# Patient Record
Sex: FEMALE | Race: WHITE | NOT HISPANIC OR LATINO | URBAN - METROPOLITAN AREA
[De-identification: names, ages, dates, MRNs, and addresses within clinical notes are randomized per-mention and may not be internally consistent; named-entity substitution may affect disease eponyms.]

---

## 2015-07-30 RX ORDER — HYDROXYCHLOROQUINE SULFATE 200 MG
2 TABLET ORAL
Qty: 0 | Refills: 0 | COMMUNITY
Start: 2015-07-30

## 2017-01-30 ENCOUNTER — EMERGENCY (EMERGENCY)
Facility: HOSPITAL | Age: 47
LOS: 1 days | Discharge: PSYCHIATRIC OR SUBSTANCE USE FACILITY | End: 2017-01-30
Attending: FAMILY MEDICINE | Admitting: FAMILY MEDICINE
Payer: MEDICAID

## 2017-01-30 VITALS
HEART RATE: 92 BPM | DIASTOLIC BLOOD PRESSURE: 80 MMHG | RESPIRATION RATE: 19 BRPM | OXYGEN SATURATION: 98 % | TEMPERATURE: 99 F | SYSTOLIC BLOOD PRESSURE: 122 MMHG

## 2017-01-30 DIAGNOSIS — F25.0 SCHIZOAFFECTIVE DISORDER, BIPOLAR TYPE: ICD-10-CM

## 2017-01-30 DIAGNOSIS — L93.0 DISCOID LUPUS ERYTHEMATOSUS: ICD-10-CM

## 2017-01-30 DIAGNOSIS — R69 ILLNESS, UNSPECIFIED: ICD-10-CM

## 2017-01-30 DIAGNOSIS — F10.21 ALCOHOL DEPENDENCE, IN REMISSION: ICD-10-CM

## 2017-01-30 LAB
ALBUMIN SERPL ELPH-MCNC: 3.5 G/DL — SIGNIFICANT CHANGE UP (ref 3.3–5)
ALP SERPL-CCNC: 40 U/L — SIGNIFICANT CHANGE UP (ref 40–120)
ALT FLD-CCNC: 20 U/L — SIGNIFICANT CHANGE UP (ref 12–78)
AMPHET UR-MCNC: NEGATIVE — SIGNIFICANT CHANGE UP
ANION GAP SERPL CALC-SCNC: 8 MMOL/L — SIGNIFICANT CHANGE UP (ref 5–17)
APAP SERPL-MCNC: 11 UG/ML — SIGNIFICANT CHANGE UP (ref 10–30)
APPEARANCE UR: CLEAR — SIGNIFICANT CHANGE UP
AST SERPL-CCNC: 14 U/L — LOW (ref 15–37)
BARBITURATES UR SCN-MCNC: NEGATIVE — SIGNIFICANT CHANGE UP
BASOPHILS # BLD AUTO: 0.1 K/UL — SIGNIFICANT CHANGE UP (ref 0–0.2)
BASOPHILS NFR BLD AUTO: 1.1 % — SIGNIFICANT CHANGE UP (ref 0–2)
BENZODIAZ UR-MCNC: NEGATIVE — SIGNIFICANT CHANGE UP
BILIRUB SERPL-MCNC: 0.2 MG/DL — SIGNIFICANT CHANGE UP (ref 0.2–1.2)
BILIRUB UR-MCNC: NEGATIVE — SIGNIFICANT CHANGE UP
BUN SERPL-MCNC: 7 MG/DL — SIGNIFICANT CHANGE UP (ref 7–23)
CALCIUM SERPL-MCNC: 8.9 MG/DL — SIGNIFICANT CHANGE UP (ref 8.5–10.1)
CHLORIDE SERPL-SCNC: 109 MMOL/L — HIGH (ref 96–108)
CO2 SERPL-SCNC: 25 MMOL/L — SIGNIFICANT CHANGE UP (ref 22–31)
COCAINE METAB.OTHER UR-MCNC: NEGATIVE — SIGNIFICANT CHANGE UP
COLOR SPEC: YELLOW — SIGNIFICANT CHANGE UP
CREAT SERPL-MCNC: 0.74 MG/DL — SIGNIFICANT CHANGE UP (ref 0.5–1.3)
DIFF PNL FLD: NEGATIVE — SIGNIFICANT CHANGE UP
EOSINOPHIL # BLD AUTO: 0 K/UL — SIGNIFICANT CHANGE UP (ref 0–0.5)
EOSINOPHIL NFR BLD AUTO: 0.9 % — SIGNIFICANT CHANGE UP (ref 0–6)
ETHANOL SERPL-MCNC: <10 MG/DL — SIGNIFICANT CHANGE UP (ref 0–10)
GLUCOSE SERPL-MCNC: 72 MG/DL — SIGNIFICANT CHANGE UP (ref 70–99)
GLUCOSE UR QL: NEGATIVE MG/DL — SIGNIFICANT CHANGE UP
HCG UR QL: NEGATIVE — SIGNIFICANT CHANGE UP
HCT VFR BLD CALC: 38 % — SIGNIFICANT CHANGE UP (ref 34.5–45)
HGB BLD-MCNC: 12.2 G/DL — SIGNIFICANT CHANGE UP (ref 11.5–15.5)
KETONES UR-MCNC: NEGATIVE — SIGNIFICANT CHANGE UP
LEUKOCYTE ESTERASE UR-ACNC: NEGATIVE — SIGNIFICANT CHANGE UP
LYMPHOCYTES # BLD AUTO: 2 K/UL — SIGNIFICANT CHANGE UP (ref 1–3.3)
LYMPHOCYTES # BLD AUTO: 41.2 % — SIGNIFICANT CHANGE UP (ref 13–44)
MCHC RBC-ENTMCNC: 28.3 PG — SIGNIFICANT CHANGE UP (ref 27–34)
MCHC RBC-ENTMCNC: 31.9 GM/DL — LOW (ref 32–36)
MCV RBC AUTO: 88.7 FL — SIGNIFICANT CHANGE UP (ref 80–100)
METHADONE UR-MCNC: NEGATIVE — SIGNIFICANT CHANGE UP
MONOCYTES # BLD AUTO: 0.2 K/UL — SIGNIFICANT CHANGE UP (ref 0–0.9)
MONOCYTES NFR BLD AUTO: 4.4 % — SIGNIFICANT CHANGE UP (ref 2–14)
NEUTROPHILS # BLD AUTO: 2.6 K/UL — SIGNIFICANT CHANGE UP (ref 1.8–7.4)
NEUTROPHILS NFR BLD AUTO: 52.3 % — SIGNIFICANT CHANGE UP (ref 43–77)
NITRITE UR-MCNC: NEGATIVE — SIGNIFICANT CHANGE UP
OPIATES UR-MCNC: NEGATIVE — SIGNIFICANT CHANGE UP
PCP SPEC-MCNC: SIGNIFICANT CHANGE UP
PCP UR-MCNC: NEGATIVE — SIGNIFICANT CHANGE UP
PH UR: 6 — SIGNIFICANT CHANGE UP (ref 4.8–8)
PLATELET # BLD AUTO: 257 K/UL — SIGNIFICANT CHANGE UP (ref 150–400)
POTASSIUM SERPL-MCNC: 3.8 MMOL/L — SIGNIFICANT CHANGE UP (ref 3.5–5.3)
POTASSIUM SERPL-SCNC: 3.8 MMOL/L — SIGNIFICANT CHANGE UP (ref 3.5–5.3)
PROT SERPL-MCNC: 6.6 GM/DL — SIGNIFICANT CHANGE UP (ref 6–8.3)
PROT UR-MCNC: NEGATIVE MG/DL — SIGNIFICANT CHANGE UP
RBC # BLD: 4.29 M/UL — SIGNIFICANT CHANGE UP (ref 3.8–5.2)
RBC # FLD: 13 % — SIGNIFICANT CHANGE UP (ref 10.3–14.5)
SALICYLATES SERPL-MCNC: <1.7 MG/DL — LOW (ref 2.8–20)
SODIUM SERPL-SCNC: 142 MMOL/L — SIGNIFICANT CHANGE UP (ref 135–145)
SP GR SPEC: 1 — LOW (ref 1.01–1.02)
THC UR QL: NEGATIVE — SIGNIFICANT CHANGE UP
TSH SERPL-MCNC: 2.2 UIU/ML — SIGNIFICANT CHANGE UP (ref 0.36–3.74)
UROBILINOGEN FLD QL: NEGATIVE MG/DL — SIGNIFICANT CHANGE UP
VALPROATE SERPL-MCNC: 24 UG/ML — LOW (ref 50–100)
WBC # BLD: 4.9 K/UL — SIGNIFICANT CHANGE UP (ref 3.8–10.5)
WBC # FLD AUTO: 4.9 K/UL — SIGNIFICANT CHANGE UP (ref 3.8–10.5)

## 2017-01-30 PROCEDURE — 99285 EMERGENCY DEPT VISIT HI MDM: CPT

## 2017-01-30 RX ORDER — ACETAMINOPHEN 500 MG
1000 TABLET ORAL ONCE
Qty: 0 | Refills: 0 | Status: COMPLETED | OUTPATIENT
Start: 2017-01-30 | End: 2017-01-30

## 2017-01-30 RX ADMIN — Medication 1000 MILLIGRAM(S): at 19:03

## 2017-01-30 NOTE — ED ADULT NURSE REASSESSMENT NOTE - NS ED NURSE REASSESS COMMENT FT1
Pt is a 46 y/o A&O x 4 female presents to ED for SI/HI (see triage note for details). Pt cooperative. Psych checklist completed per protocol. CO initiaited for safety. Urine specimen obtained and sent to lab. Will continue to monitor.
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
## 2017-01-30 NOTE — ED PROVIDER NOTE - MEDICAL DECISION MAKING DETAILS
46 y/o F w/ hx of lupus, schizophrenia presents with increased pain and stress recently. Will order labs, tylenol, and psych consult. Plan re-eval.

## 2017-01-30 NOTE — ED BEHAVIORAL HEALTH ASSESSMENT NOTE - CURRENT MEDICATION
Iván Solis NPP-[place]   Depakote 750 mg PO HS  Luvox 150 mg po daily ;Klonopin 0.5 mg po HS; Rexulti 4 mg po daily(for past week)  Metformin 1500 mg po daily     Levothyroxine 75 mcg po daily       Plaquinal 400 mg po daily  Celcept 2000 mg po daily

## 2017-01-30 NOTE — ED BEHAVIORAL HEALTH ASSESSMENT NOTE - HPI (INCLUDE ILLNESS QUALITY, SEVERITY, DURATION, TIMING, CONTEXT, MODIFYING FACTORS, ASSOCIATED SIGNS AND SYMPTOMS)
47 y.o. DWF known to [facility] and [facility] from previous treatment. Pt. is presently in control, easily tearful and describes both physical c/o pain and  c/o auditory hallucinations, chronic pain, impaired sleep/hypersomnia. States has had recent medication changes and "they are not working ". Pt. reports 6 mos. feeling "stable " under care of Dr Chacon with [facility] and "then I changed because of advice and I feel terrible now, the Depakote isn't working" Describes constant pain and no current rheumatologist for Lupus symptoms. Reports compliance with medication. 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 Describes "loves both (type) jobs."  Tearful during interview, maintained good eye contact, articulate and easily engaged. BF stayed with pt. for a period of time and then left. Have not yet reached for collateral info. Attends AA 4x/week, has a "wonderful" sponsor and BF is supportive per pt.   Recommended to come to ED by PCP, MATTHEW Lozano NP.    Pt. states she fears hallucinations will worsen, "and I have to be able to see my kids".   A&Ox4, hx of multiple admissions and several suicide attempts.   Admits to current SI with plan to "cut wrist open". Fears the violence of plan. Admits to paranoid ideations , including IOR.

## 2017-01-30 NOTE — ED PROVIDER NOTE - OBJECTIVE STATEMENT
48 y/o w/hx of herniated disc, DM, Hashimoto's thyroid dx, lupus, schizophrenia, sciatica presents to the ED for psychological issues and overall pain including in the head with onset in the past few days. C/o hearing voices, paranoia, head pain, back pain, finger pain, knee pain, toe pain. States she will hear another tv in another room talking about her. Last SI attempted 6 years ago, currently has thoughts of cutting herself. No visual hallucinations. Pt has seen three different Doctors in the past few weeks for pain. On Depakote ER, 4mg rexulti Butanox. Reports increased pain and stress recently. Denies fever, chills, CP, n/v/d.

## 2017-01-30 NOTE — ED PROVIDER NOTE - MUSCULOSKELETAL, MLM
Spine appears normal, range of motion is not limited. Edema at the PIP joints in bilateral upper extremities. Tender wrists bilaterally.

## 2017-01-30 NOTE — ED PROVIDER NOTE - PROGRESS NOTE DETAILS
Ana at [facility] states that they do not acceopt voluntary patient. Spoke to  on call for psych will with call Dr. Hernandez. She called back and confirmed that it is a new [facility] policy that voluntary admission can not be accepted for transfer. Pt to stay in ER overnight. I Ivná Dockery was the scribe for Dr. Branch. AJM: pt received at signout. seen by psych who has arranged for transfer to inpatient psych. stable for transfer.

## 2017-01-30 NOTE — ED BEHAVIORAL HEALTH NOTE - BEHAVIORAL HEALTH NOTE
Accepted at [facility], precert obtained from [facility]. Voluntary forms signed and faxed to [facility]. Awaiting ambulance at 10:20pm

## 2017-01-30 NOTE — ED ADULT NURSE REASSESSMENT NOTE - NS ED NURSE REASSESS COMMENT FT1
Pt remains assessed as above. Ambulating with steady gait. CO maintained for safety. Awaiting transfer to [facility]. Provided with dinner and water per pt request. Will continue to monitor

## 2017-01-30 NOTE — ED BEHAVIORAL HEALTH ASSESSMENT NOTE - SUICIDE RISK FACTORS
Chronic pain or acute medical issue/Access to means (pills, firearms, etc.)/Unable to engage in safety planning/Agitation/severe anxiety/Mood episode
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
## 2017-01-30 NOTE — ED PROVIDER NOTE - NS ED MD SCRIBE ATTENDING SCRIBE SECTIONS
HISTORY OF PRESENT ILLNESS/PHYSICAL EXAM/VITAL SIGNS( Pullset)/PAST MEDICAL/SURGICAL/SOCIAL HISTORY/REVIEW OF SYSTEMS/DISPOSITION

## 2017-01-30 NOTE — ED BEHAVIORAL HEALTH ASSESSMENT NOTE - SUICIDE PROTECTIVE FACTORS
Supportive social network or family/Engaged in work or school/Positive therapeutic relationships/Responsibility to family and others/Identifies reasons for living

## 2017-01-30 NOTE — ED ADULT NURSE NOTE - NS ED NURSE LEVEL OF CONSCIOUSNESS ORIENTATION
Ideation - suicidal/Oriented - self; Oriented - place; Oriented - time/Ideation - homicidal/Hallucination - audio

## 2017-01-30 NOTE — ED PROVIDER NOTE - PMH
delivery delivered    Other forms of systemic lupus erythematosus, unspecified organ involvement status    Schizo-affective schizophrenia

## 2017-01-30 NOTE — ED BEHAVIORAL HEALTH ASSESSMENT NOTE - DETAILS
see above states has OD'd in past and cut self rash: Lamictal, gained 15 lbs since started Depakote in 12/16 parents/undiagnosed; cousin w/ Bipolar "many" c/o back pain and "all over" muscle pains transfer faxed clinicals

## 2017-01-31 VITALS
OXYGEN SATURATION: 100 % | SYSTOLIC BLOOD PRESSURE: 127 MMHG | DIASTOLIC BLOOD PRESSURE: 81 MMHG | HEART RATE: 65 BPM | RESPIRATION RATE: 20 BRPM

## 2017-01-31 PROCEDURE — 93010 ELECTROCARDIOGRAM REPORT: CPT

## 2017-01-31 RX ORDER — DIVALPROEX SODIUM 500 MG/1
750 TABLET, DELAYED RELEASE ORAL ONCE
Qty: 0 | Refills: 0 | Status: COMPLETED | OUTPATIENT
Start: 2017-01-31 | End: 2017-01-31

## 2017-01-31 RX ORDER — HYDROXYCHLOROQUINE SULFATE 200 MG
400 TABLET ORAL ONCE
Qty: 0 | Refills: 0 | Status: COMPLETED | OUTPATIENT
Start: 2017-01-31 | End: 2017-01-31

## 2017-01-31 RX ORDER — IBUPROFEN 200 MG
600 TABLET ORAL ONCE
Qty: 0 | Refills: 0 | Status: COMPLETED | OUTPATIENT
Start: 2017-01-31 | End: 2017-01-31

## 2017-01-31 RX ORDER — CLONAZEPAM 1 MG
0.5 TABLET ORAL ONCE
Qty: 0 | Refills: 0 | Status: DISCONTINUED | OUTPATIENT
Start: 2017-01-31 | End: 2017-01-31

## 2017-01-31 RX ADMIN — Medication 400 MILLIGRAM(S): at 03:29

## 2017-01-31 RX ADMIN — Medication 600 MILLIGRAM(S): at 03:33

## 2017-01-31 RX ADMIN — DIVALPROEX SODIUM 750 MILLIGRAM(S): 500 TABLET, DELAYED RELEASE ORAL at 03:10

## 2017-01-31 RX ADMIN — Medication 0.5 MILLIGRAM(S): at 03:07

## 2017-01-31 NOTE — ED ADULT NURSE REASSESSMENT NOTE - STATUS
awaiting transfer, assessment change/pt is a/ox3, pt is personable and appropriate, receptive to care.   Pt awaiting bed at other psych facility.

## 2017-01-31 NOTE — ED BEHAVIORAL HEALTH NOTE - BEHAVIORAL HEALTH NOTE
Pt accepted at [facility] on Voluntary status today.  Accepting MD max Mccurdy.  Transportation set up with [facility] for within the hour of 1030 am/  [facility] [place] [id number].  Jaja Nolasco is contact for next [facility] .  SW email sent requesting for transportation [facility].

## 2017-02-11 ENCOUNTER — TRANSCRIPTION ENCOUNTER (OUTPATIENT)
Age: 47
End: 2017-02-11

## 2017-04-16 ENCOUNTER — EMERGENCY (EMERGENCY)
Facility: HOSPITAL | Age: 47
LOS: 0 days | Discharge: HOME | End: 2017-04-16
Attending: EMERGENCY MEDICINE | Admitting: EMERGENCY MEDICINE
Payer: COMMERCIAL

## 2017-04-16 VITALS
DIASTOLIC BLOOD PRESSURE: 64 MMHG | HEIGHT: 63 IN | RESPIRATION RATE: 18 BRPM | OXYGEN SATURATION: 100 % | TEMPERATURE: 99 F | SYSTOLIC BLOOD PRESSURE: 104 MMHG | WEIGHT: 156.09 LBS | HEART RATE: 89 BPM
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
## 2017-04-16 DIAGNOSIS — S49.91XA UNSPECIFIED INJURY OF RIGHT SHOULDER AND UPPER ARM, INITIAL ENCOUNTER: ICD-10-CM

## 2017-04-16 DIAGNOSIS — S40.011A CONTUSION OF RIGHT SHOULDER, INITIAL ENCOUNTER: ICD-10-CM
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
## 2017-04-16 PROCEDURE — 99283 EMERGENCY DEPT VISIT LOW MDM: CPT

## 2017-04-16 NOTE — ED PROVIDER NOTE - NS ED MD SCRIBE ATTENDING SCRIBE SECTIONS
DISPOSITION/HISTORY OF PRESENT ILLNESS/PAST MEDICAL/SURGICAL/SOCIAL HISTORY/REVIEW OF SYSTEMS/PROGRESS NOTE/PHYSICAL EXAM/RESULTS
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
## 2017-04-16 NOTE — ED PROVIDER NOTE - DETAILS:
Dr. Gillespie: I performed the initial face to face bedside interview with this patient regarding history of present illness, review of symptoms and past medical, social and family history.  I completed an independent physical examination.  I was the initial provider who evaluated this patient.  The history, review of symptoms and examination was documented by the scribe in my presence and I attest to the accuracy of the documentation.  I have signed out the follow up of any pending tests (i.e. labs, radiological studies) to the ACP.  I have discussed the patient’s plan of care and disposition with the ACP.  Return to the ER immediately for any worsening symptoms, concerns, chest pain, fevers, shortness of breath, vomiting, abdominal pain, rashes, neck pain, back pain, numbness, paresthesias, pain or any difficulties at all.  Please follow up with your own private physician or our medical clinic at [phone] in the next 2-3 days.  Find a doctor at [phone].
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
## 2017-04-16 NOTE — ED PROVIDER NOTE - MEDICAL DECISION MAKING DETAILS
Pt currently calm, denying any imaging at this time and informed to come back to ED with any worsening of symptoms.

## 2017-06-11 ENCOUNTER — TRANSCRIPTION ENCOUNTER (OUTPATIENT)
Age: 47
End: 2017-06-11

## 2017-06-13 ENCOUNTER — EMERGENCY (EMERGENCY)
Facility: HOSPITAL | Age: 47
LOS: 0 days | Discharge: HOME | End: 2017-06-13
Attending: EMERGENCY MEDICINE | Admitting: EMERGENCY MEDICINE
Payer: MEDICAID

## 2017-06-13 VITALS
SYSTOLIC BLOOD PRESSURE: 121 MMHG | HEART RATE: 68 BPM | RESPIRATION RATE: 18 BRPM | OXYGEN SATURATION: 98 % | DIASTOLIC BLOOD PRESSURE: 78 MMHG | TEMPERATURE: 98 F

## 2017-06-13 VITALS — HEIGHT: 67 IN | WEIGHT: 156.97 LBS

## 2017-06-13 DIAGNOSIS — F43.23 ADJUSTMENT DISORDER WITH MIXED ANXIETY AND DEPRESSED MOOD: ICD-10-CM

## 2017-06-13 DIAGNOSIS — F31.76 BIPOLAR DISORDER, IN FULL REMISSION, MOST RECENT EPISODE DEPRESSED: ICD-10-CM

## 2017-06-13 LAB
ALBUMIN SERPL ELPH-MCNC: 3.5 G/DL — SIGNIFICANT CHANGE UP (ref 3.3–5)
ALP SERPL-CCNC: 50 U/L — SIGNIFICANT CHANGE UP (ref 40–120)
ALT FLD-CCNC: 14 U/L — SIGNIFICANT CHANGE UP (ref 12–78)
AMPHET UR-MCNC: NEGATIVE — SIGNIFICANT CHANGE UP
ANION GAP SERPL CALC-SCNC: 7 MMOL/L — SIGNIFICANT CHANGE UP (ref 5–17)
APAP SERPL-MCNC: 16 UG/ML — SIGNIFICANT CHANGE UP (ref 10–30)
APPEARANCE UR: CLEAR — SIGNIFICANT CHANGE UP
AST SERPL-CCNC: 15 U/L — SIGNIFICANT CHANGE UP (ref 15–37)
BARBITURATES UR SCN-MCNC: NEGATIVE — SIGNIFICANT CHANGE UP
BASOPHILS # BLD AUTO: 0 K/UL — SIGNIFICANT CHANGE UP (ref 0–0.2)
BASOPHILS NFR BLD AUTO: 0.8 % — SIGNIFICANT CHANGE UP (ref 0–2)
BENZODIAZ UR-MCNC: NEGATIVE — SIGNIFICANT CHANGE UP
BILIRUB SERPL-MCNC: 0.2 MG/DL — SIGNIFICANT CHANGE UP (ref 0.2–1.2)
BILIRUB UR-MCNC: NEGATIVE — SIGNIFICANT CHANGE UP
BUN SERPL-MCNC: 14 MG/DL — SIGNIFICANT CHANGE UP (ref 7–23)
CALCIUM SERPL-MCNC: 8.5 MG/DL — SIGNIFICANT CHANGE UP (ref 8.5–10.1)
CHLORIDE SERPL-SCNC: 111 MMOL/L — HIGH (ref 96–108)
CO2 SERPL-SCNC: 22 MMOL/L — SIGNIFICANT CHANGE UP (ref 22–31)
COCAINE METAB.OTHER UR-MCNC: NEGATIVE — SIGNIFICANT CHANGE UP
COLOR SPEC: YELLOW — SIGNIFICANT CHANGE UP
CREAT SERPL-MCNC: 0.86 MG/DL — SIGNIFICANT CHANGE UP (ref 0.5–1.3)
DIFF PNL FLD: NEGATIVE — SIGNIFICANT CHANGE UP
EOSINOPHIL # BLD AUTO: 0.1 K/UL — SIGNIFICANT CHANGE UP (ref 0–0.5)
EOSINOPHIL NFR BLD AUTO: 1.2 % — SIGNIFICANT CHANGE UP (ref 0–6)
ETHANOL SERPL-MCNC: <10 MG/DL — SIGNIFICANT CHANGE UP (ref 0–10)
GLUCOSE SERPL-MCNC: 102 MG/DL — HIGH (ref 70–99)
GLUCOSE UR QL: NEGATIVE MG/DL — SIGNIFICANT CHANGE UP
HCT VFR BLD CALC: 36.9 % — SIGNIFICANT CHANGE UP (ref 34.5–45)
HGB BLD-MCNC: 12.6 G/DL — SIGNIFICANT CHANGE UP (ref 11.5–15.5)
KETONES UR-MCNC: NEGATIVE — SIGNIFICANT CHANGE UP
LEUKOCYTE ESTERASE UR-ACNC: NEGATIVE — SIGNIFICANT CHANGE UP
LITHIUM SERPL-MCNC: 0.3 MMOL/L — LOW (ref 0.6–1.2)
LYMPHOCYTES # BLD AUTO: 1.6 K/UL — SIGNIFICANT CHANGE UP (ref 1–3.3)
LYMPHOCYTES # BLD AUTO: 28.5 % — SIGNIFICANT CHANGE UP (ref 13–44)
MCHC RBC-ENTMCNC: 30.3 PG — SIGNIFICANT CHANGE UP (ref 27–34)
MCHC RBC-ENTMCNC: 34.2 GM/DL — SIGNIFICANT CHANGE UP (ref 32–36)
MCV RBC AUTO: 88.6 FL — SIGNIFICANT CHANGE UP (ref 80–100)
METHADONE UR-MCNC: NEGATIVE — SIGNIFICANT CHANGE UP
MONOCYTES # BLD AUTO: 0.3 K/UL — SIGNIFICANT CHANGE UP (ref 0–0.9)
MONOCYTES NFR BLD AUTO: 5.5 % — SIGNIFICANT CHANGE UP (ref 2–14)
NEUTROPHILS # BLD AUTO: 3.5 K/UL — SIGNIFICANT CHANGE UP (ref 1.8–7.4)
NEUTROPHILS NFR BLD AUTO: 64 % — SIGNIFICANT CHANGE UP (ref 43–77)
NITRITE UR-MCNC: NEGATIVE — SIGNIFICANT CHANGE UP
OPIATES UR-MCNC: NEGATIVE — SIGNIFICANT CHANGE UP
PCP SPEC-MCNC: SIGNIFICANT CHANGE UP
PCP UR-MCNC: NEGATIVE — SIGNIFICANT CHANGE UP
PH UR: 5 — SIGNIFICANT CHANGE UP (ref 5–8)
PLATELET # BLD AUTO: 264 K/UL — SIGNIFICANT CHANGE UP (ref 150–400)
POTASSIUM SERPL-MCNC: 4.7 MMOL/L — SIGNIFICANT CHANGE UP (ref 3.5–5.3)
POTASSIUM SERPL-SCNC: 4.7 MMOL/L — SIGNIFICANT CHANGE UP (ref 3.5–5.3)
PROT SERPL-MCNC: 6.5 GM/DL — SIGNIFICANT CHANGE UP (ref 6–8.3)
PROT UR-MCNC: NEGATIVE MG/DL — SIGNIFICANT CHANGE UP
RBC # BLD: 4.16 M/UL — SIGNIFICANT CHANGE UP (ref 3.8–5.2)
RBC # FLD: 13.3 % — SIGNIFICANT CHANGE UP (ref 10.3–14.5)
SALICYLATES SERPL-MCNC: <1.7 MG/DL — LOW (ref 2.8–20)
SODIUM SERPL-SCNC: 140 MMOL/L — SIGNIFICANT CHANGE UP (ref 135–145)
SP GR SPEC: 1 — LOW (ref 1.01–1.02)
THC UR QL: NEGATIVE — SIGNIFICANT CHANGE UP
TSH SERPL-MCNC: 1.62 UU/ML — SIGNIFICANT CHANGE UP (ref 0.36–3.74)
UROBILINOGEN FLD QL: NEGATIVE MG/DL — SIGNIFICANT CHANGE UP
WBC # BLD: 5.5 K/UL — SIGNIFICANT CHANGE UP (ref 3.8–10.5)
WBC # FLD AUTO: 5.5 K/UL — SIGNIFICANT CHANGE UP (ref 3.8–10.5)

## 2017-06-13 PROCEDURE — 93010 ELECTROCARDIOGRAM REPORT: CPT

## 2017-06-13 PROCEDURE — 99284 EMERGENCY DEPT VISIT MOD MDM: CPT

## 2017-06-13 NOTE — ED PROVIDER NOTE - MEDICAL DECISION MAKING DETAILS
48 yo WF, PMH colitis, hypothyroid, DM, bipolar, p/w feeling emotionally upset, some racing thoughts, suicidal ideation, allegedly threatened to stab herself w/ a fork.  Psych labs, 1:1 observation, Psych eval.

## 2017-06-13 NOTE — ED PROVIDER NOTE - DETAILS:
The scribe's documentation has been prepared under my direction and personally reviewed by me in its entirety. I confirm that the note above accurately reflects all work, treatment, procedures, and medical decision making performed by me (Dr. Olivarez).

## 2017-06-13 NOTE — ED BEHAVIORAL HEALTH ASSESSMENT NOTE - SUMMARY
47 y.o. DWF with long history of Bipolar illness, hospitalizations and medication management, stable since last admission at [facility] and with current providers, bib self due to increasing depression and SI this AM with intrusive thoughts, but no intent, to stab self with a fork.  She later recognized during eval she was upset her guests are still living in her home and wants them to leave and to have her former life back.  She admitted to inviting them in when she learned they were homeless but now it is interfering with her medical and mental health,  triggering symptoms of colitis and what she perceived to be markus.  She became calm when she realized she could address the issue directly rather than avoid the problem and potentially inducing further distress, and psychiatric symptoms.  No acute psychiatric symptoms have been reported.  Pt has follow up appoint with her psych MD tomorrow.  Attempted notification of Dr Chacon and message left for return call.  Pt SO Mika is supportive and does not feel she is a safety risk at this time.  Discussed safety plan to return to ED if she developed SI or feels unsafe.  Pt planning on staying with sister for immediate time.

## 2017-06-13 NOTE — ED BEHAVIORAL HEALTH ASSESSMENT NOTE - SUICIDE PROTECTIVE FACTORS
Supportive social network or family/Identifies reasons for living/Responsibility to family and others/Positive therapeutic relationships

## 2017-06-13 NOTE — ED PROVIDER NOTE - OBJECTIVE STATEMENT
46 y/o female with a h/o recent colitis over the passed 2 weeks, bipolar disorder on lithium 450 mg, OCD, lupus, hashimoto's thyroiditis, DM on metformin, stating she believes she is having a manic episode. Pt says she believes people are talking about her over the passed 3-4 days. +paranoia, SI and sleep disturbance. Denies visual or auditory hallucinations, or HI. No specific suicidal plan. Per boyfriend at bedside, he says pt was stating she was going to stab herself with a fork. Pt is on klonopin, effexor, luvox 100 mg. Dr. Lozano (PCP), Dr. Elaine (GI). Non-smoker, non-drinker, no illicit drug use. LMP June 6th.

## 2017-06-13 NOTE — ED ADULT NURSE NOTE - CHPI ED SYMPTOMS NEG
no weight loss/no change in level of consciousness/no agitation/no weakness/no disorientation/no confusion

## 2017-06-13 NOTE — ED PROVIDER NOTE - ENMT, MLM
Airway patent, Nasal mucosa clear. Mouth with mildly dry mucosa. Throat has no vesicles, no oropharyngeal exudates and uvula is midline. Neck is non-tender and supple.

## 2017-06-13 NOTE — ED BEHAVIORAL HEALTH ASSESSMENT NOTE - CURRENT MEDICATION
Iván Solis NPP-[facility]   [place] 450 CR , (LL pending)  Luvox 100 mg po daily ;Klonopin 0.5 mg po HS; Rexulti 4 mg po daily(for past week)  Metformin 1500 mg po daily     Levothyroxine 75 mcg po daily       Plaquinal 400 mg po daily  Celcept 2000 mg po daily

## 2017-06-13 NOTE — ED BEHAVIORAL HEALTH ASSESSMENT NOTE - HPI (INCLUDE ILLNESS QUALITY, SEVERITY, DURATION, TIMING, CONTEXT, MODIFYING FACTORS, ASSOCIATED SIGNS AND SYMPTOMS)
47 y.o. DWF known to [facility] and [facility] from previous treatment. Pt. is presently in control,  no acute psychiatric symptoms, PPH OCD Bipolar by History, Alcohol abuse in remission x 6 yrs, non caregiver ( has custody of 2 children), multiple psych admissions and one SA in late 20'2 by OD 10 Xanax, most recent psych admission was Jan, 2017 for SI and psychotic sym toms, paranoia, which today she refutes stating her psychiatrist thinks she has OCD with racing thoughts, PMH Fany, NIDD< Hashimoto and Colitis, bib self due to racing thoughts and thoughts of self harm.    Pt reports racing intrusive thoughts and agitation she attributes to a developing manic episode.  She rep[orts today she had thought to stab self with a fork, but did not because she does not want to die and because of her children and effects on them and SO, Mika.  Pt expressed situational stress and escalation of depression and colitis and was seen today in MD office in [facility] for exacerbation of colitis.  She reports she and live in boyfriend allowed Mika 2 friend to move in with their cats 3 months ago temporarily, who are homeless, but are still there and she wants her life and routine back.  She liked to work out in her basement, but their cats are housed there, and Pt wants her privacy again but is afraid to ask them to leave for fear of feeling backlash from guilt.  Pt became tearful when she realized what was causing her stress and felt she did not need to be admitted but needed to take care of this issue.  Circumstance discussed with Mika who agreed on addressing this with their guests and Mika will be supportive and address things with them.  Pt denies acute impairment of mood, SIIP, HIIP, and feels she would feel stable, if they were not there. Pt sleeps from 10pm to 5 am and denies markus, or sleeplessness.  Pt spoke to Her psychiatrist who agreed to see her tomorrow for emergency visit.  Boyfriend Mika denies safety concern of acting on her thoughts and agrees this is an issue which is affecting her medical and mental wellbeing.

## 2017-06-13 NOTE — ED ADULT NURSE NOTE - OBJECTIVE STATEMENT
Pt is a 47y female, A & O x 4, VSS, presents to ed w/ manic sx's, pt states she has had hallucinations stating she should hurt herself, pt denies SA, no plan, + SI, pt calm, appropriate, all belongings removed, placed with security, security called to eddi pt, pt in no apparent distress, bed rails up, friend at bedside, 1:1 at bedside, will continue to monitor,. Pt is a 47y female, A & O x 4, VSS, presents to ed w/ manic sx's, pt states she has had hallucinations stating she should hurt herself, threatened to stab herself w/ a fork, , pt calm, appropriate at this time, all belongings removed, placed with security, security called to eddi pt, pt in no apparent distress, bed rails up, friend at bedside, 1:1 at bedside, will continue to monitor,.

## 2017-06-13 NOTE — ED BEHAVIORAL HEALTH ASSESSMENT NOTE - DESCRIPTION
Pt is in good control, calm, and tearful when she identified her stress in context to house guests who stayed too long.  Pt denies SI/HI/AH/delusions and presents psychiatrically stable. Lupus, DMII, Vasculitis in eye, Hashimoto's ETOH
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
## 2017-06-13 NOTE — ED PROVIDER NOTE - MUSCULOSKELETAL, MLM
CARR x4 with no focal swelling or tenderness. Spine appears normal, range of motion is not limited, no muscle or joint tenderness

## 2017-06-13 NOTE — ED BEHAVIORAL HEALTH ASSESSMENT NOTE - DETAILS
children are in custody of their father states has OD'd in past and cut self rash: Lamictal, gained 15 lbs since started Depakote in 12/16 parents/undiagnosed; cousin w/ Bipolar "many" c/o back pain and "all over" Dr Olivarez

## 2017-06-15 DIAGNOSIS — M32.9 SYSTEMIC LUPUS ERYTHEMATOSUS, UNSPECIFIED: ICD-10-CM

## 2017-06-15 DIAGNOSIS — F43.23 ADJUSTMENT DISORDER WITH MIXED ANXIETY AND DEPRESSED MOOD: ICD-10-CM

## 2017-06-15 DIAGNOSIS — E11.9 TYPE 2 DIABETES MELLITUS WITHOUT COMPLICATIONS: ICD-10-CM

## 2017-06-15 DIAGNOSIS — Z79.84 LONG TERM (CURRENT) USE OF ORAL HYPOGLYCEMIC DRUGS: ICD-10-CM

## 2017-06-15 DIAGNOSIS — F31.76 BIPOLAR DISORDER, IN FULL REMISSION, MOST RECENT EPISODE DEPRESSED: ICD-10-CM

## 2017-06-29 ENCOUNTER — INPATIENT (INPATIENT)
Facility: HOSPITAL | Age: 47
LOS: 5 days | Discharge: HOME | End: 2017-07-05
Attending: PSYCHIATRY & NEUROLOGY | Admitting: PSYCHIATRY & NEUROLOGY
Payer: MEDICAID

## 2017-06-29 VITALS — WEIGHT: 156.97 LBS

## 2017-06-29 DIAGNOSIS — K51.911 ULCERATIVE COLITIS, UNSPECIFIED WITH RECTAL BLEEDING: ICD-10-CM

## 2017-06-29 DIAGNOSIS — E11.9 TYPE 2 DIABETES MELLITUS WITHOUT COMPLICATIONS: ICD-10-CM

## 2017-06-29 DIAGNOSIS — H35.069 RETINAL VASCULITIS, UNSPECIFIED EYE: ICD-10-CM

## 2017-06-29 DIAGNOSIS — F25.0 SCHIZOAFFECTIVE DISORDER, BIPOLAR TYPE: ICD-10-CM

## 2017-06-29 DIAGNOSIS — R69 ILLNESS, UNSPECIFIED: ICD-10-CM

## 2017-06-29 DIAGNOSIS — F10.21 ALCOHOL DEPENDENCE, IN REMISSION: ICD-10-CM

## 2017-06-29 DIAGNOSIS — E06.3 AUTOIMMUNE THYROIDITIS: ICD-10-CM

## 2017-06-29 DIAGNOSIS — F42.9 OBSESSIVE-COMPULSIVE DISORDER, UNSPECIFIED: ICD-10-CM

## 2017-06-29 DIAGNOSIS — F31.30 BIPOLAR DISORDER, CURRENT EPISODE DEPRESSED, MILD OR MODERATE SEVERITY, UNSPECIFIED: ICD-10-CM

## 2017-06-29 DIAGNOSIS — M32.8 OTHER FORMS OF SYSTEMIC LUPUS ERYTHEMATOSUS: ICD-10-CM

## 2017-06-29 LAB
ALBUMIN SERPL ELPH-MCNC: 3.8 G/DL — SIGNIFICANT CHANGE UP (ref 3.3–5)
ALP SERPL-CCNC: 40 U/L — SIGNIFICANT CHANGE UP (ref 40–120)
ALT FLD-CCNC: 19 U/L — SIGNIFICANT CHANGE UP (ref 12–78)
AMPHET UR-MCNC: NEGATIVE — SIGNIFICANT CHANGE UP
ANION GAP SERPL CALC-SCNC: 7 MMOL/L — SIGNIFICANT CHANGE UP (ref 5–17)
APAP SERPL-MCNC: <2 UG/ML — LOW (ref 10–30)
APPEARANCE UR: CLEAR — SIGNIFICANT CHANGE UP
AST SERPL-CCNC: 20 U/L — SIGNIFICANT CHANGE UP (ref 15–37)
BARBITURATES UR SCN-MCNC: NEGATIVE — SIGNIFICANT CHANGE UP
BASOPHILS # BLD AUTO: 0 K/UL — SIGNIFICANT CHANGE UP (ref 0–0.2)
BASOPHILS NFR BLD AUTO: 0.7 % — SIGNIFICANT CHANGE UP (ref 0–2)
BENZODIAZ UR-MCNC: NEGATIVE — SIGNIFICANT CHANGE UP
BILIRUB SERPL-MCNC: 0.2 MG/DL — SIGNIFICANT CHANGE UP (ref 0.2–1.2)
BILIRUB UR-MCNC: NEGATIVE — SIGNIFICANT CHANGE UP
BUN SERPL-MCNC: 11 MG/DL — SIGNIFICANT CHANGE UP (ref 7–23)
CALCIUM SERPL-MCNC: 8.5 MG/DL — SIGNIFICANT CHANGE UP (ref 8.5–10.1)
CHLORIDE SERPL-SCNC: 111 MMOL/L — HIGH (ref 96–108)
CO2 SERPL-SCNC: 22 MMOL/L — SIGNIFICANT CHANGE UP (ref 22–31)
COCAINE METAB.OTHER UR-MCNC: NEGATIVE — SIGNIFICANT CHANGE UP
COLOR SPEC: YELLOW — SIGNIFICANT CHANGE UP
CREAT SERPL-MCNC: 0.96 MG/DL — SIGNIFICANT CHANGE UP (ref 0.5–1.3)
DIFF PNL FLD: NEGATIVE — SIGNIFICANT CHANGE UP
EOSINOPHIL # BLD AUTO: 0 K/UL — SIGNIFICANT CHANGE UP (ref 0–0.5)
EOSINOPHIL NFR BLD AUTO: 0.9 % — SIGNIFICANT CHANGE UP (ref 0–6)
ETHANOL SERPL-MCNC: <10 MG/DL — SIGNIFICANT CHANGE UP (ref 0–10)
GLUCOSE SERPL-MCNC: 122 MG/DL — HIGH (ref 70–99)
GLUCOSE UR QL: NEGATIVE MG/DL — SIGNIFICANT CHANGE UP
HCT VFR BLD CALC: 38.1 % — SIGNIFICANT CHANGE UP (ref 34.5–45)
HGB BLD-MCNC: 12.7 G/DL — SIGNIFICANT CHANGE UP (ref 11.5–15.5)
KETONES UR-MCNC: NEGATIVE — SIGNIFICANT CHANGE UP
LEUKOCYTE ESTERASE UR-ACNC: NEGATIVE — SIGNIFICANT CHANGE UP
LITHIUM SERPL-MCNC: 0.3 MMOL/L — LOW (ref 0.6–1.2)
LYMPHOCYTES # BLD AUTO: 1.4 K/UL — SIGNIFICANT CHANGE UP (ref 1–3.3)
LYMPHOCYTES # BLD AUTO: 24.4 % — SIGNIFICANT CHANGE UP (ref 13–44)
MCHC RBC-ENTMCNC: 29.2 PG — SIGNIFICANT CHANGE UP (ref 27–34)
MCHC RBC-ENTMCNC: 33.4 GM/DL — SIGNIFICANT CHANGE UP (ref 32–36)
MCV RBC AUTO: 87.5 FL — SIGNIFICANT CHANGE UP (ref 80–100)
METHADONE UR-MCNC: NEGATIVE — SIGNIFICANT CHANGE UP
MONOCYTES # BLD AUTO: 0.2 K/UL — SIGNIFICANT CHANGE UP (ref 0–0.9)
MONOCYTES NFR BLD AUTO: 2.7 % — SIGNIFICANT CHANGE UP (ref 2–14)
NEUTROPHILS # BLD AUTO: 4.1 K/UL — SIGNIFICANT CHANGE UP (ref 1.8–7.4)
NEUTROPHILS NFR BLD AUTO: 71.4 % — SIGNIFICANT CHANGE UP (ref 43–77)
NITRITE UR-MCNC: NEGATIVE — SIGNIFICANT CHANGE UP
OPIATES UR-MCNC: NEGATIVE — SIGNIFICANT CHANGE UP
PCP SPEC-MCNC: SIGNIFICANT CHANGE UP
PCP UR-MCNC: NEGATIVE — SIGNIFICANT CHANGE UP
PH UR: 5 — SIGNIFICANT CHANGE UP (ref 5–8)
PLATELET # BLD AUTO: 254 K/UL — SIGNIFICANT CHANGE UP (ref 150–400)
POTASSIUM SERPL-MCNC: 4.1 MMOL/L — SIGNIFICANT CHANGE UP (ref 3.5–5.3)
POTASSIUM SERPL-SCNC: 4.1 MMOL/L — SIGNIFICANT CHANGE UP (ref 3.5–5.3)
PROT SERPL-MCNC: 6.8 GM/DL — SIGNIFICANT CHANGE UP (ref 6–8.3)
PROT UR-MCNC: NEGATIVE MG/DL — SIGNIFICANT CHANGE UP
RBC # BLD: 4.35 M/UL — SIGNIFICANT CHANGE UP (ref 3.8–5.2)
RBC # FLD: 13.3 % — SIGNIFICANT CHANGE UP (ref 10.3–14.5)
SALICYLATES SERPL-MCNC: 2 MG/DL — LOW (ref 2.8–20)
SODIUM SERPL-SCNC: 140 MMOL/L — SIGNIFICANT CHANGE UP (ref 135–145)
SP GR SPEC: 1 — LOW (ref 1.01–1.02)
THC UR QL: NEGATIVE — SIGNIFICANT CHANGE UP
TSH SERPL-MCNC: 3.46 UIU/ML — SIGNIFICANT CHANGE UP (ref 0.36–3.74)
UROBILINOGEN FLD QL: NEGATIVE MG/DL — SIGNIFICANT CHANGE UP
WBC # BLD: 5.7 K/UL — SIGNIFICANT CHANGE UP (ref 3.8–10.5)
WBC # FLD AUTO: 5.7 K/UL — SIGNIFICANT CHANGE UP (ref 3.8–10.5)

## 2017-06-29 PROCEDURE — 99285 EMERGENCY DEPT VISIT HI MDM: CPT

## 2017-06-29 PROCEDURE — 93010 ELECTROCARDIOGRAM REPORT: CPT

## 2017-06-29 RX ORDER — DIVALPROEX SODIUM 500 MG/1
0 TABLET, DELAYED RELEASE ORAL
Qty: 0 | Refills: 0 | COMMUNITY

## 2017-06-29 RX ORDER — HALOPERIDOL DECANOATE 100 MG/ML
5 INJECTION INTRAMUSCULAR ONCE
Qty: 0 | Refills: 0 | Status: DISCONTINUED | OUTPATIENT
Start: 2017-06-29 | End: 2017-07-05

## 2017-06-29 RX ORDER — LITHIUM CARBONATE 300 MG/1
300 TABLET, EXTENDED RELEASE ORAL
Qty: 0 | Refills: 0 | Status: DISCONTINUED | OUTPATIENT
Start: 2017-06-29 | End: 2017-06-30

## 2017-06-29 RX ORDER — DEXTROSE 50 % IN WATER 50 %
12.5 SYRINGE (ML) INTRAVENOUS ONCE
Qty: 0 | Refills: 0 | Status: DISCONTINUED | OUTPATIENT
Start: 2017-06-29 | End: 2017-07-05

## 2017-06-29 RX ORDER — IBUPROFEN 200 MG
600 TABLET ORAL EVERY 8 HOURS
Qty: 0 | Refills: 0 | Status: DISCONTINUED | OUTPATIENT
Start: 2017-06-29 | End: 2017-06-30

## 2017-06-29 RX ORDER — DEXTROSE 50 % IN WATER 50 %
1 SYRINGE (ML) INTRAVENOUS ONCE
Qty: 0 | Refills: 0 | Status: DISCONTINUED | OUTPATIENT
Start: 2017-06-29 | End: 2017-07-05

## 2017-06-29 RX ORDER — MESALAMINE 400 MG
1000 TABLET, DELAYED RELEASE (ENTERIC COATED) ORAL AT BEDTIME
Qty: 0 | Refills: 0 | Status: DISCONTINUED | OUTPATIENT
Start: 2017-06-29 | End: 2017-07-05

## 2017-06-29 RX ORDER — CLONAZEPAM 1 MG
0.5 TABLET ORAL
Qty: 0 | Refills: 0 | COMMUNITY
Start: 2017-06-29

## 2017-06-29 RX ORDER — CLONAZEPAM 1 MG
0 TABLET ORAL
Qty: 0 | Refills: 0 | COMMUNITY

## 2017-06-29 RX ORDER — DIPHENHYDRAMINE HCL 50 MG
50 CAPSULE ORAL ONCE
Qty: 0 | Refills: 0 | Status: DISCONTINUED | OUTPATIENT
Start: 2017-06-29 | End: 2017-07-05

## 2017-06-29 RX ORDER — LITHIUM CARBONATE 300 MG/1
225 TABLET, EXTENDED RELEASE ORAL
Qty: 0 | Refills: 0 | Status: DISCONTINUED | OUTPATIENT
Start: 2017-06-29 | End: 2017-06-29

## 2017-06-29 RX ORDER — METFORMIN HYDROCHLORIDE 850 MG/1
500 TABLET ORAL
Qty: 0 | Refills: 0 | Status: DISCONTINUED | OUTPATIENT
Start: 2017-06-29 | End: 2017-06-30

## 2017-06-29 RX ORDER — VENLAFAXINE HCL 75 MG
75 CAPSULE, EXT RELEASE 24 HR ORAL DAILY
Qty: 0 | Refills: 0 | Status: DISCONTINUED | OUTPATIENT
Start: 2017-06-29 | End: 2017-06-30

## 2017-06-29 RX ORDER — HYDROXYCHLOROQUINE SULFATE 200 MG
400 TABLET ORAL AT BEDTIME
Qty: 0 | Refills: 0 | Status: DISCONTINUED | OUTPATIENT
Start: 2017-06-29 | End: 2017-07-05

## 2017-06-29 RX ORDER — SODIUM CHLORIDE 9 MG/ML
1000 INJECTION, SOLUTION INTRAVENOUS
Qty: 0 | Refills: 0 | Status: DISCONTINUED | OUTPATIENT
Start: 2017-06-29 | End: 2017-07-05

## 2017-06-29 RX ORDER — DEXTROSE 50 % IN WATER 50 %
25 SYRINGE (ML) INTRAVENOUS ONCE
Qty: 0 | Refills: 0 | Status: DISCONTINUED | OUTPATIENT
Start: 2017-06-29 | End: 2017-07-05

## 2017-06-29 RX ORDER — GLUCAGON INJECTION, SOLUTION 0.5 MG/.1ML
1 INJECTION, SOLUTION SUBCUTANEOUS ONCE
Qty: 0 | Refills: 0 | Status: DISCONTINUED | OUTPATIENT
Start: 2017-06-29 | End: 2017-07-05

## 2017-06-29 RX ORDER — MESALAMINE 400 MG
1500 TABLET, DELAYED RELEASE (ENTERIC COATED) ORAL DAILY
Qty: 0 | Refills: 0 | Status: DISCONTINUED | OUTPATIENT
Start: 2017-06-29 | End: 2017-07-05

## 2017-06-29 RX ORDER — MYCOPHENOLATE MOFETIL 250 MG/1
1000 CAPSULE ORAL
Qty: 0 | Refills: 0 | Status: DISCONTINUED | OUTPATIENT
Start: 2017-06-29 | End: 2017-07-05

## 2017-06-29 RX ORDER — FLUVOXAMINE MALEATE 25 MG/1
150 TABLET ORAL AT BEDTIME
Qty: 0 | Refills: 0 | Status: DISCONTINUED | OUTPATIENT
Start: 2017-06-29 | End: 2017-07-05

## 2017-06-29 RX ORDER — LEVOTHYROXINE SODIUM 125 MCG
75 TABLET ORAL DAILY
Qty: 0 | Refills: 0 | Status: DISCONTINUED | OUTPATIENT
Start: 2017-06-29 | End: 2017-07-05

## 2017-06-29 RX ORDER — INSULIN LISPRO 100/ML
VIAL (ML) SUBCUTANEOUS
Qty: 0 | Refills: 0 | Status: DISCONTINUED | OUTPATIENT
Start: 2017-06-29 | End: 2017-07-05

## 2017-06-29 RX ORDER — CLONAZEPAM 1 MG
0.5 TABLET ORAL AT BEDTIME
Qty: 0 | Refills: 0 | Status: DISCONTINUED | OUTPATIENT
Start: 2017-06-29 | End: 2017-07-05

## 2017-06-29 RX ADMIN — Medication 400 MILLIGRAM(S): at 23:55

## 2017-06-29 RX ADMIN — LITHIUM CARBONATE 300 MILLIGRAM(S): 300 TABLET, EXTENDED RELEASE ORAL at 21:07

## 2017-06-29 RX ADMIN — MYCOPHENOLATE MOFETIL 1000 MILLIGRAM(S): 250 CAPSULE ORAL at 23:55

## 2017-06-29 RX ADMIN — Medication 1000 MILLIGRAM(S): at 23:59

## 2017-06-29 RX ADMIN — Medication 0.5 MILLIGRAM(S): at 21:06

## 2017-06-29 RX ADMIN — FLUVOXAMINE MALEATE 150 MILLIGRAM(S): 25 TABLET ORAL at 21:06

## 2017-06-29 NOTE — ED PROVIDER NOTE - OBJECTIVE STATEMENT
48 yo with extensive psych history reporting stating that for the past few days she has been having compulsions about wanting to kill herself.  Has a plan to cut her wrists.  Has reported attempts in the past with cutting and pills.  Symptoms are currently severe and constant.  Denies any ingestions at this time.
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
## 2017-06-29 NOTE — ED BEHAVIORAL HEALTH ASSESSMENT NOTE - SUICIDE PROTECTIVE FACTORS
Responsibility to family and others/Positive therapeutic relationships/Supportive social network or family/Identifies reasons for living/Future oriented

## 2017-06-29 NOTE — H&P ADULT - PROBLEM SELECTOR PLAN 3
-C/w Mesalamine and Canasa supp  -Monitor CBC  -Pt refused GI consult while inpatient, states she recently visited her outpt GI
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
## 2017-06-29 NOTE — H&P ADULT - NSHPSOCIALHISTORY_GEN_ALL_CORE
Lives at home with boyfriend and his son. Has 2 children, they lives with father. Former smoker, quit in 2004. History of alcohol abuse, sober for the past 6 years. Denies illicit drug use.

## 2017-06-29 NOTE — ED BEHAVIORAL HEALTH ASSESSMENT NOTE - PRIMARY DX
Bipolar affective disorder, current episode depressed, current episode severity unspecified Deferred condition on axis II Schizoaffective disorder, bipolar type

## 2017-06-29 NOTE — H&P ADULT - PMH
delivery delivered    Hashimoto's disease    Other forms of systemic lupus erythematosus, unspecified organ involvement status    Retinal vasculitis, unspecified laterality    Schizo-affective schizophrenia    Type 2 diabetes mellitus without complication, unspecified long term insulin use status    Ulcerative colitis with rectal bleeding, unspecified location
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
## 2017-06-29 NOTE — ED BEHAVIORAL HEALTH ASSESSMENT NOTE - DESCRIPTION
upset, tearful initially , but cooperative Lupus, Colitis, DM, Hoshimotos, Fibromyalgia 47 y.o. DWF , mother who lost custody in past, has visitation with a supportive BF. Long Work hx 6 mos in length

## 2017-06-29 NOTE — PATIENT PROFILE BEHAVIORAL HEALTH - COPING STRESSORS, PROFILE
Acute blood loss anemia given Occult blood + and normal Hg in 12/2016  Endorses hx of diverticulitis-No abdominal pain reported  Been taking NSAIDs though EGD normal   Type and scree. Goal Hg>7  No intervention from GI as inpatient given stable HG housing concerns mental health condition/death of a loved one/housing concerns

## 2017-06-29 NOTE — H&P ADULT - PROBLEM SELECTOR PLAN 2
-C/w Metformin 500 mg bid  -Fingerstick with ISS  -Diabetic diet -Hold home PO Metformin   -Fingerstick with ISS  -Diabetic diet

## 2017-06-29 NOTE — ED ADULT NURSE NOTE - OBJECTIVE STATEMENT
Pt presents to the Ed with c/o hurting herself.   Pt stated that she has a plan of cutting her wrist due  to her increased depression.

## 2017-06-29 NOTE — H&P ADULT - ASSESSMENT
48 y/o female with PMHx of NIDDM, Ulcerative colitis, Lupus, Retinal vasculitis, Fibromyalgia, Hashimoto's thyroiditis, Bipolar disorder, Schizoaffective disorder presented to ED with suicidal ideation.

## 2017-06-29 NOTE — ED BEHAVIORAL HEALTH ASSESSMENT NOTE - SUMMARY
47 y.o. DWF, mother of 10 y.o son and 13 y.o. daughter living with SO who is supportive. States had been stable on regime of Wellbutrin, Abilify, LICO3, Luvox and Klonopin ~ 6 mos ago. Dr. Chacon went on extended vacation and covering MD "changed meds. 2 hospitalizations since in Dec, 16 and Feb.'17.    Currently having disturbing intrusive images and impulses to harm self. Tearful, impaired sleep, concentration, memory. +AH, no commands, paranoia and feels people can read her mind.    Pt. requesting voluntary admission to stabilize symptoms and meducation

## 2017-06-29 NOTE — ED BEHAVIORAL HEALTH ASSESSMENT NOTE - CURRENT MEDICATION
OguraLuvox 150 mg po HS, Rexulti 3 mg po AM, Klonopin 0.5 mg po HS, Effexor XR 75 po daily, Lithium  CR 225mg po BID

## 2017-06-29 NOTE — ED ADULT TRIAGE NOTE - CHIEF COMPLAINT QUOTE
Pt presents to ED stating "I am thinking about hurting myself, I don't want to be here any more". Pt reports plan to cut herself. Pt reports hx of suicide attempt by taking pills in 2003

## 2017-06-29 NOTE — H&P ADULT - ATTENDING COMMENTS
48 y/o F PMHx significant for NIDDM, Ulcerative colitis, Lupus erythematosus, Retinal vasculitis, Fibromyalgia, Hashimoto's thyroiditis, Bipolar disorder, Schizoaffective disorder who presents to the ED with suicidal ideation     1)Schizoaffective disorder, bipolar type  ~cont. management per Psychiatry    2)DM2  ~hold Metformin for now  ~FS qAC  ~cont. ISS per protocol    3)Ulcerative colitis   ~cont. home meds  ~Pt refused GI consult. As noted the patient had several BMs today w/ scant blood.    4)Hashimoto's disease  ~cont. Levothyroxine 75mcg po daily    5)Retinal vasculitis  ~cont. Cellcept 500 mg 2 tabs bid.     6)SLE  ~cont. Plaquenil 200 mg 2 tabs qhs

## 2017-06-30 LAB
CHOLEST SERPL-MCNC: 196 MG/DL — SIGNIFICANT CHANGE UP (ref 10–199)
HBA1C BLD-MCNC: 4.9 % — SIGNIFICANT CHANGE UP (ref 4–5.6)
HCT VFR BLD CALC: 39.8 % — SIGNIFICANT CHANGE UP (ref 34.5–45)
HDLC SERPL-MCNC: 83 MG/DL — SIGNIFICANT CHANGE UP (ref 40–125)
HGB BLD-MCNC: 13.4 G/DL — SIGNIFICANT CHANGE UP (ref 11.5–15.5)
LIPID PNL WITH DIRECT LDL SERPL: 74 MG/DL — SIGNIFICANT CHANGE UP
MCHC RBC-ENTMCNC: 30.1 PG — SIGNIFICANT CHANGE UP (ref 27–34)
MCHC RBC-ENTMCNC: 33.8 GM/DL — SIGNIFICANT CHANGE UP (ref 32–36)
MCV RBC AUTO: 89.1 FL — SIGNIFICANT CHANGE UP (ref 80–100)
PLATELET # BLD AUTO: 275 K/UL — SIGNIFICANT CHANGE UP (ref 150–400)
RBC # BLD: 4.47 M/UL — SIGNIFICANT CHANGE UP (ref 3.8–5.2)
RBC # FLD: 13.2 % — SIGNIFICANT CHANGE UP (ref 10.3–14.5)
TOTAL CHOLESTEROL/HDL RATIO MEASUREMENT: 2.4 RATIO — LOW (ref 3.3–7.1)
TRIGL SERPL-MCNC: 195 MG/DL — HIGH (ref 10–149)
WBC # BLD: 5.1 K/UL — SIGNIFICANT CHANGE UP (ref 3.8–10.5)
WBC # FLD AUTO: 5.1 K/UL — SIGNIFICANT CHANGE UP (ref 3.8–10.5)

## 2017-06-30 RX ORDER — ARIPIPRAZOLE 15 MG/1
10 TABLET ORAL DAILY
Qty: 0 | Refills: 0 | Status: DISCONTINUED | OUTPATIENT
Start: 2017-07-01 | End: 2017-07-05

## 2017-06-30 RX ORDER — FLUVOXAMINE MALEATE 25 MG/1
50 TABLET ORAL DAILY
Qty: 0 | Refills: 0 | Status: DISCONTINUED | OUTPATIENT
Start: 2017-07-01 | End: 2017-07-05

## 2017-06-30 RX ORDER — LITHIUM CARBONATE 300 MG/1
450 TABLET, EXTENDED RELEASE ORAL
Qty: 0 | Refills: 0 | Status: DISCONTINUED | OUTPATIENT
Start: 2017-06-30 | End: 2017-06-30

## 2017-06-30 RX ORDER — VENLAFAXINE HCL 75 MG
37.5 CAPSULE, EXT RELEASE 24 HR ORAL DAILY
Qty: 0 | Refills: 0 | Status: DISCONTINUED | OUTPATIENT
Start: 2017-07-01 | End: 2017-07-03

## 2017-06-30 RX ORDER — ACETAMINOPHEN 500 MG
650 TABLET ORAL EVERY 6 HOURS
Qty: 0 | Refills: 0 | Status: DISCONTINUED | OUTPATIENT
Start: 2017-06-30 | End: 2017-07-05

## 2017-06-30 RX ORDER — LITHIUM CARBONATE 300 MG/1
450 TABLET, EXTENDED RELEASE ORAL
Qty: 0 | Refills: 0 | Status: DISCONTINUED | OUTPATIENT
Start: 2017-06-30 | End: 2017-07-05

## 2017-06-30 RX ADMIN — MYCOPHENOLATE MOFETIL 1000 MILLIGRAM(S): 250 CAPSULE ORAL at 21:17

## 2017-06-30 RX ADMIN — Medication 600 MILLIGRAM(S): at 08:49

## 2017-06-30 RX ADMIN — FLUVOXAMINE MALEATE 150 MILLIGRAM(S): 25 TABLET ORAL at 22:34

## 2017-06-30 RX ADMIN — Medication 1500 MILLIGRAM(S): at 11:20

## 2017-06-30 RX ADMIN — Medication 75 MICROGRAM(S): at 08:49

## 2017-06-30 RX ADMIN — Medication 1000 MILLIGRAM(S): at 22:35

## 2017-06-30 RX ADMIN — Medication 0.5 MILLIGRAM(S): at 21:18

## 2017-06-30 RX ADMIN — Medication 600 MILLIGRAM(S): at 09:50

## 2017-06-30 RX ADMIN — LITHIUM CARBONATE 450 MILLIGRAM(S): 300 TABLET, EXTENDED RELEASE ORAL at 21:16

## 2017-06-30 RX ADMIN — LITHIUM CARBONATE 300 MILLIGRAM(S): 300 TABLET, EXTENDED RELEASE ORAL at 11:20

## 2017-06-30 RX ADMIN — MYCOPHENOLATE MOFETIL 1000 MILLIGRAM(S): 250 CAPSULE ORAL at 08:51

## 2017-06-30 RX ADMIN — Medication 75 MILLIGRAM(S): at 08:49

## 2017-06-30 RX ADMIN — Medication 400 MILLIGRAM(S): at 21:15

## 2017-06-30 NOTE — PROGRESS NOTE BEHAVIORAL HEALTH - NSBHFUPADDHPIFT_PSY_A_CORE
Discussed stressors of ANDREAS's son having gun locker in the house and how this affects her.  Also still unable to stand up to people and has been unable to have ANDREAS's friends removed from the home.

## 2017-07-01 ENCOUNTER — OUTPATIENT (OUTPATIENT)
Dept: OUTPATIENT SERVICES | Facility: HOSPITAL | Age: 47
LOS: 1 days | End: 2017-07-01
Payer: MEDICAID

## 2017-07-01 RX ADMIN — LITHIUM CARBONATE 450 MILLIGRAM(S): 300 TABLET, EXTENDED RELEASE ORAL at 21:50

## 2017-07-01 RX ADMIN — MYCOPHENOLATE MOFETIL 1000 MILLIGRAM(S): 250 CAPSULE ORAL at 21:51

## 2017-07-01 RX ADMIN — Medication 650 MILLIGRAM(S): at 05:14

## 2017-07-01 RX ADMIN — Medication 1500 MILLIGRAM(S): at 09:25

## 2017-07-01 RX ADMIN — FLUVOXAMINE MALEATE 50 MILLIGRAM(S): 25 TABLET ORAL at 09:25

## 2017-07-01 RX ADMIN — MYCOPHENOLATE MOFETIL 1000 MILLIGRAM(S): 250 CAPSULE ORAL at 09:25

## 2017-07-01 RX ADMIN — FLUVOXAMINE MALEATE 150 MILLIGRAM(S): 25 TABLET ORAL at 21:52

## 2017-07-01 RX ADMIN — ARIPIPRAZOLE 10 MILLIGRAM(S): 15 TABLET ORAL at 09:24

## 2017-07-01 RX ADMIN — Medication 37.5 MILLIGRAM(S): at 09:25

## 2017-07-01 RX ADMIN — Medication 1000 MILLIGRAM(S): at 21:56

## 2017-07-01 RX ADMIN — Medication 0.5 MILLIGRAM(S): at 21:53

## 2017-07-01 RX ADMIN — Medication 650 MILLIGRAM(S): at 13:04

## 2017-07-01 RX ADMIN — Medication 400 MILLIGRAM(S): at 21:50

## 2017-07-01 RX ADMIN — LITHIUM CARBONATE 450 MILLIGRAM(S): 300 TABLET, EXTENDED RELEASE ORAL at 09:25

## 2017-07-01 RX ADMIN — Medication 75 MICROGRAM(S): at 06:12

## 2017-07-02 RX ADMIN — FLUVOXAMINE MALEATE 150 MILLIGRAM(S): 25 TABLET ORAL at 21:21

## 2017-07-02 RX ADMIN — LITHIUM CARBONATE 450 MILLIGRAM(S): 300 TABLET, EXTENDED RELEASE ORAL at 21:19

## 2017-07-02 RX ADMIN — Medication 37.5 MILLIGRAM(S): at 09:18

## 2017-07-02 RX ADMIN — Medication 75 MICROGRAM(S): at 06:23

## 2017-07-02 RX ADMIN — MYCOPHENOLATE MOFETIL 1000 MILLIGRAM(S): 250 CAPSULE ORAL at 21:20

## 2017-07-02 RX ADMIN — Medication 1000 MILLIGRAM(S): at 21:27

## 2017-07-02 RX ADMIN — FLUVOXAMINE MALEATE 50 MILLIGRAM(S): 25 TABLET ORAL at 09:18

## 2017-07-02 RX ADMIN — Medication 400 MILLIGRAM(S): at 21:18

## 2017-07-02 RX ADMIN — Medication 1500 MILLIGRAM(S): at 09:18

## 2017-07-02 RX ADMIN — Medication 0.5 MILLIGRAM(S): at 21:21

## 2017-07-02 RX ADMIN — LITHIUM CARBONATE 450 MILLIGRAM(S): 300 TABLET, EXTENDED RELEASE ORAL at 09:18

## 2017-07-02 RX ADMIN — ARIPIPRAZOLE 10 MILLIGRAM(S): 15 TABLET ORAL at 09:18

## 2017-07-02 RX ADMIN — Medication 650 MILLIGRAM(S): at 21:23

## 2017-07-02 RX ADMIN — MYCOPHENOLATE MOFETIL 1000 MILLIGRAM(S): 250 CAPSULE ORAL at 09:21

## 2017-07-03 RX ADMIN — LITHIUM CARBONATE 450 MILLIGRAM(S): 300 TABLET, EXTENDED RELEASE ORAL at 09:25

## 2017-07-03 RX ADMIN — MYCOPHENOLATE MOFETIL 1000 MILLIGRAM(S): 250 CAPSULE ORAL at 09:25

## 2017-07-03 RX ADMIN — Medication 75 MICROGRAM(S): at 06:41

## 2017-07-03 RX ADMIN — Medication 400 MILLIGRAM(S): at 21:24

## 2017-07-03 RX ADMIN — MYCOPHENOLATE MOFETIL 1000 MILLIGRAM(S): 250 CAPSULE ORAL at 21:25

## 2017-07-03 RX ADMIN — FLUVOXAMINE MALEATE 150 MILLIGRAM(S): 25 TABLET ORAL at 21:26

## 2017-07-03 RX ADMIN — Medication 37.5 MILLIGRAM(S): at 09:25

## 2017-07-03 RX ADMIN — LITHIUM CARBONATE 450 MILLIGRAM(S): 300 TABLET, EXTENDED RELEASE ORAL at 21:24

## 2017-07-03 RX ADMIN — Medication 1000 MILLIGRAM(S): at 21:28

## 2017-07-03 RX ADMIN — Medication 1500 MILLIGRAM(S): at 09:25

## 2017-07-03 RX ADMIN — FLUVOXAMINE MALEATE 50 MILLIGRAM(S): 25 TABLET ORAL at 09:25

## 2017-07-03 RX ADMIN — Medication 0.5 MILLIGRAM(S): at 21:25

## 2017-07-03 RX ADMIN — ARIPIPRAZOLE 10 MILLIGRAM(S): 15 TABLET ORAL at 09:25

## 2017-07-04 VITALS
TEMPERATURE: 98 F | DIASTOLIC BLOOD PRESSURE: 83 MMHG | RESPIRATION RATE: 16 BRPM | SYSTOLIC BLOOD PRESSURE: 118 MMHG | OXYGEN SATURATION: 99 % | HEART RATE: 66 BPM

## 2017-07-04 RX ADMIN — MYCOPHENOLATE MOFETIL 1000 MILLIGRAM(S): 250 CAPSULE ORAL at 21:02

## 2017-07-04 RX ADMIN — MYCOPHENOLATE MOFETIL 1000 MILLIGRAM(S): 250 CAPSULE ORAL at 09:31

## 2017-07-04 RX ADMIN — ARIPIPRAZOLE 10 MILLIGRAM(S): 15 TABLET ORAL at 09:31

## 2017-07-04 RX ADMIN — Medication 1500 MILLIGRAM(S): at 09:31

## 2017-07-04 RX ADMIN — Medication 0.5 MILLIGRAM(S): at 21:01

## 2017-07-04 RX ADMIN — FLUVOXAMINE MALEATE 50 MILLIGRAM(S): 25 TABLET ORAL at 09:31

## 2017-07-04 RX ADMIN — Medication 400 MILLIGRAM(S): at 21:02

## 2017-07-04 RX ADMIN — Medication 1000 MILLIGRAM(S): at 21:03

## 2017-07-04 RX ADMIN — Medication 75 MICROGRAM(S): at 06:25

## 2017-07-04 RX ADMIN — LITHIUM CARBONATE 450 MILLIGRAM(S): 300 TABLET, EXTENDED RELEASE ORAL at 09:31

## 2017-07-04 RX ADMIN — FLUVOXAMINE MALEATE 150 MILLIGRAM(S): 25 TABLET ORAL at 21:02

## 2017-07-04 RX ADMIN — LITHIUM CARBONATE 450 MILLIGRAM(S): 300 TABLET, EXTENDED RELEASE ORAL at 21:02

## 2017-07-04 NOTE — PROGRESS NOTE BEHAVIORAL HEALTH - NSBHCHARTREVIEWVS_PSY_A_CORE FT
Vital Signs Last 24 Hrs  T(C): 37.2 (03 Jul 2017 07:23), Max: 37.2 (03 Jul 2017 07:23)  T(F): 99 (03 Jul 2017 07:23), Max: 99 (03 Jul 2017 07:23)  HR: 63 (03 Jul 2017 07:23) (63 - 63)  BP: 128/77 (03 Jul 2017 07:23) (128/77 - 128/77)  BP(mean): --  RR: 16 (03 Jul 2017 07:23) (16 - 16)  SpO2: 100% (03 Jul 2017 07:23) (100% - 100%)
Vital Signs Last 24 Hrs  T(C): 36.8 (01 Jul 2017 08:22), Max: 36.8 (01 Jul 2017 08:22)  T(F): 98.3 (01 Jul 2017 08:22), Max: 98.3 (01 Jul 2017 08:22)  HR: 79 (01 Jul 2017 08:22) (79 - 79)  BP: 122/82 (01 Jul 2017 08:22) (122/82 - 122/82)  BP(mean): --  RR: 12 (01 Jul 2017 08:22) (12 - 12)  SpO2: 100% (01 Jul 2017 08:22) (100% - 100%)
Vital Signs Last 24 Hrs  T(C): 37.2 (03 Jul 2017 07:23), Max: 37.2 (03 Jul 2017 07:23)  T(F): 99 (03 Jul 2017 07:23), Max: 99 (03 Jul 2017 07:23)  HR: 63 (03 Jul 2017 07:23) (63 - 63)  BP: 128/77 (03 Jul 2017 07:23) (128/77 - 128/77)  BP(mean): --  RR: 16 (03 Jul 2017 07:23) (16 - 16)  SpO2: 100% (03 Jul 2017 07:23) (100% - 100%)
Vital Signs Last 24 Hrs  T(C): 36.6 (02 Jul 2017 08:22), Max: 36.6 (02 Jul 2017 08:22)  T(F): 97.8 (02 Jul 2017 08:22), Max: 97.8 (02 Jul 2017 08:22)  HR: 96 (02 Jul 2017 08:22) (96 - 96)  BP: 122/83 (02 Jul 2017 08:22) (122/83 - 122/83)  BP(mean): --  RR: 14 (02 Jul 2017 08:22) (14 - 14)  SpO2: 100% (02 Jul 2017 08:22) (100% - 100%)
Vital Signs Last 24 Hrs  T(C): 36.7 (30 Jun 2017 09:26), Max: 37.1 (29 Jun 2017 22:25)  T(F): 98.1 (30 Jun 2017 09:26), Max: 98.7 (29 Jun 2017 22:25)  HR: 70 (30 Jun 2017 09:26) (70 - 93)  BP: 119/83 (30 Jun 2017 09:26) (119/83 - 124/79)  BP(mean): 94 (29 Jun 2017 22:25) (94 - 94)  RR: 17 (30 Jun 2017 09:26) (16 - 17)  SpO2: 100% (30 Jun 2017 09:26) (100% - 100%)

## 2017-07-05 DIAGNOSIS — R69 ILLNESS, UNSPECIFIED: ICD-10-CM

## 2017-07-05 LAB — LITHIUM SERPL-MCNC: 0.6 MMOL/L — SIGNIFICANT CHANGE UP (ref 0.6–1.2)

## 2017-07-05 RX ORDER — MESALAMINE 400 MG
3 TABLET, DELAYED RELEASE (ENTERIC COATED) ORAL
Qty: 0 | Refills: 0 | COMMUNITY
Start: 2017-07-05

## 2017-07-05 RX ORDER — LITHIUM CARBONATE 300 MG/1
3 TABLET, EXTENDED RELEASE ORAL
Qty: 84 | Refills: 1 | OUTPATIENT
Start: 2017-07-05 | End: 2017-08-01

## 2017-07-05 RX ORDER — ARIPIPRAZOLE 15 MG/1
1 TABLET ORAL
Qty: 14 | Refills: 1 | OUTPATIENT
Start: 2017-07-05 | End: 2017-08-01

## 2017-07-05 RX ORDER — METFORMIN HYDROCHLORIDE 850 MG/1
3 TABLET ORAL
Qty: 0 | Refills: 0 | COMMUNITY

## 2017-07-05 RX ORDER — CLONAZEPAM 1 MG
0.5 TABLET ORAL
Qty: 14 | Refills: 0
Start: 2017-07-05 | End: 2022-09-06

## 2017-07-05 RX ORDER — FLUVOXAMINE MALEATE 25 MG/1
1 TABLET ORAL
Qty: 14 | Refills: 1 | OUTPATIENT
Start: 2017-07-05 | End: 2017-08-01

## 2017-07-05 RX ORDER — CLONAZEPAM 1 MG
0.5 TABLET ORAL
Qty: 14 | Refills: 0 | OUTPATIENT
Start: 2017-07-05 | End: 2017-07-19

## 2017-07-05 RX ORDER — FLUVOXAMINE MALEATE 25 MG/1
3 TABLET ORAL
Qty: 42 | Refills: 1 | OUTPATIENT
Start: 2017-07-05 | End: 2017-08-01

## 2017-07-05 RX ORDER — CLONAZEPAM 1 MG
0.5 TABLET ORAL
Qty: 7 | Refills: 0 | OUTPATIENT
Start: 2017-07-05 | End: 2017-07-19

## 2017-07-05 RX ORDER — MESALAMINE 400 MG
1 TABLET, DELAYED RELEASE (ENTERIC COATED) ORAL
Qty: 0 | Refills: 0 | COMMUNITY
Start: 2017-07-05

## 2017-07-05 RX ADMIN — Medication 75 MICROGRAM(S): at 05:30

## 2017-07-05 RX ADMIN — FLUVOXAMINE MALEATE 50 MILLIGRAM(S): 25 TABLET ORAL at 08:33

## 2017-07-05 RX ADMIN — LITHIUM CARBONATE 450 MILLIGRAM(S): 300 TABLET, EXTENDED RELEASE ORAL at 08:33

## 2017-07-05 RX ADMIN — MYCOPHENOLATE MOFETIL 1000 MILLIGRAM(S): 250 CAPSULE ORAL at 08:33

## 2017-07-05 RX ADMIN — ARIPIPRAZOLE 10 MILLIGRAM(S): 15 TABLET ORAL at 08:33

## 2017-07-05 RX ADMIN — Medication 1500 MILLIGRAM(S): at 08:33

## 2017-07-05 NOTE — PROGRESS NOTE BEHAVIORAL HEALTH - SECONDARY DX3
Type 2 diabetes mellitus without complication, unspecified long term insulin use status

## 2017-07-05 NOTE — PROGRESS NOTE BEHAVIORAL HEALTH - RISK ASSESSMENT
Suicide Risk Factors:  · Suicide Risk Factors  Mood episode      Patient much improved overall   For discharge today.  Activating Events Stressors:  · Activating Events/Stressors  Other  · Other  house stressors    Suicide Protective Factors:  · Suicide Protective Factors  Responsibility to family and others, Identifies reasons for living, Future oriented, Supportive social network or family, Positive therapeutic relationships
Suicide Risk Factors:  · Suicide Risk Factors  Mood episode    Activating Events Stressors:  · Activating Events/Stressors  Other  · Other  house stressors    Suicide Protective Factors:  · Suicide Protective Factors  Responsibility to family and others, Identifies reasons for living, Future oriented, Supportive social network or family, Positive therapeutic relationships

## 2017-07-05 NOTE — PROGRESS NOTE BEHAVIORAL HEALTH - PERCEPTIONS
Auditory hallucinations

## 2017-07-05 NOTE — PROGRESS NOTE BEHAVIORAL HEALTH - NSBHADMITIPDSM_PSY_A_CORE
see above for Axis I, II, III

## 2017-07-05 NOTE — DISCHARGE NOTE BEHAVIORAL HEALTH - FAMILY HISTORY OF PSYCHIATRIC ILLNESS
· Details  Maternal: bipolar, Paternal: depression//cousin committed suicide  · Family History of Substance Abuse  Yes  · Details  "alot"  47 y.o. DWF , mother who lost custody in past, has visitation with a supportive BF. Long Work hx

## 2017-07-05 NOTE — PROGRESS NOTE BEHAVIORAL HEALTH - NSBHADMITIPOBSFT_PSY_A_CORE
no imminent risk of self-harm. Step 2 privilege as of 7/2/17
no imminent risk of self-harm
no imminent risk of self-harm. Step 2 privilege as of 7/2/17
no imminent risk of self-harm

## 2017-07-05 NOTE — PROGRESS NOTE BEHAVIORAL HEALTH - NSBHCONSORIP_PSY_A_CORE
Inpatient Admission...

## 2017-07-05 NOTE — DISCHARGE NOTE BEHAVIORAL HEALTH - NSBHDCMEDSFT_PSY_A_CORE
MEDICATIONS  (STANDING):  ARIPiprazole 10 milliGRAM(s) Oral daily  fluvoxaMINE 50 milliGRAM(s) Oral daily 150 mg hs  lithium 450 milliGRAM(s) Oral two times a day  Klonopin 0.5 mg hs

## 2017-07-05 NOTE — DISCHARGE NOTE BEHAVIORAL HEALTH - NSBHDCADDR2FT_A_CORE
Lily Lara [address]  [place] [address]  ([phone]) [place] [address]  ([phone])    Appt. Today 7/5 at 7PM.

## 2017-07-05 NOTE — PROGRESS NOTE BEHAVIORAL HEALTH - NSBHADMITIPREASON_PSY_A_CORE
Danger to self; mental illness expected to respond to inpatient care

## 2017-07-05 NOTE — PROGRESS NOTE BEHAVIORAL HEALTH - NSBHADMITDANGERSELF_PSY_A_CORE
unable to care for self

## 2017-07-05 NOTE — PROGRESS NOTE BEHAVIORAL HEALTH - NSBHATTESTSEENBY_PSY_A_CORE
attending Psychiatrist without NP/Trainee

## 2017-07-05 NOTE — DISCHARGE NOTE BEHAVIORAL HEALTH - NSBHDCHOUSING_PSY_A_CORE
home/[address] [facility] [place] [place] [address]  ([phone]) home/[address] [facility] [place] [address] (: [phone]/ Cell: [phone]).

## 2017-07-05 NOTE — DISCHARGE NOTE BEHAVIORAL HEALTH - PAST PSYCHIATRIC HISTORY
multiple admissions, PHP  MEDICATION:   Current Medication:  · Current Medication   OguraLuvox 150 mg po HS, Rexulti 3 mg po AM, Klonopin 0.5 mg po HS, Effexor XR 75 po daily, Lithium  CR 225mg po BID    Psychotropic Medication:  · Past Psychotropic Medication  Geodon, Prozac, Paxil, Zoloft, Depakote, Wellbutrin, Abilify(+), Prolixin, Risperdal,(+) Celexa(+)

## 2017-07-05 NOTE — PROGRESS NOTE BEHAVIORAL HEALTH - PROBLEM SELECTOR PROBLEM 1
Bipolar affective disorder, current episode depressed, current episode severity unspecified

## 2017-07-05 NOTE — PROGRESS NOTE BEHAVIORAL HEALTH - SECONDARY DX2
Ulcerative colitis with rectal bleeding, unspecified location

## 2017-07-05 NOTE — PROGRESS NOTE BEHAVIORAL HEALTH - LANGUAGE
No abnormalities noted

## 2017-07-05 NOTE — PROGRESS NOTE BEHAVIORAL HEALTH - NSBHLEGALSTATUS_PSY_A_CORE
9.13 (Voluntary)

## 2017-07-05 NOTE — PROGRESS NOTE BEHAVIORAL HEALTH - SUMMARY
47 y.o. DWF, mother of 10 y.o son and 13 y.o. daughter living with SO who is supportive. States had been stable on regime of Wellbutrin, Abilify, LICO3, Luvox and Klonopin ~ 6 mos ago. Dr. Chacon went on extended vacation and covering MD "changed meds. 2 hospitalizations since in Dec, 16 and Feb.'17.    Currently having disturbing intrusive images and impulses to harm self. Tearful, impaired sleep, concentration, memory. +AH, no commands, paranoia and feels people can read her mind.    Pt. requesting voluntary admission to stabilize symptoms and medication
47 y.o. DWF, mother of 10 y.o son and 13 y.o. daughter living with SO who is supportive. States had been stable on regime of Wellbutrin, Abilify, LICO3, Luvox and Klonopin ~ 6 mos ago. Dr. Chacon went on extended vacation and covering MD "changed meds. 2 hospitalizations since in Dec, 16 and Feb.'17.    Currently having disturbing intrusive images and impulses to harm self. Tearful, impaired sleep, concentration, memory. +AH, no commands, paranoia and feels people can read her mind.    Pt. requesting voluntary admission to stabilize symptoms and meducation

## 2017-07-05 NOTE — DISCHARGE NOTE BEHAVIORAL HEALTH - SECONDARY DIAGNOSIS.
Alcohol dependence, in remission Obsessive-compulsive disorder, unspecified type Ulcerative colitis with rectal bleeding, unspecified location Other forms of systemic lupus erythematosus, unspecified organ involvement status Type 2 diabetes mellitus without complication, unspecified long term insulin use status

## 2017-07-05 NOTE — PROGRESS NOTE BEHAVIORAL HEALTH - NSBHFUPINTERVALHXFT_PSY_A_CORE
patient appears to have improved over weekend due to changes in medication as well as steps she has taken to achieve more control over her life.    MEDICATIONS  (STANDING):  clonazePAM Tablet 0.5 milliGRAM(s) Oral at bedtime  fluvoxaMINE 150 milliGRAM(s) Oral at bedtime  hydroxychloroquine 400 milliGRAM(s) Oral at bedtime  mycophenolate mofetil 1000 milliGRAM(s) Oral two times a day  levothyroxine 75 MICROGram(s) Oral daily  mesalamine Suppository 1000 milliGRAM(s) Rectal at bedtime  mesalamine ER Capsule 1500 milliGRAM(s) Oral daily  insulin lispro (HumaLOG) corrective regimen sliding scale   SubCutaneous three times a day before meals  dextrose 5%. 1000 milliLiter(s) (50 mL/Hr) IV Continuous <Continuous>  dextrose 50% Injectable 12.5 Gram(s) IV Push once  dextrose 50% Injectable 25 Gram(s) IV Push once  dextrose 50% Injectable 25 Gram(s) IV Push once  ARIPiprazole 10 milliGRAM(s) Oral daily  fluvoxaMINE 50 milliGRAM(s) Oral daily  lithium 450 milliGRAM(s) Oral two times a day    MEDICATIONS  (PRN):  diphenhydrAMINE   Injectable 50 milliGRAM(s) IntraMuscular once PRN Agitation  LORazepam   Injectable 2 milliGRAM(s) IntraMuscular once PRN Agitation  haloperidol    Injectable 5 milliGRAM(s) IntraMuscular once PRN Agitation  dextrose Gel 1 Dose(s) Oral once PRN Blood Glucose LESS THAN 70 milliGRAM(s)/deciliter  glucagon  Injectable 1 milliGRAM(s) IntraMuscular once PRN Glucose LESS THAN 70 milligrams/deciliter  aluminum hydroxide/magnesium hydroxide/simethicone Suspension 30 milliLiter(s) Oral every 4 hours PRN Dyspepsia  acetaminophen   Tablet 650 milliGRAM(s) Oral every 6 hours PRN For Temp greater than 38 C (100.4 F)
MEDICATIONS  (STANDING):  clonazePAM Tablet 0.5 milliGRAM(s) Oral at bedtime  fluvoxaMINE 150 milliGRAM(s) Oral at bedtime  hydroxychloroquine 400 milliGRAM(s) Oral at bedtime  mycophenolate mofetil 1000 milliGRAM(s) Oral two times a day  levothyroxine 75 MICROGram(s) Oral daily  mesalamine Suppository 1000 milliGRAM(s) Rectal at bedtime  mesalamine ER Capsule 1500 milliGRAM(s) Oral daily  insulin lispro (HumaLOG) corrective regimen sliding scale   SubCutaneous three times a day before meals  dextrose 5%. 1000 milliLiter(s) (50 mL/Hr) IV Continuous <Continuous>  dextrose 50% Injectable 12.5 Gram(s) IV Push once  dextrose 50% Injectable 25 Gram(s) IV Push once  dextrose 50% Injectable 25 Gram(s) IV Push once  ARIPiprazole 10 milliGRAM(s) Oral daily  fluvoxaMINE 50 milliGRAM(s) Oral daily  venlafaxine XR 37.5 milliGRAM(s) Oral daily  lithium 450 milliGRAM(s) Oral two times a day    MEDICATIONS  (PRN):  diphenhydrAMINE   Injectable 50 milliGRAM(s) IntraMuscular once PRN Agitation  LORazepam   Injectable 2 milliGRAM(s) IntraMuscular once PRN Agitation  haloperidol    Injectable 5 milliGRAM(s) IntraMuscular once PRN Agitation  dextrose Gel 1 Dose(s) Oral once PRN Blood Glucose LESS THAN 70 milliGRAM(s)/deciliter  glucagon  Injectable 1 milliGRAM(s) IntraMuscular once PRN Glucose LESS THAN 70 milligrams/deciliter  aluminum hydroxide/magnesium hydroxide/simethicone Suspension 30 milliLiter(s) Oral every 4 hours PRN Dyspepsia  acetaminophen   Tablet 650 milliGRAM(s) Oral every 6 hours PRN For Temp greater than 38 C (100.4 F)
Patient has improved great;y since admission Stable for discharge today  MEDICATIONS  (STANDING):  clonazePAM Tablet 0.5 milliGRAM(s) Oral at bedtime  fluvoxaMINE 150 milliGRAM(s) Oral at bedtime  hydroxychloroquine 400 milliGRAM(s) Oral at bedtime  mycophenolate mofetil 1000 milliGRAM(s) Oral two times a day  levothyroxine 75 MICROGram(s) Oral daily  mesalamine Suppository 1000 milliGRAM(s) Rectal at bedtime  mesalamine ER Capsule 1500 milliGRAM(s) Oral daily  insulin lispro (HumaLOG) corrective regimen sliding scale   SubCutaneous three times a day before meals  dextrose 5%. 1000 milliLiter(s) (50 mL/Hr) IV Continuous <Continuous>  dextrose 50% Injectable 12.5 Gram(s) IV Push once  dextrose 50% Injectable 25 Gram(s) IV Push once  dextrose 50% Injectable 25 Gram(s) IV Push once  ARIPiprazole 10 milliGRAM(s) Oral daily  fluvoxaMINE 50 milliGRAM(s) Oral daily  lithium 450 milliGRAM(s) Oral two times a day    MEDICATIONS  (PRN):  diphenhydrAMINE   Injectable 50 milliGRAM(s) IntraMuscular once PRN Agitation  LORazepam   Injectable 2 milliGRAM(s) IntraMuscular once PRN Agitation  haloperidol    Injectable 5 milliGRAM(s) IntraMuscular once PRN Agitation  dextrose Gel 1 Dose(s) Oral once PRN Blood Glucose LESS THAN 70 milliGRAM(s)/deciliter  glucagon  Injectable 1 milliGRAM(s) IntraMuscular once PRN Glucose LESS THAN 70 milligrams/deciliter  aluminum hydroxide/magnesium hydroxide/simethicone Suspension 30 milliLiter(s) Oral every 4 hours PRN Dyspepsia  acetaminophen   Tablet 650 milliGRAM(s) Oral every 6 hours PRN For Temp greater than 38 C (100.4 F)
patient appears to have improved over weekend due to changes in medication as well as steps she has taken to achieve more control over her life.    MEDICATIONS  (STANDING):  clonazePAM Tablet 0.5 milliGRAM(s) Oral at bedtime  fluvoxaMINE 150 milliGRAM(s) Oral at bedtime  hydroxychloroquine 400 milliGRAM(s) Oral at bedtime  mycophenolate mofetil 1000 milliGRAM(s) Oral two times a day  levothyroxine 75 MICROGram(s) Oral daily  mesalamine Suppository 1000 milliGRAM(s) Rectal at bedtime  mesalamine ER Capsule 1500 milliGRAM(s) Oral daily  insulin lispro (HumaLOG) corrective regimen sliding scale   SubCutaneous three times a day before meals  dextrose 5%. 1000 milliLiter(s) (50 mL/Hr) IV Continuous <Continuous>  dextrose 50% Injectable 12.5 Gram(s) IV Push once  dextrose 50% Injectable 25 Gram(s) IV Push once  dextrose 50% Injectable 25 Gram(s) IV Push once  ARIPiprazole 10 milliGRAM(s) Oral daily  fluvoxaMINE 50 milliGRAM(s) Oral daily  lithium 450 milliGRAM(s) Oral two times a day    MEDICATIONS  (PRN):  diphenhydrAMINE   Injectable 50 milliGRAM(s) IntraMuscular once PRN Agitation  LORazepam   Injectable 2 milliGRAM(s) IntraMuscular once PRN Agitation  haloperidol    Injectable 5 milliGRAM(s) IntraMuscular once PRN Agitation  dextrose Gel 1 Dose(s) Oral once PRN Blood Glucose LESS THAN 70 milliGRAM(s)/deciliter  glucagon  Injectable 1 milliGRAM(s) IntraMuscular once PRN Glucose LESS THAN 70 milligrams/deciliter  aluminum hydroxide/magnesium hydroxide/simethicone Suspension 30 milliLiter(s) Oral every 4 hours PRN Dyspepsia  acetaminophen   Tablet 650 milliGRAM(s) Oral every 6 hours PRN For Temp greater than 38 C (100.4 F)
MEDICATIONS  (STANDING):  clonazePAM Tablet 0.5 milliGRAM(s) Oral at bedtime  fluvoxaMINE 150 milliGRAM(s) Oral at bedtime  hydroxychloroquine 400 milliGRAM(s) Oral at bedtime  mycophenolate mofetil 1000 milliGRAM(s) Oral two times a day  levothyroxine 75 MICROGram(s) Oral daily  mesalamine Suppository 1000 milliGRAM(s) Rectal at bedtime  mesalamine ER Capsule 1500 milliGRAM(s) Oral daily  insulin lispro (HumaLOG) corrective regimen sliding scale   SubCutaneous three times a day before meals  dextrose 5%. 1000 milliLiter(s) (50 mL/Hr) IV Continuous <Continuous>  dextrose 50% Injectable 12.5 Gram(s) IV Push once  dextrose 50% Injectable 25 Gram(s) IV Push once  dextrose 50% Injectable 25 Gram(s) IV Push once  ARIPiprazole 10 milliGRAM(s) Oral daily  fluvoxaMINE 50 milliGRAM(s) Oral daily  venlafaxine XR 37.5 milliGRAM(s) Oral daily  lithium 450 milliGRAM(s) Oral two times a day    MEDICATIONS  (PRN):  diphenhydrAMINE   Injectable 50 milliGRAM(s) IntraMuscular once PRN Agitation  LORazepam   Injectable 2 milliGRAM(s) IntraMuscular once PRN Agitation  haloperidol    Injectable 5 milliGRAM(s) IntraMuscular once PRN Agitation  dextrose Gel 1 Dose(s) Oral once PRN Blood Glucose LESS THAN 70 milliGRAM(s)/deciliter  glucagon  Injectable 1 milliGRAM(s) IntraMuscular once PRN Glucose LESS THAN 70 milligrams/deciliter  aluminum hydroxide/magnesium hydroxide/simethicone Suspension 30 milliLiter(s) Oral every 4 hours PRN Dyspepsia  acetaminophen   Tablet 650 milliGRAM(s) Oral every 6 hours PRN For Temp greater than 38 C (100.4 F)
patient is adjusting to unit.  Discussed several ways medications could be adjusted as she was doing better in the past.    MEDICATIONS  (STANDING):  clonazePAM Tablet 0.5 milliGRAM(s) Oral at bedtime  fluvoxaMINE 150 milliGRAM(s) Oral at bedtime  hydroxychloroquine 400 milliGRAM(s) Oral at bedtime  mycophenolate mofetil 1000 milliGRAM(s) Oral two times a day  levothyroxine 75 MICROGram(s) Oral daily  mesalamine Suppository 1000 milliGRAM(s) Rectal at bedtime  mesalamine ER Capsule 1500 milliGRAM(s) Oral daily  insulin lispro (HumaLOG) corrective regimen sliding scale   SubCutaneous three times a day before meals  dextrose 5%. 1000 milliLiter(s) (50 mL/Hr) IV Continuous <Continuous>  dextrose 50% Injectable 12.5 Gram(s) IV Push once  dextrose 50% Injectable 25 Gram(s) IV Push once  dextrose 50% Injectable 25 Gram(s) IV Push once  lithium CR (ESKALITH-CR) 450 milliGRAM(s) Oral two times a day    MEDICATIONS  (PRN):  diphenhydrAMINE   Injectable 50 milliGRAM(s) IntraMuscular once PRN Agitation  LORazepam   Injectable 2 milliGRAM(s) IntraMuscular once PRN Agitation  haloperidol    Injectable 5 milliGRAM(s) IntraMuscular once PRN Agitation  ibuprofen  Tablet 600 milliGRAM(s) Oral every 8 hours PRN pain  dextrose Gel 1 Dose(s) Oral once PRN Blood Glucose LESS THAN 70 milliGRAM(s)/deciliter  glucagon  Injectable 1 milliGRAM(s) IntraMuscular once PRN Glucose LESS THAN 70 milligrams/deciliter  aluminum hydroxide/magnesium hydroxide/simethicone Suspension 30 milliLiter(s) Oral every 4 hours PRN Dyspepsia

## 2017-07-05 NOTE — DISCHARGE NOTE BEHAVIORAL HEALTH - NSBHDCREFEROTHERFT_PSY_A_CORE
Referral made to [facility] for Case Management.  You can call to see who you were assigned to.  ([phone]).

## 2017-07-05 NOTE — DISCHARGE NOTE BEHAVIORAL HEALTH - NSBHDCCRISISPLAN1FT_PSY_A_CORE
Speak to a trusted family member or friend, tell your psychiatrist or therapist, return to [facility].  You may call [phone] for assistance.

## 2017-07-05 NOTE — DISCHARGE NOTE BEHAVIORAL HEALTH - MEDICATION SUMMARY - MEDICATIONS TO TAKE
I will START or STAY ON the medications listed below when I get home from the hospital:    mesalamine 1000 mg rectal suppository  -- 1 suppository(ies) rectally once a day (at bedtime)  -- Indication: For colltis    mesalamine 500 mg oral capsule, extended release  -- 3 cap(s) by mouth once a day  -- Indication: For colitis    KlonoPIN 0.5 mg oral tablet  -- 0.5 milligram(s) by mouth once a day (at bedtime), As Needed MDD:1 tab  -- Indication: For insomnia    fluvoxaMINE 50 mg oral tablet  -- 3 tab(s) by mouth once a day (at bedtime)  -- Indication: For OCD    fluvoxaMINE 50 mg oral tablet  -- 1 tab(s) by mouth once a day  -- Indication: For OCD    hydroxychloroquine 200 mg oral tablet  -- 2 tab(s) by mouth 2 times a day (with meals)  -- Indication: For lupus    ARIPiprazole 10 mg oral tablet  -- 1 tab(s) by mouth once a day  -- Indication: For mood stabilizer/psychossi    lithium 150 mg oral capsule  -- 3 cap(s) by mouth 2 times a day  -- Indication: For mood stabilizer    CellCept 500 mg oral tablet  -- 2 tab(s) by mouth 2 times a day  -- Indication: For colitis    mycophenolate mofetil 250 mg oral capsule  --  by mouth   -- Indication: For colitis    Synthroid 75 mcg (0.075 mg) oral tablet  -- 1 tab(s) by mouth once a day  -- Indication: For Hypothyroidism

## 2017-07-05 NOTE — PROGRESS NOTE BEHAVIORAL HEALTH - PROBLEM SELECTOR PLAN 2
Taper Effexor and titrate Luvox-  increase to 50 mg Am and 150 mg hs  Appears to be improving
Taper Effexor and titrate Luvox-  increase to 50 mg Am and 150 mg hs

## 2017-07-05 NOTE — DISCHARGE NOTE BEHAVIORAL HEALTH - NSBHDCDXVALIDYESFT_PSY_A_CORE
Medications were streamlined.  Rexulti was changed to Abilify and lithium was increased to more therapeutic dose. Patient taken off metformin by hospitalist as sugars were very good.  Effexor was tapered and luvox was increased for OCD.  Mood improved as did intrusive thoughts.  Patient was able to set limits with BF- have his son move out and tell house guests they needed to leave and this had a great therapeutic effect for her.  Patient not deemed to be a risk to self or others at time of discharge.

## 2017-07-05 NOTE — PROGRESS NOTE BEHAVIORAL HEALTH - PROBLEM SELECTOR PLAN 1
Increase lithium to 450 mg BID  Abilify titration to replace Rexulti  Klonopin 0.5 mg for insomnia

## 2017-07-05 NOTE — DISCHARGE NOTE BEHAVIORAL HEALTH - NSBHDCSUBSTHXFT_PSY_A_CORE
· Alcohol  Yes  · Description (First use, Last use, Quantity, Frequency, Duration)  sober 6 yrs  Attends AA

## 2017-07-05 NOTE — PROGRESS NOTE BEHAVIORAL HEALTH - SECONDARY DX1
Obsessive-compulsive disorder, unspecified type

## 2017-07-05 NOTE — PROGRESS NOTE BEHAVIORAL HEALTH - THOUGHT CONTENT
Ideas of reference/Obsessions/Preoccupations
Obsessions/Preoccupations
Preoccupations/Ideas of reference/Obsessions
Obsessions/Preoccupations
Preoccupations/Obsessions
Ideas of reference/Obsessions/Preoccupations

## 2017-07-05 NOTE — DISCHARGE NOTE BEHAVIORAL HEALTH - MEDICATION SUMMARY - MEDICATIONS TO STOP TAKING
I will STOP taking the medications listed below when I get home from the hospital:    Depakote ER  --  by mouth   -- 750 mg    metFORMIN 500 mg oral tablet  -- 3  by mouth once a day

## 2017-07-05 NOTE — PROGRESS NOTE BEHAVIORAL HEALTH - NSBHFUPINTERVALCCFT_PSY_A_CORE
Enjoyed visit with brother today  Boyfriend is having son and visitors leave home and is feeling better bout this  Reporting decrease in intrusive thoughts.  no auditory hallucinations
Covering MD Note ( 7/1/17)  " I think I'm doing a lot better. "  Pt seen in the day room. Pt reported feeling less depressed and more mood stable with recent increase in Lithium and other med adjustments. Pt noted having an argument last evening with her boy friend  about ongoing issues related to his son's PTSD and ETOH use disorder " but I know I need to stand up for myself and tell him there are too many people in the house and his son needs to leave. '  Pt tolerating med regimen with good clinical effect and with no side effects. Pt attending groups and reported good sleep and appetite.  Pt currently denying SI /HI .Pt amenable to ongoing treatment. Social with select female peers.  Labs   TG = 195  EKG WNL  A1C= 4.9
I would like to be discharged.  Patient's mood was stabled and not currently troubled by OCD symptoms
Covering note   "I'm happy I'm going home soon "    Pt aware of the scheduled discharge and is with euthymic mood and affect id congruent to mood.  Denies any side effects from medication and is verbalizing that she is intending to continue with the medication and follow up.
Covering MD Note ( 7/2/17)     " I'm feeling much better. Really good now. I'm glad. And I talked to my boyfriend and he told his son he has to leave the house ."     Pt seen in the day room. Pt reported feeling much less depressed with current med regimen and attributes much of the improvement to the recent increase in Lithium for further mood stabilization. Pt reported good solid sleep ( 9 pm-5 am)  Pt reported always going to sleep early and being a morning person.   Pt tolerating med regimen with good clinical effect and with no side effects. Pt attending groups  and enjoying the challenge delivered in a fun an entertaining fashion.  Pt continues to currently deny SI /HI .Pt amenable to ongoing treatment. Social with select female peers. Pt requesting STEP 2 privilege. Discussion with staff who agree to pt step change.  Labs   TG = 195  EKG WNL  A1C= 4.9
Reports she is having intrusive thoughts, urges to cut and poor sleep  Anniversary of father's death 2 years ago is weighing on her.
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
## 2017-07-05 NOTE — DISCHARGE NOTE BEHAVIORAL HEALTH - NSBHDCSWCOMMENTSFT_PSY_A_CORE
Educated Ms. Ervin and her boyfriend about need to stay in treatment and take meds as prescribed.  Also educated about above discharge plan.

## 2017-07-05 NOTE — DISCHARGE NOTE BEHAVIORAL HEALTH - NSBHDCMEDICALFT_PSY_A_CORE
hydroxychloroquine 400 milliGRAM(s) Oral at bedtime  mycophenolate mofetil 1000 milliGRAM(s) Oral two times a day  levothyroxine 75 MICROGram(s) Oral daily  mesalamine Suppository 1000 milliGRAM(s) Rectal at bedtime  mesalamine ER Capsule 1500 milliGRAM(s) Oral daily            DM, colitis, Lupus E, Fibromyalgia, Hoshimotos , allergy PCN

## 2017-07-05 NOTE — DISCHARGE NOTE BEHAVIORAL HEALTH - NSBHDCCRISISPROB1FT_PSY_A_CORE
Any thoughts of harming yourself or return of any acute symptoms such as disturbing thoughts or auditory hallucinations that brought you to the hospital.

## 2017-07-10 DIAGNOSIS — Z79.4 LONG TERM (CURRENT) USE OF INSULIN: ICD-10-CM

## 2017-07-10 DIAGNOSIS — F10.21 ALCOHOL DEPENDENCE, IN REMISSION: ICD-10-CM

## 2017-07-10 DIAGNOSIS — F41.9 ANXIETY DISORDER, UNSPECIFIED: ICD-10-CM

## 2017-07-10 DIAGNOSIS — F31.30 BIPOLAR DISORDER, CURRENT EPISODE DEPRESSED, MILD OR MODERATE SEVERITY, UNSPECIFIED: ICD-10-CM

## 2017-07-10 DIAGNOSIS — Z88.0 ALLERGY STATUS TO PENICILLIN: ICD-10-CM

## 2017-07-10 DIAGNOSIS — Z91.011 ALLERGY TO MILK PRODUCTS: ICD-10-CM

## 2017-07-10 DIAGNOSIS — E11.9 TYPE 2 DIABETES MELLITUS WITHOUT COMPLICATIONS: ICD-10-CM

## 2017-07-10 DIAGNOSIS — Z88.8 ALLERGY STATUS TO OTHER DRUGS, MEDICAMENTS AND BIOLOGICAL SUBSTANCES STATUS: ICD-10-CM

## 2017-07-10 DIAGNOSIS — H35.069 RETINAL VASCULITIS, UNSPECIFIED EYE: ICD-10-CM

## 2017-07-10 DIAGNOSIS — F42.9 OBSESSIVE-COMPULSIVE DISORDER, UNSPECIFIED: ICD-10-CM

## 2017-07-10 DIAGNOSIS — M79.7 FIBROMYALGIA: ICD-10-CM

## 2017-07-10 DIAGNOSIS — K51.911 ULCERATIVE COLITIS, UNSPECIFIED WITH RECTAL BLEEDING: ICD-10-CM

## 2017-07-10 DIAGNOSIS — R44.0 AUDITORY HALLUCINATIONS: ICD-10-CM

## 2017-07-10 DIAGNOSIS — E06.3 AUTOIMMUNE THYROIDITIS: ICD-10-CM

## 2017-07-10 DIAGNOSIS — R45.851 SUICIDAL IDEATIONS: ICD-10-CM

## 2017-07-10 DIAGNOSIS — M32.9 SYSTEMIC LUPUS ERYTHEMATOSUS, UNSPECIFIED: ICD-10-CM

## 2017-10-09 ENCOUNTER — INPATIENT (INPATIENT)
Facility: HOSPITAL | Age: 47
LOS: 3 days | Discharge: HOME | End: 2017-10-13
Attending: PSYCHIATRY & NEUROLOGY | Admitting: PSYCHIATRY & NEUROLOGY
Payer: MEDICAID

## 2017-10-09 VITALS — WEIGHT: 160.06 LBS | HEIGHT: 67 IN

## 2017-10-09 DIAGNOSIS — F31.5 BIPOLAR DISORDER, CURRENT EPISODE DEPRESSED, SEVERE, WITH PSYCHOTIC FEATURES: ICD-10-CM

## 2017-10-09 LAB
ALBUMIN SERPL ELPH-MCNC: 3.6 G/DL — SIGNIFICANT CHANGE UP (ref 3.3–5)
ALP SERPL-CCNC: 64 U/L — SIGNIFICANT CHANGE UP (ref 40–120)
ALT FLD-CCNC: 24 U/L — SIGNIFICANT CHANGE UP (ref 12–78)
AMPHET UR-MCNC: NEGATIVE — SIGNIFICANT CHANGE UP
ANION GAP SERPL CALC-SCNC: 7 MMOL/L — SIGNIFICANT CHANGE UP (ref 5–17)
APPEARANCE UR: CLEAR — SIGNIFICANT CHANGE UP
AST SERPL-CCNC: 21 U/L — SIGNIFICANT CHANGE UP (ref 15–37)
BACTERIA # UR AUTO: (no result)
BARBITURATES UR SCN-MCNC: NEGATIVE — SIGNIFICANT CHANGE UP
BASOPHILS # BLD AUTO: 0 K/UL — SIGNIFICANT CHANGE UP (ref 0–0.2)
BASOPHILS NFR BLD AUTO: 0.5 % — SIGNIFICANT CHANGE UP (ref 0–2)
BENZODIAZ UR-MCNC: NEGATIVE — SIGNIFICANT CHANGE UP
BILIRUB SERPL-MCNC: 0.3 MG/DL — SIGNIFICANT CHANGE UP (ref 0.2–1.2)
BILIRUB UR-MCNC: NEGATIVE — SIGNIFICANT CHANGE UP
BUN SERPL-MCNC: 19 MG/DL — SIGNIFICANT CHANGE UP (ref 7–23)
CALCIUM SERPL-MCNC: 8.8 MG/DL — SIGNIFICANT CHANGE UP (ref 8.5–10.1)
CHLORIDE SERPL-SCNC: 107 MMOL/L — SIGNIFICANT CHANGE UP (ref 96–108)
CHOLEST SERPL-MCNC: 229 MG/DL — HIGH (ref 10–199)
CO2 SERPL-SCNC: 22 MMOL/L — SIGNIFICANT CHANGE UP (ref 22–31)
COCAINE METAB.OTHER UR-MCNC: NEGATIVE — SIGNIFICANT CHANGE UP
COLOR SPEC: YELLOW — SIGNIFICANT CHANGE UP
CREAT SERPL-MCNC: 0.84 MG/DL — SIGNIFICANT CHANGE UP (ref 0.5–1.3)
DIFF PNL FLD: NEGATIVE — SIGNIFICANT CHANGE UP
EOSINOPHIL # BLD AUTO: 0.2 K/UL — SIGNIFICANT CHANGE UP (ref 0–0.5)
EOSINOPHIL NFR BLD AUTO: 2 % — SIGNIFICANT CHANGE UP (ref 0–6)
EPI CELLS # UR: (no result)
GLUCOSE SERPL-MCNC: 88 MG/DL — SIGNIFICANT CHANGE UP (ref 70–99)
GLUCOSE UR QL: NEGATIVE MG/DL — SIGNIFICANT CHANGE UP
HBA1C BLD-MCNC: 5 % — SIGNIFICANT CHANGE UP (ref 4–5.6)
HCG SERPL-ACNC: <1 MIU/ML — SIGNIFICANT CHANGE UP
HCT VFR BLD CALC: 39.7 % — SIGNIFICANT CHANGE UP (ref 34.5–45)
HDLC SERPL-MCNC: 102 MG/DL — SIGNIFICANT CHANGE UP (ref 40–125)
HGB BLD-MCNC: 13.2 G/DL — SIGNIFICANT CHANGE UP (ref 11.5–15.5)
KETONES UR-MCNC: NEGATIVE — SIGNIFICANT CHANGE UP
LEUKOCYTE ESTERASE UR-ACNC: (no result)
LIPID PNL WITH DIRECT LDL SERPL: 83 MG/DL — SIGNIFICANT CHANGE UP
LYMPHOCYTES # BLD AUTO: 1.7 K/UL — SIGNIFICANT CHANGE UP (ref 1–3.3)
LYMPHOCYTES # BLD AUTO: 21.4 % — SIGNIFICANT CHANGE UP (ref 13–44)
MCHC RBC-ENTMCNC: 30 PG — SIGNIFICANT CHANGE UP (ref 27–34)
MCHC RBC-ENTMCNC: 33.4 GM/DL — SIGNIFICANT CHANGE UP (ref 32–36)
MCV RBC AUTO: 90 FL — SIGNIFICANT CHANGE UP (ref 80–100)
METHADONE UR-MCNC: NEGATIVE — SIGNIFICANT CHANGE UP
MONOCYTES # BLD AUTO: 0.4 K/UL — SIGNIFICANT CHANGE UP (ref 0–0.9)
MONOCYTES NFR BLD AUTO: 5.3 % — SIGNIFICANT CHANGE UP (ref 2–14)
NEUTROPHILS # BLD AUTO: 5.5 K/UL — SIGNIFICANT CHANGE UP (ref 1.8–7.4)
NEUTROPHILS NFR BLD AUTO: 70.8 % — SIGNIFICANT CHANGE UP (ref 43–77)
NITRITE UR-MCNC: NEGATIVE — SIGNIFICANT CHANGE UP
OPIATES UR-MCNC: NEGATIVE — SIGNIFICANT CHANGE UP
PCP SPEC-MCNC: SIGNIFICANT CHANGE UP
PCP UR-MCNC: NEGATIVE — SIGNIFICANT CHANGE UP
PH UR: 5 — SIGNIFICANT CHANGE UP (ref 5–8)
PLATELET # BLD AUTO: 284 K/UL — SIGNIFICANT CHANGE UP (ref 150–400)
POTASSIUM SERPL-MCNC: 4.3 MMOL/L — SIGNIFICANT CHANGE UP (ref 3.5–5.3)
POTASSIUM SERPL-SCNC: 4.3 MMOL/L — SIGNIFICANT CHANGE UP (ref 3.5–5.3)
PROT SERPL-MCNC: 6.9 GM/DL — SIGNIFICANT CHANGE UP (ref 6–8.3)
PROT UR-MCNC: NEGATIVE MG/DL — SIGNIFICANT CHANGE UP
RBC # BLD: 4.41 M/UL — SIGNIFICANT CHANGE UP (ref 3.8–5.2)
RBC # FLD: 13.3 % — SIGNIFICANT CHANGE UP (ref 10.3–14.5)
RBC CASTS # UR COMP ASSIST: NEGATIVE /HPF — SIGNIFICANT CHANGE UP (ref 0–4)
SODIUM SERPL-SCNC: 136 MMOL/L — SIGNIFICANT CHANGE UP (ref 135–145)
SP GR SPEC: 1 — LOW (ref 1.01–1.02)
THC UR QL: NEGATIVE — SIGNIFICANT CHANGE UP
TOTAL CHOLESTEROL/HDL RATIO MEASUREMENT: 2.2 RATIO — LOW (ref 3.3–7.1)
TRIGL SERPL-MCNC: 218 MG/DL — HIGH (ref 10–149)
TSH SERPL-MCNC: 3.13 UU/ML — SIGNIFICANT CHANGE UP (ref 0.36–3.74)
UROBILINOGEN FLD QL: NEGATIVE MG/DL — SIGNIFICANT CHANGE UP
WBC # BLD: 7.7 K/UL — SIGNIFICANT CHANGE UP (ref 3.8–10.5)
WBC # FLD AUTO: 7.7 K/UL — SIGNIFICANT CHANGE UP (ref 3.8–10.5)
WBC UR QL: SIGNIFICANT CHANGE UP

## 2017-10-09 PROCEDURE — 93010 ELECTROCARDIOGRAM REPORT: CPT

## 2017-10-09 PROCEDURE — 99285 EMERGENCY DEPT VISIT HI MDM: CPT

## 2017-10-09 RX ORDER — LITHIUM CARBONATE 300 MG/1
450 TABLET, EXTENDED RELEASE ORAL
Qty: 0 | Refills: 0 | Status: DISCONTINUED | OUTPATIENT
Start: 2017-10-09 | End: 2017-10-09

## 2017-10-09 RX ORDER — CYCLOBENZAPRINE HYDROCHLORIDE 10 MG/1
10 TABLET, FILM COATED ORAL AT BEDTIME
Qty: 0 | Refills: 0 | Status: COMPLETED | OUTPATIENT
Start: 2017-10-09 | End: 2017-10-09

## 2017-10-09 RX ORDER — HALOPERIDOL DECANOATE 100 MG/ML
5 INJECTION INTRAMUSCULAR ONCE
Qty: 0 | Refills: 0 | Status: DISCONTINUED | OUTPATIENT
Start: 2017-10-09 | End: 2017-10-13

## 2017-10-09 RX ORDER — LITHIUM CARBONATE 300 MG/1
450 TABLET, EXTENDED RELEASE ORAL
Qty: 0 | Refills: 0 | Status: DISCONTINUED | OUTPATIENT
Start: 2017-10-09 | End: 2017-10-13

## 2017-10-09 RX ORDER — FLUVOXAMINE MALEATE 25 MG/1
150 TABLET ORAL AT BEDTIME
Qty: 0 | Refills: 0 | Status: DISCONTINUED | OUTPATIENT
Start: 2017-10-09 | End: 2017-10-10

## 2017-10-09 RX ORDER — MYCOPHENOLATE MOFETIL 250 MG/1
1000 CAPSULE ORAL
Qty: 0 | Refills: 0 | Status: DISCONTINUED | OUTPATIENT
Start: 2017-10-09 | End: 2017-10-13

## 2017-10-09 RX ORDER — ARIPIPRAZOLE 15 MG/1
15 TABLET ORAL DAILY
Qty: 0 | Refills: 0 | Status: DISCONTINUED | OUTPATIENT
Start: 2017-10-09 | End: 2017-10-10

## 2017-10-09 RX ORDER — ACETAMINOPHEN 500 MG
1000 TABLET ORAL ONCE
Qty: 0 | Refills: 0 | Status: COMPLETED | OUTPATIENT
Start: 2017-10-09 | End: 2017-10-09

## 2017-10-09 RX ORDER — DIPHENHYDRAMINE HCL 50 MG
50 CAPSULE ORAL ONCE
Qty: 0 | Refills: 0 | Status: DISCONTINUED | OUTPATIENT
Start: 2017-10-09 | End: 2017-10-13

## 2017-10-09 RX ORDER — CLONAZEPAM 1 MG
0.5 TABLET ORAL AT BEDTIME
Qty: 0 | Refills: 0 | Status: DISCONTINUED | OUTPATIENT
Start: 2017-10-09 | End: 2017-10-13

## 2017-10-09 RX ORDER — LEVOTHYROXINE SODIUM 125 MCG
75 TABLET ORAL DAILY
Qty: 0 | Refills: 0 | Status: DISCONTINUED | OUTPATIENT
Start: 2017-10-09 | End: 2017-10-13

## 2017-10-09 RX ORDER — TRAZODONE HCL 50 MG
50 TABLET ORAL AT BEDTIME
Qty: 0 | Refills: 0 | Status: DISCONTINUED | OUTPATIENT
Start: 2017-10-09 | End: 2017-10-13

## 2017-10-09 RX ORDER — HYDROXYCHLOROQUINE SULFATE 200 MG
200 TABLET ORAL
Qty: 0 | Refills: 0 | Status: DISCONTINUED | OUTPATIENT
Start: 2017-10-09 | End: 2017-10-13

## 2017-10-09 RX ADMIN — Medication 1000 MILLIGRAM(S): at 14:52

## 2017-10-09 RX ADMIN — Medication 0.5 MILLIGRAM(S): at 21:52

## 2017-10-09 RX ADMIN — Medication 1000 MILLIGRAM(S): at 15:16

## 2017-10-09 RX ADMIN — LITHIUM CARBONATE 450 MILLIGRAM(S): 300 TABLET, EXTENDED RELEASE ORAL at 21:51

## 2017-10-09 NOTE — ED BEHAVIORAL HEALTH ASSESSMENT NOTE - DESCRIPTION
Calm.  Lying in bed.  Ate snack. Lupus, DMII, Vasculitis in eye, Rebekah living with BF   Unemployed ha 2 children 13 and 10

## 2017-10-09 NOTE — ED BEHAVIORAL HEALTH ASSESSMENT NOTE - HPI (INCLUDE ILLNESS QUALITY, SEVERITY, DURATION, TIMING, CONTEXT, MODIFYING FACTORS, ASSOCIATED SIGNS AND SYMPTOMS)
47 y.o. DWF known to [facility] from previous treatment.  Patient reports she has slept poorly since discharge in July.  Has had difficulty connecting with outpatient MD.  Saw an NP in the interim and Celexa was added for depression.    SHe reports for a few days has yovana hearing whsipers of father in her head.  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  Things have been better with BF and his son who are in home with her.  They are also renting to [employer].  She has had suicidal ideation to drive off the road and was worried because her kids were in the car with her.  She has also been feeling paranoid and had to leave AA meeting because  people were looking at her.  She has also been afraid to go outside.

## 2017-10-09 NOTE — ED PROVIDER NOTE - MEDICAL DECISION MAKING DETAILS
46 y/o female with PMHX of Schizo affective disorder presents the ED c/o hallucinations and suicidal ideations. Will obtain psych clearance lab work, uranalysis for urinary frequency , anticipate Pt to be cleared for psych admit, consult psych.

## 2017-10-09 NOTE — ED STATDOCS - PMH
delivery delivered    Hashimoto's disease    Lupus    Other forms of systemic lupus erythematosus, unspecified organ involvement status    Retinal vasculitis, unspecified laterality    Schizo-affective schizophrenia    Type 2 diabetes mellitus without complication, unspecified long term insulin use status    Ulcerative colitis with rectal bleeding, unspecified location

## 2017-10-09 NOTE — ED BEHAVIORAL HEALTH ASSESSMENT NOTE - RISK ASSESSMENT
High  Past suicidal behavior as well as current suicidality, auditory hallucinations, insomnia, and poor access to regular provider.  Patient will be safe in inpatient setting.  Does not need 1:1 observation once admitted to psychiatry unit.

## 2017-10-09 NOTE — ED BEHAVIORAL HEALTH ASSESSMENT NOTE - SUMMARY
46 yo with long history of Bipolar Disorder and multiple hospitalizations presents with insomnia, increasing depressed mood and suicidal thoughts as well as auditory hallucinations. patient has had difficulty connecting with outpatient provider, is interested in possibly a higher level of care in outpatient setting.     Patient is willing to sign in voluntarily for medication adjustments and stabilization.
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
## 2017-10-09 NOTE — ED ADULT TRIAGE NOTE - CHIEF COMPLAINT QUOTE
auditory/visual hallucinations , SI thoughts , pt needs a new psychiatrist , pt reports she takes her medications as prescribed , hx schizo affective disorder

## 2017-10-09 NOTE — ED BEHAVIORAL HEALTH ASSESSMENT NOTE - DETAILS
stay with their father Has overdosed in the past and cut her wrists rash: Lamictal, gained 15 lbs since started Depakote in 12/16 many relatives chronic pain issues Dr. Hernandez is MD Informed MD of admission

## 2017-10-09 NOTE — ED STATDOCS - PROGRESS NOTE DETAILS
Alainaarabella Hummel: 47F pmhx schizo affective disorder, Lupus presents to ED c/o hallucinations for one week. States hearing voices and not being to sleep.  Reports being hospitalized in July and receiving medications. Allergies to Risperadol, Benadryl. Former smoker 13 years ago. Will be sent to Main Ed for Eval.

## 2017-10-09 NOTE — ED ADULT NURSE NOTE - OBJECTIVE STATEMENT
Pt reports that she is experiencing auditory hallucinations that are sometimes whispers and sometimes the tv talking to her. She reports hearing her father's voice, who is .  Pt reports that she is also experiencing visual hallucinations with "things hovering over people."  Pt reports feeling the compulsion to hurt herself, such as driving and feeling as if she needs to run into something. Pt denies any physical pain at this time. Pt reports that her psychiatric nurse practitioner "thinks that it's menopause and I don't need a medication change."  Pt reports walking her dog at night and seeing people walking on the street and believing that they were there to hurt her.  Pt reports taking meds as prescribed.  Constant observation in place for safety with belongings to security.
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
## 2017-10-09 NOTE — ED PROVIDER NOTE - OBJECTIVE STATEMENT
47 y-o Female with PMHx of Bipolar disease, presents to the ED c/o Hallucinations. Pt states 47 y-o Female with PMHx Schizo-affective schizophrenia, Lupus, Hashimoto diseases, presents to the ED c/o Hallucinations. Pt state she has been hearing voices and has been feeling depressed. Had a plan of walking in front of train. Pt also c/o urinary frequency.  Pt is otherwise asymptomatic at this time.

## 2017-10-10 LAB — LITHIUM SERPL-MCNC: 0.6 MMOL/L — SIGNIFICANT CHANGE UP (ref 0.6–1.2)

## 2017-10-10 RX ORDER — ARIPIPRAZOLE 15 MG/1
30 TABLET ORAL DAILY
Qty: 0 | Refills: 0 | Status: DISCONTINUED | OUTPATIENT
Start: 2017-10-11 | End: 2017-10-13

## 2017-10-10 RX ORDER — HYDROXYCHLOROQUINE SULFATE 200 MG
200 TABLET ORAL
Qty: 0 | Refills: 0 | Status: DISCONTINUED | OUTPATIENT
Start: 2017-10-10 | End: 2017-10-10

## 2017-10-10 RX ORDER — CLONAZEPAM 1 MG
0.5 TABLET ORAL
Qty: 0 | Refills: 0 | Status: DISCONTINUED | OUTPATIENT
Start: 2017-10-10 | End: 2017-10-13

## 2017-10-10 RX ORDER — FLUVOXAMINE MALEATE 25 MG/1
200 TABLET ORAL AT BEDTIME
Qty: 0 | Refills: 0 | Status: DISCONTINUED | OUTPATIENT
Start: 2017-10-10 | End: 2017-10-13

## 2017-10-10 RX ORDER — MESALAMINE 400 MG
1500 TABLET, DELAYED RELEASE (ENTERIC COATED) ORAL DAILY
Qty: 0 | Refills: 0 | Status: DISCONTINUED | OUTPATIENT
Start: 2017-10-10 | End: 2017-10-13

## 2017-10-10 RX ADMIN — Medication 375 MILLIGRAM(S): at 20:44

## 2017-10-10 RX ADMIN — LITHIUM CARBONATE 450 MILLIGRAM(S): 300 TABLET, EXTENDED RELEASE ORAL at 20:44

## 2017-10-10 RX ADMIN — Medication 375 MILLIGRAM(S): at 11:16

## 2017-10-10 RX ADMIN — CYCLOBENZAPRINE HYDROCHLORIDE 10 MILLIGRAM(S): 10 TABLET, FILM COATED ORAL at 03:28

## 2017-10-10 RX ADMIN — LITHIUM CARBONATE 450 MILLIGRAM(S): 300 TABLET, EXTENDED RELEASE ORAL at 09:13

## 2017-10-10 RX ADMIN — ARIPIPRAZOLE 15 MILLIGRAM(S): 15 TABLET ORAL at 09:15

## 2017-10-10 RX ADMIN — FLUVOXAMINE MALEATE 200 MILLIGRAM(S): 25 TABLET ORAL at 20:43

## 2017-10-10 RX ADMIN — Medication 1500 MILLIGRAM(S): at 18:28

## 2017-10-10 RX ADMIN — Medication 0.5 MILLIGRAM(S): at 14:19

## 2017-10-10 RX ADMIN — MYCOPHENOLATE MOFETIL 1000 MILLIGRAM(S): 250 CAPSULE ORAL at 09:13

## 2017-10-10 RX ADMIN — MYCOPHENOLATE MOFETIL 1000 MILLIGRAM(S): 250 CAPSULE ORAL at 20:46

## 2017-10-10 RX ADMIN — FLUVOXAMINE MALEATE 150 MILLIGRAM(S): 25 TABLET ORAL at 03:27

## 2017-10-10 RX ADMIN — MYCOPHENOLATE MOFETIL 1000 MILLIGRAM(S): 250 CAPSULE ORAL at 03:25

## 2017-10-10 RX ADMIN — Medication 200 MILLIGRAM(S): at 09:14

## 2017-10-10 RX ADMIN — Medication 200 MILLIGRAM(S): at 18:29

## 2017-10-10 RX ADMIN — Medication 75 MICROGRAM(S): at 06:20

## 2017-10-10 RX ADMIN — Medication 1 MILLIGRAM(S): at 07:29

## 2017-10-10 RX ADMIN — Medication 0.5 MILLIGRAM(S): at 20:48

## 2017-10-10 RX ADMIN — Medication 375 MILLIGRAM(S): at 04:20

## 2017-10-10 RX ADMIN — Medication 375 MILLIGRAM(S): at 21:46

## 2017-10-10 RX ADMIN — Medication 375 MILLIGRAM(S): at 03:29

## 2017-10-10 RX ADMIN — Medication 375 MILLIGRAM(S): at 09:13

## 2017-10-10 NOTE — CONSULT NOTE ADULT - SUBJECTIVE AND OBJECTIVE BOX
PCP:  Kang Larson   GI:  [place]  Rheum:  [place]  GYN:  Shantelle / Boris    CHIEF COMPLAINT:   disorganized thoughts and hearing voices    HISTORY OF THE PRESENT ILLNESS:    47 F with complex medical Hx along with Hx of Schizoaffective Disorder and Bipolar disorder presented to the ED with racing thoughts, disorganized thoughts, hearing voices and thoughts of hurting herself by driving off the road.  She has been experiencing increasing difficulty with focusing and concentrating despite taking her medications.    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 Escalante.  Denies any heavy bleeding with periods.  All 10 systems reviewed in detailed and found to be negative with the exception of what has already been described above    MEDICATIONS  (STANDING):  ARIPiprazole 15 milliGRAM(s) Oral daily  clonazePAM Tablet 0.5 milliGRAM(s) Oral at bedtime  fluvoxaMINE 150 milliGRAM(s) Oral at bedtime  hydroxychloroquine 200 milliGRAM(s) Oral two times a day with meals  hydroxychloroquine 200 milliGRAM(s) Oral two times a day with meals  levothyroxine 75 MICROGram(s) Oral daily  lithium 450 milliGRAM(s) Oral two times a day  mycophenolate mofetil 1000 milliGRAM(s) Oral two times a day  naproxen 375 milliGRAM(s) Oral two times a day    MEDICATIONS  (PRN):  diphenhydrAMINE   Injectable 50 milliGRAM(s) IntraMuscular once PRN agitation  haloperidol    Injectable 5 milliGRAM(s) IntraMuscular once PRN Agitation  LORazepam     Tablet 1 milliGRAM(s) Oral every 6 hours PRN Anxiety  LORazepam   Injectable 2 milliGRAM(s) IntraMuscular once PRN Agitation  traZODone 50 milliGRAM(s) Oral at bedtime PRN insomnioa    VITALS SIGNS:  T(F): 98 (10-10-17 @ 08:39), Max: 98.6 (10-09-17 @ 20:18)  HR: 64 (10-10-17 @ 08:39) (63 - 76)  BP: 123/50 (10-10-17 @ 08:39) (123/50 - 130/75)  RR: 14 (10-10-17 @ 08:39) (14 - 18)  SpO2: 100% (10-10-17 @ 08:39) (98% - 100%)    PHYSICAL EXAM:    HEENT:  pupils equal and reactive, EOMI, no oropharyngeal lesions, erythema, exudates, oral thrush    NECK:   supple, no carotid bruits, no palpable lymph nodes, no thyromegaly    CV:  +S1, +S2, regular, no murmurs or rubs    RESP:   lungs clear to auscultation bilaterally, no wheezing, rales, rhonchi, good air entry bilaterally    BREAST:  not examined    GI:  abdomen soft, non-tender, non-distended, normal BS, no bruits, no abdominal masses, no palpable masses    RECTAL:  not examined    :  not examined    MSK:   normal muscle tone, no atrophy, no rigidity, no contractions    EXT:   no clubbing, no cyanosis, no edema, no calf pain, swelling or erythema    VASCULAR:  pulses equal and symmetric in the upper and lower extremities    NEURO:  AAOX3, no focal neurological deficits, follows all commands, able to move extremities spontaneously    SKIN:  no ulcers, lesions or rashes      LABS:                        13.2   7.7   )-----------( 284      ( 09 Oct 2017 11:40 )             39.7     10-09    136  |  107  |  19  ----------------------------<  88  4.3   |  22  |  0.84    Ca    8.8      09 Oct 2017 11:40    TPro  6.9  /  Alb  3.6  /  TBili  0.3  /  DBili  x   /  AST  21  /  ALT  24  /  AlkPhos  64  10-09    LIVER FUNCTIONS - ( 09 Oct 2017 11:40 )  Alb: 3.6 g/dL / Pro: 6.9 gm/dL / ALK PHOS: 64 U/L / ALT: 24 U/L / AST: 21 U/L / GGT: x           Urinalysis Basic - ( 09 Oct 2017 12:40 )    Color: Yellow / Appearance: Clear / S.005 / pH: x  Gluc: x / Ketone: Negative  / Bili: Negative / Urobili: Negative mg/dL   Blood: x / Protein: Negative mg/dL / Nitrite: Negative   Leuk Esterase: Trace / RBC: Negative /HPF / WBC 3-5   Sq Epi: x / Non Sq Epi: Moderate / Bacteria: Few    Blood, Urine: Negative (10-09 @ 12:40)    EKG - NSR, normal axis, normal RWP, no acute ischemic changes, QTc 428      IMPRESSION:    ACUTE PSYCHOSIS AND DECOMPENSATION IN PATIENT WITH BIPOLAR DISORDER, DEPRESSION, AND SCHIZOAFFECTIVE DISORDER    ACUTE SUICIDAL THOUGHTS WITH PLAN    HX OF DIABETES MELLITUS, NOW OFF MEDS    SYSTEMIC LUPUS - STABLE ON IMMUNOSUPPRESSIVE MEDS    ULCERATIVE COLITIS - STABLE    HASHIMOTOS HYPOTHYROIDISM    FIBROMYALGIA    IRREGULAR MENSES      RECOMMENDATIONS:  - medically stable for admission to   - primary management per psych  - continue her usual outpatient medical regimen - Cellcept, Synthroid, Placquenil, Naprosyn, Apriso  - will convert her Apriso to Mesalamine since Apriso is not on formulary  - patient requesting to be seen by Dr. Escalante from GYN - I spoke to her, she will see in the next 1-2 days  -  will sign off    d/w patient and psych RN

## 2017-10-11 DIAGNOSIS — F42.2 MIXED OBSESSIONAL THOUGHTS AND ACTS: ICD-10-CM

## 2017-10-11 RX ADMIN — ARIPIPRAZOLE 30 MILLIGRAM(S): 15 TABLET ORAL at 08:57

## 2017-10-11 RX ADMIN — Medication 75 MICROGRAM(S): at 05:44

## 2017-10-11 RX ADMIN — Medication 375 MILLIGRAM(S): at 21:15

## 2017-10-11 RX ADMIN — Medication 200 MILLIGRAM(S): at 08:58

## 2017-10-11 RX ADMIN — MYCOPHENOLATE MOFETIL 1000 MILLIGRAM(S): 250 CAPSULE ORAL at 09:02

## 2017-10-11 RX ADMIN — Medication 200 MILLIGRAM(S): at 17:09

## 2017-10-11 RX ADMIN — LITHIUM CARBONATE 450 MILLIGRAM(S): 300 TABLET, EXTENDED RELEASE ORAL at 20:40

## 2017-10-11 RX ADMIN — Medication 1500 MILLIGRAM(S): at 08:58

## 2017-10-11 RX ADMIN — MYCOPHENOLATE MOFETIL 1000 MILLIGRAM(S): 250 CAPSULE ORAL at 20:40

## 2017-10-11 RX ADMIN — Medication 0.5 MILLIGRAM(S): at 13:43

## 2017-10-11 RX ADMIN — Medication 375 MILLIGRAM(S): at 09:02

## 2017-10-11 RX ADMIN — Medication 0.5 MILLIGRAM(S): at 20:45

## 2017-10-11 RX ADMIN — Medication 375 MILLIGRAM(S): at 20:40

## 2017-10-11 RX ADMIN — LITHIUM CARBONATE 450 MILLIGRAM(S): 300 TABLET, EXTENDED RELEASE ORAL at 08:58

## 2017-10-11 RX ADMIN — FLUVOXAMINE MALEATE 200 MILLIGRAM(S): 25 TABLET ORAL at 20:40

## 2017-10-12 DIAGNOSIS — N95.1 MENOPAUSAL AND FEMALE CLIMACTERIC STATES: ICD-10-CM

## 2017-10-12 RX ORDER — ARIPIPRAZOLE 15 MG/1
1 TABLET ORAL
Qty: 0 | Refills: 0 | COMMUNITY

## 2017-10-12 RX ORDER — ACETAMINOPHEN 500 MG
650 TABLET ORAL EVERY 6 HOURS
Qty: 0 | Refills: 0 | Status: DISCONTINUED | OUTPATIENT
Start: 2017-10-12 | End: 2017-10-13

## 2017-10-12 RX ORDER — CLONAZEPAM 1 MG
1 TABLET ORAL
Qty: 28 | Refills: 0 | OUTPATIENT
Start: 2017-10-12 | End: 2017-10-26

## 2017-10-12 RX ORDER — CITALOPRAM 10 MG/1
1 TABLET, FILM COATED ORAL
Qty: 0 | Refills: 0 | COMMUNITY

## 2017-10-12 RX ORDER — ACETAMINOPHEN 500 MG
2 TABLET ORAL
Qty: 0 | Refills: 0 | COMMUNITY
Start: 2017-10-12

## 2017-10-12 RX ORDER — ARIPIPRAZOLE 15 MG/1
1 TABLET ORAL
Qty: 14 | Refills: 1 | OUTPATIENT
Start: 2017-10-12 | End: 2017-11-08

## 2017-10-12 RX ORDER — FLUVOXAMINE MALEATE 25 MG/1
2 TABLET ORAL
Qty: 0 | Refills: 0 | COMMUNITY
Start: 2017-10-12

## 2017-10-12 RX ADMIN — FLUVOXAMINE MALEATE 200 MILLIGRAM(S): 25 TABLET ORAL at 21:55

## 2017-10-12 RX ADMIN — MYCOPHENOLATE MOFETIL 1000 MILLIGRAM(S): 250 CAPSULE ORAL at 09:31

## 2017-10-12 RX ADMIN — Medication 375 MILLIGRAM(S): at 21:26

## 2017-10-12 RX ADMIN — LITHIUM CARBONATE 450 MILLIGRAM(S): 300 TABLET, EXTENDED RELEASE ORAL at 09:30

## 2017-10-12 RX ADMIN — Medication 0.5 MILLIGRAM(S): at 21:29

## 2017-10-12 RX ADMIN — Medication 200 MILLIGRAM(S): at 09:38

## 2017-10-12 RX ADMIN — ARIPIPRAZOLE 30 MILLIGRAM(S): 15 TABLET ORAL at 09:29

## 2017-10-12 RX ADMIN — LITHIUM CARBONATE 450 MILLIGRAM(S): 300 TABLET, EXTENDED RELEASE ORAL at 21:25

## 2017-10-12 RX ADMIN — MYCOPHENOLATE MOFETIL 1000 MILLIGRAM(S): 250 CAPSULE ORAL at 21:30

## 2017-10-12 RX ADMIN — Medication 375 MILLIGRAM(S): at 09:30

## 2017-10-12 RX ADMIN — Medication 200 MILLIGRAM(S): at 18:09

## 2017-10-12 RX ADMIN — Medication 1500 MILLIGRAM(S): at 09:33

## 2017-10-12 RX ADMIN — Medication 0.5 MILLIGRAM(S): at 13:29

## 2017-10-12 RX ADMIN — Medication 375 MILLIGRAM(S): at 01:00

## 2017-10-12 RX ADMIN — Medication 75 MICROGRAM(S): at 06:19

## 2017-10-12 NOTE — PROGRESS NOTE BEHAVIORAL HEALTH - PROBLEM SELECTOR PROBLEM 1
Bipolar affective disorder, current episode depressed, current episode severity unspecified

## 2017-10-12 NOTE — CONSULT NOTE ADULT - ASSESSMENT
Perimenopausal symptoms reported during admission for Bipolar disorder with psychotic features and mixed obsessive thoughts/acts.    Pt will follow up with me in the office shortly after her discharge from the hospital tomorrow.  Thank you for this consultation.

## 2017-10-12 NOTE — PROGRESS NOTE BEHAVIORAL HEALTH - NSBHADMITIPREASON_PSY_A_CORE
Danger to self; mental illness expected to respond to inpatient care

## 2017-10-12 NOTE — DISCHARGE NOTE BEHAVIORAL HEALTH - NSBHDCADMRISKMITFT_PSY_A_CORE
patient will be linked to outpatient clinic where she can regularly receive medication management and psychotherapy in one location

## 2017-10-12 NOTE — DISCHARGE NOTE BEHAVIORAL HEALTH - NSBHDCCRISISPLAN1FT_PSY_A_CORE
Talk to a trusted friend or family member and ask for help.  Call the Suicide Hotline  Call 911 or go to your nearest hospital emergency room
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
## 2017-10-12 NOTE — PROGRESS NOTE BEHAVIORAL HEALTH - SUMMARY
47 y.o. DWF, mother of 10 y.o son and 13 y.o. daughter living with SO who is supportive. States had been stable on regime of Wellbutrin, Abilify, LICO3, Luvox and Klonopin ~ 6 mos ago. Dr. Chacon went on extended vacation and covering MD "changed meds. 2 hospitalizations since in Dec, 16 and Feb.'17.    Currently having disturbing intrusive images and impulses to harm self. Tearful, impaired sleep, concentration, memory. +AH, no commands, paranoia and feels people can read her mind.    Pt. requesting voluntary admission to stabilize symptoms and medication
46 yo with long history of Bipolar Disorder and multiple hospitalizations presents with insomnia, increasing depressed mood and suicidal thoughts as well as auditory hallucinations. patient has had difficulty connecting with outpatient provider, is interested in possibly a higher level of care in outpatient setting.    Patient is much improved since admission
48 yo with long history of Bipolar Disorder and multiple hospitalizations presents with insomnia, increasing depressed mood and suicidal thoughts as well as auditory hallucinations. patient has had difficulty connecting with outpatient provider, is interested in possibly a higher level of care in outpatient setting.

## 2017-10-12 NOTE — DISCHARGE NOTE BEHAVIORAL HEALTH - NSBHDCSUICFCTRMIT_PSY_A_CORE
Patient has BF to receive support from  Patient will now have therapist she can reach out to if her condition begins to deteriorate  Patient regularly seeks out emergency services when her condition worsens

## 2017-10-12 NOTE — DISCHARGE NOTE BEHAVIORAL HEALTH - MEDICATION SUMMARY - MEDICATIONS TO CHANGE
I will SWITCH the dose or number of times a day I take the medications listed below when I get home from the hospital:    fluvoxaMINE 50 mg oral tablet  -- 3 tab(s) by mouth once a day (at bedtime)    KlonoPIN 0.5 mg oral tablet  -- 0.5 milligram(s) by mouth once a day (at bedtime), As Needed MDD:1 tab    Abilify 15 mg oral tablet  -- 1 tab(s) by mouth once a day

## 2017-10-12 NOTE — DISCHARGE NOTE BEHAVIORAL HEALTH - MEDICATION SUMMARY - MEDICATIONS TO STOP TAKING
I will STOP taking the medications listed below when I get home from the hospital:    CeleXA 20 mg oral tablet  -- 1 tab(s) by mouth once a day

## 2017-10-12 NOTE — PROGRESS NOTE BEHAVIORAL HEALTH - RISK ASSESSMENT
Suicide Risk Factors:  · Suicide Risk Factors  Mood episode      Patient much improved overall   For discharge today.  Activating Events Stressors:  · Activating Events/Stressors  Other  · Other  house stressors    Suicide Protective Factors:  · Suicide Protective Factors  Responsibility to family and others, Identifies reasons for living, Future oriented, Supportive social network or family, Positive therapeutic relationships
Past suicidal behavior as well as current suicidality, auditory hallucinations, insomnia, and poor access to regular provider.  Patient will be safe in inpatient setting.      Symptoms are improving  risk level is low at this time
Past suicidal behavior as well as current suicidality, auditory hallucinations, insomnia, and poor access to regular provider.  Patient will be safe in inpatient setting.      Symptoms are improving  risk level is low at this time

## 2017-10-12 NOTE — DISCHARGE NOTE BEHAVIORAL HEALTH - FAMILY HISTORY OF PSYCHIATRIC ILLNESS
living with BF and his adult son who has PTSD   Unemployed has 2 children 13 and 10  past CPS involvement and [employer] charge in college

## 2017-10-12 NOTE — DISCHARGE NOTE BEHAVIORAL HEALTH - NSBHDCADDFT_PSY_A_CORE
Gyb consult received Gyn consult received- will follow-up outpatient for perimenopausal symptoms Gyn consult received- will follow-up outpatient for perimenopausal symptoms    Lupus  Hashimoto's Disease,  fibromyalgia  Retinal vasculitis   Pt advised to go for follow up with private internist for all medical conditions.  Pt is also advised to continue with all medications until directed by her outpatient internist and psychiatrist to change or to discontinue the medications

## 2017-10-12 NOTE — PROGRESS NOTE BEHAVIORAL HEALTH - SECONDARY DX1
Obsessive-compulsive disorder, unspecified type
Mixed obsessional thoughts and acts
Mixed obsessional thoughts and acts

## 2017-10-12 NOTE — PROGRESS NOTE BEHAVIORAL HEALTH - NSBHFUPINTERVALCCFT_PSY_A_CORE
Patient reports she is feeling better.  No intrusive thoughts  Paranoia has resolved   Agreeing to more intensive outpatient treatment
Patient reports she is not having intrusive thoughts and is feeling less paranoid.  Not hearing voces.
Yes
Patient reports having intrusive thoughts and also jocelyn paranoid ideas that others conversations are pertaining to her  Was frightened by the suicidal thoughts he was having

## 2017-10-12 NOTE — PROGRESS NOTE BEHAVIORAL HEALTH - AXIS III
· Axis III  Lupus, Diabetes type II, Hoshimotos Disease, Vasculitis of eye
Hashimoto's disease    Lupus    Other forms of systemic lupus erythematosus, unspecified organ involvement status    Retinal vasculitis, unspecified laterality  Ulcerative colitis with rectal bleeding, unspecified location.
Hashimoto's disease    Lupus    Other forms of systemic lupus erythematosus, unspecified organ involvement status    Retinal vasculitis, unspecified laterality  Ulcerative colitis with rectal bleeding, unspecified location.

## 2017-10-12 NOTE — PROGRESS NOTE BEHAVIORAL HEALTH - PROBLEM SELECTOR PLAN 2
Taper Effexor and titrate Luvox-  increase to 50 mg Am and 150 mg hs  Appears to be improving
Luvox increased to 200 mg
Luvox increased to 200 mg

## 2017-10-12 NOTE — DISCHARGE NOTE BEHAVIORAL HEALTH - NSBHDCMEDICALFT_PSY_A_CORE
Lupus  Hashimoto's Disease,  fibromyalgia  Retinal vasculitis  Ulcerative colitis    hydroxychloroquine 200 milliGRAM(s) Oral two times a day with meals  levothyroxine 75 MICROGram(s) Oral daily  mesalamine ER Capsule 1500 milliGRAM(s) Oral daily  mycophenolate mofetil 1000 milliGRAM(s) Oral two times a day  naproxen 375 milliGRAM(s) Oral two times a day

## 2017-10-12 NOTE — PROGRESS NOTE BEHAVIORAL HEALTH - PROBLEM SELECTOR PLAN 1
Increase lithium to 450 mg BID  Abilify titration to replace Rexulti  Klonopin 0.5 mg for insomnia
Contimed lithium 450 mg BID  Abilify increased to 30 mg
Contimed lithium 450 mg BID  Abilify increased to 30 mg

## 2017-10-12 NOTE — PROGRESS NOTE BEHAVIORAL HEALTH - NSBHFUPINTERVALHXFT_PSY_A_CORE
Patient is noted to be less depressed and less anxious  Psychosis has resolved.    MEDICATIONS  (STANDING):  ARIPiprazole 30 milliGRAM(s) Oral daily  clonazePAM Tablet 0.5 milliGRAM(s) Oral at 2PM  clonazePAM Tablet 0.5 milliGRAM(s) Oral at bedtime  fluvoxaMINE 200 milliGRAM(s) Oral at bedtime  hydroxychloroquine 200 milliGRAM(s) Oral two times a day with meals  levothyroxine 75 MICROGram(s) Oral daily  lithium 450 milliGRAM(s) Oral two times a day  mesalamine ER Capsule 1500 milliGRAM(s) Oral daily  mycophenolate mofetil 1000 milliGRAM(s) Oral two times a day  naproxen 375 milliGRAM(s) Oral two times a day
Patient is improving gradually Feels that med changes have helped her. Would like to be in more structured program upon discharge.
Patient remains anxious and took ativan prn  Will increase Klonopin to twice a day  Abilify being increased to previous effective dose of 30 mg and increasing luvox to 200 mg for OCD

## 2017-10-12 NOTE — PROGRESS NOTE BEHAVIORAL HEALTH - PRIMARY DX
Bipolar affective disorder, current episode depressed, current episode severity unspecified
Bipolar I disorder, current or most recent episode depressed, with psychotic features
Bipolar I disorder, current or most recent episode depressed, with psychotic features

## 2017-10-12 NOTE — DISCHARGE NOTE BEHAVIORAL HEALTH - REASON FOR ADMISSION
auditory hallucinations and suicidal thoughts    "I haven't slept I am not sure my meds are working"

## 2017-10-12 NOTE — PROGRESS NOTE BEHAVIORAL HEALTH - PROBLEM SELECTOR PROBLEM 2
Obsessive-compulsive disorder, unspecified type

## 2017-10-12 NOTE — CONSULT NOTE ADULT - SUBJECTIVE AND OBJECTIVE BOX
Pt reports feeling hot flashes more frequently and intensely in the past few months.  Has questions about menopause and how to manage the symptoms.  Pt has stopped using the Nuvaring for cycle management for over four months and is interested in resuming again.  Pt denies any hot flashes while she's been in the hospital for this admission.  She correctly  suspects that they are stress related.  Pt is eager for discharge home tomorrow.  She looks forward to scheduling a visit in my office to review her symptoms and to decide on a plan for management of her doyle-menopausal symptoms.    PAST MEDICAL & SURGICAL HISTORY:  Retinal vasculitis, unspecified laterality  Hashimoto's disease  Ulcerative colitis with rectal bleeding, unspecified location  Type 2 diabetes mellitus without complication, unspecified long term insulin use status  Other forms of systemic lupus erythematosus, unspecified organ involvement status  Schizo-affective schizophrenia   delivery delivered    MEDICATIONS  (STANDING):  ARIPiprazole 30 milliGRAM(s) Oral daily  clonazePAM Tablet 0.5 milliGRAM(s) Oral <User Schedule>  clonazePAM Tablet 0.5 milliGRAM(s) Oral at bedtime  fluvoxaMINE 200 milliGRAM(s) Oral at bedtime  hydroxychloroquine 200 milliGRAM(s) Oral two times a day with meals  levothyroxine 75 MICROGram(s) Oral daily  lithium 450 milliGRAM(s) Oral two times a day  mesalamine ER Capsule 1500 milliGRAM(s) Oral daily  mycophenolate mofetil 1000 milliGRAM(s) Oral two times a day  naproxen 375 milliGRAM(s) Oral two times a day    MEDICATIONS  (PRN):  diphenhydrAMINE   Injectable 50 milliGRAM(s) IntraMuscular once PRN agitation  haloperidol    Injectable 5 milliGRAM(s) IntraMuscular once PRN Agitation  LORazepam     Tablet 1 milliGRAM(s) Oral every 6 hours PRN Anxiety  LORazepam   Injectable 2 milliGRAM(s) IntraMuscular once PRN Agitation  traZODone 50 milliGRAM(s) Oral at bedtime PRN insomnioa    Vital Signs Last 24 Hrs  T(C): 36.5 (12 Oct 2017 07:28), Max: 36.5 (12 Oct 2017 07:28)  T(F): 97.7 (12 Oct 2017 07:28), Max: 97.7 (12 Oct 2017 07:28)  HR: 69 (12 Oct 2017 07:28) (69 - 69)  BP: 117/78 (12 Oct 2017 07:28) (117/78 - 117/78)  RR: 16 (12 Oct 2017 07:28) (16 - 16)  SpO2: 99% (12 Oct 2017 07:28) (99% - 99%)    Examination- deferred

## 2017-10-12 NOTE — DISCHARGE NOTE BEHAVIORAL HEALTH - NSBHDCMEDSFT_PSY_A_CORE
ARIPiprazole 30 milliGRAM(s) Oral daily  clonazePAM Tablet 0.5 milliGRAM(s) Oral at bedtime  clonazePAM Tablet 0.5 milliGRAM(s) Oral <User Schedule>  fluvoxaMINE 200 milliGRAM(s) Oral at bedtime  lithium 450 milliGRAM(s) Oral two times a day

## 2017-10-12 NOTE — DISCHARGE NOTE BEHAVIORAL HEALTH - HPI (INCLUDE ILLNESS QUALITY, SEVERITY, DURATION, TIMING, CONTEXT, MODIFYING FACTORS, ASSOCIATED SIGNS AND SYMPTOMS)
47 y.o. DWF known to [facility] and [facility] from previous treatment.  Patient reports she has slept poorly since discharge in July.  Has had difficulty connecting with outpatient MD.  Saw an NP in the interim and Celexa was added for depression.    She reports for a few days has yovana hearing whispers of father in her head.  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  Things have been better with BF and his son who are in home with her.  They are also renting to tenants.  She had a suicidal intrusive thought to drive off the road and was worried because her kids were in the car with her.  She has also been feeling paranoid and had to leave AA meeting because  people were looking at her.  She has also been afraid to go outside.

## 2017-10-12 NOTE — DISCHARGE NOTE BEHAVIORAL HEALTH - NSBHDCDXVALIDYESFT_PSY_A_CORE
Patient's Celexa was discontinued and Luvox was increased to 200 mg for depression and OCD with good effect- Mood improved, suicidality resolved, and she stopped having intrusive thoughts.  Abilify was increased to 30 mg for paranoia and auditory hallucinations with good effect.  Klonopin was increased to twice daily with good effect on anxiety as well as insomnia .As on previous hospitalizations, she reconstituted quickly with the structure of the inpatient unit  She agreed to a more structured follow- up including psychotherapy  as she had moved from provider to provider int he past with adverse outcome.

## 2017-10-12 NOTE — DISCHARGE NOTE BEHAVIORAL HEALTH - NSBHDCSWCOMMENTSFT_PSY_A_CORE
Pt educated about her psychiatric condition, and about need and resources for continuing in outpatient treatment and community support. Pt advised to follow the prescribed treatment plan above until directed otherwise by her outpatient provider.

## 2017-10-12 NOTE — DISCHARGE NOTE BEHAVIORAL HEALTH - SECONDARY DIAGNOSIS.
Hashimoto's disease Lupus erythematosus, unspecified form Mixed obsessional thoughts and acts Ulcerative colitis with rectal bleeding, unspecified location Retinal vasculitis, unspecified laterality

## 2017-10-12 NOTE — DISCHARGE NOTE BEHAVIORAL HEALTH - NSBHDCTESTSFT_PSY_A_CORE
Lithium Level, Serum: 0.6 mmol/L (10.10.17 @ 06:48)  Hemoglobin A1C, Whole Blood: 5.0: Method: Immunoassay       Reference Range                4.0-5.6%       High risk (prediabetic)        5.7-6.4%       Diabetic, diagnostic             >=6.5%    Lipid Profile (10.09.17 @ 11:40)    HDL/Total Cholesterol Ratio Measurement: 2.2 RATIO    Cholesterol, Serum: 229 mg/dL    Triglycerides, Serum: 218 mg/dL    HDL Cholesterol, Serum: 102 mg/dL    Direct LDL: 83: LDL Cholesterol --- Interpretive Comment (for adults 18 and over)  Optimal LDL Level may vary based on clinical situation  Below 70                  Ideal for people at very high risk of heart  disease  Below 100                Ideal for people at risk of heart disease  100 - 129                   [place]  130 - 159                   Borderline high  160 - 189                   High  190 and Above          Very high mg/dL

## 2017-10-12 NOTE — DISCHARGE NOTE BEHAVIORAL HEALTH - MEDICATION SUMMARY - MEDICATIONS TO TAKE
I will START or STAY ON the medications listed below when I get home from the hospital:    Apriso 0.375 g oral capsule, extended release  -- 4 cap(s) by mouth once a day (in the morning)  -- Indication: For ulceerative colitis    acetaminophen 325 mg oral tablet  -- 2 tab(s) by mouth every 6 hours, As needed, Moderate Pain (4 - 6)  -- Indication: For Pain    Naprosyn 375 mg oral tablet  -- 1 tab(s) by mouth 2 times a day  -- Indication: For Pain    clonazePAM 0.5 mg oral tablet  -- 1 tab(s) by mouth 2 times a day MDD:2 tabs until told to discontinue   -- Indication: For anxiety    fluvoxaMINE 100 mg oral tablet  -- 2 tab(s) by mouth once a day (at bedtime)  -- Indication: For OCD/depression    Plaquenil 200 mg oral tablet  -- 1 tab(s) by mouth 2 times a day  -- Indication: For Lupus    lithium 150 mg oral capsule  -- 3 cap(s) by mouth 2 times a day  -- Indication: For Mood stabilization    ARIPiprazole 30 mg oral tablet  -- 1 tab(s) by mouth once a day until told to discontinue  -- Indication: For Psychosis    CellCept 500 mg oral tablet  -- 2 tab(s) by mouth 2 times a day  -- Indication: For LUPUS    Flexeril 10 mg oral tablet  -- 1 tab(s) by mouth once a day (at bedtime)  -- Indication: For fibromyalgia    Synthroid 75 mcg (0.075 mg) oral tablet  -- 1 tab(s) by mouth once a day  -- Indication: For hypothyroidism

## 2017-10-13 VITALS
DIASTOLIC BLOOD PRESSURE: 74 MMHG | TEMPERATURE: 98 F | SYSTOLIC BLOOD PRESSURE: 116 MMHG | HEART RATE: 65 BPM | RESPIRATION RATE: 14 BRPM | OXYGEN SATURATION: 97 %

## 2017-10-13 RX ADMIN — Medication 375 MILLIGRAM(S): at 10:59

## 2017-10-13 RX ADMIN — Medication 75 MICROGRAM(S): at 06:41

## 2017-10-13 RX ADMIN — Medication 200 MILLIGRAM(S): at 08:33

## 2017-10-13 RX ADMIN — MYCOPHENOLATE MOFETIL 1000 MILLIGRAM(S): 250 CAPSULE ORAL at 08:32

## 2017-10-13 RX ADMIN — LITHIUM CARBONATE 450 MILLIGRAM(S): 300 TABLET, EXTENDED RELEASE ORAL at 08:32

## 2017-10-13 RX ADMIN — Medication 1500 MILLIGRAM(S): at 08:33

## 2017-10-13 RX ADMIN — Medication 375 MILLIGRAM(S): at 08:35

## 2017-10-13 RX ADMIN — ARIPIPRAZOLE 30 MILLIGRAM(S): 15 TABLET ORAL at 08:33

## 2017-10-17 DIAGNOSIS — G47.00 INSOMNIA, UNSPECIFIED: ICD-10-CM

## 2017-10-17 DIAGNOSIS — R44.3 HALLUCINATIONS, UNSPECIFIED: ICD-10-CM

## 2017-10-17 DIAGNOSIS — H35.069 RETINAL VASCULITIS, UNSPECIFIED EYE: ICD-10-CM

## 2017-10-17 DIAGNOSIS — M79.7 FIBROMYALGIA: ICD-10-CM

## 2017-10-17 DIAGNOSIS — F31.5 BIPOLAR DISORDER, CURRENT EPISODE DEPRESSED, SEVERE, WITH PSYCHOTIC FEATURES: ICD-10-CM

## 2017-10-17 DIAGNOSIS — F42.2 MIXED OBSESSIONAL THOUGHTS AND ACTS: ICD-10-CM

## 2017-10-17 DIAGNOSIS — E06.3 AUTOIMMUNE THYROIDITIS: ICD-10-CM

## 2017-10-17 DIAGNOSIS — R45.851 SUICIDAL IDEATIONS: ICD-10-CM

## 2017-10-17 DIAGNOSIS — K51.90 ULCERATIVE COLITIS, UNSPECIFIED, WITHOUT COMPLICATIONS: ICD-10-CM

## 2017-10-17 DIAGNOSIS — M32.9 SYSTEMIC LUPUS ERYTHEMATOSUS, UNSPECIFIED: ICD-10-CM

## 2017-10-17 DIAGNOSIS — Z78.0 ASYMPTOMATIC MENOPAUSAL STATE: ICD-10-CM

## 2017-10-17 DIAGNOSIS — F42.9 OBSESSIVE-COMPULSIVE DISORDER, UNSPECIFIED: ICD-10-CM

## 2017-10-17 DIAGNOSIS — E11.9 TYPE 2 DIABETES MELLITUS WITHOUT COMPLICATIONS: ICD-10-CM

## 2017-10-17 DIAGNOSIS — N92.6 IRREGULAR MENSTRUATION, UNSPECIFIED: ICD-10-CM

## 2017-10-28 ENCOUNTER — INPATIENT (INPATIENT)
Facility: HOSPITAL | Age: 47
LOS: 16 days | Discharge: HOME | End: 2017-11-14
Attending: PSYCHIATRY & NEUROLOGY | Admitting: PSYCHIATRY & NEUROLOGY
Payer: MEDICAID

## 2017-10-28 VITALS — HEIGHT: 64 IN

## 2017-10-28 PROCEDURE — 99285 EMERGENCY DEPT VISIT HI MDM: CPT | Mod: 25

## 2017-10-28 PROCEDURE — 93010 ELECTROCARDIOGRAM REPORT: CPT

## 2017-10-28 NOTE — ED PROVIDER NOTE - PROGRESS NOTE DETAILS
d/w telepsych, will see pt d/w telepsych, would like to confer with Dr Hernandez in am regarding patient.  will hold in ED o/n.  requests 1mg ativan PO

## 2017-10-28 NOTE — ED PROVIDER NOTE - CHPI ED SYMPTOMS NEG
no disorientation/no weakness/no weight loss/no change in level of consciousness/no confusion/no paranoia/no agitation

## 2017-10-28 NOTE — ED PROVIDER NOTE - OBJECTIVE STATEMENT
46 y/o F PMHx Schizo affective schizophrenia, Lupus, Hashimoto's dz, presents to the ED c/o Hallucinations. The pt states that she has been hearing voices and has been feeling depressed since the past 3 days.  The pt notes that she has "Images" that tell her to hurt others and herself, and today had a compulsion to cut her wrists with a razor blade, but chose not to. The pt was recently d/c'd from  on October 13th for similar symptoms. No h/o headache, fever, chills, dizziness, abd pain , nvd, cp, cough, sob, rash or urinary incontinence.

## 2017-10-29 DIAGNOSIS — F60.3 BORDERLINE PERSONALITY DISORDER: ICD-10-CM

## 2017-10-29 DIAGNOSIS — F42.2 MIXED OBSESSIONAL THOUGHTS AND ACTS: ICD-10-CM

## 2017-10-29 DIAGNOSIS — F31.62 BIPOLAR DISORDER, CURRENT EPISODE MIXED, MODERATE: ICD-10-CM

## 2017-10-29 DIAGNOSIS — F31.30 BIPOLAR DISORDER, CURRENT EPISODE DEPRESSED, MILD OR MODERATE SEVERITY, UNSPECIFIED: ICD-10-CM

## 2017-10-29 LAB
ALBUMIN SERPL ELPH-MCNC: 3.8 G/DL — SIGNIFICANT CHANGE UP (ref 3.3–5)
ALP SERPL-CCNC: 51 U/L — SIGNIFICANT CHANGE UP (ref 40–120)
ALT FLD-CCNC: 32 U/L — SIGNIFICANT CHANGE UP (ref 12–78)
AMPHET UR-MCNC: NEGATIVE — SIGNIFICANT CHANGE UP
ANION GAP SERPL CALC-SCNC: 5 MMOL/L — SIGNIFICANT CHANGE UP (ref 5–17)
APAP SERPL-MCNC: <2 UG/ML — LOW (ref 10–30)
APPEARANCE UR: CLEAR — SIGNIFICANT CHANGE UP
AST SERPL-CCNC: 24 U/L — SIGNIFICANT CHANGE UP (ref 15–37)
BACTERIA # UR AUTO: (no result)
BARBITURATES UR SCN-MCNC: NEGATIVE — SIGNIFICANT CHANGE UP
BASOPHILS # BLD AUTO: 0 K/UL — SIGNIFICANT CHANGE UP (ref 0–0.2)
BASOPHILS NFR BLD AUTO: 0.8 % — SIGNIFICANT CHANGE UP (ref 0–2)
BENZODIAZ UR-MCNC: NEGATIVE — SIGNIFICANT CHANGE UP
BILIRUB SERPL-MCNC: 0.3 MG/DL — SIGNIFICANT CHANGE UP (ref 0.2–1.2)
BILIRUB UR-MCNC: NEGATIVE — SIGNIFICANT CHANGE UP
BUN SERPL-MCNC: 12 MG/DL — SIGNIFICANT CHANGE UP (ref 7–23)
CALCIUM SERPL-MCNC: 8.7 MG/DL — SIGNIFICANT CHANGE UP (ref 8.5–10.1)
CHLORIDE SERPL-SCNC: 106 MMOL/L — SIGNIFICANT CHANGE UP (ref 96–108)
CO2 SERPL-SCNC: 26 MMOL/L — SIGNIFICANT CHANGE UP (ref 22–31)
COCAINE METAB.OTHER UR-MCNC: NEGATIVE — SIGNIFICANT CHANGE UP
COLOR SPEC: YELLOW — SIGNIFICANT CHANGE UP
CREAT SERPL-MCNC: 0.8 MG/DL — SIGNIFICANT CHANGE UP (ref 0.5–1.3)
DIFF PNL FLD: (no result)
EOSINOPHIL # BLD AUTO: 0.1 K/UL — SIGNIFICANT CHANGE UP (ref 0–0.5)
EOSINOPHIL NFR BLD AUTO: 2.3 % — SIGNIFICANT CHANGE UP (ref 0–6)
EPI CELLS # UR: (no result)
ETHANOL SERPL-MCNC: <10 MG/DL — SIGNIFICANT CHANGE UP (ref 0–10)
GLUCOSE SERPL-MCNC: 86 MG/DL — SIGNIFICANT CHANGE UP (ref 70–99)
GLUCOSE UR QL: NEGATIVE MG/DL — SIGNIFICANT CHANGE UP
HCT VFR BLD CALC: 35 % — SIGNIFICANT CHANGE UP (ref 34.5–45)
HGB BLD-MCNC: 11.6 G/DL — SIGNIFICANT CHANGE UP (ref 11.5–15.5)
KETONES UR-MCNC: NEGATIVE — SIGNIFICANT CHANGE UP
LEUKOCYTE ESTERASE UR-ACNC: (no result)
LITHIUM SERPL-MCNC: 0.9 MMOL/L — SIGNIFICANT CHANGE UP (ref 0.6–1.2)
LYMPHOCYTES # BLD AUTO: 2.4 K/UL — SIGNIFICANT CHANGE UP (ref 1–3.3)
LYMPHOCYTES # BLD AUTO: 41.8 % — SIGNIFICANT CHANGE UP (ref 13–44)
MCHC RBC-ENTMCNC: 29.6 PG — SIGNIFICANT CHANGE UP (ref 27–34)
MCHC RBC-ENTMCNC: 33.1 GM/DL — SIGNIFICANT CHANGE UP (ref 32–36)
MCV RBC AUTO: 89.5 FL — SIGNIFICANT CHANGE UP (ref 80–100)
METHADONE UR-MCNC: NEGATIVE — SIGNIFICANT CHANGE UP
MONOCYTES # BLD AUTO: 0.4 K/UL — SIGNIFICANT CHANGE UP (ref 0–0.9)
MONOCYTES NFR BLD AUTO: 6.8 % — SIGNIFICANT CHANGE UP (ref 2–14)
NEUTROPHILS # BLD AUTO: 2.7 K/UL — SIGNIFICANT CHANGE UP (ref 1.8–7.4)
NEUTROPHILS NFR BLD AUTO: 48.4 % — SIGNIFICANT CHANGE UP (ref 43–77)
NITRITE UR-MCNC: NEGATIVE — SIGNIFICANT CHANGE UP
OPIATES UR-MCNC: NEGATIVE — SIGNIFICANT CHANGE UP
PCP SPEC-MCNC: SIGNIFICANT CHANGE UP
PCP UR-MCNC: NEGATIVE — SIGNIFICANT CHANGE UP
PH UR: 6.5 — SIGNIFICANT CHANGE UP (ref 5–8)
PLATELET # BLD AUTO: 316 K/UL — SIGNIFICANT CHANGE UP (ref 150–400)
POTASSIUM SERPL-MCNC: 3.5 MMOL/L — SIGNIFICANT CHANGE UP (ref 3.5–5.3)
POTASSIUM SERPL-SCNC: 3.5 MMOL/L — SIGNIFICANT CHANGE UP (ref 3.5–5.3)
PROT SERPL-MCNC: 6.7 GM/DL — SIGNIFICANT CHANGE UP (ref 6–8.3)
PROT UR-MCNC: NEGATIVE MG/DL — SIGNIFICANT CHANGE UP
RBC # BLD: 3.9 M/UL — SIGNIFICANT CHANGE UP (ref 3.8–5.2)
RBC # FLD: 13.8 % — SIGNIFICANT CHANGE UP (ref 10.3–14.5)
RBC CASTS # UR COMP ASSIST: SIGNIFICANT CHANGE UP /HPF (ref 0–4)
SALICYLATES SERPL-MCNC: <1.7 MG/DL — LOW (ref 2.8–20)
SODIUM SERPL-SCNC: 137 MMOL/L — SIGNIFICANT CHANGE UP (ref 135–145)
SP GR SPEC: 1.01 — SIGNIFICANT CHANGE UP (ref 1.01–1.02)
THC UR QL: NEGATIVE — SIGNIFICANT CHANGE UP
TSH SERPL-MCNC: 3.57 UIU/ML — SIGNIFICANT CHANGE UP (ref 0.36–3.74)
UROBILINOGEN FLD QL: NEGATIVE MG/DL — SIGNIFICANT CHANGE UP
WBC # BLD: 5.7 K/UL — SIGNIFICANT CHANGE UP (ref 3.8–10.5)
WBC # FLD AUTO: 5.7 K/UL — SIGNIFICANT CHANGE UP (ref 3.8–10.5)
WBC UR QL: SIGNIFICANT CHANGE UP

## 2017-10-29 PROCEDURE — 90792 PSYCH DIAG EVAL W/MED SRVCS: CPT | Mod: GT

## 2017-10-29 RX ORDER — QUETIAPINE FUMARATE 200 MG/1
50 TABLET, FILM COATED ORAL AT BEDTIME
Qty: 0 | Refills: 0 | Status: DISCONTINUED | OUTPATIENT
Start: 2017-10-29 | End: 2017-11-01

## 2017-10-29 RX ORDER — LEVOTHYROXINE SODIUM 125 MCG
75 TABLET ORAL DAILY
Qty: 0 | Refills: 0 | Status: DISCONTINUED | OUTPATIENT
Start: 2017-10-29 | End: 2017-11-14

## 2017-10-29 RX ORDER — CYCLOBENZAPRINE HYDROCHLORIDE 10 MG/1
10 TABLET, FILM COATED ORAL AT BEDTIME
Qty: 0 | Refills: 0 | Status: COMPLETED | OUTPATIENT
Start: 2017-10-29 | End: 2017-10-29

## 2017-10-29 RX ORDER — MYCOPHENOLATE MOFETIL 250 MG/1
1000 CAPSULE ORAL
Qty: 0 | Refills: 0 | Status: DISCONTINUED | OUTPATIENT
Start: 2017-10-29 | End: 2017-11-14

## 2017-10-29 RX ORDER — FLUVOXAMINE MALEATE 25 MG/1
200 TABLET ORAL AT BEDTIME
Qty: 0 | Refills: 0 | Status: DISCONTINUED | OUTPATIENT
Start: 2017-10-29 | End: 2017-11-14

## 2017-10-29 RX ORDER — HYDROXYCHLOROQUINE SULFATE 200 MG
200 TABLET ORAL
Qty: 0 | Refills: 0 | Status: DISCONTINUED | OUTPATIENT
Start: 2017-10-29 | End: 2017-11-14

## 2017-10-29 RX ORDER — ARIPIPRAZOLE 15 MG/1
30 TABLET ORAL DAILY
Qty: 0 | Refills: 0 | Status: DISCONTINUED | OUTPATIENT
Start: 2017-10-29 | End: 2017-10-29

## 2017-10-29 RX ORDER — CYCLOBENZAPRINE HYDROCHLORIDE 10 MG/1
10 TABLET, FILM COATED ORAL AT BEDTIME
Qty: 0 | Refills: 0 | Status: DISCONTINUED | OUTPATIENT
Start: 2017-10-29 | End: 2017-11-10

## 2017-10-29 RX ORDER — CLONAZEPAM 1 MG
0.5 TABLET ORAL
Qty: 0 | Refills: 0 | Status: DISCONTINUED | OUTPATIENT
Start: 2017-10-29 | End: 2017-11-05

## 2017-10-29 RX ORDER — GABAPENTIN 400 MG/1
300 CAPSULE ORAL THREE TIMES A DAY
Qty: 0 | Refills: 0 | Status: DISCONTINUED | OUTPATIENT
Start: 2017-10-29 | End: 2017-11-03

## 2017-10-29 RX ORDER — LITHIUM CARBONATE 300 MG/1
450 TABLET, EXTENDED RELEASE ORAL
Qty: 0 | Refills: 0 | Status: DISCONTINUED | OUTPATIENT
Start: 2017-10-29 | End: 2017-10-29

## 2017-10-29 RX ORDER — ACETAMINOPHEN 500 MG
1000 TABLET ORAL ONCE
Qty: 0 | Refills: 0 | Status: COMPLETED | OUTPATIENT
Start: 2017-10-29 | End: 2017-10-29

## 2017-10-29 RX ADMIN — Medication 75 MICROGRAM(S): at 08:26

## 2017-10-29 RX ADMIN — Medication 200 MILLIGRAM(S): at 08:25

## 2017-10-29 RX ADMIN — Medication 1 MILLIGRAM(S): at 05:22

## 2017-10-29 RX ADMIN — Medication 1000 MILLIGRAM(S): at 05:22

## 2017-10-29 RX ADMIN — LITHIUM CARBONATE 450 MILLIGRAM(S): 300 TABLET, EXTENDED RELEASE ORAL at 08:24

## 2017-10-29 RX ADMIN — Medication 375 MILLIGRAM(S): at 21:00

## 2017-10-29 RX ADMIN — MYCOPHENOLATE MOFETIL 1000 MILLIGRAM(S): 250 CAPSULE ORAL at 20:07

## 2017-10-29 RX ADMIN — Medication 375 MILLIGRAM(S): at 08:24

## 2017-10-29 RX ADMIN — Medication 200 MILLIGRAM(S): at 18:40

## 2017-10-29 RX ADMIN — MYCOPHENOLATE MOFETIL 1000 MILLIGRAM(S): 250 CAPSULE ORAL at 08:25

## 2017-10-29 RX ADMIN — FLUVOXAMINE MALEATE 200 MILLIGRAM(S): 25 TABLET ORAL at 20:06

## 2017-10-29 RX ADMIN — GABAPENTIN 300 MILLIGRAM(S): 400 CAPSULE ORAL at 20:07

## 2017-10-29 RX ADMIN — Medication 2 MILLIGRAM(S): at 10:16

## 2017-10-29 RX ADMIN — Medication 375 MILLIGRAM(S): at 20:08

## 2017-10-29 RX ADMIN — QUETIAPINE FUMARATE 50 MILLIGRAM(S): 200 TABLET, FILM COATED ORAL at 20:09

## 2017-10-29 RX ADMIN — Medication 0.5 MILLIGRAM(S): at 20:06

## 2017-10-29 RX ADMIN — CYCLOBENZAPRINE HYDROCHLORIDE 10 MILLIGRAM(S): 10 TABLET, FILM COATED ORAL at 08:25

## 2017-10-29 RX ADMIN — Medication 375 MILLIGRAM(S): at 09:20

## 2017-10-29 RX ADMIN — Medication 0.5 MILLIGRAM(S): at 08:24

## 2017-10-29 NOTE — PROGRESS NOTE BEHAVIORAL HEALTH - NSBHFUPADDHPIFT_PSY_A_CORE
Was having anxiety and insomnia   Has yovana eating impulsively and doing scratch offs.   Doctor had wanted her to come off pain meds due to interaction with lithium.  Also reports she has had akathisia with Abilify.  Was hearing voices calling her names.

## 2017-10-29 NOTE — PROGRESS NOTE BEHAVIORAL HEALTH - NSBHFUPSTRENGTHS_PSY_A_CORE
Intelligent/Has access to housing/residential stability/Knowledge of medications/Has supportive interpersonal relationships with family, friends or peers
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
## 2017-10-29 NOTE — H&P ADULT - NSHPLABSRESULTS_GEN_ALL_CORE
11.6   5.7   )-----------( 316      ( 29 Oct 2017 00:31 )             35.0     10    137  |  106  |  12  ----------------------------<  86  3.5   |  26  |  0.80    Ca    8.7      29 Oct 2017 00:31    TPro  6.7  /  Alb  3.8  /  TBili  0.3  /  DBili  x   /  AST  24  /  ALT  32  /  AlkPhos  51  10-    CAPILLARY BLOOD GLUCOSE        LIVER FUNCTIONS - ( 29 Oct 2017 00:31 )  Alb: 3.8 g/dL / Pro: 6.7 gm/dL / ALK PHOS: 51 U/L / ALT: 32 U/L / AST: 24 U/L / GGT: x             Urinalysis Basic - ( 29 Oct 2017 00:22 )    Color: Yellow / Appearance: Clear / S.010 / pH: x  Gluc: x / Ketone: Negative  / Bili: Negative / Urobili: Negative mg/dL   Blood: x / Protein: Negative mg/dL / Nitrite: Negative   Leuk Esterase: Trace / RBC: 0-2 /HPF / WBC 0-2   Sq Epi: x / Non Sq Epi: Moderate / Bacteria: Occasional

## 2017-10-29 NOTE — ED BEHAVIORAL HEALTH ASSESSMENT NOTE - RISK ASSESSMENT
risk factors include   Past suicidal behavior as well as current suicidality, auditory hallucinations, insomnia.  protective factors include compliance with and access to treatment

## 2017-10-29 NOTE — H&P ADULT - NSHPPHYSICALEXAM_GEN_ALL_CORE
Vital Signs Last 24 Hrs  T(C): 36.7 (29 Oct 2017 08:31), Max: 36.9 (29 Oct 2017 05:45)  T(F): 98.1 (29 Oct 2017 08:31), Max: 98.5 (29 Oct 2017 05:45)  HR: 81 (29 Oct 2017 08:31) (71 - 81)  BP: 142/83 (29 Oct 2017 08:31) (114/71 - 142/83)  BP(mean): --  RR: 16 (29 Oct 2017 08:31) (16 - 18)  SpO2: 100% (29 Oct 2017 08:31) (99% - 100%)    PHYSICAL EXAM:    Constitutional: NAD, awake and alert, well-developed  HEENT: PERR, EOMI, Normal Hearing, MMM  Neck: Soft and supple  Respiratory: Breath sounds are clear bilaterally, No wheezing, rales or rhonchi  Cardiovascular: S1 and S2, regular rate and rhythm, no Murmurs, gallops or rubs  Gastrointestinal: Bowel Sounds present, soft, nontender, nondistended, no guarding, no rebound  Extremities: No peripheral edema  Neurological: A/O x 3, no focal deficits  Skin: No rashes
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
## 2017-10-29 NOTE — PROGRESS NOTE BEHAVIORAL HEALTH - NSBHCHARTREVIEWLAB_PSY_A_CORE FT
Lithium Level, Serum (10.29.17 @ 00:31)    Lithium Level, Serum: 0.9 mmol/L  Thyroid Stimulating Hormone, Serum (10.29.17 @ 00:31)    Thyroid Stimulating Hormone, Serum: 3.570 uIU/mL

## 2017-10-29 NOTE — ED BEHAVIORAL HEALTH ASSESSMENT NOTE - CURRENT MEDICATION
Lithium 450 mg BID, Abilify 30mg daily, Luvox 200mg hs, Klonopin 0.5 mg hs,   Naprosyn 375 mg Am and PM, Flexeril 10 mg hs, Cellcept 1000 mg BID, Apriso 375 mg- 4 tabs daily, Plaquemil 200 mg BID, Synthroid 75 micrograms daily

## 2017-10-29 NOTE — ED ADULT NURSE NOTE - OBJECTIVE STATEMENT
pt presents to ED c/o hallucinations and voices to cut her wrist. pt denies SI/HI at this time.  pt on 1:1, belongings collected and taken by family member. pt wanded by security. EKG performed. pt is a&ox3, breathing even with unlabored respirations. pt calm and cooperative at this time, no s/s of acute distress. will continue to monitor.

## 2017-10-29 NOTE — ED BEHAVIORAL HEALTH ASSESSMENT NOTE - PSYCHIATRIC ISSUES AND PLAN (INCLUDE STANDING AND PRN MEDICATION)
continue current psychotropic medications; ativan 1mg po x1 for anxiety/insomnia cont abilify luvox lithium klonopin; ativan prn agitation

## 2017-10-29 NOTE — ED BEHAVIORAL HEALTH ASSESSMENT NOTE - DETAILS
stay with their father Arsalan rosario/mireya'ed 10/13 Has overdosed in the past and cut her wrists rash: Lamictal, gained 15 lbs since started Depakote in 12/16 many relatives chronic pain issues self morning handoff Dr. Hernandez

## 2017-10-29 NOTE — ED BEHAVIORAL HEALTH NOTE - BEHAVIORAL HEALTH NOTE
Behavioral Telehealth Care Manager COLLATERAL NOTE:  ************************************************  *CHART REVIEW  ~[employer]~( Reviewed 10/09/17, 6/29/17)  ~Alpha~ N/A  ~HIE~ (Reviewed SW/Inpt Note)  ~CVM~ N/A  ~TIER~ N/A  ~[employer]~ N/A  ~[employer]~ N/A  ~[facility]~ Attestation of clinical emergency , reviewed  ************************************************  COLLATERAL CONTACT:  Ike Pennington  *************************************************  *NUMBER:  [phone]  *RELATIONSHIP: Boyfriend (3 yrs)  *RELIABILITY: Reliable  *OPINION RE PATIENT RELIABILITY: Reliable.  *OPINION RE CONCERN FOR DANGEROUSNESS: He states he does not think she would act on her thoughts but that the thoughts are there.  *PSYCHOEDUCATION: Reviewed role of Emergency Department, nature of voluntary vs involuntary hospitalizations, support groups for caregivers.  **********************************************	  CORE HISTORY PROVIDED BY: Ike Pennington  **********************************************  *DEMOGRAPHICS:  47year old,   female, domiciled with her boyfriend, unemployed, has 2 minor children that she sees on the weekend. Long history of mental illness, diagnosed with schizophrenia and has multiple medical issues.   *DEPENDENTS:   *HPI: Per Boyfriend patient was not doing well since she was discharged from the inpatient unit on 10/13/17. He stated she has not been sleeping and has gotten worse. She has not been focusing well.  Boyfriend reports that the outpatient provider changed the medications but he was not sure exactly what was done. He also reports that there is some construction work going on in the home and also feels that this has been adding to her stress. She is not sleeping at night and then can’t sleep during the day because the construction work is occurring in their bedroom bathroom. The patient today had her children visiting for the weekend and stated she could not manage and had sent them back home with their father. She then called her therapist and explained that she was not feeling well, had not been sleeping, was hearing voices and having images to drink her bottle of pills and to cut herself. She was advised by her therapist to go to the ED.    *ED COURSE: She has been calm and cooperative in the ED, no prns required, no restraints  *PAST PSYCH: She has a long psychiatric history, with multiple psychiatric admissions. Recent last 2 hospitalizations were at [facility] (10/09/17-10/13/17, 6/29/17-7/05/17) for depression with suicidal thoughts. No reported suicide attempts. She has a history of attending a partial hospitalization program at [facility] in 2/2017.  She recently has been going to [facility] in [place].  *SUICIDALITY: Per boyfriend she has no history of suicide attempts in the 3 years that they have been together. He is not aware of any past suicide attempts, except for “when she was much younger”. Patient has verbalized suicidal thoughts with a plan. Tonight she reported having images to drink her bottle of pills or to cut herself.   *HOMICIDALITY/VIOLENCE:   No history of homicidally. No history of violence  * SUBSTANCE:  She has a history of substance abuse. She has been sober for 6 years. She attends AA meetings regularly  * ARREST:  No history of arrests reported, no current legal issues reported  *FAMILY HISTORY:  Boyfriend states that patients sister takes medications and has “issues” but not able to elaborate.  *SOCIAL HISTORY:  No access to weapons or guns. She has a long psychiatric history. Boyfriend does not report any trauma.  * MEDICAL: Lupus, Hashimoto, UC, Retinal vasculitis,   * MEDICATIONS: Abilify, Klonopin, Lithium  *DISPOSITION:  Admit to  [facility]  ***********************************************   I have discussed the above information with Telepsychiatry Attending: Dr. Peres  ************************************************

## 2017-10-29 NOTE — ED BEHAVIORAL HEALTH ASSESSMENT NOTE - HPI (INCLUDE ILLNESS QUALITY, SEVERITY, DURATION, TIMING, CONTEXT, MODIFYING FACTORS, ASSOCIATED SIGNS AND SYMPTOMS)
47 y o  female, domiciled with boyfriend, two children ages 10 and 13 with their father, unemployed, with a hx of bipolar d/o and OCD, medical hx of ulcerative colitis, Lupus, Hashimoto's disease, fibromyalgia, retinal vasculitis; alcohol dependence in remission; numerous prior hospitalizations (including 10/9-10/13/17 at  [facility]), hx of prior overdose and cutting her wrists; presents self referred with insomnia, anxiety, auditory hallucinations to cut her wrists.  Patient discharged from  on 10/13, referred to [facility] for outpatient follow up. She states she has had difficulty sleeping, which has been compounded by the construction going on in her house during the day, preventing her from sleeping or napping during the day as well. She had anxiety surrounding her [facility] intake, and her psychiatrist there told her, for some reason, not to take her plaquenil or flexeril, resulting in increased pain symptoms and further exacerbating her insomnia. She continued to decompensate and [facility] was going to refer her to a PHP at [place] or [place], but today she felt much worse, globally anxious and paranoid of other people, to the point where she had to cut short her visit with her children; she called her [facility] therapist and confessed to hearing voices to cut her wrists, and seeing images of her cutting or overdosing on meds, and the therapist recommended that she come to the ED. 47 y o  female, domiciled with boyfriend, two children ages 10 and 13 with their father, unemployed, with a hx of bipolar d/o and OCD, medical hx of ulcerative colitis, Lupus, Hashimoto's disease, fibromyalgia, retinal vasculitis; alcohol dependence in remission; numerous prior hospitalizations (including 10/9-10/13/17 at  [facility]), hx of prior overdose and cutting her wrists; presents self referred with insomnia, anxiety, auditory hallucinations to cut her wrists.  Patient discharged from  on 10/13, referred to [facility] for outpatient follow up. She states she has had difficulty sleeping, which has been compounded by the construction going on in her house during the day, preventing her from sleeping or napping during the day as well. She had anxiety surrounding her [facility] intake, and her psychiatrist there told her, for some reason, not to take her plaquenil or flexeril, resulting in increased pain symptoms and further exacerbating her insomnia. She continued to decompensate and [facility] was going to refer her to a PHP at [place] or [place], but today she felt much worse, globally anxious and paranoid of other people, to the point where she had to cut short her visit with her children; she called her [facility] therapist and confessed to hearing voices to cut her wrists, and seeing images of her cutting or overdosing on meds, and the therapist recommended that she come to the ED.  On interview pt reports continued suicidal ideations to cut her wrists and is unable to contract for safety.

## 2017-10-29 NOTE — ED BEHAVIORAL HEALTH ASSESSMENT NOTE - SUMMARY
47 y o  female, domiciled with boyfriend, two children ages 10 and 13 with their father, unemployed, with a hx of bipolar d/o and OCD, medical hx of ulcerative colitis, Lupus, Hashimoto's disease, fibromyalgia, retinal vasculitis; alcohol dependence in remission; numerous prior hospitalizations (including 10/9-10/13/17 at [facility]), hx of prior overdose and cutting her wrists; presents self referred with insomnia, anxiety, auditory hallucinations to cut her wrists.  Patient has decompensated since her discharge on 10/13 with worsening insomnia and discharge. At this point in time she continues to have AH surrounding cutting her wrists and cannot verbalize a commitment to safety. The benefits of rehospitalization are not clear, and it is possible that patient's symptoms will improve considerably with some restful sleep. therefore it is appropriate to hold the patient for reassessment later this morning, and for input from prior inpatient provider. 47 y o  female, domiciled with boyfriend, two children ages 10 and 13 with their father, unemployed, with a hx of bipolar d/o and OCD, medical hx of ulcerative colitis, Lupus, Hashimoto's disease, fibromyalgia, retinal vasculitis; alcohol dependence in remission; numerous prior hospitalizations (including 10/9-10/13/17 at  5), hx of prior overdose and cutting her wrists; presents self referred with insomnia, anxiety, auditory hallucinations to cut her wrists.  Patient has decompensated since her discharge on 10/13 with worsening insomnia and anxiety. At this point in time she continues to have AH surrounding cutting her wrists and cannot verbalize a commitment to safety. She will be admitted for safety and stabilization.

## 2017-10-29 NOTE — ED BEHAVIORAL HEALTH ASSESSMENT NOTE - DESCRIPTION
Anxious, but no pete agitation. Lupus, DMII, Vasculitis in eye, Rebekah living with BF   Unemployed has 2 children 13 and 10

## 2017-10-30 RX ORDER — MESALAMINE 400 MG
1500 TABLET, DELAYED RELEASE (ENTERIC COATED) ORAL DAILY
Qty: 0 | Refills: 0 | Status: DISCONTINUED | OUTPATIENT
Start: 2017-10-30 | End: 2017-10-30

## 2017-10-30 RX ORDER — MESALAMINE 400 MG
1.5 TABLET, DELAYED RELEASE (ENTERIC COATED) ORAL DAILY
Qty: 0 | Refills: 0 | Status: DISCONTINUED | OUTPATIENT
Start: 2017-10-30 | End: 2017-11-14

## 2017-10-30 RX ADMIN — Medication 0.5 MILLIGRAM(S): at 09:55

## 2017-10-30 RX ADMIN — Medication 75 MICROGRAM(S): at 08:09

## 2017-10-30 RX ADMIN — Medication 200 MILLIGRAM(S): at 08:09

## 2017-10-30 RX ADMIN — Medication 0.5 MILLIGRAM(S): at 20:42

## 2017-10-30 RX ADMIN — GABAPENTIN 300 MILLIGRAM(S): 400 CAPSULE ORAL at 09:55

## 2017-10-30 RX ADMIN — Medication 375 MILLIGRAM(S): at 20:39

## 2017-10-30 RX ADMIN — Medication 200 MILLIGRAM(S): at 16:51

## 2017-10-30 RX ADMIN — Medication 250 MILLIGRAM(S): at 09:58

## 2017-10-30 RX ADMIN — FLUVOXAMINE MALEATE 200 MILLIGRAM(S): 25 TABLET ORAL at 20:37

## 2017-10-30 RX ADMIN — GABAPENTIN 300 MILLIGRAM(S): 400 CAPSULE ORAL at 20:42

## 2017-10-30 RX ADMIN — QUETIAPINE FUMARATE 50 MILLIGRAM(S): 200 TABLET, FILM COATED ORAL at 20:38

## 2017-10-30 RX ADMIN — MYCOPHENOLATE MOFETIL 1000 MILLIGRAM(S): 250 CAPSULE ORAL at 20:36

## 2017-10-30 RX ADMIN — Medication 375 MILLIGRAM(S): at 13:00

## 2017-10-30 RX ADMIN — MYCOPHENOLATE MOFETIL 1000 MILLIGRAM(S): 250 CAPSULE ORAL at 09:56

## 2017-10-30 RX ADMIN — GABAPENTIN 300 MILLIGRAM(S): 400 CAPSULE ORAL at 13:04

## 2017-10-31 RX ADMIN — Medication 200 MILLIGRAM(S): at 08:35

## 2017-10-31 RX ADMIN — GABAPENTIN 300 MILLIGRAM(S): 400 CAPSULE ORAL at 20:32

## 2017-10-31 RX ADMIN — MYCOPHENOLATE MOFETIL 1000 MILLIGRAM(S): 250 CAPSULE ORAL at 20:32

## 2017-10-31 RX ADMIN — GABAPENTIN 300 MILLIGRAM(S): 400 CAPSULE ORAL at 12:49

## 2017-10-31 RX ADMIN — GABAPENTIN 300 MILLIGRAM(S): 400 CAPSULE ORAL at 08:33

## 2017-10-31 RX ADMIN — Medication 0.5 MILLIGRAM(S): at 08:33

## 2017-10-31 RX ADMIN — Medication 375 MILLIGRAM(S): at 01:21

## 2017-10-31 RX ADMIN — Medication 1.5 GRAM(S): at 08:35

## 2017-10-31 RX ADMIN — Medication 250 MILLIGRAM(S): at 08:35

## 2017-10-31 RX ADMIN — Medication 375 MILLIGRAM(S): at 09:20

## 2017-10-31 RX ADMIN — QUETIAPINE FUMARATE 50 MILLIGRAM(S): 200 TABLET, FILM COATED ORAL at 20:33

## 2017-10-31 RX ADMIN — Medication 200 MILLIGRAM(S): at 20:37

## 2017-10-31 RX ADMIN — Medication 0.5 MILLIGRAM(S): at 21:19

## 2017-10-31 RX ADMIN — MYCOPHENOLATE MOFETIL 1000 MILLIGRAM(S): 250 CAPSULE ORAL at 08:33

## 2017-10-31 RX ADMIN — FLUVOXAMINE MALEATE 200 MILLIGRAM(S): 25 TABLET ORAL at 20:35

## 2017-10-31 RX ADMIN — Medication 75 MICROGRAM(S): at 06:44

## 2017-11-01 DIAGNOSIS — K08.409 PARTIAL LOSS OF TEETH, UNSPECIFIED CAUSE, UNSPECIFIED CLASS: ICD-10-CM

## 2017-11-01 RX ORDER — QUETIAPINE FUMARATE 200 MG/1
100 TABLET, FILM COATED ORAL AT BEDTIME
Qty: 0 | Refills: 0 | Status: DISCONTINUED | OUTPATIENT
Start: 2017-11-01 | End: 2017-11-03

## 2017-11-01 RX ADMIN — Medication 75 MICROGRAM(S): at 05:14

## 2017-11-01 RX ADMIN — Medication 200 MILLIGRAM(S): at 08:39

## 2017-11-01 RX ADMIN — Medication 0.5 MILLIGRAM(S): at 22:22

## 2017-11-01 RX ADMIN — GABAPENTIN 300 MILLIGRAM(S): 400 CAPSULE ORAL at 22:22

## 2017-11-01 RX ADMIN — Medication 375 MILLIGRAM(S): at 11:49

## 2017-11-01 RX ADMIN — Medication 375 MILLIGRAM(S): at 08:40

## 2017-11-01 RX ADMIN — GABAPENTIN 300 MILLIGRAM(S): 400 CAPSULE ORAL at 08:39

## 2017-11-01 RX ADMIN — MYCOPHENOLATE MOFETIL 1000 MILLIGRAM(S): 250 CAPSULE ORAL at 08:39

## 2017-11-01 RX ADMIN — Medication 375 MILLIGRAM(S): at 22:25

## 2017-11-01 RX ADMIN — MYCOPHENOLATE MOFETIL 1000 MILLIGRAM(S): 250 CAPSULE ORAL at 22:25

## 2017-11-01 RX ADMIN — Medication 0.5 MILLIGRAM(S): at 08:39

## 2017-11-01 RX ADMIN — GABAPENTIN 300 MILLIGRAM(S): 400 CAPSULE ORAL at 12:55

## 2017-11-01 RX ADMIN — QUETIAPINE FUMARATE 100 MILLIGRAM(S): 200 TABLET, FILM COATED ORAL at 22:23

## 2017-11-01 RX ADMIN — Medication 200 MILLIGRAM(S): at 16:03

## 2017-11-01 RX ADMIN — FLUVOXAMINE MALEATE 200 MILLIGRAM(S): 25 TABLET ORAL at 22:23

## 2017-11-01 RX ADMIN — Medication 1.5 GRAM(S): at 08:41

## 2017-11-01 NOTE — PROGRESS NOTE ADULT - PROBLEM SELECTOR PLAN 7
-healing well, no retained sutures  -no signs of infection  -tolerating PO diet  -tylenol for pain PRN

## 2017-11-01 NOTE — PROGRESS NOTE ADULT - ASSESSMENT
48 y/o female with PMHx of NIDDM, Ulcerative colitis, Lupus, Retinal vasculitis, Fibromyalgia, Hashimoto's thyroiditis, Bipolar disorder, Schizoaffective disorder presented to ED with suicidal ideation. Complains of possible oral infection.

## 2017-11-01 NOTE — PATIENT PROFILE BEHAVIORAL HEALTH - NS TRANSFER PATIENT BELONGINGS
[address] [id number]  Phone: [phone]  Fax: [phone]    Dorys York MD        November 1, 2017     Patient: Glenice Oppenheim   YOB: 1987   Date of Visit: 11/1/2017       To Whom It May Concern: It is my medical opinion that Iona Hernandez may return to work on 11/3/17 with the following restrictions: lifting/carrying not to exceed 15 lbs., avoid excessive walking or standing, Duration of restrictions (days):  duration of pregnancy, frequent bathroom breaks, allowed to sit when needed, able to carry water at all times for adequate hydration, ability to have a snack when needed . If you have any questions or concerns, please don't hesitate to call.     Sincerely,        Dorys York MD Clothing
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
## 2017-11-02 LAB
BASOPHILS # BLD AUTO: 0.1 K/UL — SIGNIFICANT CHANGE UP (ref 0–0.2)
BASOPHILS NFR BLD AUTO: 0.9 % — SIGNIFICANT CHANGE UP (ref 0–2)
EOSINOPHIL # BLD AUTO: 0.1 K/UL — SIGNIFICANT CHANGE UP (ref 0–0.5)
EOSINOPHIL NFR BLD AUTO: 1.5 % — SIGNIFICANT CHANGE UP (ref 0–6)
HCT VFR BLD CALC: 39.8 % — SIGNIFICANT CHANGE UP (ref 34.5–45)
HGB BLD-MCNC: 12.8 G/DL — SIGNIFICANT CHANGE UP (ref 11.5–15.5)
LYMPHOCYTES # BLD AUTO: 2.8 K/UL — SIGNIFICANT CHANGE UP (ref 1–3.3)
LYMPHOCYTES # BLD AUTO: 35 % — SIGNIFICANT CHANGE UP (ref 13–44)
MCHC RBC-ENTMCNC: 29 PG — SIGNIFICANT CHANGE UP (ref 27–34)
MCHC RBC-ENTMCNC: 32.2 GM/DL — SIGNIFICANT CHANGE UP (ref 32–36)
MCV RBC AUTO: 89.9 FL — SIGNIFICANT CHANGE UP (ref 80–100)
MONOCYTES # BLD AUTO: 0.5 K/UL — SIGNIFICANT CHANGE UP (ref 0–0.9)
MONOCYTES NFR BLD AUTO: 6.9 % — SIGNIFICANT CHANGE UP (ref 2–14)
NEUTROPHILS # BLD AUTO: 4.4 K/UL — SIGNIFICANT CHANGE UP (ref 1.8–7.4)
NEUTROPHILS NFR BLD AUTO: 55.7 % — SIGNIFICANT CHANGE UP (ref 43–77)
PLATELET # BLD AUTO: 347 K/UL — SIGNIFICANT CHANGE UP (ref 150–400)
RBC # BLD: 4.43 M/UL — SIGNIFICANT CHANGE UP (ref 3.8–5.2)
RBC # FLD: 13.6 % — SIGNIFICANT CHANGE UP (ref 10.3–14.5)
WBC # BLD: 8 K/UL — SIGNIFICANT CHANGE UP (ref 3.8–10.5)
WBC # FLD AUTO: 8 K/UL — SIGNIFICANT CHANGE UP (ref 3.8–10.5)

## 2017-11-02 RX ADMIN — Medication 75 MICROGRAM(S): at 06:20

## 2017-11-02 RX ADMIN — MYCOPHENOLATE MOFETIL 1000 MILLIGRAM(S): 250 CAPSULE ORAL at 08:53

## 2017-11-02 RX ADMIN — Medication 200 MILLIGRAM(S): at 08:54

## 2017-11-02 RX ADMIN — Medication 2 MILLIGRAM(S): at 12:13

## 2017-11-02 RX ADMIN — Medication 375 MILLIGRAM(S): at 08:54

## 2017-11-02 RX ADMIN — Medication 2 MILLIGRAM(S): at 18:10

## 2017-11-02 RX ADMIN — GABAPENTIN 300 MILLIGRAM(S): 400 CAPSULE ORAL at 12:13

## 2017-11-02 RX ADMIN — GABAPENTIN 300 MILLIGRAM(S): 400 CAPSULE ORAL at 08:53

## 2017-11-02 RX ADMIN — Medication 375 MILLIGRAM(S): at 20:42

## 2017-11-02 RX ADMIN — MYCOPHENOLATE MOFETIL 1000 MILLIGRAM(S): 250 CAPSULE ORAL at 20:41

## 2017-11-02 RX ADMIN — FLUVOXAMINE MALEATE 200 MILLIGRAM(S): 25 TABLET ORAL at 20:42

## 2017-11-02 RX ADMIN — Medication 200 MILLIGRAM(S): at 17:24

## 2017-11-02 RX ADMIN — Medication 0.5 MILLIGRAM(S): at 20:41

## 2017-11-02 RX ADMIN — QUETIAPINE FUMARATE 100 MILLIGRAM(S): 200 TABLET, FILM COATED ORAL at 20:42

## 2017-11-02 RX ADMIN — GABAPENTIN 300 MILLIGRAM(S): 400 CAPSULE ORAL at 20:42

## 2017-11-02 RX ADMIN — Medication 0.5 MILLIGRAM(S): at 08:54

## 2017-11-02 RX ADMIN — Medication 1.5 GRAM(S): at 08:55

## 2017-11-03 RX ORDER — QUETIAPINE FUMARATE 200 MG/1
200 TABLET, FILM COATED ORAL AT BEDTIME
Qty: 0 | Refills: 0 | Status: DISCONTINUED | OUTPATIENT
Start: 2017-11-03 | End: 2017-11-08

## 2017-11-03 RX ORDER — GABAPENTIN 400 MG/1
400 CAPSULE ORAL THREE TIMES A DAY
Qty: 0 | Refills: 0 | Status: DISCONTINUED | OUTPATIENT
Start: 2017-11-03 | End: 2017-11-08

## 2017-11-03 RX ADMIN — MYCOPHENOLATE MOFETIL 1000 MILLIGRAM(S): 250 CAPSULE ORAL at 20:38

## 2017-11-03 RX ADMIN — Medication 200 MILLIGRAM(S): at 18:34

## 2017-11-03 RX ADMIN — QUETIAPINE FUMARATE 200 MILLIGRAM(S): 200 TABLET, FILM COATED ORAL at 20:40

## 2017-11-03 RX ADMIN — Medication 2 MILLIGRAM(S): at 13:51

## 2017-11-03 RX ADMIN — Medication 375 MILLIGRAM(S): at 19:47

## 2017-11-03 RX ADMIN — Medication 0.5 MILLIGRAM(S): at 20:39

## 2017-11-03 RX ADMIN — Medication 200 MILLIGRAM(S): at 08:39

## 2017-11-03 RX ADMIN — GABAPENTIN 300 MILLIGRAM(S): 400 CAPSULE ORAL at 12:52

## 2017-11-03 RX ADMIN — Medication 375 MILLIGRAM(S): at 08:39

## 2017-11-03 RX ADMIN — Medication 75 MICROGRAM(S): at 04:30

## 2017-11-03 RX ADMIN — FLUVOXAMINE MALEATE 200 MILLIGRAM(S): 25 TABLET ORAL at 20:40

## 2017-11-03 RX ADMIN — Medication 375 MILLIGRAM(S): at 08:43

## 2017-11-03 RX ADMIN — Medication 1.5 GRAM(S): at 08:38

## 2017-11-03 RX ADMIN — Medication 375 MILLIGRAM(S): at 21:28

## 2017-11-03 RX ADMIN — Medication 0.5 MILLIGRAM(S): at 08:39

## 2017-11-03 RX ADMIN — Medication 2 MILLIGRAM(S): at 04:30

## 2017-11-03 RX ADMIN — Medication 375 MILLIGRAM(S): at 20:42

## 2017-11-03 RX ADMIN — MYCOPHENOLATE MOFETIL 1000 MILLIGRAM(S): 250 CAPSULE ORAL at 08:39

## 2017-11-03 RX ADMIN — Medication 2 MILLIGRAM(S): at 20:40

## 2017-11-03 RX ADMIN — GABAPENTIN 300 MILLIGRAM(S): 400 CAPSULE ORAL at 08:38

## 2017-11-04 LAB
AMPHET UR-MCNC: NEGATIVE — SIGNIFICANT CHANGE UP
BARBITURATES UR SCN-MCNC: NEGATIVE — SIGNIFICANT CHANGE UP
BENZODIAZ UR-MCNC: NEGATIVE — SIGNIFICANT CHANGE UP
COCAINE METAB.OTHER UR-MCNC: NEGATIVE — SIGNIFICANT CHANGE UP
METHADONE UR-MCNC: NEGATIVE — SIGNIFICANT CHANGE UP
OPIATES UR-MCNC: NEGATIVE — SIGNIFICANT CHANGE UP
PCP SPEC-MCNC: SIGNIFICANT CHANGE UP
PCP UR-MCNC: NEGATIVE — SIGNIFICANT CHANGE UP
THC UR QL: NEGATIVE — SIGNIFICANT CHANGE UP

## 2017-11-04 RX ORDER — HALOPERIDOL DECANOATE 100 MG/ML
5 INJECTION INTRAMUSCULAR EVERY 6 HOURS
Qty: 0 | Refills: 0 | Status: DISCONTINUED | OUTPATIENT
Start: 2017-11-04 | End: 2017-11-14

## 2017-11-04 RX ORDER — DIPHENHYDRAMINE HCL 50 MG
50 CAPSULE ORAL EVERY 6 HOURS
Qty: 0 | Refills: 0 | Status: DISCONTINUED | OUTPATIENT
Start: 2017-11-04 | End: 2017-11-14

## 2017-11-04 RX ADMIN — Medication 375 MILLIGRAM(S): at 09:15

## 2017-11-04 RX ADMIN — Medication 2 MILLIGRAM(S): at 08:22

## 2017-11-04 RX ADMIN — Medication 200 MILLIGRAM(S): at 17:36

## 2017-11-04 RX ADMIN — MYCOPHENOLATE MOFETIL 1000 MILLIGRAM(S): 250 CAPSULE ORAL at 20:07

## 2017-11-04 RX ADMIN — Medication 75 MICROGRAM(S): at 05:38

## 2017-11-04 RX ADMIN — QUETIAPINE FUMARATE 200 MILLIGRAM(S): 200 TABLET, FILM COATED ORAL at 20:07

## 2017-11-04 RX ADMIN — Medication 1.5 GRAM(S): at 09:13

## 2017-11-04 RX ADMIN — Medication 2 MILLIGRAM(S): at 17:36

## 2017-11-04 RX ADMIN — Medication 375 MILLIGRAM(S): at 09:12

## 2017-11-04 RX ADMIN — Medication 0.5 MILLIGRAM(S): at 09:16

## 2017-11-04 RX ADMIN — Medication 0.5 MILLIGRAM(S): at 20:09

## 2017-11-04 RX ADMIN — FLUVOXAMINE MALEATE 200 MILLIGRAM(S): 25 TABLET ORAL at 20:08

## 2017-11-04 RX ADMIN — Medication 375 MILLIGRAM(S): at 22:16

## 2017-11-04 RX ADMIN — Medication 375 MILLIGRAM(S): at 20:08

## 2017-11-04 RX ADMIN — Medication 200 MILLIGRAM(S): at 09:14

## 2017-11-04 RX ADMIN — MYCOPHENOLATE MOFETIL 1000 MILLIGRAM(S): 250 CAPSULE ORAL at 09:13

## 2017-11-05 RX ADMIN — Medication 0.5 MILLIGRAM(S): at 20:53

## 2017-11-05 RX ADMIN — QUETIAPINE FUMARATE 200 MILLIGRAM(S): 200 TABLET, FILM COATED ORAL at 20:56

## 2017-11-05 RX ADMIN — Medication 200 MILLIGRAM(S): at 09:21

## 2017-11-05 RX ADMIN — Medication 375 MILLIGRAM(S): at 08:58

## 2017-11-05 RX ADMIN — GABAPENTIN 400 MILLIGRAM(S): 400 CAPSULE ORAL at 13:39

## 2017-11-05 RX ADMIN — GABAPENTIN 400 MILLIGRAM(S): 400 CAPSULE ORAL at 20:54

## 2017-11-05 RX ADMIN — Medication 375 MILLIGRAM(S): at 21:56

## 2017-11-05 RX ADMIN — FLUVOXAMINE MALEATE 200 MILLIGRAM(S): 25 TABLET ORAL at 20:55

## 2017-11-05 RX ADMIN — GABAPENTIN 400 MILLIGRAM(S): 400 CAPSULE ORAL at 09:22

## 2017-11-05 RX ADMIN — Medication 75 MICROGRAM(S): at 05:54

## 2017-11-05 RX ADMIN — Medication 1.5 GRAM(S): at 09:23

## 2017-11-05 RX ADMIN — MYCOPHENOLATE MOFETIL 1000 MILLIGRAM(S): 250 CAPSULE ORAL at 20:53

## 2017-11-05 RX ADMIN — Medication 0.5 MILLIGRAM(S): at 09:22

## 2017-11-05 RX ADMIN — Medication 375 MILLIGRAM(S): at 09:22

## 2017-11-05 RX ADMIN — Medication 2 MILLIGRAM(S): at 18:50

## 2017-11-05 RX ADMIN — Medication 200 MILLIGRAM(S): at 18:42

## 2017-11-05 RX ADMIN — Medication 375 MILLIGRAM(S): at 20:54

## 2017-11-05 RX ADMIN — MYCOPHENOLATE MOFETIL 1000 MILLIGRAM(S): 250 CAPSULE ORAL at 09:21

## 2017-11-05 NOTE — PROGRESS NOTE BEHAVIORAL HEALTH - ADDITIONAL DETAILS / COMMENTS
Asked for Step 2 privileges but will hold off at this time in light of extreme lability exhibited today.

## 2017-11-06 RX ORDER — DOCUSATE SODIUM 100 MG
100 CAPSULE ORAL DAILY
Qty: 0 | Refills: 0 | Status: DISCONTINUED | OUTPATIENT
Start: 2017-11-06 | End: 2017-11-14

## 2017-11-06 RX ADMIN — Medication 375 MILLIGRAM(S): at 21:20

## 2017-11-06 RX ADMIN — Medication 375 MILLIGRAM(S): at 11:03

## 2017-11-06 RX ADMIN — Medication 200 MILLIGRAM(S): at 08:14

## 2017-11-06 RX ADMIN — MYCOPHENOLATE MOFETIL 1000 MILLIGRAM(S): 250 CAPSULE ORAL at 09:56

## 2017-11-06 RX ADMIN — Medication 75 MICROGRAM(S): at 06:28

## 2017-11-06 RX ADMIN — Medication 375 MILLIGRAM(S): at 09:55

## 2017-11-06 RX ADMIN — Medication 200 MILLIGRAM(S): at 20:11

## 2017-11-06 RX ADMIN — Medication 1 DROP(S): at 13:50

## 2017-11-06 RX ADMIN — QUETIAPINE FUMARATE 200 MILLIGRAM(S): 200 TABLET, FILM COATED ORAL at 20:32

## 2017-11-06 RX ADMIN — MYCOPHENOLATE MOFETIL 1000 MILLIGRAM(S): 250 CAPSULE ORAL at 20:32

## 2017-11-06 RX ADMIN — GABAPENTIN 400 MILLIGRAM(S): 400 CAPSULE ORAL at 13:05

## 2017-11-06 RX ADMIN — GABAPENTIN 400 MILLIGRAM(S): 400 CAPSULE ORAL at 09:57

## 2017-11-06 RX ADMIN — FLUVOXAMINE MALEATE 200 MILLIGRAM(S): 25 TABLET ORAL at 20:31

## 2017-11-06 RX ADMIN — GABAPENTIN 400 MILLIGRAM(S): 400 CAPSULE ORAL at 20:30

## 2017-11-06 RX ADMIN — Medication 375 MILLIGRAM(S): at 20:33

## 2017-11-07 RX ADMIN — Medication 200 MILLIGRAM(S): at 17:16

## 2017-11-07 RX ADMIN — Medication 75 MICROGRAM(S): at 06:30

## 2017-11-07 RX ADMIN — Medication 375 MILLIGRAM(S): at 09:05

## 2017-11-07 RX ADMIN — MYCOPHENOLATE MOFETIL 1000 MILLIGRAM(S): 250 CAPSULE ORAL at 20:26

## 2017-11-07 RX ADMIN — QUETIAPINE FUMARATE 200 MILLIGRAM(S): 200 TABLET, FILM COATED ORAL at 20:26

## 2017-11-07 RX ADMIN — GABAPENTIN 400 MILLIGRAM(S): 400 CAPSULE ORAL at 09:04

## 2017-11-07 RX ADMIN — MYCOPHENOLATE MOFETIL 1000 MILLIGRAM(S): 250 CAPSULE ORAL at 09:04

## 2017-11-07 RX ADMIN — Medication 100 MILLIGRAM(S): at 09:04

## 2017-11-07 RX ADMIN — FLUVOXAMINE MALEATE 200 MILLIGRAM(S): 25 TABLET ORAL at 20:26

## 2017-11-07 RX ADMIN — GABAPENTIN 400 MILLIGRAM(S): 400 CAPSULE ORAL at 20:26

## 2017-11-07 RX ADMIN — GABAPENTIN 400 MILLIGRAM(S): 400 CAPSULE ORAL at 12:04

## 2017-11-07 RX ADMIN — Medication 375 MILLIGRAM(S): at 20:26

## 2017-11-07 RX ADMIN — Medication 375 MILLIGRAM(S): at 10:00

## 2017-11-07 RX ADMIN — Medication 200 MILLIGRAM(S): at 09:04

## 2017-11-07 RX ADMIN — Medication 1 DROP(S): at 10:07

## 2017-11-07 RX ADMIN — Medication 375 MILLIGRAM(S): at 21:21
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
## 2017-11-07 NOTE — DISCHARGE NOTE BEHAVIORAL HEALTH - NSBHDCHOUSING_PSY_A_CORE
[address] [place]   [address]      ([phone])  Patient will be staying temporarily with her sister, Francoise Ervin./home

## 2017-11-07 NOTE — DISCHARGE NOTE BEHAVIORAL HEALTH - SECONDARY DIAGNOSIS.
Borderline personality disorder Hashimoto's disease Lupus erythematosus, unspecified form Mixed obsessional thoughts and acts Ulcerative colitis with rectal bleeding, unspecified location
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
## 2017-11-07 NOTE — DISCHARGE NOTE BEHAVIORAL HEALTH - HPI (INCLUDE ILLNESS QUALITY, SEVERITY, DURATION, TIMING, CONTEXT, MODIFYING FACTORS, ASSOCIATED SIGNS AND SYMPTOMS)
· HPI (include illness quality, severity, duration, timing, context, modifying factors, associated signs and symptoms)  47 y o  female, domiciled with boyfriend, two children ages 10 and 13 with their father, unemployed, with a hx of bipolar d/o and OCD, medical hx of ulcerative colitis, Lupus, Hashimoto's disease, fibromyalgia, retinal vasculitis; alcohol dependence in remission; numerous prior hospitalizations (including 10/9-10/13/17 at  [facility]), hx of prior overdose and cutting her wrists; presents self referred with insomnia, anxiety, auditory hallucinations to cut her wrists.  Patient discharged from  on 10/13, referred to [facility] for outpatient follow up. She states she has had difficulty sleeping, which has been compounded by the construction going on in her house during the day, preventing her from sleeping or napping during the day as well. She had anxiety surrounding her [facility] intake, and her psychiatrist there told her, for some reason, not to take her plaquenil or flexeril, resulting in increased pain symptoms and further exacerbating her insomnia. She continued to decompensate and [facility] was going to refer her to a PHP at [facility] or [place], but today she felt much worse, globally anxious and paranoid of other people, to the point where she had to cut short her visit with her children; she called her [facility] therapist and confessed to hearing voices to cut her wrists, and seeing images of her cutting or overdosing on meds, and the therapist recommended that she come to the ED.  On interview pt reports continued suicidal ideations to cut her wrists and is unable to contract for safety.

## 2017-11-07 NOTE — DISCHARGE NOTE BEHAVIORAL HEALTH - NSBHDCADMRISKMITFT_PSY_A_CORE
Patient to be referred to partial hospital upon discharge Meeting held with sister to help her better engage in outpatient care

## 2017-11-07 NOTE — DISCHARGE NOTE BEHAVIORAL HEALTH - MEDICATION SUMMARY - MEDICATIONS TO STOP TAKING
I will STOP taking the medications listed below when I get home from the hospital:    lithium 150 mg oral capsule  -- 3 cap(s) by mouth 2 times a day    Flexeril 10 mg oral tablet  -- 1 tab(s) by mouth once a day (at bedtime)    acetaminophen 325 mg oral tablet  -- 2 tab(s) by mouth every 6 hours, As needed, Moderate Pain (4 - 6)    clonazePAM 0.5 mg oral tablet  -- 1 tab(s) by mouth 2 times a day MDD:2 tabs until told to discontinue

## 2017-11-07 NOTE — DISCHARGE NOTE BEHAVIORAL HEALTH - NSBHDCSWCOMMENTSFT_PSY_A_CORE
Signs and symptoms of mental illness, need and resources for continuing in appropriate level of psychiatric o/p tx as well as community resources to support continued wellness. Pt also educated about the need to continue taking medication at directed above until directed otherwise by her outpatient mental health provider.

## 2017-11-07 NOTE — DISCHARGE NOTE BEHAVIORAL HEALTH - MEDICATION SUMMARY - MEDICATIONS TO TAKE
I will START or STAY ON the medications listed below when I get home from the hospital:    Apriso 0.375 g oral capsule, extended release  -- 4 cap(s) by mouth once a day (in the morning) until told to discontinue by outpatient MD  -- Indication: For colitis    Naprosyn 375 mg oral tablet  -- 1 tab(s) by mouth 2 times a day until told to discontinue by outpatient MD  -- Indication: For pain    gabapentin 300 mg oral capsule  -- 2 cap(s) by mouth 3 times a day until told to discontinue by outpatient MD  -- Indication: For pain    fluvoxaMINE 100 mg oral tablet  -- 2 tab(s) by mouth once a day (at bedtime) until told to discontinue by outpatient MD  -- Indication: For depression    Plaquenil 200 mg oral tablet  -- 1 tab(s) by mouth 2 times a day until told to discontinue by outpatient MD  -- Indication: For Lupus    ARIPiprazole 15 mg oral tablet  -- 1 tab(s) by mouth once a dayuntil told to discontinue by outpatient MD  -- Indication: For psychosis    CellCept 500 mg oral tablet  -- 2 tab(s) by mouth 2 times a day until told to discontinue by outpatient MD  -- Indication: For auto immune    docusate sodium 100 mg oral capsule  -- 1 cap(s) by mouth once a day until told to discontinue by outpatient MD  -- Indication: For Stool softener    ocular lubricant ophthalmic solution  -- 1 drop(s) to each affected eye every 2 hours, As needed, dryness until told to discontinue by outpatient MD  -- Indication: For dry eyes    Synthroid 75 mcg (0.075 mg) oral tablet  -- 1 tab(s) by mouth once a day until told to disconintue by outpatient MD  -- Indication: For thyroid

## 2017-11-07 NOTE — DISCHARGE NOTE BEHAVIORAL HEALTH - NSBHDCMEDSFT_PSY_A_CORE
MEDICATIONS  (STANDING):  ARIPiprazole 15 milliGRAM(s) Oral daily  fluvoxaMINE 200 milliGRAM(s) Oral at bedtime  gabapentin 600 milliGRAM(s) Oral three times a day
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
## 2017-11-07 NOTE — DISCHARGE NOTE BEHAVIORAL HEALTH - NSBHDCCRISISPLAN1FT_PSY_A_CORE
Talk to a trusted friend or family member and ask for help.  Call the Suicide Hotline  Call 911 or go to my nearest hospital emergency room.

## 2017-11-07 NOTE — DISCHARGE NOTE BEHAVIORAL HEALTH - NSBHDCSUICFCTRMIT_PSY_A_CORE
Will have tenants leave home to alleviate external stress  Sister will live with her to help improve functioning  Patient will attempt to engage in DBT therapy

## 2017-11-07 NOTE — DISCHARGE NOTE BEHAVIORAL HEALTH - NSBHDCPURPOSE1FT_PSY_A_CORE
Pt has an appt to begin partial hospitalization at [facility] on Thursday, 11/9/17 at 8:30 AM at [facility]

## 2017-11-08 RX ORDER — ARIPIPRAZOLE 15 MG/1
15 TABLET ORAL ONCE
Qty: 0 | Refills: 0 | Status: COMPLETED | OUTPATIENT
Start: 2017-11-08 | End: 2017-11-08

## 2017-11-08 RX ORDER — ARIPIPRAZOLE 15 MG/1
15 TABLET ORAL DAILY
Qty: 0 | Refills: 0 | Status: DISCONTINUED | OUTPATIENT
Start: 2017-11-09 | End: 2017-11-14

## 2017-11-08 RX ORDER — GABAPENTIN 400 MG/1
600 CAPSULE ORAL THREE TIMES A DAY
Qty: 0 | Refills: 0 | Status: DISCONTINUED | OUTPATIENT
Start: 2017-11-08 | End: 2017-11-14

## 2017-11-08 RX ADMIN — Medication 375 MILLIGRAM(S): at 21:17

## 2017-11-08 RX ADMIN — Medication 375 MILLIGRAM(S): at 08:20

## 2017-11-08 RX ADMIN — GABAPENTIN 400 MILLIGRAM(S): 400 CAPSULE ORAL at 08:21

## 2017-11-08 RX ADMIN — MYCOPHENOLATE MOFETIL 1000 MILLIGRAM(S): 250 CAPSULE ORAL at 21:17

## 2017-11-08 RX ADMIN — MYCOPHENOLATE MOFETIL 1000 MILLIGRAM(S): 250 CAPSULE ORAL at 08:20

## 2017-11-08 RX ADMIN — ARIPIPRAZOLE 15 MILLIGRAM(S): 15 TABLET ORAL at 13:22

## 2017-11-08 RX ADMIN — Medication 2 MILLIGRAM(S): at 11:59

## 2017-11-08 RX ADMIN — Medication 100 MILLIGRAM(S): at 08:21

## 2017-11-08 RX ADMIN — Medication 375 MILLIGRAM(S): at 09:25

## 2017-11-08 RX ADMIN — Medication 1 DROP(S): at 21:18

## 2017-11-08 RX ADMIN — GABAPENTIN 600 MILLIGRAM(S): 400 CAPSULE ORAL at 21:17

## 2017-11-08 RX ADMIN — FLUVOXAMINE MALEATE 200 MILLIGRAM(S): 25 TABLET ORAL at 21:17

## 2017-11-08 RX ADMIN — Medication 200 MILLIGRAM(S): at 08:21

## 2017-11-08 RX ADMIN — Medication 75 MICROGRAM(S): at 06:20

## 2017-11-08 RX ADMIN — GABAPENTIN 600 MILLIGRAM(S): 400 CAPSULE ORAL at 11:58

## 2017-11-09 RX ADMIN — GABAPENTIN 600 MILLIGRAM(S): 400 CAPSULE ORAL at 21:21

## 2017-11-09 RX ADMIN — Medication 375 MILLIGRAM(S): at 21:20

## 2017-11-09 RX ADMIN — Medication 200 MILLIGRAM(S): at 08:38

## 2017-11-09 RX ADMIN — Medication 100 MILLIGRAM(S): at 08:42

## 2017-11-09 RX ADMIN — Medication 375 MILLIGRAM(S): at 12:56

## 2017-11-09 RX ADMIN — ARIPIPRAZOLE 15 MILLIGRAM(S): 15 TABLET ORAL at 08:42

## 2017-11-09 RX ADMIN — Medication 200 MILLIGRAM(S): at 16:57

## 2017-11-09 RX ADMIN — Medication 75 MICROGRAM(S): at 06:15

## 2017-11-09 RX ADMIN — GABAPENTIN 600 MILLIGRAM(S): 400 CAPSULE ORAL at 13:02

## 2017-11-09 RX ADMIN — Medication 375 MILLIGRAM(S): at 08:39

## 2017-11-09 RX ADMIN — Medication 2 MILLIGRAM(S): at 23:31

## 2017-11-09 RX ADMIN — FLUVOXAMINE MALEATE 200 MILLIGRAM(S): 25 TABLET ORAL at 21:21

## 2017-11-09 RX ADMIN — MYCOPHENOLATE MOFETIL 1000 MILLIGRAM(S): 250 CAPSULE ORAL at 21:22

## 2017-11-09 RX ADMIN — MYCOPHENOLATE MOFETIL 1000 MILLIGRAM(S): 250 CAPSULE ORAL at 08:41

## 2017-11-09 RX ADMIN — GABAPENTIN 600 MILLIGRAM(S): 400 CAPSULE ORAL at 08:38

## 2017-11-09 RX ADMIN — Medication 200 MILLIGRAM(S): at 18:40

## 2017-11-09 RX ADMIN — Medication 375 MILLIGRAM(S): at 22:00

## 2017-11-10 RX ORDER — CYCLOBENZAPRINE HYDROCHLORIDE 10 MG/1
1 TABLET, FILM COATED ORAL
Qty: 0 | Refills: 0 | COMMUNITY

## 2017-11-10 RX ORDER — DOCUSATE SODIUM 100 MG
1 CAPSULE ORAL
Qty: 0 | Refills: 0 | COMMUNITY
Start: 2017-11-10

## 2017-11-10 RX ORDER — FLUVOXAMINE MALEATE 25 MG/1
2 TABLET ORAL
Qty: 0 | Refills: 0 | COMMUNITY
Start: 2017-11-10

## 2017-11-10 RX ORDER — ARIPIPRAZOLE 15 MG/1
1 TABLET ORAL
Qty: 0 | Refills: 0 | COMMUNITY
Start: 2017-11-10

## 2017-11-10 RX ORDER — CYCLOBENZAPRINE HYDROCHLORIDE 10 MG/1
10 TABLET, FILM COATED ORAL THREE TIMES A DAY
Qty: 0 | Refills: 0 | Status: DISCONTINUED | OUTPATIENT
Start: 2017-11-10 | End: 2017-11-14

## 2017-11-10 RX ORDER — GABAPENTIN 400 MG/1
2 CAPSULE ORAL
Qty: 0 | Refills: 0 | COMMUNITY
Start: 2017-11-10

## 2017-11-10 RX ORDER — CYCLOBENZAPRINE HYDROCHLORIDE 10 MG/1
1 TABLET, FILM COATED ORAL
Qty: 0 | Refills: 0 | COMMUNITY
Start: 2017-11-10

## 2017-11-10 RX ADMIN — FLUVOXAMINE MALEATE 200 MILLIGRAM(S): 25 TABLET ORAL at 20:40

## 2017-11-10 RX ADMIN — CYCLOBENZAPRINE HYDROCHLORIDE 10 MILLIGRAM(S): 10 TABLET, FILM COATED ORAL at 20:40

## 2017-11-10 RX ADMIN — Medication 200 MILLIGRAM(S): at 17:00

## 2017-11-10 RX ADMIN — MYCOPHENOLATE MOFETIL 1000 MILLIGRAM(S): 250 CAPSULE ORAL at 20:40

## 2017-11-10 RX ADMIN — Medication 375 MILLIGRAM(S): at 09:21

## 2017-11-10 RX ADMIN — GABAPENTIN 600 MILLIGRAM(S): 400 CAPSULE ORAL at 08:20

## 2017-11-10 RX ADMIN — GABAPENTIN 600 MILLIGRAM(S): 400 CAPSULE ORAL at 20:40

## 2017-11-10 RX ADMIN — Medication 100 MILLIGRAM(S): at 08:20

## 2017-11-10 RX ADMIN — ARIPIPRAZOLE 15 MILLIGRAM(S): 15 TABLET ORAL at 08:20

## 2017-11-10 RX ADMIN — MYCOPHENOLATE MOFETIL 1000 MILLIGRAM(S): 250 CAPSULE ORAL at 08:21

## 2017-11-10 RX ADMIN — Medication 75 MICROGRAM(S): at 06:33

## 2017-11-10 RX ADMIN — Medication 1 DROP(S): at 07:53

## 2017-11-10 RX ADMIN — Medication 375 MILLIGRAM(S): at 20:38

## 2017-11-10 RX ADMIN — Medication 200 MILLIGRAM(S): at 07:53

## 2017-11-10 RX ADMIN — Medication 375 MILLIGRAM(S): at 21:12

## 2017-11-10 RX ADMIN — GABAPENTIN 600 MILLIGRAM(S): 400 CAPSULE ORAL at 12:17

## 2017-11-10 RX ADMIN — Medication 375 MILLIGRAM(S): at 08:21

## 2017-11-11 RX ADMIN — GABAPENTIN 600 MILLIGRAM(S): 400 CAPSULE ORAL at 20:42

## 2017-11-11 RX ADMIN — Medication 2 MILLIGRAM(S): at 03:17

## 2017-11-11 RX ADMIN — Medication 375 MILLIGRAM(S): at 21:45

## 2017-11-11 RX ADMIN — ARIPIPRAZOLE 15 MILLIGRAM(S): 15 TABLET ORAL at 09:24

## 2017-11-11 RX ADMIN — MYCOPHENOLATE MOFETIL 1000 MILLIGRAM(S): 250 CAPSULE ORAL at 20:43

## 2017-11-11 RX ADMIN — MYCOPHENOLATE MOFETIL 1000 MILLIGRAM(S): 250 CAPSULE ORAL at 09:22

## 2017-11-11 RX ADMIN — Medication 375 MILLIGRAM(S): at 09:22

## 2017-11-11 RX ADMIN — Medication 200 MILLIGRAM(S): at 16:26

## 2017-11-11 RX ADMIN — Medication 75 MICROGRAM(S): at 06:30

## 2017-11-11 RX ADMIN — GABAPENTIN 600 MILLIGRAM(S): 400 CAPSULE ORAL at 13:35

## 2017-11-11 RX ADMIN — Medication 100 MILLIGRAM(S): at 09:21

## 2017-11-11 RX ADMIN — FLUVOXAMINE MALEATE 200 MILLIGRAM(S): 25 TABLET ORAL at 21:12

## 2017-11-11 RX ADMIN — Medication 375 MILLIGRAM(S): at 10:30

## 2017-11-11 RX ADMIN — Medication 200 MILLIGRAM(S): at 09:21

## 2017-11-11 RX ADMIN — GABAPENTIN 600 MILLIGRAM(S): 400 CAPSULE ORAL at 09:21

## 2017-11-11 RX ADMIN — Medication 1 DROP(S): at 09:24

## 2017-11-11 RX ADMIN — Medication 375 MILLIGRAM(S): at 20:43

## 2017-11-12 RX ADMIN — ARIPIPRAZOLE 15 MILLIGRAM(S): 15 TABLET ORAL at 08:48

## 2017-11-12 RX ADMIN — GABAPENTIN 600 MILLIGRAM(S): 400 CAPSULE ORAL at 14:46

## 2017-11-12 RX ADMIN — Medication 200 MILLIGRAM(S): at 08:49

## 2017-11-12 RX ADMIN — GABAPENTIN 600 MILLIGRAM(S): 400 CAPSULE ORAL at 20:46

## 2017-11-12 RX ADMIN — Medication 375 MILLIGRAM(S): at 21:48

## 2017-11-12 RX ADMIN — GABAPENTIN 600 MILLIGRAM(S): 400 CAPSULE ORAL at 08:53

## 2017-11-12 RX ADMIN — Medication 375 MILLIGRAM(S): at 20:46

## 2017-11-12 RX ADMIN — FLUVOXAMINE MALEATE 200 MILLIGRAM(S): 25 TABLET ORAL at 20:46

## 2017-11-12 RX ADMIN — Medication 75 MICROGRAM(S): at 05:50

## 2017-11-12 RX ADMIN — Medication 375 MILLIGRAM(S): at 08:54

## 2017-11-12 RX ADMIN — Medication 100 MILLIGRAM(S): at 08:49

## 2017-11-12 RX ADMIN — Medication 200 MILLIGRAM(S): at 17:42

## 2017-11-12 RX ADMIN — MYCOPHENOLATE MOFETIL 1000 MILLIGRAM(S): 250 CAPSULE ORAL at 20:46

## 2017-11-12 RX ADMIN — MYCOPHENOLATE MOFETIL 1000 MILLIGRAM(S): 250 CAPSULE ORAL at 08:49

## 2017-11-13 RX ADMIN — Medication 75 MICROGRAM(S): at 05:57

## 2017-11-13 RX ADMIN — Medication 375 MILLIGRAM(S): at 08:58

## 2017-11-13 RX ADMIN — Medication 200 MILLIGRAM(S): at 08:57

## 2017-11-13 RX ADMIN — GABAPENTIN 600 MILLIGRAM(S): 400 CAPSULE ORAL at 20:58

## 2017-11-13 RX ADMIN — Medication 375 MILLIGRAM(S): at 21:00

## 2017-11-13 RX ADMIN — Medication 100 MILLIGRAM(S): at 08:57

## 2017-11-13 RX ADMIN — FLUVOXAMINE MALEATE 200 MILLIGRAM(S): 25 TABLET ORAL at 20:58

## 2017-11-13 RX ADMIN — GABAPENTIN 600 MILLIGRAM(S): 400 CAPSULE ORAL at 08:57

## 2017-11-13 RX ADMIN — GABAPENTIN 600 MILLIGRAM(S): 400 CAPSULE ORAL at 12:52

## 2017-11-13 RX ADMIN — Medication 200 MILLIGRAM(S): at 16:55

## 2017-11-13 RX ADMIN — MYCOPHENOLATE MOFETIL 1000 MILLIGRAM(S): 250 CAPSULE ORAL at 08:57

## 2017-11-13 RX ADMIN — MYCOPHENOLATE MOFETIL 1000 MILLIGRAM(S): 250 CAPSULE ORAL at 20:58

## 2017-11-13 RX ADMIN — ARIPIPRAZOLE 15 MILLIGRAM(S): 15 TABLET ORAL at 08:57

## 2017-11-14 VITALS
RESPIRATION RATE: 14 BRPM | TEMPERATURE: 98 F | HEART RATE: 80 BPM | DIASTOLIC BLOOD PRESSURE: 78 MMHG | OXYGEN SATURATION: 99 % | SYSTOLIC BLOOD PRESSURE: 115 MMHG

## 2017-11-14 RX ORDER — MESALAMINE 400 MG
4 TABLET, DELAYED RELEASE (ENTERIC COATED) ORAL
Qty: 0 | Refills: 0 | COMMUNITY

## 2017-11-14 RX ORDER — ARIPIPRAZOLE 15 MG/1
1 TABLET ORAL
Qty: 14 | Refills: 1
Start: 2017-11-14 | End: 2017-12-11

## 2017-11-14 RX ORDER — FLUVOXAMINE MALEATE 25 MG/1
2 TABLET ORAL
Qty: 28 | Refills: 1
Start: 2017-11-14 | End: 2022-09-20

## 2017-11-14 RX ORDER — ARIPIPRAZOLE 15 MG/1
1 TABLET ORAL
Qty: 14 | Refills: 1
Start: 2017-11-14 | End: 2022-09-20

## 2017-11-14 RX ORDER — HYDROXYCHLOROQUINE SULFATE 200 MG
1 TABLET ORAL
Qty: 0 | Refills: 0 | COMMUNITY

## 2017-11-14 RX ORDER — MYCOPHENOLATE MOFETIL 250 MG/1
2 CAPSULE ORAL
Qty: 0 | Refills: 0 | COMMUNITY

## 2017-11-14 RX ORDER — GABAPENTIN 400 MG/1
1 CAPSULE ORAL
Qty: 0 | Refills: 1 | COMMUNITY
Start: 2017-11-14 | End: 2017-12-11

## 2017-11-14 RX ORDER — FLUVOXAMINE MALEATE 25 MG/1
2 TABLET ORAL
Qty: 28 | Refills: 1 | OUTPATIENT
Start: 2017-11-14 | End: 2017-12-11

## 2017-11-14 RX ORDER — GABAPENTIN 400 MG/1
2 CAPSULE ORAL
Qty: 84 | Refills: 1
Start: 2017-11-14 | End: 2022-09-20

## 2017-11-14 RX ORDER — ARIPIPRAZOLE 15 MG/1
1 TABLET ORAL
Qty: 14 | Refills: 1 | OUTPATIENT
Start: 2017-11-14 | End: 2017-12-11

## 2017-11-14 RX ORDER — GABAPENTIN 400 MG/1
2 CAPSULE ORAL
Qty: 84 | Refills: 1 | OUTPATIENT
Start: 2017-11-14 | End: 2017-12-11

## 2017-11-14 RX ORDER — LEVOTHYROXINE SODIUM 125 MCG
1 TABLET ORAL
Qty: 0 | Refills: 0 | COMMUNITY

## 2017-11-14 RX ADMIN — MYCOPHENOLATE MOFETIL 1000 MILLIGRAM(S): 250 CAPSULE ORAL at 08:55

## 2017-11-14 RX ADMIN — Medication 100 MILLIGRAM(S): at 08:55

## 2017-11-14 RX ADMIN — GABAPENTIN 600 MILLIGRAM(S): 400 CAPSULE ORAL at 08:55

## 2017-11-14 RX ADMIN — Medication 200 MILLIGRAM(S): at 08:55

## 2017-11-14 RX ADMIN — Medication 375 MILLIGRAM(S): at 08:55

## 2017-11-14 RX ADMIN — Medication 2 MILLIGRAM(S): at 00:48

## 2017-11-14 RX ADMIN — Medication 375 MILLIGRAM(S): at 09:55

## 2017-11-14 RX ADMIN — ARIPIPRAZOLE 15 MILLIGRAM(S): 15 TABLET ORAL at 08:56

## 2017-11-14 RX ADMIN — Medication 75 MICROGRAM(S): at 06:08

## 2017-11-14 NOTE — PROGRESS NOTE BEHAVIORAL HEALTH - NSBHADMITIPDSM_PSY_A_CORE
see above for Axis I, II, III

## 2017-11-14 NOTE — PROGRESS NOTE BEHAVIORAL HEALTH - NSBHADMITIPREASON_PSY_A_CORE
Danger to self; mental illness expected to respond to inpatient care

## 2017-11-14 NOTE — PROGRESS NOTE BEHAVIORAL HEALTH - NSBHFUPSUICINTERVAL_PSY_A_CORE
none known
none known
yes
none known
yes
none known
none known

## 2017-11-14 NOTE — PROGRESS NOTE BEHAVIORAL HEALTH - PROBLEM SELECTOR PROBLEM 2
Borderline personality disorder

## 2017-11-14 NOTE — PROGRESS NOTE BEHAVIORAL HEALTH - PRIMARY DX
Bipolar disorder, current episode mixed, moderate

## 2017-11-14 NOTE — PROGRESS NOTE BEHAVIORAL HEALTH - NS ED [facility] REVIEW OF ED CHART AVAILABLE INVESTIGATIONS REVIEWED
None available

## 2017-11-14 NOTE — PROGRESS NOTE BEHAVIORAL HEALTH - SUMMARY
47 y o  female, domiciled with boyfriend, two children ages 10 and 13 with their father, unemployed, with a hx of bipolar d/o and OCD, medical hx of ulcerative colitis, Lupus, Hashimoto's disease, fibromyalgia, retinal vasculitis; alcohol dependence in remission; numerous prior hospitalizations (including 10/9-10/13/17 at  5), hx of prior overdose and cutting her wrists; presents self referred with insomnia, anxiety, auditory hallucinations to cut her wrists.  Patient has decompensated since her discharge on 10/13 with worsening insomnia and anxiety. At this point in time she continues to have AH surrounding cutting her wrists and cannot verbalize a commitment to safety. She will be admitted for safety and stabilization.
· Summary  47 y o  female, domiciled with boyfriend, two children ages 10 and 13 with their father, unemployed, with a hx of bipolar d/o and OCD, medical hx of ulcerative colitis, Lupus, Hashimoto's disease, fibromyalgia, retinal vasculitis; alcohol dependence in remission; numerous prior hospitalizations (including 10/9-10/13/17 at  5), hx of prior overdose and cutting her wrists; presents self referred with insomnia, anxiety, auditory hallucinations to cut her wrists.  Patient has decompensated since her discharge on 10/13 with worsening insomnia and anxiety. At this point in time she continues to have AH surrounding cutting her wrists and cannot verbalize a commitment to safety. She will be admitted for safety and stabilization.
47 y o  female, domiciled with boyfriend, two children ages 10 and 13 with their father, unemployed, with a hx of bipolar d/o and OCD, medical hx of ulcerative colitis, Lupus, Hashimoto's disease, fibromyalgia, retinal vasculitis; alcohol dependence in remission; numerous prior hospitalizations (including 10/9-10/13/17 at  5), hx of prior overdose and cutting her wrists; presents self referred with insomnia, anxiety, auditory hallucinations to cut her wrists.  Patient has decompensated since her discharge on 10/13 with worsening insomnia and anxiety. At this point in time she continues to have AH surrounding cutting her wrists and cannot verbalize a commitment to safety. She will be admitted for safety and stabilization.

## 2017-11-14 NOTE — PROGRESS NOTE BEHAVIORAL HEALTH - ATTENTION / CONCENTRATION
Impaired

## 2017-11-14 NOTE — PROGRESS NOTE BEHAVIORAL HEALTH - NSBHADMITIPOBSFT_PSY_A_CORE
no imminent risk to others

## 2017-11-14 NOTE — PROGRESS NOTE BEHAVIORAL HEALTH - ABNORMAL MOVEMENTS
No abnormal movements

## 2017-11-14 NOTE — PROGRESS NOTE BEHAVIORAL HEALTH - LANGUAGE
No abnormalities noted

## 2017-11-14 NOTE — PROGRESS NOTE BEHAVIORAL HEALTH - DETAILS
chronic pain issues

## 2017-11-14 NOTE — PROGRESS NOTE BEHAVIORAL HEALTH - MOOD
Anxious
Normal
Anxious/Depressed
Anxious

## 2017-11-14 NOTE — PROGRESS NOTE BEHAVIORAL HEALTH - ESTIMATED DISCHARGE DATE
03-Nov-2017

## 2017-11-14 NOTE — PROGRESS NOTE BEHAVIORAL HEALTH - PERCEPTIONS
No abnormalities
Auditory hallucinations/No abnormalities
No abnormalities
Auditory hallucinations
No abnormalities
Auditory hallucinations
No abnormalities

## 2017-11-14 NOTE — PROGRESS NOTE BEHAVIORAL HEALTH - BODY HABITUS
Well nourished

## 2017-11-14 NOTE — PROGRESS NOTE BEHAVIORAL HEALTH - PROBLEM SELECTOR PLAN 2
Behavioral interventions  Treat psychosis with Seroquel due to past benefit with akathisia
Behavioral interventions  Treat psychosis with Seroquel due to past benefit and akathisia with Abilify
Behavioral interventions  Treat psychosis with Seroquel due to past benefit and akathisia with Abilify  Has resisted dose increases  Will discontinue and restart Abilify at lower dose- agrees to take 15 mg
Behavioral interventions  Treat psychosis with Seroquel due to past benefit and akathisia with Abilify
Behavioral interventions  Treat psychosis with Seroquel due to past benefit and akathisia with Abilify  Has resisted dose increases
Behavioral interventions  Treat psychosis with Seroquel due to past benefit and akathisia with Abilify
Behavioral interventions  Treat psychosis with Seroquel due to past benefit and akathisia with Abilify  Has resisted dose increases
Behavioral interventions  Treat psychosis with Seroquel due to past benefit and akathisia with Abilify  Has resisted dose increases  Will discontinue and restart Abilify at lower dose- agrees to take 15 mg
Behavioral interventions  Treat psychosis with Seroquel due to past benefit and akathisia with Abilify  Has resisted dose increases  Will discontinue and restart Abilify at lower dose- agrees to take 15 mg
Behavioral interventions  Treat psychosis with Seroquel due to past benefit and akathisia with Abilify
Behavioral interventions  Treat psychosis with Seroquel due to past benefit and akathisia with Abilify  Has resisted dose increases  Will discontinue and restart Abilify at lower dose- agrees to take 15 mg
Behavioral interventions  Treat psychosis with Seroquel due to past benefit and akathisia with Abilify  Has resisted dose increases  Will discontinue and restart Abilify at lower dose- agrees to take 15 mg
Behavioral interventions  Treat psychosis with Seroquel due to past benefit and akathisia with Abilify

## 2017-11-14 NOTE — PROGRESS NOTE BEHAVIORAL HEALTH - NSBHPTASSESSDT_PSY_A_CORE
01-Nov-2017 14:38
02-Nov-2017 14:33
03-Nov-2017 14:47
09-Nov-2017 13:59
10-Nov-2017 13:12
13-Nov-2017 13:39
14-Nov-2017 09:55
07-Nov-2017 14:52
08-Nov-2017 12:09
06-Nov-2017 13:45
03-Nov-2017 14:47
03-Nov-2017 14:47
30-Oct-2017 11:56
29-Oct-2017 13:50
31-Oct-2017 12:06

## 2017-11-14 NOTE — PROGRESS NOTE BEHAVIORAL HEALTH - NS ED [facility] MED ROS EYES
No complaints

## 2017-11-14 NOTE — PROGRESS NOTE BEHAVIORAL HEALTH - ESTIMATED INTELLIGENCE
Average

## 2017-11-14 NOTE — PROGRESS NOTE BEHAVIORAL HEALTH - PROBLEM SELECTOR PROBLEM 1
Bipolar disorder with depression

## 2017-11-14 NOTE — PROGRESS NOTE BEHAVIORAL HEALTH - AXIS III
Lupus, Diabetes type II, Hashimoto's Disease, Vasculitis of eye
Lupus, Diabetes type II, Hoshimotos Disease, Vasculitis of eye
Lupus, Diabetes type II, Hashimoto's Disease, Vasculitis of eye

## 2017-11-14 NOTE — PROGRESS NOTE BEHAVIORAL HEALTH - NS ED [facility] MED ROS PSYCHIATRIC
See HPI

## 2017-11-14 NOTE — PROGRESS NOTE BEHAVIORAL HEALTH - NSBHCONSORIP_PSY_A_CORE
Inpatient Admission...

## 2017-11-14 NOTE — PROGRESS NOTE BEHAVIORAL HEALTH - NS ED [facility] MSE SPEECH RATE
Normal
Pressured
Normal
Pressured
Normal

## 2017-11-14 NOTE — PROGRESS NOTE BEHAVIORAL HEALTH - AFFECT CONGRUENCE
Congruent

## 2017-11-14 NOTE — PROGRESS NOTE BEHAVIORAL HEALTH - NSBHADMITMEDEDU_PSY_A_CORE
yes...

## 2017-11-14 NOTE — PROGRESS NOTE BEHAVIORAL HEALTH - AFFECT RANGE
Constricted
Labile
Constricted
Labile
Constricted
Labile

## 2017-11-14 NOTE — PROGRESS NOTE BEHAVIORAL HEALTH - PROBLEM SELECTOR PROBLEM 3
Mixed obsessional thoughts and acts

## 2017-11-14 NOTE — PROGRESS NOTE BEHAVIORAL HEALTH - PROBLEM SELECTOR PLAN 1
Will change from lithium to Neurontin due to potential interactions with pain medications
Changed from lithium to Neurontin due to potential interactions with pain medications
Changed from lithium to Neurontin due to potential interactions with pain medications  Increase to 600 mg TID
Changed from lithium to Neurontin due to potential interactions with pain medications
Changed from lithium to Neurontin due to potential interactions with pain medications  Increase to 600 mg TID
Changed from lithium to Neurontin due to potential interactions with pain medications  Increase to 600 mg TID
Changed from lithium to Neurontin due to potential interactions with pain medications
Changed from lithium to Neurontin due to potential interactions with pain medications  Increase to 600 mg TID
Changed from lithium to Neurontin due to potential interactions with pain medications  Increase to 600 mg TID
Changed from lithium to Neurontin due to potential interactions with pain medications
Changed from lithium to Neurontin due to potential interactions with pain medications

## 2017-11-14 NOTE — PROGRESS NOTE BEHAVIORAL HEALTH - NS ED [facility] REVIEW OF ED CHART AVAILABLE LABS REVIEWED
None available
Yes
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
## 2017-11-14 NOTE — PROGRESS NOTE BEHAVIORAL HEALTH - RECENT MEMORY
Normal

## 2017-11-14 NOTE — PROGRESS NOTE BEHAVIORAL HEALTH - NSBHADMITIPOBS_PSY_A_CORE
Routine observation

## 2017-11-14 NOTE — PROGRESS NOTE BEHAVIORAL HEALTH - BEHAVIOR
Cooperative

## 2017-11-14 NOTE — PROGRESS NOTE BEHAVIORAL HEALTH - NSBHADMITMEDEDUDETAILS_A_CORE FT
Discussed risks/benefits with Neurontin and Seroquel
Discussed risks/benefits with Neurontin and sero
Discussed risks/benefits with Neurontin and Seroquel
Discussed risks/benefits with Neurontin and sero
Discussed risks/benefits with Neurontin and Seroquel
Discussed risks/benefits with Neurontin and Seroquel
Discussed risks/benefits with Neurontin and Abilify    For potential discharge early next week
Discussed risks/benefits with Neurontin and Seroquel

## 2017-11-14 NOTE — PROGRESS NOTE BEHAVIORAL HEALTH - NS ED [facility] AXIS I PRIMARY CODE FT
F31.62

## 2017-11-14 NOTE — PROGRESS NOTE BEHAVIORAL HEALTH - MUSCLE TONE / STRENGTH
Normal muscle tone/strength

## 2017-11-14 NOTE — PROGRESS NOTE BEHAVIORAL HEALTH - NSBHLEGALSTATUS_PSY_A_CORE
9.13 (Voluntary)

## 2017-11-14 NOTE — PROGRESS NOTE BEHAVIORAL HEALTH - NSBHATTESTSEENBY_PSY_A_CORE
attending Psychiatrist without NP/Trainee

## 2017-11-14 NOTE — PROGRESS NOTE BEHAVIORAL HEALTH - THOUGHT PROCESS
Linear

## 2017-11-14 NOTE — PROGRESS NOTE BEHAVIORAL HEALTH - NSBHFUPTYPE_PSY_A_CORE
Inpatient
Inpatient-On Service Note
Inpatient

## 2017-11-14 NOTE — PROGRESS NOTE BEHAVIORAL HEALTH - GAIT / STATION
Normal gait / station
Other
Normal gait / station
Other
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
## 2017-11-14 NOTE — PROGRESS NOTE BEHAVIORAL HEALTH - THOUGHT CONTENT
Ruminations/Unremarkable/Other
Suicidality/Other
Suicidality/Other
Other/Suicidality
Other/Unremarkable/Ruminations
Other/Unremarkable/Ruminations
Delusions/Ideas of reference/Unremarkable/Ruminations
Ideas of reference/Unremarkable/Delusions/Ruminations
Ruminations/Other/Unremarkable
Ideas of reference/Unremarkable/Delusions/Ruminations
Ruminations/Unremarkable/Delusions/Ideas of reference
Unremarkable/Other/Ruminations
Ideas of reference/Delusions/Suicidality
Unremarkable/Ruminations/Other
Unremarkable/Ruminations/Other

## 2017-11-14 NOTE — PROGRESS NOTE BEHAVIORAL HEALTH - AFFECT QUALITY
Anxious
Euthymic
Anxious

## 2017-11-14 NOTE — CHART NOTE - NSCHARTNOTEFT_GEN_A_CORE
Post discharge note    Pt of Dr Hernandez's discharged from hospital earlier today 11/14/17    called to request prescriptions be sent to a different pharmacy as [facility] had been having computer problem.  Plan  1. Writer called the pt ( tel [phone]) to confirm plan as below:  2.Pt  3 discharge prescriptions were e- prescribed to [facility] on [address]  after computer system back up again     1. Neurontin 100 mg tabs - 2 tabs po tid # 84 with 1 refill     2. Luvox 100 mg tab- 2 tabs po qhs # 28 with 1 refill     3. Abilify 15 mg tabs  one tab po q am # 14 with 1 refill  3. All  3 prescriptions e [facility] [address] [facility] ( tel [phone].

## 2017-11-14 NOTE — PROGRESS NOTE BEHAVIORAL HEALTH - NSBHCHARTREVIEWVS_PSY_A_CORE FT
Vital Signs Last 24 Hrs  T(C): 36.9 (06 Nov 2017 08:16), Max: 36.9 (06 Nov 2017 08:16)  T(F): 98.5 (06 Nov 2017 08:16), Max: 98.5 (06 Nov 2017 08:16)  HR: 100 (06 Nov 2017 08:16) (100 - 100)  BP: 132/100 (06 Nov 2017 08:16) (132/100 - 132/100)  BP(mean): --  RR: 14 (06 Nov 2017 08:16) (14 - 14)  SpO2: 97% (06 Nov 2017 08:16) (97% - 97%)
Vital Signs Last 24 Hrs  T(C): 36.6 (10 Nov 2017 09:09), Max: 36.6 (10 Nov 2017 09:09)  T(F): 97.9 (10 Nov 2017 09:09), Max: 97.9 (10 Nov 2017 09:09)  HR: 91 (10 Nov 2017 09:09) (91 - 91)  BP: 120/92 (10 Nov 2017 09:09) (120/92 - 120/92)  BP(mean): --  RR: 14 (10 Nov 2017 09:09) (14 - 14)  SpO2: 96% (10 Nov 2017 09:09) (96% - 96%)
Vital Signs Last 24 Hrs  T(C): 36.7 (02 Nov 2017 08:10), Max: 36.7 (02 Nov 2017 08:10)  T(F): 98 (02 Nov 2017 08:10), Max: 98 (02 Nov 2017 08:10)  HR: 91 (02 Nov 2017 08:10) (75 - 91)  BP: 156/92 (02 Nov 2017 08:10) (146/85 - 156/92)  BP(mean): --  RR: 16 (02 Nov 2017 08:10) (16 - 16)  SpO2: 100% (02 Nov 2017 08:10) (100% - 100%)
Vital Signs Last 24 Hrs  T(C): 36.7 (04 Nov 2017 08:40), Max: 36.7 (04 Nov 2017 08:40)  T(F): 98.1 (04 Nov 2017 08:40), Max: 98.1 (04 Nov 2017 08:40)  HR: 100 (04 Nov 2017 08:40) (100 - 100)  BP: 147/82 (04 Nov 2017 08:40) (147/82 - 147/82)  BP(mean): --  RR: 16 (04 Nov 2017 08:40) (16 - 16)  SpO2: 99% (04 Nov 2017 08:40) (99% - 99%)
Vital Signs Last 24 Hrs  T(C): 36.7 (14 Nov 2017 08:30), Max: 36.7 (14 Nov 2017 08:30)  T(F): 98.1 (14 Nov 2017 08:30), Max: 98.1 (14 Nov 2017 08:30)  HR: 80 (14 Nov 2017 08:30) (80 - 80)  BP: 115/78 (14 Nov 2017 08:30) (115/78 - 115/78)  BP(mean): --  RR: 14 (14 Nov 2017 08:30) (14 - 14)  SpO2: 99% (14 Nov 2017 08:30) (99% - 99%)
Vital Signs Last 24 Hrs  T(C): 37.1 (09 Nov 2017 08:30), Max: 37.1 (09 Nov 2017 08:30)  T(F): 98.7 (09 Nov 2017 08:30), Max: 98.7 (09 Nov 2017 08:30)  HR: 85 (09 Nov 2017 08:30) (85 - 85)  BP: 130/87 (09 Nov 2017 08:30) (130/87 - 130/87)  BP(mean): --  RR: 16 (09 Nov 2017 08:30) (16 - 16)  SpO2: 98% (09 Nov 2017 08:30) (98% - 98%)
Vital Signs Last 24 Hrs  T(C): 37.1 (13 Nov 2017 08:11), Max: 37.1 (13 Nov 2017 08:11)  T(F): 98.7 (13 Nov 2017 08:11), Max: 98.7 (13 Nov 2017 08:11)  HR: 87 (13 Nov 2017 08:11) (87 - 87)  BP: 127/81 (13 Nov 2017 08:11) (127/81 - 127/81)  BP(mean): --  RR: 12 (13 Nov 2017 08:11) (12 - 12)  SpO2: 99% (13 Nov 2017 08:11) (99% - 99%)
Vital Signs Last 24 Hrs  T(C): 36.9 (07 Nov 2017 08:22), Max: 36.9 (07 Nov 2017 08:22)  T(F): 98.5 (07 Nov 2017 08:22), Max: 98.5 (07 Nov 2017 08:22)  HR: 91 (07 Nov 2017 08:22) (91 - 91)  BP: 134/73 (07 Nov 2017 08:22) (134/73 - 134/73)  BP(mean): --  RR: 16 (07 Nov 2017 08:22) (16 - 16)  SpO2: 100% (07 Nov 2017 08:22) (100% - 100%)
Vital Signs Last 24 Hrs  T(C): 36.9 (08 Nov 2017 08:18), Max: 36.9 (08 Nov 2017 08:18)  T(F): 98.5 (08 Nov 2017 08:18), Max: 98.5 (08 Nov 2017 08:18)  HR: 86 (08 Nov 2017 08:18) (86 - 86)  BP: 131/90 (08 Nov 2017 08:18) (131/90 - 131/90)  BP(mean): --  RR: 12 (08 Nov 2017 08:18) (12 - 12)  SpO2: 99% (08 Nov 2017 08:18) (99% - 99%)
Vital Signs Last 24 Hrs  T(C): 36.6 (31 Oct 2017 07:30), Max: 36.6 (31 Oct 2017 07:30)  T(F): 97.9 (31 Oct 2017 07:30), Max: 97.9 (31 Oct 2017 07:30)  HR: 93 (31 Oct 2017 07:30) (93 - 93)  BP: 141/69 (31 Oct 2017 07:30) (141/69 - 141/69)  BP(mean): --  RR: 16 (31 Oct 2017 07:30) (16 - 16)  SpO2: 100% (31 Oct 2017 07:30) (100% - 100%)
ICU Vital Signs Last 24 Hrs  T(C): 36.6 (03 Nov 2017 08:35), Max: 36.6 (03 Nov 2017 08:35)  T(F): 97.9 (03 Nov 2017 08:35), Max: 97.9 (03 Nov 2017 08:35)  HR: 101 (03 Nov 2017 08:35) (101 - 101)  BP: 134/86 (03 Nov 2017 08:35) (134/86 - 134/86)  BP(mean): --  ABP: --  ABP(mean): --  RR: 16 (03 Nov 2017 08:35) (16 - 16)  SpO2: 99% (03 Nov 2017 08:35) (99% - 99%)
Vital Signs Last 24 Hrs  T(C): 37 (05 Nov 2017 08:05), Max: 37 (05 Nov 2017 08:05)  T(F): 98.6 (05 Nov 2017 08:05), Max: 98.6 (05 Nov 2017 08:05)  HR: 112 (05 Nov 2017 08:05) (112 - 112)  BP: 100/48 (05 Nov 2017 08:05) (100/48 - 100/48)  BP(mean): --  RR: 16 (05 Nov 2017 08:05) (16 - 16)  SpO2: 99% (05 Nov 2017 08:05) (99% - 99%)
Vital Signs Last 24 Hrs  T(C): 37.1 (30 Oct 2017 07:43), Max: 37.1 (30 Oct 2017 07:43)  T(F): 98.7 (30 Oct 2017 07:43), Max: 98.7 (30 Oct 2017 07:43)  HR: 83 (30 Oct 2017 07:43) (83 - 83)  BP: 136/88 (30 Oct 2017 07:43) (136/88 - 136/88)  BP(mean): --  RR: 16 (30 Oct 2017 07:43) (16 - 16)  SpO2: 98% (30 Oct 2017 07:43) (98% - 98%)
Vital Signs Last 24 Hrs  T(C): 36.9 (01 Nov 2017 07:24), Max: 36.9 (01 Nov 2017 07:24)  T(F): 98.5 (01 Nov 2017 07:24), Max: 98.5 (01 Nov 2017 07:24)  HR: 84 (01 Nov 2017 07:24) (84 - 84)  BP: 152/84 (01 Nov 2017 07:24) (152/84 - 152/84)  BP(mean): --  RR: 16 (01 Nov 2017 07:24) (16 - 16)  SpO2: 100% (01 Nov 2017 07:24) (100% - 100%)
Vital Signs Last 24 Hrs  T(C): 36.7 (10-29-17 @ 08:31), Max: 36.9 (10-29-17 @ 05:45)  T(F): 98.1 (10-29-17 @ 08:31), Max: 98.5 (10-29-17 @ 05:45)  HR: 81 (10-29-17 @ 08:31) (71 - 81)  BP: 142/83 (10-29-17 @ 08:31) (114/71 - 142/83)  BP(mean): --  RR: 16 (10-29-17 @ 08:31) (16 - 18)  SpO2: 100% (10-29-17 @ 08:31) (99% - 100%)

## 2017-11-14 NOTE — PROGRESS NOTE BEHAVIORAL HEALTH - NS ED [facility] MSE GENERAL APPEARANCE
Well developed

## 2017-11-14 NOTE — PROGRESS NOTE BEHAVIORAL HEALTH - NSBHADMITDANGERSELF_PSY_A_CORE
suicidal ideation with plan and means

## 2017-11-16 DIAGNOSIS — M32.9 SYSTEMIC LUPUS ERYTHEMATOSUS, UNSPECIFIED: ICD-10-CM

## 2017-11-16 DIAGNOSIS — E11.9 TYPE 2 DIABETES MELLITUS WITHOUT COMPLICATIONS: ICD-10-CM

## 2017-11-16 DIAGNOSIS — M79.7 FIBROMYALGIA: ICD-10-CM

## 2017-11-16 DIAGNOSIS — E06.3 AUTOIMMUNE THYROIDITIS: ICD-10-CM

## 2017-11-16 DIAGNOSIS — F31.9 BIPOLAR DISORDER, UNSPECIFIED: ICD-10-CM

## 2017-11-16 DIAGNOSIS — F60.3 BORDERLINE PERSONALITY DISORDER: ICD-10-CM

## 2017-11-16 DIAGNOSIS — G47.00 INSOMNIA, UNSPECIFIED: ICD-10-CM

## 2017-12-14 ENCOUNTER — TRANSCRIPTION ENCOUNTER (OUTPATIENT)
Age: 47
End: 2017-12-14

## 2017-12-17 ENCOUNTER — TRANSCRIPTION ENCOUNTER (OUTPATIENT)
Age: 47
End: 2017-12-17

## 2017-12-22 ENCOUNTER — TRANSCRIPTION ENCOUNTER (OUTPATIENT)
Age: 47
End: 2017-12-22

## 2017-12-26 ENCOUNTER — TRANSCRIPTION ENCOUNTER (OUTPATIENT)
Age: 47
End: 2017-12-26

## 2018-02-01 ENCOUNTER — APPOINTMENT (OUTPATIENT)
Dept: PSYCHIATRY | Facility: CLINIC | Age: 48
End: 2018-02-01
Payer: MEDICAID

## 2018-02-01 DIAGNOSIS — Z86.39 PERSONAL HISTORY OF OTHER ENDOCRINE, NUTRITIONAL AND METABOLIC DISEASE: ICD-10-CM

## 2018-02-01 DIAGNOSIS — Z87.39 PERSONAL HISTORY OF OTHER DISEASES OF THE MUSCULOSKELETAL SYSTEM AND CONNECTIVE TISSUE: ICD-10-CM

## 2018-02-01 PROCEDURE — 99205 OFFICE O/P NEW HI 60 MIN: CPT

## 2018-02-01 PROCEDURE — 99203 OFFICE O/P NEW LOW 30 MIN: CPT

## 2018-02-01 RX ORDER — ARIPIPRAZOLE 15 MG/1
15 TABLET ORAL
Qty: 14 | Refills: 0 | Status: DISCONTINUED | COMMUNITY
Start: 2017-11-14

## 2018-02-01 RX ORDER — CITALOPRAM HYDROBROMIDE 20 MG/1
20 TABLET, FILM COATED ORAL
Qty: 30 | Refills: 0 | Status: DISCONTINUED | COMMUNITY
Start: 2017-09-02

## 2018-02-01 RX ORDER — VENLAFAXINE HYDROCHLORIDE 37.5 MG/1
37.5 CAPSULE, EXTENDED RELEASE ORAL
Qty: 30 | Refills: 0 | Status: DISCONTINUED | COMMUNITY
Start: 2017-08-25

## 2018-02-01 RX ORDER — NAPROXEN 375 MG/1
375 TABLET ORAL
Qty: 60 | Refills: 0 | Status: DISCONTINUED | COMMUNITY
Start: 2017-09-02

## 2018-02-01 RX ORDER — ARIPIPRAZOLE 10 MG/1
10 TABLET ORAL
Qty: 30 | Refills: 0 | Status: DISCONTINUED | COMMUNITY
Start: 2017-09-12

## 2018-02-01 RX ORDER — ARIPIPRAZOLE 30 MG/1
30 TABLET ORAL
Qty: 14 | Refills: 0 | Status: DISCONTINUED | COMMUNITY
Start: 2017-10-12

## 2018-02-01 RX ORDER — LITHIUM CARBONATE 450 MG/1
450 TABLET ORAL
Qty: 60 | Refills: 0 | Status: DISCONTINUED | COMMUNITY
Start: 2017-09-02

## 2018-02-01 RX ORDER — METHYLPREDNISOLONE 4 MG/1
4 TABLET ORAL
Qty: 21 | Refills: 0 | Status: DISCONTINUED | COMMUNITY
Start: 2017-12-12

## 2018-02-01 RX ORDER — HYDROXYCHLOROQUINE SULFATE 200 MG/1
200 TABLET, FILM COATED ORAL
Qty: 30 | Refills: 0 | Status: DISCONTINUED | COMMUNITY
Start: 2017-04-21

## 2018-02-01 RX ORDER — FLUVOXAMINE MALEATE 25 MG/1
25 TABLET, FILM COATED ORAL
Qty: 7 | Refills: 0 | Status: DISCONTINUED | COMMUNITY
Start: 2017-10-23

## 2018-02-01 RX ORDER — LEVOTHYROXINE SODIUM 0.07 MG/1
75 TABLET ORAL
Qty: 30 | Refills: 0 | Status: DISCONTINUED | COMMUNITY
Start: 2017-05-09

## 2018-02-01 RX ORDER — VENLAFAXINE HYDROCHLORIDE 75 MG/1
75 CAPSULE, EXTENDED RELEASE ORAL
Qty: 30 | Refills: 0 | Status: DISCONTINUED | COMMUNITY
Start: 2017-08-25

## 2018-02-01 RX ORDER — ARIPIPRAZOLE 20 MG/1
20 TABLET ORAL
Qty: 30 | Refills: 0 | Status: DISCONTINUED | COMMUNITY
Start: 2017-11-29

## 2018-02-01 RX ORDER — GABAPENTIN 300 MG/1
300 CAPSULE ORAL
Qty: 84 | Refills: 0 | Status: DISCONTINUED | COMMUNITY
Start: 2017-11-14

## 2018-02-01 RX ORDER — AZITHROMYCIN 250 MG/1
250 TABLET, FILM COATED ORAL
Qty: 6 | Refills: 0 | Status: DISCONTINUED | COMMUNITY
Start: 2017-10-19

## 2018-02-01 RX ORDER — SUMATRIPTAN 100 MG/1
100 TABLET, FILM COATED ORAL
Qty: 9 | Refills: 0 | Status: DISCONTINUED | COMMUNITY
Start: 2017-02-21

## 2018-02-01 RX ORDER — POLYMYXIN B SULFATE AND TRIMETHOPRIM 10000; 1 [USP'U]/ML; MG/ML
10000-0.1 SOLUTION OPHTHALMIC
Qty: 10 | Refills: 0 | Status: DISCONTINUED | COMMUNITY
Start: 2017-12-16

## 2018-02-01 RX ORDER — ARIPIPRAZOLE 5 MG/1
5 TABLET ORAL
Qty: 30 | Refills: 0 | Status: DISCONTINUED | COMMUNITY
Start: 2017-11-15

## 2018-02-01 RX ORDER — ETONOGESTREL AND ETHINYL ESTRADIOL .12; .015 MG/D; MG/D
0.12-0.015 INSERT, EXTENDED RELEASE VAGINAL
Qty: 1 | Refills: 0 | Status: DISCONTINUED | COMMUNITY
Start: 2017-12-01

## 2018-02-01 RX ORDER — CYCLOBENZAPRINE HYDROCHLORIDE 10 MG/1
10 TABLET, FILM COATED ORAL
Qty: 90 | Refills: 0 | Status: DISCONTINUED | COMMUNITY
Start: 2017-06-19

## 2018-02-01 RX ORDER — RISPERIDONE 1 MG/1
1 TABLET, FILM COATED ORAL
Qty: 30 | Refills: 0 | Status: DISCONTINUED | COMMUNITY
Start: 2017-09-11

## 2018-02-01 RX ORDER — CLONAZEPAM 0.5 MG/1
0.5 TABLET ORAL
Qty: 28 | Refills: 0 | Status: DISCONTINUED | COMMUNITY
Start: 2017-10-12

## 2018-02-01 RX ORDER — LEVOTHYROXINE SODIUM 0.07 MG/1
75 TABLET ORAL
Refills: 0 | Status: ACTIVE | COMMUNITY

## 2018-02-01 RX ORDER — FLUVOXAMINE MALEATE 100 MG/1
100 TABLET, FILM COATED ORAL
Qty: 60 | Refills: 0 | Status: DISCONTINUED | COMMUNITY
Start: 2017-07-06

## 2018-02-01 RX ORDER — AZITHROMYCIN 500 MG/1
500 TABLET, FILM COATED ORAL
Qty: 3 | Refills: 0 | Status: DISCONTINUED | COMMUNITY
Start: 2017-12-06

## 2018-02-01 RX ORDER — BENZONATATE 100 MG/1
100 CAPSULE ORAL
Qty: 30 | Refills: 0 | Status: DISCONTINUED | COMMUNITY
Start: 2017-12-12

## 2018-02-01 RX ORDER — ALBUTEROL SULFATE 90 UG/1
108 (90 BASE) AEROSOL, METERED RESPIRATORY (INHALATION)
Qty: 9 | Refills: 0 | Status: DISCONTINUED | COMMUNITY
Start: 2017-12-12

## 2018-02-01 RX ORDER — FLUTICASONE PROPIONATE 50 UG/1
50 SPRAY, METERED NASAL
Qty: 16 | Refills: 0 | Status: DISCONTINUED | COMMUNITY
Start: 2017-12-06

## 2018-02-01 RX ORDER — MESALAMINE 375 MG/1
0.38 CAPSULE, EXTENDED RELEASE ORAL
Qty: 120 | Refills: 0 | Status: DISCONTINUED | COMMUNITY
Start: 2017-06-15

## 2018-02-01 RX ORDER — BENZTROPINE MESYLATE 1 MG/1
1 TABLET ORAL
Qty: 30 | Refills: 0 | Status: DISCONTINUED | COMMUNITY
Start: 2017-09-11

## 2018-02-01 RX ORDER — MYCOPHENOLATE MOFETIL 500 MG/1
500 TABLET ORAL
Qty: 120 | Refills: 0 | Status: DISCONTINUED | COMMUNITY
Start: 2017-04-21

## 2018-02-01 RX ORDER — PROPRANOLOL HYDROCHLORIDE 10 MG/1
10 TABLET ORAL
Qty: 60 | Refills: 0 | Status: DISCONTINUED | COMMUNITY
Start: 2017-11-16

## 2018-02-15 ENCOUNTER — APPOINTMENT (OUTPATIENT)
Dept: PSYCHIATRY | Facility: CLINIC | Age: 48
End: 2018-02-15
Payer: MEDICAID

## 2018-02-15 PROCEDURE — 99214 OFFICE O/P EST MOD 30 MIN: CPT

## 2018-02-15 RX ORDER — FLUVOXAMINE MALEATE 100 MG/1
100 TABLET, FILM COATED ORAL
Qty: 30 | Refills: 0 | Status: DISCONTINUED | COMMUNITY
Start: 2018-02-01 | End: 2018-02-15

## 2018-03-15 ENCOUNTER — APPOINTMENT (OUTPATIENT)
Dept: PSYCHIATRY | Facility: CLINIC | Age: 48
End: 2018-03-15
Payer: MEDICAID

## 2018-03-15 PROCEDURE — 99214 OFFICE O/P EST MOD 30 MIN: CPT

## 2018-03-15 RX ORDER — GABAPENTIN 300 MG/1
300 CAPSULE ORAL 3 TIMES DAILY
Qty: 90 | Refills: 0 | Status: DISCONTINUED | COMMUNITY
Start: 2018-02-01 | End: 2018-03-15

## 2018-06-01 PROCEDURE — G9005: CPT

## 2018-06-01 PROCEDURE — G9001: CPT

## 2018-06-07 ENCOUNTER — APPOINTMENT (OUTPATIENT)
Dept: PSYCHIATRY | Facility: CLINIC | Age: 48
End: 2018-06-07
Payer: MEDICAID

## 2018-06-07 PROCEDURE — 99214 OFFICE O/P EST MOD 30 MIN: CPT

## 2018-07-01 ENCOUNTER — OUTPATIENT (OUTPATIENT)
Dept: OUTPATIENT SERVICES | Facility: HOSPITAL | Age: 48
LOS: 1 days | End: 2018-07-01
Payer: MEDICAID

## 2018-07-25 DIAGNOSIS — Z71.89 OTHER SPECIFIED COUNSELING: ICD-10-CM

## 2018-07-25 PROBLEM — E06.3 AUTOIMMUNE THYROIDITIS: Chronic | Status: ACTIVE | Noted: 2017-06-29

## 2018-07-25 PROBLEM — K51.911 ULCERATIVE COLITIS, UNSPECIFIED WITH RECTAL BLEEDING: Chronic | Status: ACTIVE | Noted: 2017-06-29

## 2018-07-25 PROBLEM — H35.069: Chronic | Status: ACTIVE | Noted: 2017-06-29

## 2018-07-25 PROBLEM — F25.0 SCHIZOAFFECTIVE DISORDER, BIPOLAR TYPE: Chronic | Status: ACTIVE | Noted: 2017-01-31

## 2018-08-02 ENCOUNTER — APPOINTMENT (OUTPATIENT)
Dept: PSYCHIATRY | Facility: CLINIC | Age: 48
End: 2018-08-02
Payer: MEDICAID

## 2018-08-02 PROCEDURE — 99214 OFFICE O/P EST MOD 30 MIN: CPT

## 2018-10-15 ENCOUNTER — INPATIENT (INPATIENT)
Facility: HOSPITAL | Age: 48
LOS: 6 days | Discharge: HOME | End: 2018-10-22
Attending: PSYCHIATRY & NEUROLOGY | Admitting: PSYCHIATRY & NEUROLOGY
Payer: MEDICAID

## 2018-10-15 VITALS
SYSTOLIC BLOOD PRESSURE: 128 MMHG | HEART RATE: 79 BPM | RESPIRATION RATE: 16 BRPM | DIASTOLIC BLOOD PRESSURE: 81 MMHG | TEMPERATURE: 98 F | WEIGHT: 149.91 LBS | HEIGHT: 62 IN | OXYGEN SATURATION: 100 %

## 2018-10-15 LAB
ALBUMIN SERPL ELPH-MCNC: 3.5 G/DL — SIGNIFICANT CHANGE UP (ref 3.3–5)
ALP SERPL-CCNC: 55 U/L — SIGNIFICANT CHANGE UP (ref 40–120)
ALT FLD-CCNC: 22 U/L — SIGNIFICANT CHANGE UP (ref 12–78)
AMPHET UR-MCNC: NEGATIVE — SIGNIFICANT CHANGE UP
ANION GAP SERPL CALC-SCNC: 8 MMOL/L — SIGNIFICANT CHANGE UP (ref 5–17)
APAP SERPL-MCNC: <2 UG/ML — LOW (ref 10–30)
APPEARANCE UR: CLEAR — SIGNIFICANT CHANGE UP
AST SERPL-CCNC: 22 U/L — SIGNIFICANT CHANGE UP (ref 15–37)
BACTERIA # UR AUTO: ABNORMAL
BARBITURATES UR SCN-MCNC: NEGATIVE — SIGNIFICANT CHANGE UP
BASOPHILS # BLD AUTO: 0.03 K/UL — SIGNIFICANT CHANGE UP (ref 0–0.2)
BASOPHILS NFR BLD AUTO: 0.4 % — SIGNIFICANT CHANGE UP (ref 0–2)
BENZODIAZ UR-MCNC: NEGATIVE — SIGNIFICANT CHANGE UP
BILIRUB SERPL-MCNC: 0.2 MG/DL — SIGNIFICANT CHANGE UP (ref 0.2–1.2)
BILIRUB UR-MCNC: NEGATIVE — SIGNIFICANT CHANGE UP
BUN SERPL-MCNC: 12 MG/DL — SIGNIFICANT CHANGE UP (ref 7–23)
CALCIUM SERPL-MCNC: 8.4 MG/DL — LOW (ref 8.5–10.1)
CHLORIDE SERPL-SCNC: 112 MMOL/L — HIGH (ref 96–108)
CO2 SERPL-SCNC: 25 MMOL/L — SIGNIFICANT CHANGE UP (ref 22–31)
COCAINE METAB.OTHER UR-MCNC: NEGATIVE — SIGNIFICANT CHANGE UP
COLOR SPEC: YELLOW — SIGNIFICANT CHANGE UP
CREAT SERPL-MCNC: 0.8 MG/DL — SIGNIFICANT CHANGE UP (ref 0.5–1.3)
DIFF PNL FLD: NEGATIVE — SIGNIFICANT CHANGE UP
EOSINOPHIL # BLD AUTO: 0.16 K/UL — SIGNIFICANT CHANGE UP (ref 0–0.5)
EOSINOPHIL NFR BLD AUTO: 2.3 % — SIGNIFICANT CHANGE UP (ref 0–6)
EPI CELLS # UR: ABNORMAL
ETHANOL SERPL-MCNC: <10 MG/DL — SIGNIFICANT CHANGE UP (ref 0–10)
GLUCOSE SERPL-MCNC: 106 MG/DL — HIGH (ref 70–99)
GLUCOSE UR QL: NEGATIVE MG/DL — SIGNIFICANT CHANGE UP
HCT VFR BLD CALC: 38.4 % — SIGNIFICANT CHANGE UP (ref 34.5–45)
HGB BLD-MCNC: 13.2 G/DL — SIGNIFICANT CHANGE UP (ref 11.5–15.5)
IMM GRANULOCYTES NFR BLD AUTO: 0.1 % — SIGNIFICANT CHANGE UP (ref 0–1.5)
KETONES UR-MCNC: NEGATIVE — SIGNIFICANT CHANGE UP
LEUKOCYTE ESTERASE UR-ACNC: ABNORMAL
LYMPHOCYTES # BLD AUTO: 1.81 K/UL — SIGNIFICANT CHANGE UP (ref 1–3.3)
LYMPHOCYTES # BLD AUTO: 26.1 % — SIGNIFICANT CHANGE UP (ref 13–44)
MCHC RBC-ENTMCNC: 31.4 PG — SIGNIFICANT CHANGE UP (ref 27–34)
MCHC RBC-ENTMCNC: 34.4 GM/DL — SIGNIFICANT CHANGE UP (ref 32–36)
MCV RBC AUTO: 91.2 FL — SIGNIFICANT CHANGE UP (ref 80–100)
METHADONE UR-MCNC: NEGATIVE — SIGNIFICANT CHANGE UP
MONOCYTES # BLD AUTO: 0.34 K/UL — SIGNIFICANT CHANGE UP (ref 0–0.9)
MONOCYTES NFR BLD AUTO: 4.9 % — SIGNIFICANT CHANGE UP (ref 2–14)
NEUTROPHILS # BLD AUTO: 4.59 K/UL — SIGNIFICANT CHANGE UP (ref 1.8–7.4)
NEUTROPHILS NFR BLD AUTO: 66.2 % — SIGNIFICANT CHANGE UP (ref 43–77)
NITRITE UR-MCNC: NEGATIVE — SIGNIFICANT CHANGE UP
NRBC # BLD: 0 /100 WBCS — SIGNIFICANT CHANGE UP (ref 0–0)
OPIATES UR-MCNC: NEGATIVE — SIGNIFICANT CHANGE UP
PCP SPEC-MCNC: SIGNIFICANT CHANGE UP
PCP UR-MCNC: NEGATIVE — SIGNIFICANT CHANGE UP
PH UR: 5 — SIGNIFICANT CHANGE UP (ref 5–8)
PLATELET # BLD AUTO: 292 K/UL — SIGNIFICANT CHANGE UP (ref 150–400)
POTASSIUM SERPL-MCNC: 4.1 MMOL/L — SIGNIFICANT CHANGE UP (ref 3.5–5.3)
POTASSIUM SERPL-SCNC: 4.1 MMOL/L — SIGNIFICANT CHANGE UP (ref 3.5–5.3)
PROT SERPL-MCNC: 6.7 GM/DL — SIGNIFICANT CHANGE UP (ref 6–8.3)
PROT UR-MCNC: NEGATIVE MG/DL — SIGNIFICANT CHANGE UP
RBC # BLD: 4.21 M/UL — SIGNIFICANT CHANGE UP (ref 3.8–5.2)
RBC # FLD: 13.2 % — SIGNIFICANT CHANGE UP (ref 10.3–14.5)
RBC CASTS # UR COMP ASSIST: SIGNIFICANT CHANGE UP /HPF (ref 0–4)
SALICYLATES SERPL-MCNC: <1.7 MG/DL — LOW (ref 2.8–20)
SODIUM SERPL-SCNC: 145 MMOL/L — SIGNIFICANT CHANGE UP (ref 135–145)
SP GR SPEC: 1.02 — SIGNIFICANT CHANGE UP (ref 1.01–1.02)
THC UR QL: NEGATIVE — SIGNIFICANT CHANGE UP
TSH SERPL-MCNC: 2.09 UU/ML — SIGNIFICANT CHANGE UP (ref 0.34–4.82)
UROBILINOGEN FLD QL: NEGATIVE MG/DL — SIGNIFICANT CHANGE UP
WBC # BLD: 6.94 K/UL — SIGNIFICANT CHANGE UP (ref 3.8–10.5)
WBC # FLD AUTO: 6.94 K/UL — SIGNIFICANT CHANGE UP (ref 3.8–10.5)
WBC UR QL: SIGNIFICANT CHANGE UP

## 2018-10-15 PROCEDURE — 71045 X-RAY EXAM CHEST 1 VIEW: CPT | Mod: 26

## 2018-10-15 PROCEDURE — 93010 ELECTROCARDIOGRAM REPORT: CPT

## 2018-10-15 PROCEDURE — 99285 EMERGENCY DEPT VISIT HI MDM: CPT

## 2018-10-15 RX ORDER — TRAZODONE HCL 50 MG
100 TABLET ORAL AT BEDTIME
Qty: 0 | Refills: 0 | Status: DISCONTINUED | OUTPATIENT
Start: 2018-10-15 | End: 2018-10-22

## 2018-10-15 RX ORDER — FLUVOXAMINE MALEATE 25 MG/1
50 TABLET ORAL AT BEDTIME
Qty: 0 | Refills: 0 | Status: DISCONTINUED | OUTPATIENT
Start: 2018-10-15 | End: 2018-10-22

## 2018-10-15 RX ORDER — ARIPIPRAZOLE 15 MG/1
20 TABLET ORAL AT BEDTIME
Qty: 0 | Refills: 0 | Status: DISCONTINUED | OUTPATIENT
Start: 2018-10-15 | End: 2018-10-22

## 2018-10-15 RX ORDER — LEVOTHYROXINE SODIUM 125 MCG
75 TABLET ORAL DAILY
Qty: 0 | Refills: 0 | Status: DISCONTINUED | OUTPATIENT
Start: 2018-10-16 | End: 2018-10-22

## 2018-10-15 RX ORDER — GABAPENTIN 400 MG/1
300 CAPSULE ORAL THREE TIMES A DAY
Qty: 0 | Refills: 0 | Status: DISCONTINUED | OUTPATIENT
Start: 2018-10-15 | End: 2018-10-17

## 2018-10-15 RX ADMIN — GABAPENTIN 300 MILLIGRAM(S): 400 CAPSULE ORAL at 23:26

## 2018-10-15 RX ADMIN — ARIPIPRAZOLE 20 MILLIGRAM(S): 15 TABLET ORAL at 23:24

## 2018-10-15 RX ADMIN — Medication 375 MILLIGRAM(S): at 20:30

## 2018-10-15 RX ADMIN — Medication 100 MILLIGRAM(S): at 23:24

## 2018-10-15 RX ADMIN — FLUVOXAMINE MALEATE 50 MILLIGRAM(S): 25 TABLET ORAL at 23:24

## 2018-10-15 NOTE — ED BEHAVIORAL HEALTH ASSESSMENT NOTE - SUMMARY
48 year-old  female, , living with boyfriend, history of Bipolar, history of multiple in-patient hospitalizations (last HH: 10/2017), history of suicide attempt (overdose), was self-referred for depression with auditory hallucination and suicidal ideation plan and intent and unspecific homicidal ideation.    Patient presenting distressed, tearful, anxious, depressed, with suicidal ideation/intent/plan and unspecified homicidal ideation with no plan / intent. It appears presenting symptoms strongly influenced by reactivation of PTSD. These symptoms represent a change from baseline from which the patient cannot be reasonably expected to improve with current level of care. The patient presents with risk requiring inpatient psychiatric hospitalization for safety and stabilization.

## 2018-10-15 NOTE — ED PROVIDER NOTE - OBJECTIVE STATEMENT
49 y/o f with PMHx of Hashimoto's disease, Lupus, schizophrenia, DM2, ulcerative colitis presenting to the ED c/o suicidal plan, intent, and past attempt, Homicidal thoughts. Pt plans to OD on medications. Pt states she has had "bad visions", had thoughts of stabbing  at hospital today. Hx of suicidal attempt in past. Denies fever, chills, CP, SOB, n/v, abd pain, any other acute physical c/o. Former smoker, past
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
## 2018-10-15 NOTE — ED PROVIDER NOTE - MEDICAL DECISION MAKING DETAILS
47 y/o f with PMHx of Hashimoto's disease, Lupus, schizophrenia, DM2, ulcerative colitis presenting to the ED c/o suicidal plan, intent, and past attempt, Homicidal thoughts. Given obvious HI, SI with past attempt, plan, means, and intent, medically clear for psychiatric evaluation, symptomatic treatment, admit.

## 2018-10-15 NOTE — ED ADULT NURSE NOTE - NSIMPLEMENTINTERV_GEN_ALL_ED
Implemented All Universal Safety Interventions:  [place] to call system. Call bell, personal items and telephone within reach. Instruct patient to call for assistance. Room bathroom lighting operational. Non-slip footwear when patient is off stretcher. Physically safe environment: no spills, clutter or unnecessary equipment. Stretcher in lowest position, wheels locked, appropriate side rails in place.

## 2018-10-15 NOTE — ED BEHAVIORAL HEALTH ASSESSMENT NOTE - REASON FOR REFERRAL
depression with auditory hallucination and suicidal ideation plan and intent and unspecific homicidal ideation
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
## 2018-10-15 NOTE — ED PROVIDER NOTE - NS_ ATTENDINGSCRIBEDETAILS _ED_A_ED_FT
The scribe's documentation has been prepared under my direction and personally reviewed by me in its entirety.  I confirm that the note above accurately reflects all my work, treatment, procedures, and decision making except where otherwise noted or amended by me.  Blaine Dill M.D.

## 2018-10-15 NOTE — ED BEHAVIORAL HEALTH ASSESSMENT NOTE - RISK ASSESSMENT
risk factors include   Past suicidal behavior as well as current suicidality, auditory hallucinations, homicidal ideation  protective factors include compliance with and access to treatment

## 2018-10-15 NOTE — ED BEHAVIORAL HEALTH ASSESSMENT NOTE - CURRENT MEDICATION
Abilify 20 mg daily, Luvox 50mg QHS; Naprosyn 375 mg Am and PM, Apriso 2000 mg daily; Synthroid 75 micrograms daily Abilify 20 mg daily, Luvox 50mg QHS; Naprosyn 375 mg Am and PM, Apriso 2000 mg daily; Synthroid 75 micrograms daily; Lamictal 100 mg - denies allergy to this medication

## 2018-10-15 NOTE — ED BEHAVIORAL HEALTH ASSESSMENT NOTE - DESCRIPTION
living with BF   Unemployed has 2 children 13 and 10 As per HPI Lupus, DMII, Vasculitis in eye, Rebekah

## 2018-10-15 NOTE — ED BEHAVIORAL HEALTH ASSESSMENT NOTE - DETAILS
many relatives chronic pain issues Dr. Lay self Has overdosed in the past and cut her wrists; current suicidal ideation/intent/plan to cut wrist rash: Lamictal, gained 15 lbs since started Depakote in 12/16 gained 15 lbs since started Depakote in 12/16

## 2018-10-15 NOTE — ED ADULT NURSE NOTE - OBJECTIVE STATEMENT
Pt came to ED for evaluation of SI/HI, with intent, plan. Pt is currently adherent with her meds, and states these feelings are cyclical.

## 2018-10-16 LAB — TSH SERPL-MCNC: 1.69 UU/ML — SIGNIFICANT CHANGE UP (ref 0.34–4.82)

## 2018-10-16 PROCEDURE — 99285 EMERGENCY DEPT VISIT HI MDM: CPT

## 2018-10-16 RX ORDER — INFLUENZA VIRUS VACCINE 15; 15; 15; 15 UG/.5ML; UG/.5ML; UG/.5ML; UG/.5ML
0.5 SUSPENSION INTRAMUSCULAR ONCE
Qty: 0 | Refills: 0 | Status: DISCONTINUED | OUTPATIENT
Start: 2018-10-16 | End: 2018-10-22

## 2018-10-16 RX ORDER — ACETAMINOPHEN 500 MG
975 TABLET ORAL DAILY
Qty: 0 | Refills: 0 | Status: DISCONTINUED | OUTPATIENT
Start: 2018-10-16 | End: 2018-10-22

## 2018-10-16 RX ORDER — LAMOTRIGINE 25 MG/1
100 TABLET, ORALLY DISINTEGRATING ORAL AT BEDTIME
Qty: 0 | Refills: 0 | Status: DISCONTINUED | OUTPATIENT
Start: 2018-10-16 | End: 2018-10-22

## 2018-10-16 RX ORDER — ACETAMINOPHEN 500 MG
975 TABLET ORAL ONCE
Qty: 0 | Refills: 0 | Status: COMPLETED | OUTPATIENT
Start: 2018-10-16 | End: 2018-10-16

## 2018-10-16 RX ADMIN — Medication 375 MILLIGRAM(S): at 22:10

## 2018-10-16 RX ADMIN — ARIPIPRAZOLE 20 MILLIGRAM(S): 15 TABLET ORAL at 22:11

## 2018-10-16 RX ADMIN — LAMOTRIGINE 100 MILLIGRAM(S): 25 TABLET, ORALLY DISINTEGRATING ORAL at 00:57

## 2018-10-16 RX ADMIN — Medication 375 MILLIGRAM(S): at 06:48

## 2018-10-16 RX ADMIN — Medication 975 MILLIGRAM(S): at 16:51

## 2018-10-16 RX ADMIN — Medication 375 MILLIGRAM(S): at 07:15

## 2018-10-16 RX ADMIN — GABAPENTIN 300 MILLIGRAM(S): 400 CAPSULE ORAL at 06:49

## 2018-10-16 RX ADMIN — LAMOTRIGINE 100 MILLIGRAM(S): 25 TABLET, ORALLY DISINTEGRATING ORAL at 22:09

## 2018-10-16 RX ADMIN — Medication 75 MICROGRAM(S): at 06:48

## 2018-10-16 RX ADMIN — FLUVOXAMINE MALEATE 50 MILLIGRAM(S): 25 TABLET ORAL at 23:04

## 2018-10-16 RX ADMIN — Medication 100 MILLIGRAM(S): at 22:10

## 2018-10-16 NOTE — ED ADULT NURSE REASSESSMENT NOTE - COMFORT CARE
hourly rounding completed/side rails up/repositioned
plan of care explained/CO maintained
ambulated to bathroom/plan of care explained/wait time explained/warm blanket provided

## 2018-10-16 NOTE — ED BEHAVIORAL HEALTH NOTE - BEHAVIORAL HEALTH NOTE
Chart review, pt seen,   She continues to express depressed mood and visual sensations, images that she sees.     She applied for voluntary admission.   Pending bed per CSW.

## 2018-10-16 NOTE — ED ADULT NURSE REASSESSMENT NOTE - NS ED NURSE REASSESS COMMENT FT1
patient medicated for headache will reassess. Patient maintained on 1:1 observation will continue to monitor.
Patient maintained on 1:1 observation.  Resting quietly at this time.  Patient tolerating po food and fluid.  Awaiting room assignment.  Patient calm and cooperative at this time. Will continue to monitor.

## 2018-10-16 NOTE — ED ADULT NURSE REASSESSMENT NOTE - GENERAL PATIENT STATE
comfortable appearance/cooperative
comfortable appearance/cooperative/resting/sleeping
comfortable appearance
comfortable appearance/family/SO at bedside
comfortable appearance/resting/sleeping
no change observed/family/SO at bedside/comfortable appearance/cooperative

## 2018-10-16 NOTE — PATIENT PROFILE BEHAVIORAL HEALTH - REASON FOR ADMISSION
48 year-old  female, , living with boyfriend, history of Bipolar, history of multiple in-patient hospitalizations (last HH: 10/2017), history of suicide attempt (overdose), was self-referred for depression with auditory hallucination and suicidal ideation plan and intent and unspecific homicidal ideation.

## 2018-10-17 DIAGNOSIS — F31.9 BIPOLAR DISORDER, UNSPECIFIED: ICD-10-CM

## 2018-10-17 DIAGNOSIS — F60.3 BORDERLINE PERSONALITY DISORDER: ICD-10-CM

## 2018-10-17 LAB
CHOLEST SERPL-MCNC: 207 MG/DL — HIGH (ref 10–199)
HBA1C BLD-MCNC: 5.2 % — SIGNIFICANT CHANGE UP (ref 4–5.6)
HDLC SERPL-MCNC: 70 MG/DL — SIGNIFICANT CHANGE UP
LIPID PNL WITH DIRECT LDL SERPL: 105 MG/DL — SIGNIFICANT CHANGE UP
TOTAL CHOLESTEROL/HDL RATIO MEASUREMENT: 3 RATIO — LOW (ref 3.3–7.1)
TRIGL SERPL-MCNC: 162 MG/DL — HIGH (ref 10–149)

## 2018-10-17 PROCEDURE — 99233 SBSQ HOSP IP/OBS HIGH 50: CPT

## 2018-10-17 RX ORDER — MESALAMINE 400 MG
1200 TABLET, DELAYED RELEASE (ENTERIC COATED) ORAL DAILY
Qty: 0 | Refills: 0 | Status: DISCONTINUED | OUTPATIENT
Start: 2018-10-17 | End: 2018-10-22

## 2018-10-17 RX ORDER — HYDROXYCHLOROQUINE SULFATE 200 MG
1 TABLET ORAL
Qty: 0 | Refills: 0 | COMMUNITY

## 2018-10-17 RX ORDER — GABAPENTIN 400 MG/1
300 CAPSULE ORAL THREE TIMES A DAY
Qty: 0 | Refills: 0 | Status: DISCONTINUED | OUTPATIENT
Start: 2018-10-17 | End: 2018-10-22

## 2018-10-17 RX ORDER — MYCOPHENOLATE MOFETIL 250 MG/1
2 CAPSULE ORAL
Qty: 0 | Refills: 0 | COMMUNITY

## 2018-10-17 RX ADMIN — Medication 975 MILLIGRAM(S): at 12:33

## 2018-10-17 RX ADMIN — ARIPIPRAZOLE 20 MILLIGRAM(S): 15 TABLET ORAL at 22:34

## 2018-10-17 RX ADMIN — Medication 975 MILLIGRAM(S): at 14:14

## 2018-10-17 RX ADMIN — Medication 375 MILLIGRAM(S): at 09:09

## 2018-10-17 RX ADMIN — Medication 375 MILLIGRAM(S): at 22:34

## 2018-10-17 RX ADMIN — GABAPENTIN 300 MILLIGRAM(S): 400 CAPSULE ORAL at 12:34

## 2018-10-17 RX ADMIN — Medication 75 MICROGRAM(S): at 06:24

## 2018-10-17 RX ADMIN — FLUVOXAMINE MALEATE 50 MILLIGRAM(S): 25 TABLET ORAL at 22:34

## 2018-10-17 RX ADMIN — LAMOTRIGINE 100 MILLIGRAM(S): 25 TABLET, ORALLY DISINTEGRATING ORAL at 22:33

## 2018-10-17 RX ADMIN — Medication 375 MILLIGRAM(S): at 10:44

## 2018-10-17 RX ADMIN — GABAPENTIN 300 MILLIGRAM(S): 400 CAPSULE ORAL at 01:57

## 2018-10-17 RX ADMIN — Medication 100 MILLIGRAM(S): at 22:34

## 2018-10-17 RX ADMIN — GABAPENTIN 300 MILLIGRAM(S): 400 CAPSULE ORAL at 09:10

## 2018-10-17 NOTE — BEHAVIORAL HEALTH ASSESSMENT NOTE - NSBHREFERDETAILS_PSY_A_CORE_FT
· ED referral: HPI Objective Statement: 47 y/o f with PMHx of Hashimoto's disease, Lupus, schizophrenia, DM2, ulcerative colitis presenting to the ED c/o suicidal plan, intent, and past attempt, Homicidal thoughts. Pt plans to OD on medications. Pt states she has had "bad visions", had thoughts of stabbing  at hospital today. Hx of suicidal attempt in past. Denies fever, chills, CP, SOB, n/v, abd pain, any other acute physical c/o. Former smoker, past
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
## 2018-10-17 NOTE — H&P ADULT - HISTORY OF PRESENT ILLNESS
47 y/o female with PMHx Ulcerative colitis, Retinal vasculitis in 2011, Fibromyalgia, Hashimoto's thyroiditis, Bipolar disorder, Schizoaffective disorder presented with depression and suicidal ideation with plan to OD on medication. Pt also reports that she is having evil thoughts about hurting someone without a specific plan. Has auditory hallucinations. Denies fever, chills, abd pain, N/V/D or rectal bleeding, dysuria or urinary frequency.   Per pt, lupus was ruled out, she is no longer on Cellcept and Plaquenil. Denies history of DM.

## 2018-10-17 NOTE — BEHAVIORAL HEALTH ASSESSMENT NOTE - NSBHSUICPROTECTFACT_PSY_A_CORE
Engaged in work or school/Responsibility to family and others/Positive therapeutic relationships/Supportive social network or family/High spirituality/Future oriented

## 2018-10-17 NOTE — H&P ADULT - ATTENDING COMMENTS
49 y/o F PMHx as noted above admitted to inpatient Psychiatry for further management of depression and SI.    1)Bipolar disorder  ~cont. management per Psychiatry    2)Ulcerative colitis  ~cont. Mesalamine   ~currently no active signs of acute flare    3)Hashimoto's thyroiditis  ~cont. Levothyroxine as above     4)Hyperlipidemia  ~cont. management as above

## 2018-10-17 NOTE — BEHAVIORAL HEALTH ASSESSMENT NOTE - NSBHCHARTREVIEWVS_PSY_A_CORE FT
ICU Vital Signs Last 24 Hrs  T(C): 36.6 (16 Oct 2018 19:05), Max: 36.8 (16 Oct 2018 18:52)  T(F): 97.9 (16 Oct 2018 19:05), Max: 98.2 (16 Oct 2018 18:52)  HR: 76 (16 Oct 2018 18:52) (76 - 76)  BP: 118/70 (16 Oct 2018 18:52) (118/70 - 118/70)  BP(mean): --  ABP: --  ABP(mean): --  RR: 16 (16 Oct 2018 19:05) (16 - 16)  SpO2: 100% (16 Oct 2018 19:05) (100% - 100%)

## 2018-10-17 NOTE — BEHAVIORAL HEALTH ASSESSMENT NOTE - NSBHADMITIPSTRENGTH_PSY_A_CORE
Motivated and ready for change/Involved in cultural/spiritual/Evangelical/community activities/Has access to housing/residential stability/Able to manage surrounding demands/opportunities/Has supportive interpersonal relationships with family, friends or peers/Cooperative with treatment/Able to set and pursue goals

## 2018-10-17 NOTE — H&P ADULT - PMH
delivery delivered    Hashimoto's disease    Retinal vasculitis, unspecified laterality    Schizo-affective schizophrenia    Ulcerative colitis with rectal bleeding, unspecified location

## 2018-10-17 NOTE — H&P ADULT - FAMILY HISTORY
Sibling  Still living? Unknown  Family history of seizures, Age at diagnosis: Age Unknown  Family history of colitis, Age at diagnosis: Age Unknown

## 2018-10-17 NOTE — BEHAVIORAL HEALTH ASSESSMENT NOTE - NSBHCHARTREVIEWINVESTIGATE_PSY_A_CORE FT
Ventricular Rate 78 BPM    Atrial Rate 78 BPM    P-R Interval 140 ms    QRS Duration 72 ms    Q-T Interval 404 ms    QTC Calculation(Bezet) 460 ms    P Axis 39 degrees    R Axis 10 degrees    T Axis 32 degrees    Diagnosis Line Normal sinus rhythm  Normal ECG  When compared with ECG of 28-OCT-2017 23:57,  No significant change was found  Confirmed by DARRON VALDEZ, MONY ([id number]) on 10/16/2018 7:13:01 P

## 2018-10-17 NOTE — BEHAVIORAL HEALTH ASSESSMENT NOTE - NSBHADMITTHERAPIESTARGET_PSY_A_CORE FT
Address management of Anxiety by verbalization with staff and provider, and home therapist.  Discussing manageable coping strategies with regard PTSD symptoms.

## 2018-10-17 NOTE — H&P ADULT - ASSESSMENT
49 y/o female with PMHx Ulcerative colitis, Retinal vasculitis in 2011, Fibromyalgia, Hashimoto's thyroiditis, Bipolar disorder, Schizoaffective disorder presented with depression and suicidal ideation with plan to OD on medication.    # Bipolar disorder  -Primary management per psych    # Ulcerative colitis  -Resume Mesalamine   -Monitor for signs of flare up  -Follow up with GI outpatient    # Hashimoto's thyroiditis  -Continue with Synthroid 75 mg daily     # Hyperlipidemia  -Low fat diet    DVT ppx  Encourage ambulation

## 2018-10-17 NOTE — BEHAVIORAL HEALTH ASSESSMENT NOTE - NSBHCHARTREVIEWLAB_PSY_A_CORE FT
Lipid Profile (10.17.18 @ 07:17)    Total Cholesterol/HDL Ratio Measurement: 3.0 RATIO    Cholesterol, Serum: 207 mg/dL    Triglycerides, Serum: 162 mg/dL    HDL Cholesterol, Serum: 70: HDL Levels >/= 60 mg/dL are considered beneficial and a "negative" risk  factor.  Effective 08/15/2018: New reference range and interpretive comment. mg/dL    Direct LDL: 105: LDL Cholesterol --- Interpretive Comment (for adults 18 and over)   Thyroid Stimulating Hormone, Serum (10.16.18 @ 17:49)    Thyroid Stimulating Hormone, Serum: 1.69 uU/mL  Lipid Profile (10.17.18 @ 07:17)    Total Cholesterol/HDL Ratio Measurement: 3.0 RATIO    Cholesterol, Serum: 207 mg/dL    Triglycerides, Serum: 162 mg/dL    HDL Cholesterol, Serum: 70: HDL Levels >/= 60 mg/dL are considered beneficial and a "negative" risk  factor.  Effective 08/15/2018: New reference range and interpretive comment. mg/dL    Direct LDL: 105: LDL Cholesterol --- Interpretive Comment (for adults 18 and over)   Comprehensive Metabolic Panel (06.29.17 @ 09:44)    Sodium, Serum: 140 mmol/L    Potassium, Serum: 4.1 mmol/L    Chloride, Serum: 111 mmol/L    Carbon Dioxide, Serum: 22 mmol/L    Anion Gap, Serum: 7 mmol/L    Blood Urea Nitrogen, Serum: 11 mg/dL    Creatinine, Serum: 0.96 mg/dL    Glucose, Serum: 122 mg/dL    Calcium, Total Serum: 8.5 mg/dL    Protein Total, Serum: 6.8 gm/dL    Albumin, Serum: 3.8 g/dL    Bilirubin Total, Serum: 0.2 mg/dL    Alkaline Phosphatase, Serum: 40 U/L    Aspartate Aminotransferase (AST/SGOT): 20 U/L    Alanine Aminotransferase (ALT/SGPT): 19 U/L    Hemoglobin A1C, Whole Blood (10.16.18 @ 17:49)    Hemoglobin A1C, Whole Blood: 5.2: Method: Immunoassay
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
## 2018-10-18 PROBLEM — L93.0 DISCOID LUPUS ERYTHEMATOSUS: Chronic | Status: INACTIVE | Noted: 2017-10-17 | Resolved: 2018-10-17

## 2018-10-18 PROBLEM — M32.8 OTHER FORMS OF SYSTEMIC LUPUS ERYTHEMATOSUS: Chronic | Status: INACTIVE | Noted: 2017-01-31 | Resolved: 2018-10-17

## 2018-10-18 PROBLEM — E11.9 TYPE 2 DIABETES MELLITUS WITHOUT COMPLICATIONS: Chronic | Status: INACTIVE | Noted: 2017-06-29 | Resolved: 2018-10-17

## 2018-10-18 PROCEDURE — 99233 SBSQ HOSP IP/OBS HIGH 50: CPT

## 2018-10-18 RX ADMIN — GABAPENTIN 300 MILLIGRAM(S): 400 CAPSULE ORAL at 15:05

## 2018-10-18 RX ADMIN — FLUVOXAMINE MALEATE 50 MILLIGRAM(S): 25 TABLET ORAL at 21:06

## 2018-10-18 RX ADMIN — Medication 375 MILLIGRAM(S): at 22:11

## 2018-10-18 RX ADMIN — GABAPENTIN 300 MILLIGRAM(S): 400 CAPSULE ORAL at 08:34

## 2018-10-18 RX ADMIN — GABAPENTIN 300 MILLIGRAM(S): 400 CAPSULE ORAL at 21:05

## 2018-10-18 RX ADMIN — ARIPIPRAZOLE 20 MILLIGRAM(S): 15 TABLET ORAL at 21:06

## 2018-10-18 RX ADMIN — Medication 75 MICROGRAM(S): at 06:44

## 2018-10-18 RX ADMIN — Medication 375 MILLIGRAM(S): at 00:02

## 2018-10-18 RX ADMIN — GABAPENTIN 300 MILLIGRAM(S): 400 CAPSULE ORAL at 00:02

## 2018-10-18 RX ADMIN — Medication 375 MILLIGRAM(S): at 21:06

## 2018-10-18 RX ADMIN — Medication 100 MILLIGRAM(S): at 21:05

## 2018-10-18 RX ADMIN — LAMOTRIGINE 100 MILLIGRAM(S): 25 TABLET, ORALLY DISINTEGRATING ORAL at 21:05

## 2018-10-18 RX ADMIN — Medication 375 MILLIGRAM(S): at 08:38

## 2018-10-18 RX ADMIN — Medication 975 MILLIGRAM(S): at 08:48

## 2018-10-18 RX ADMIN — Medication 375 MILLIGRAM(S): at 08:48

## 2018-10-18 RX ADMIN — Medication 975 MILLIGRAM(S): at 08:36

## 2018-10-18 RX ADMIN — Medication 1200 MILLIGRAM(S): at 08:34

## 2018-10-18 NOTE — PROGRESS NOTE BEHAVIORAL HEALTH - NSBHFUPINTERVALHXFT_PSY_A_CORE
Patient seen in day room and she is calm and cooperative, stating "I would like to go home tomorrow, but im told not until Monday". The patient reports she would like to locate a new psychiatric provider, as she feels current provider was over-medicating her. Patient verbalized awareness of the need to secure an appointment with a new psychiatric provider prior to discharge. Reports improved mood with less anxiety. Patient stating she is feeling more hopeful about the future. She is compliant with prescribed medications with no side effects or adverse reactions reported. The patient reports sleep was restful overnight and appetite is good. Patient previously reported possible UTI symptoms, however currently denies urinary frequency or pain upon urination.  The patient currently denies suicidality, thoughts or plan and currently denies visual hallucinations, currently denies auditory hallucinations. Patient seen in day room and she is calm and cooperative, stating "I would like to go home tomorrow, but im told not until Monday". The patient reports she would like to locate a new psychiatric provider, as she feels current provider was over-medicating her. Patient verbalized awareness of the need to secure an appointment with a new psychiatric provider prior to discharge. Reports low mood and anxiety is less, but also stating her anxiety experienced in the past has been difficult to control. Patient reports se wants to leave her current place of employment, as she experienced increased symptoms of anxiety when at work.  She is more visible on the unit, social with peers and is attending therapy groups.  Patient stating she is feeling more hopeful about the future. She is compliant with prescribed medications with no side effects or adverse reactions reported. The patient reports sleep was restful overnight and appetite is good. Patient previously reported possible UTI symptoms, however currently denies urinary frequency or pain upon urination.  The patient currently denies suicidality, thoughts or plan and currently denies visual hallucinations, currently denies auditory hallucinations. Patient seen in day room and she is calm and cooperative, Reports depression and anxiety is less.  Patient reports se wants to leave her current place of employment, as she experienced increased symptoms of anxiety when at work.  She is more visible on the unit, social with peers and is attending therapy groups.  Patient stating she is feeling more hopeful about the future. She is compliant with prescribed medications with no side effects or adverse reactions reported. The patient reports sleep was restful overnight and appetite is good. Patient previously reported possible UTI symptoms, however currently denies urinary frequency or pain upon urination.  The patient currently denies suicidality, thoughts or plan and currently denies visual hallucinations, currently denies auditory hallucinations.

## 2018-10-18 NOTE — PROGRESS NOTE ADULT - ASSESSMENT
49 y/o female with PMHx Ulcerative colitis, Retinal vasculitis in 2011, Fibromyalgia, Hashimoto's thyroiditis, Bipolar disorder, Schizoaffective disorder presented with depression and suicidal ideation with plan to OD on medication.    # Bipolar disorder  -Primary management per psych    # Ulcerative colitis  -continue Mesalamine   -Monitor for signs of flare up  -Follow up with GI outpatient    # Hashimoto's thyroiditis  -Continue with Synthroid 75 mg daily     # Hyperlipidemia  -Low fat diet    DVT ppx  Encourage ambulation

## 2018-10-18 NOTE — PROGRESS NOTE ADULT - SUBJECTIVE AND OBJECTIVE BOX
HOSPITALIST ATTENDING PROGRESS NOTE    Chart and meds reviewed.  Patient seen and examined.    HPI: 49 y/o female with PMHx Ulcerative colitis, Retinal vasculitis in 2011, Fibromyalgia, Hashimoto's thyroiditis, Bipolar disorder, Schizoaffective disorder presented with depression and suicidal ideation with plan to OD on medication. Pt also reports that she is having evil thoughts about hurting someone without a specific plan. Has auditory hallucinations. Denies fever, chills, abd pain, N/V/D or rectal bleeding, dysuria or urinary frequency.   Per pt, lupus was ruled out, she is no longer on Cellcept and Plaquenil. Denies history of DM.     10/18/18 pt seen and examined, no complaints, no diarrhea, no n/v. f/u as outpt with .    All 10 systems reviewed and found to be negative with the exception of what has been described above.    MEDICATIONS  (STANDING):  acetaminophen   Tablet .. 975 milliGRAM(s) Oral daily  ARIPiprazole 20 milliGRAM(s) Oral at bedtime  fluvoxaMINE 50 milliGRAM(s) Oral at bedtime  gabapentin 300 milliGRAM(s) Oral three times a day  influenza   Vaccine 0.5 milliLiter(s) IntraMuscular once  lamoTRIgine 100 milliGRAM(s) Oral at bedtime  levothyroxine 75 MICROGram(s) Oral daily  mesalamine DR Capsule 1200 milliGRAM(s) Oral daily  naproxen 375 milliGRAM(s) Oral two times a day  traZODone 100 milliGRAM(s) Oral at bedtime    MEDICATIONS  (PRN):      VITALS:  T(F): 97.9 (10-18-18 @ 08:24), Max: 97.9 (10-18-18 @ 08:24)  HR: 70 (10-18-18 @ 08:24) (70 - 70)  BP: 113/77 (10-18-18 @ 08:24) (113/77 - 113/77)  RR: 12 (10-18-18 @ 08:24) (12 - 12)  SpO2: 100% (10-18-18 @ 08:24) (100% - 100%)  Wt(kg): --    I&O's Summary      CAPILLARY BLOOD GLUCOSE          PHYSICAL EXAM:    HEENT:  pupils equal and reactive, EOMI, no oropharyngeal lesions, erythema, exudates, oral thrush    NECK:   supple, no carotid bruits, no palpable lymph nodes, no thyromegaly    CV:  +S1, +S2, regular, no murmurs or rubs    RESP:   lungs clear to auscultation bilaterally, no wheezing, rales, rhonchi, good air entry bilaterally    BREAST:  not examined    GI:  abdomen soft, non-tender, non-distended, normal BS, no bruits, no abdominal masses, no palpable masses    RECTAL:  not examined    :  not examined    MSK:   normal muscle tone, no atrophy, no rigidity, no contractions    EXT:   no clubbing, no cyanosis, no edema, no calf pain, swelling or erythema    VASCULAR:  pulses equal and symmetric in the upper and lower extremities    NEURO:  AAOX3, no focal neurological deficits, follows all commands, able to move extremities spontaneously    SKIN:  no ulcers, lesions or rashes    LABS:                                                      CULTURES:

## 2018-10-19 PROCEDURE — 99233 SBSQ HOSP IP/OBS HIGH 50: CPT

## 2018-10-19 RX ADMIN — GABAPENTIN 300 MILLIGRAM(S): 400 CAPSULE ORAL at 08:54

## 2018-10-19 RX ADMIN — GABAPENTIN 300 MILLIGRAM(S): 400 CAPSULE ORAL at 13:31

## 2018-10-19 RX ADMIN — GABAPENTIN 300 MILLIGRAM(S): 400 CAPSULE ORAL at 20:16

## 2018-10-19 RX ADMIN — Medication 375 MILLIGRAM(S): at 21:36

## 2018-10-19 RX ADMIN — LAMOTRIGINE 100 MILLIGRAM(S): 25 TABLET, ORALLY DISINTEGRATING ORAL at 20:16

## 2018-10-19 RX ADMIN — Medication 100 MILLIGRAM(S): at 20:16

## 2018-10-19 RX ADMIN — ARIPIPRAZOLE 20 MILLIGRAM(S): 15 TABLET ORAL at 20:16

## 2018-10-19 RX ADMIN — Medication 975 MILLIGRAM(S): at 09:07

## 2018-10-19 RX ADMIN — Medication 375 MILLIGRAM(S): at 12:32

## 2018-10-19 RX ADMIN — Medication 75 MICROGRAM(S): at 06:28

## 2018-10-19 RX ADMIN — Medication 375 MILLIGRAM(S): at 20:17

## 2018-10-19 RX ADMIN — Medication 375 MILLIGRAM(S): at 09:07

## 2018-10-19 RX ADMIN — Medication 1200 MILLIGRAM(S): at 08:56

## 2018-10-19 RX ADMIN — Medication 975 MILLIGRAM(S): at 08:55

## 2018-10-19 RX ADMIN — FLUVOXAMINE MALEATE 50 MILLIGRAM(S): 25 TABLET ORAL at 21:37

## 2018-10-19 NOTE — PROGRESS NOTE BEHAVIORAL HEALTH - NSBHFUPINTERVALCCFT_PSY_A_CORE
10/19:overnight sleep restful, 6-8 hours/night  She is attending therapy groups, reports shared with peers about her history and what she believes she needs to do to stay well.  She is accepted by peers and is able to socialize with them during leisure periods.  Less depressed and reports less anxious and using reading as a coping skill.

## 2018-10-19 NOTE — PROGRESS NOTE BEHAVIORAL HEALTH - NSBHFUPINTERVALHXFT_PSY_A_CORE
Patient seen in day room, she reports feeling less depressed and anxious. She reports she has been utilizing reading as a coping skill. Reports overnight sleep was restful and appetite is good. The pt. is more visible on the unit and is attending therapy groups, The patient currently denies suicidality, thoughts or plan and currently denies visual hallucinations, currently denies auditory hallucinations.

## 2018-10-20 RX ADMIN — Medication 1200 MILLIGRAM(S): at 12:50

## 2018-10-20 RX ADMIN — Medication 375 MILLIGRAM(S): at 21:02

## 2018-10-20 RX ADMIN — GABAPENTIN 300 MILLIGRAM(S): 400 CAPSULE ORAL at 13:16

## 2018-10-20 RX ADMIN — Medication 375 MILLIGRAM(S): at 15:35

## 2018-10-20 RX ADMIN — FLUVOXAMINE MALEATE 50 MILLIGRAM(S): 25 TABLET ORAL at 21:04

## 2018-10-20 RX ADMIN — Medication 75 MICROGRAM(S): at 06:05

## 2018-10-20 RX ADMIN — Medication 975 MILLIGRAM(S): at 09:43

## 2018-10-20 RX ADMIN — Medication 375 MILLIGRAM(S): at 09:46

## 2018-10-20 RX ADMIN — GABAPENTIN 300 MILLIGRAM(S): 400 CAPSULE ORAL at 21:02

## 2018-10-20 RX ADMIN — Medication 375 MILLIGRAM(S): at 21:52

## 2018-10-20 RX ADMIN — ARIPIPRAZOLE 20 MILLIGRAM(S): 15 TABLET ORAL at 21:01

## 2018-10-20 RX ADMIN — Medication 975 MILLIGRAM(S): at 15:36

## 2018-10-20 RX ADMIN — GABAPENTIN 300 MILLIGRAM(S): 400 CAPSULE ORAL at 09:46

## 2018-10-20 RX ADMIN — Medication 100 MILLIGRAM(S): at 21:02

## 2018-10-20 RX ADMIN — LAMOTRIGINE 100 MILLIGRAM(S): 25 TABLET, ORALLY DISINTEGRATING ORAL at 21:02

## 2018-10-20 NOTE — PROGRESS NOTE BEHAVIORAL HEALTH - PROBLEM SELECTOR PLAN 2
Behavioral interventions   monitor

## 2018-10-20 NOTE — PROGRESS NOTE BEHAVIORAL HEALTH - NSBHFUPINTERVALHXFT_PSY_A_CORE
Ms. Ervin cooperated with this assessment, denying acute distress or concern.  She acknowledges her depressed mood and anxiety during the course of the hospitalization, expressing a belief that it is improving with her current inpatient treatment.  She is noted to be adherent with her medication regimen, denies side effects, and denies current self-harm ideation, intent, or plan.  She also denies hallucinations, manic, or other psychotic symptoms.  She denies sleep/appetite concerns at this time.  Per the other staff, no behavioral disturbances were noted overnight.

## 2018-10-21 RX ADMIN — Medication 75 MICROGRAM(S): at 06:39

## 2018-10-21 RX ADMIN — Medication 375 MILLIGRAM(S): at 09:45

## 2018-10-21 RX ADMIN — GABAPENTIN 300 MILLIGRAM(S): 400 CAPSULE ORAL at 20:54

## 2018-10-21 RX ADMIN — LAMOTRIGINE 100 MILLIGRAM(S): 25 TABLET, ORALLY DISINTEGRATING ORAL at 20:55

## 2018-10-21 RX ADMIN — Medication 975 MILLIGRAM(S): at 09:45

## 2018-10-21 RX ADMIN — ARIPIPRAZOLE 20 MILLIGRAM(S): 15 TABLET ORAL at 20:54

## 2018-10-21 RX ADMIN — Medication 1200 MILLIGRAM(S): at 09:16

## 2018-10-21 RX ADMIN — Medication 375 MILLIGRAM(S): at 20:54

## 2018-10-21 RX ADMIN — GABAPENTIN 300 MILLIGRAM(S): 400 CAPSULE ORAL at 13:15

## 2018-10-21 RX ADMIN — Medication 100 MILLIGRAM(S): at 20:55

## 2018-10-21 RX ADMIN — FLUVOXAMINE MALEATE 50 MILLIGRAM(S): 25 TABLET ORAL at 20:55

## 2018-10-21 RX ADMIN — GABAPENTIN 300 MILLIGRAM(S): 400 CAPSULE ORAL at 09:16

## 2018-10-21 RX ADMIN — Medication 375 MILLIGRAM(S): at 09:16

## 2018-10-21 RX ADMIN — Medication 375 MILLIGRAM(S): at 21:43

## 2018-10-21 RX ADMIN — Medication 975 MILLIGRAM(S): at 09:15

## 2018-10-22 VITALS
OXYGEN SATURATION: 99 % | HEART RATE: 71 BPM | RESPIRATION RATE: 16 BRPM | TEMPERATURE: 98 F | SYSTOLIC BLOOD PRESSURE: 128 MMHG | DIASTOLIC BLOOD PRESSURE: 69 MMHG

## 2018-10-22 LAB
CULTURE RESULTS: NO GROWTH — SIGNIFICANT CHANGE UP
SPECIMEN SOURCE: SIGNIFICANT CHANGE UP

## 2018-10-22 RX ORDER — GABAPENTIN 400 MG/1
1 CAPSULE ORAL
Qty: 45 | Refills: 1
Start: 2018-10-22 | End: 2022-09-22

## 2018-10-22 RX ORDER — ARIPIPRAZOLE 15 MG/1
1 TABLET ORAL
Qty: 15 | Refills: 1 | OUTPATIENT
Start: 2018-10-22 | End: 2018-11-20

## 2018-10-22 RX ORDER — ACETAMINOPHEN 500 MG
3 TABLET ORAL
Qty: 0 | Refills: 0 | COMMUNITY
Start: 2018-10-22

## 2018-10-22 RX ORDER — ARIPIPRAZOLE 15 MG/1
30 TABLET ORAL DAILY
Qty: 0 | Refills: 0 | Status: DISCONTINUED | OUTPATIENT
Start: 2018-10-22 | End: 2018-10-22

## 2018-10-22 RX ORDER — MESALAMINE 400 MG
4 TABLET, DELAYED RELEASE (ENTERIC COATED) ORAL
Qty: 0 | Refills: 0 | COMMUNITY

## 2018-10-22 RX ORDER — FLUVOXAMINE MALEATE 25 MG/1
1 TABLET ORAL
Qty: 15 | Refills: 1 | OUTPATIENT
Start: 2018-10-22 | End: 2018-11-20

## 2018-10-22 RX ORDER — MESALAMINE 400 MG
3 TABLET, DELAYED RELEASE (ENTERIC COATED) ORAL
Qty: 45 | Refills: 1 | OUTPATIENT
Start: 2018-10-22 | End: 2018-11-20

## 2018-10-22 RX ORDER — LEVOTHYROXINE SODIUM 125 MCG
1 TABLET ORAL
Qty: 15 | Refills: 1 | OUTPATIENT
Start: 2018-10-22 | End: 2018-11-20

## 2018-10-22 RX ORDER — LEVOTHYROXINE SODIUM 125 MCG
1 TABLET ORAL
Qty: 0 | Refills: 0 | COMMUNITY

## 2018-10-22 RX ORDER — INFLUENZA VIRUS VACCINE 15; 15; 15; 15 UG/.5ML; UG/.5ML; UG/.5ML; UG/.5ML
0.5 SUSPENSION INTRAMUSCULAR ONCE
Qty: 0 | Refills: 0 | Status: COMPLETED | OUTPATIENT
Start: 2018-10-22 | End: 2018-10-22

## 2018-10-22 RX ORDER — LAMOTRIGINE 25 MG/1
1 TABLET, ORALLY DISINTEGRATING ORAL
Qty: 15 | Refills: 1
Start: 2018-10-22 | End: 2022-09-22

## 2018-10-22 RX ORDER — GABAPENTIN 400 MG/1
1 CAPSULE ORAL
Qty: 45 | Refills: 1 | OUTPATIENT
Start: 2018-10-22 | End: 2018-11-20

## 2018-10-22 RX ORDER — TRAZODONE HCL 50 MG
1 TABLET ORAL
Qty: 15 | Refills: 1 | OUTPATIENT
Start: 2018-10-22 | End: 2018-11-20

## 2018-10-22 RX ORDER — LAMOTRIGINE 25 MG/1
1 TABLET, ORALLY DISINTEGRATING ORAL
Qty: 15 | Refills: 1
Start: 2018-10-22 | End: 2018-11-20

## 2018-10-22 RX ORDER — ARIPIPRAZOLE 15 MG/1
30 TABLET ORAL AT BEDTIME
Qty: 0 | Refills: 0 | Status: DISCONTINUED | OUTPATIENT
Start: 2018-10-22 | End: 2018-10-22

## 2018-10-22 RX ADMIN — Medication 375 MILLIGRAM(S): at 10:00

## 2018-10-22 RX ADMIN — Medication 975 MILLIGRAM(S): at 10:00

## 2018-10-22 RX ADMIN — Medication 75 MICROGRAM(S): at 06:36

## 2018-10-22 RX ADMIN — Medication 975 MILLIGRAM(S): at 09:00

## 2018-10-22 RX ADMIN — GABAPENTIN 300 MILLIGRAM(S): 400 CAPSULE ORAL at 12:27

## 2018-10-22 RX ADMIN — GABAPENTIN 300 MILLIGRAM(S): 400 CAPSULE ORAL at 09:00

## 2018-10-22 RX ADMIN — Medication 375 MILLIGRAM(S): at 09:01

## 2018-10-22 RX ADMIN — Medication 1200 MILLIGRAM(S): at 09:00

## 2018-10-22 RX ADMIN — INFLUENZA VIRUS VACCINE 0.5 MILLILITER(S): 15; 15; 15; 15 SUSPENSION INTRAMUSCULAR at 12:26

## 2018-10-22 NOTE — PROGRESS NOTE BEHAVIORAL HEALTH - SECONDARY DX1
Borderline personality disorder

## 2018-10-22 NOTE — PROGRESS NOTE BEHAVIORAL HEALTH - RISK ASSESSMENT
risk factors include past suicidal behavior as well as current suicidality, auditory hallucinations, insomnia.  Protective factors include compliance with and access to treatment.
risk factors include   Past suicidal behavior as well as current suicidality, auditory hallucinations, insomnia.  protective factors include compliance with and access to treatment
risk factors include   Past suicidal behavior as well as current suicidality, auditory hallucinations, insomnia.  protective factors include compliance with and access to treatment
risk factors include past suicidal behavior as well as current suicidality, auditory hallucinations, insomnia.  Protective factors include compliance with and access to treatment.

## 2018-10-22 NOTE — PROGRESS NOTE BEHAVIORAL HEALTH - NSBHCHARTREVIEWINVESTIGATE_PSY_A_CORE FT
< from: 12 Lead ECG (10.15.18 @ 14:10) >

## 2018-10-22 NOTE — DISCHARGE NOTE BEHAVIORAL HEALTH - NSBHDCADDFT_PSY_A_CORE
10-16 ApoaoehtoiY5N 5.2  10-17 Chol 207<H>  HDL 70 Trig 162<H>  QRS axis to [] ° and NSR at a rate of [] BPM. There was no atrial enlargement. There was no ventricular hypertrophy. There were no ST-T changes and all intervals were normal.
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
## 2018-10-22 NOTE — DISCHARGE NOTE BEHAVIORAL HEALTH - MEDICATION SUMMARY - MEDICATIONS TO STOP TAKING
I will STOP taking the medications listed below when I get home from the hospital:    Apriso 0.375 g oral capsule, extended release  -- 4 cap(s) by mouth once a day (in the morning) until told to discontinue by outpatient MD

## 2018-10-22 NOTE — DISCHARGE NOTE BEHAVIORAL HEALTH - MEDICATION SUMMARY - MEDICATIONS TO CHANGE
I will SWITCH the dose or number of times a day I take the medications listed below when I get home from the hospital:    fluvoxaMINE 100 mg oral tablet  -- 2 tab(s) by mouth once a day (at bedtime) until told to discontinue by outpatient MD

## 2018-10-22 NOTE — PROGRESS NOTE BEHAVIORAL HEALTH - NSBHFUPINTERVALHXFT_PSY_A_CORE
Ms. Ervin cooperated with this assessment, denying acute distress or concern.  She acknowledges her depressed mood and anxiety during the course of the hospitalization, expressing a belief that it is improving with her current inpatient treatment.  She is noted to be adherent with her medication regimen, denies side effects, and denies current self-harm ideation, intent, or plan.  She also denies hallucinations, manic, or other psychotic symptoms.  She denies sleep/appetite concerns at this time.  Per the other staff, no behavioral disturbances were noted overnight. Pt with denial of suicidal ideation or plan. Pt indicating that she is "feeling well" but then indicated that she was concerned if  she would still be doing well back in the community. Spoke with the pt about her concerns and she was feeling that she would like the Abilify  readjusted .  As the pt will be going for aftercare would adjust the Abilify to 30mg at HS as she was still with periods of interrupted sleep, and pt with periods of sometimes I still can't always tell if I'm doing well or not and then I start to panic."   Pt is wary about possibility for a relapse to anxiety.  Reviewed the use of medication and of the potential side effects of medications with her. Pt with good eye contact Alert and speech is goal directed.

## 2018-10-22 NOTE — DISCHARGE NOTE BEHAVIORAL HEALTH - NSBHDCADDR3FT_A_CORE
[address] [place] [address] [address] [place] [address]    Intake:  Thursday 10/25 at 9am.

## 2018-10-22 NOTE — DISCHARGE NOTE BEHAVIORAL HEALTH - NSBHDCCONDITIONFT_PSY_A_CORE
improved  with denial of any suicidal ideation ro plan Pt with decreased anxiety and depression .  Pt denies side effects from medication .

## 2018-10-22 NOTE — PROGRESS NOTE BEHAVIORAL HEALTH - NSBHCHARTREVIEWVS_PSY_A_CORE FT
Vital Signs Last 24 Hrs  T(C): 36.6 (18 Oct 2018 08:24), Max: 36.6 (18 Oct 2018 08:24)  T(F): 97.9 (18 Oct 2018 08:24), Max: 97.9 (18 Oct 2018 08:24)  HR: 70 (18 Oct 2018 08:24) (70 - 70)  BP: 113/77 (18 Oct 2018 08:24) (113/77 - 113/77)  BP(mean): --  RR: 12 (18 Oct 2018 08:24) (12 - 12)  SpO2: 100% (18 Oct 2018 08:24) (100% - 100%)
Vital Signs Last 24 Hrs  T(C): 36.6 (18 Oct 2018 08:24), Max: 36.6 (18 Oct 2018 08:24)  T(F): 97.9 (18 Oct 2018 08:24), Max: 97.9 (18 Oct 2018 08:24)  HR: 70 (18 Oct 2018 08:24) (70 - 70)  BP: 113/77 (18 Oct 2018 08:24) (113/77 - 113/77)  BP(mean): --  RR: 12 (18 Oct 2018 08:24) (12 - 12)  SpO2: 100% (18 Oct 2018 08:24) (100% - 100%)
Vital Signs Last 24 Hrs  T(C): 36.5 (20 Oct 2018 08:21), Max: 36.5 (20 Oct 2018 08:21)  T(F): 97.7 (20 Oct 2018 08:21), Max: 97.7 (20 Oct 2018 08:21)  HR: 70 (20 Oct 2018 08:21) (70 - 70)  BP: 122/47 (20 Oct 2018 08:21) (122/47 - 122/47)  BP(mean): --  RR: 16 (20 Oct 2018 08:21) (16 - 16)  SpO2: 99% (20 Oct 2018 08:21) (99% - 99%)
Vital Signs Last 24 Hrs  T(C): 36.5 (20 Oct 2018 08:21), Max: 36.5 (20 Oct 2018 08:21)  T(F): 97.7 (20 Oct 2018 08:21), Max: 97.7 (20 Oct 2018 08:21)  HR: 70 (20 Oct 2018 08:21) (70 - 70)  BP: 122/47 (20 Oct 2018 08:21) (122/47 - 122/47)  BP(mean): --  RR: 16 (20 Oct 2018 08:21) (16 - 16)  SpO2: 99% (20 Oct 2018 08:21) (99% - 99%)

## 2018-10-22 NOTE — DISCHARGE NOTE BEHAVIORAL HEALTH - NSBHDCCRISISPLAN1FT_PSY_A_CORE
Tell your boyfriend, tell your psychiatrist or therapist, return to the emergency room.  You may call 911 for assistance.

## 2018-10-22 NOTE — DISCHARGE NOTE BEHAVIORAL HEALTH - NSBHDCREFEROTHERFT_PSY_A_CORE
Referral made to [facility] for Care Coordination.  Please call [phone]) to see who you are assigned to.

## 2018-10-22 NOTE — PROGRESS NOTE BEHAVIORAL HEALTH - NSBHADMITDANGERSELF_PSY_A_CORE
suicidal ideation with plan and means

## 2018-10-22 NOTE — PROGRESS NOTE BEHAVIORAL HEALTH - NSBHADMITIPOBSFT_PSY_A_CORE
no imminent risk to others
no imminent risk to others
since the pt denies any suicidal ideation or plan
no imminent risk to others

## 2018-10-22 NOTE — PROGRESS NOTE BEHAVIORAL HEALTH - NSBHCHARTREVIEWLAB_PSY_A_CORE FT
< from: 12 Lead ECG (10.15.18 @ 14:10) >    Ventricular Rate 78 BPM    Lipid Profile (10.17.18 @ 07:17)    Total Cholesterol/HDL Ratio Measurement: 3.0 RATIO    Cholesterol, Serum: 207 mg/dL    Triglycerides, Serum: 162 mg/dL    HDL Cholesterol, Serum: 70: HDL Levels >/= 60 mg/dL are considered beneficial and a "negative" risk  factor.  Effective 08/15/2018: New reference range and interpretive comment. mg/dL    Direct LDL: 105: LDL Cholesterol --- Interpretive Comment (for adults 18 and over)   Thyroid Stimulating Hormone, Serum (10.16.18 @ 17:49)    Thyroid Stimulating Hormone, Serum: 1.69 uU/mL  Lipid Profile (10.17.18 @ 07:17)    Total Cholesterol/HDL Ratio Measurement: 3.0 RATIO    Cholesterol, Serum: 207 mg/dL    Triglycerides, Serum: 162 mg/dL    HDL Cholesterol, Serum: 70: HDL Levels >/= 60 mg/dL are considered beneficial and a "negative" risk  factor.  Effective 08/15/2018: New reference range and interpretive comment. mg/dL    Direct LDL: 105: LDL Cholesterol --- Interpretive Comment (for adults 18 and over)   Comprehensive Metabolic Panel (06.29.17 @ 09:44)    Sodium, Serum: 140 mmol/L    Potassium, Serum: 4.1 mmol/L    Chloride, Serum: 111 mmol/L    Carbon Dioxide, Serum: 22 mmol/L    Anion Gap, Serum: 7 mmol/L    Blood Urea Nitrogen, Serum: 11 mg/dL    Creatinine, Serum: 0.96 mg/dL    Glucose, Serum: 122 mg/dL    Calcium, Total Serum: 8.5 mg/dL    Protein Total, Serum: 6.8 gm/dL    Albumin, Serum: 3.8 g/dL    Bilirubin Total, Serum: 0.2 mg/dL    Alkaline Phosphatase, Serum: 40 U/L    Aspartate Aminotransferase (AST/SGOT): 20 U/L    Alanine Aminotransferase (ALT/SGPT): 19 U/L    Hemoglobin A1C, Whole Blood (10.16.18 @ 17:49)    Hemoglobin A1C, Whole Blood: 5.2: Method: Immunoassay

## 2018-10-22 NOTE — DISCHARGE NOTE BEHAVIORAL HEALTH - NSBHDCCRISISPLAN2FT_PSY_A_CORE
Discuss with your boyfriend, with your psychiatrist/ therapist, return to the emergency room if necessary.  You may call 911 for assistance.

## 2018-10-22 NOTE — PROGRESS NOTE BEHAVIORAL HEALTH - PERCEPTIONS
No abnormalities
Auditory hallucinations/No abnormalities
No abnormalities
No abnormalities/Auditory hallucinations

## 2018-10-22 NOTE — PROGRESS NOTE BEHAVIORAL HEALTH - AXIS III
Lupus, Diabetes type II, Hashimoto's Disease, Vasculitis of eye

## 2018-10-22 NOTE — DISCHARGE NOTE BEHAVIORAL HEALTH - CONDITIONS AT DISCHARGE
pt less depressed and anxious, denies suicidal ideation or voices. Patient alert, oriented x3, pleasant and cooperative.  Pt denies S/H IIP currently.  Nurse and  reviewed discharge plan with patient who agrees and accepts plan.  Pt provided with a copy of discharge paperwork.  Pt appropriately dressed for discharge and is transported home by family , friend or taxi to ensure safe arrival home.

## 2018-10-22 NOTE — DISCHARGE NOTE BEHAVIORAL HEALTH - NSBHDCMEDICALFT_PSY_A_CORE
medical hx of ulcerative colitis, Lupus, Hashimoto's disease, fibromyalgia, retinal vasculitis; alcohol dependence in remission

## 2018-10-22 NOTE — DISCHARGE NOTE BEHAVIORAL HEALTH - SECONDARY DIAGNOSIS.
Borderline personality disorder Hashimoto's disease Ulcerative colitis with rectal bleeding, unspecified location

## 2018-10-22 NOTE — PROGRESS NOTE BEHAVIORAL HEALTH - OTHER
Reported as reduces relative to last assessment yesterday
Reported as reduces relative to last assessment yesterday

## 2018-10-22 NOTE — DISCHARGE NOTE BEHAVIORAL HEALTH - NSBHDCSUICSAFETYFT_PSY_A_CORE
1. Pt is aware that if the symptoms of suicidal thoughts return that the pt is to go to the ER to be evaluated and obtain external supports and if needed medication.   2. Pt able to indicate clear recognition of symptoms  of not being well such as poor sleep, increase in anxiety, feeling depressed, having thoughts of self injury, having active or passive thoughts of  death , hallucinations , bizarre  thoughts , paranoid  thoughts. that in the interim until she reconnects with her  outside therapist and MD that Dr Garrett and the treatment team can be contacted at [facility] [phone].  3. Both the pt and the family are both aware of the discharge plan and of the aftercare appts set for the pt  4. Pt is aware of the importance to tell family, their social support system and their therapist and provider if they experience any suicidal thoughts, and be willing to receive help to not act on theses thoughts.
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
## 2018-10-22 NOTE — DISCHARGE NOTE BEHAVIORAL HEALTH - NSBHDCSWCOMMENTSFT_PSY_A_CORE
Ms. Ervin was educated about the importance of remaining in outpatient mental health treatment, taking her medications as prescribed and about her discharge plan. She was advised not to stop taking any medication unless told to do so by a physician.

## 2018-10-22 NOTE — DISCHARGE NOTE BEHAVIORAL HEALTH - NSBHDCSUICFCTRMIT_PSY_A_CORE
1. pt with close relationship with family members  2. pt with family support and family was able to validate this to her during family visits.   3. pt is to inform family, social support and professional support if she is having any hallucinations or paranoid thoughts.  4  Clinical care for mental, physical , and substance abuse disorder  5  access to a variety of Clinical interventions and Support for Help seeking ,AA,NA,PHP 1. pt with close relationship with family members  2. pt with family support and family was able to validate this to her during family visits.   3. pt is to inform family, social support and professional support if she is having any hallucinations or paranoid thoughts.  4  Clinical care for mental, physical , and substance abuse disorder to continue care with Dr. Gregory Curry   5  access to a variety of Clinical interventions and Support for Help seeking ,AA,NA,PHP

## 2018-10-22 NOTE — DISCHARGE NOTE BEHAVIORAL HEALTH - NSBHDCMEDSFT_PSY_A_CORE
MEDICATIONS  (STANDING):  acetaminophen   Tablet .. 975 milliGRAM(s) Oral daily  ARIPiprazole 30 milliGRAM(s) Oral daily  fluvoxaMINE 50 milliGRAM(s) Oral at bedtime  gabapentin 300 milliGRAM(s) Oral three times a day  influenza   Vaccine 0.5 milliLiter(s) IntraMuscular once  lamoTRIgine 100 milliGRAM(s) Oral at bedtime  levothyroxine 75 MICROGram(s) Oral daily  mesalamine DR Capsule 1200 milliGRAM(s) Oral daily  naproxen 375 milliGRAM(s) Oral two times a day  traZODone 100 milliGRAM(s) Oral at bedtime MEDICATIONS  (STANDING):  acetaminophen   Tablet .. 975 milliGRAM(s) Oral daily  ARIPiprazole 30 milliGRAM(s) Oral daily  fluvoxaMINE 50 milliGRAM(s) Oral at bedtime  gabapentin 300 milliGRAM(s) Oral three times a day  influenza   Vaccine 0.5 milliLiter(s) IntraMuscular once  lamoTRIgine 100 milliGRAM(s) Oral at bedtime  levothyroxine 75 MICROGram(s) Oral daily  mesalamine DR Capsule 1200 milliGRAM(s) Oral daily  naproxen 375 milliGRAM(s) Oral two times a day  traZODone 100 milliGRAM(s) Oral at bedtime    Pt is directed to continue with all medications until directed by his MD to change or discontinued with medications. Pt with denial of any suicidal ideation or plan. Pt with denial of any hallucination. no delusions elicited

## 2018-10-22 NOTE — DISCHARGE NOTE BEHAVIORAL HEALTH - NSBHDCRESPONSEFT_PSY_A_CORE
Hx of medication trials with :Seroquel, lithium, Abilify, Risperdal, luvox, Neurontin    MEDICATIONS  (STANDING):  acetaminophen   Tablet .. 975 milliGRAM(s) Oral daily  ARIPiprazole 30 milliGRAM(s) Oral daily  fluvoxaMINE 50 milliGRAM(s) Oral at bedtime  gabapentin 300 milliGRAM(s) Oral three times a day  influenza   Vaccine 0.5 milliLiter(s) IntraMuscular once  lamoTRIgine 100 milliGRAM(s) Oral at bedtime  levothyroxine 75 MICROGram(s) Oral daily  mesalamine DR Capsule 1200 milliGRAM(s) Oral daily  naproxen 375 milliGRAM(s) Oral two times a day  traZODone 100 milliGRAM(s) Oral at bedtime    Pt is directed to continue with all medications until directed by her MD to change or discontinued with medications    Pt with euthymic mood and full range of affect that is mood congruent.  She denies any suicidal ideation or plan

## 2018-10-22 NOTE — DISCHARGE NOTE BEHAVIORAL HEALTH - PAST PSYCHIATRIC HISTORY
prior hospitalizations : 27 past inpatient [id number]/[id number]/13/17 and 10/29/2017-11/13/2017 at  5, , [facility] in 7/16 and 12/16/2017, hx of prior overdose and cutting her wrists; presents self referred with insomnia, anxiety, auditory hallucinations to cut her wrists.

## 2018-10-22 NOTE — DISCHARGE NOTE BEHAVIORAL HEALTH - MEDICATION SUMMARY - MEDICATIONS TO TAKE
I will START or STAY ON the medications listed below when I get home from the hospital:    Delzicol 400 mg oral delayed release capsule  -- 3 cap(s) by mouth once a day  -- Indication: For Ulcerative colitis with rectal bleeding, unspecified location    acetaminophen 325 mg oral tablet  -- 3 tab(s) by mouth once a day  -- Indication: For pain    naproxen 375 mg oral tablet  -- 1 tab(s) by mouth 2 times a day  -- Indication: For pain    gabapentin 300 mg oral capsule  -- 1 cap(s) by mouth 3 times a day  -- Indication: For Ulcerative colitis with rectal bleeding, unspecified location    lamoTRIgine 100 mg oral tablet  -- 1 tab(s) by mouth once a day (at bedtime)  -- Indication: For Bipolar 1 disorder    fluvoxaMINE 50 mg oral tablet  -- 1 tab(s) by mouth once a day (at bedtime)  -- Indication: For Bipolar 1 disorder    traZODone 100 mg oral tablet  -- 1 tab(s) by mouth once a day (at bedtime)  -- Indication: For insomnia    ARIPiprazole 30 mg oral tablet  -- 1 tab(s) by mouth once a day (at bedtime)  -- Indication: For Bipolar 1 disorder    levothyroxine 75 mcg (0.075 mg) oral tablet  -- 1 tab(s) by mouth once a day  -- Indication: For Hashimoto's disease

## 2018-10-22 NOTE — DISCHARGE NOTE BEHAVIORAL HEALTH - NSBHDCSUICPROTECTFT_PSY_A_CORE
1. family support of her sister   2. pt with motivation to improve herself.   3.pt with the support of professional support system of her psychotherapist and Psychiatrist  4, pt is intelligent

## 2018-10-22 NOTE — PROGRESS NOTE BEHAVIORAL HEALTH - NSBHATTESTSEENBY_PSY_A_CORE
NP with telephonic supervision from Attending Psychiatrist
attending Psychiatrist without NP/Trainee
NP with telephonic supervision from Attending Psychiatrist
attending Psychiatrist without NP/Trainee
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
## 2018-10-23 ENCOUNTER — APPOINTMENT (OUTPATIENT)
Dept: PSYCHIATRY | Facility: CLINIC | Age: 48
End: 2018-10-23
Payer: MEDICAID

## 2018-10-23 PROCEDURE — 99214 OFFICE O/P EST MOD 30 MIN: CPT

## 2018-10-30 DIAGNOSIS — F60.3 BORDERLINE PERSONALITY DISORDER: ICD-10-CM

## 2018-10-30 DIAGNOSIS — K51.90 ULCERATIVE COLITIS, UNSPECIFIED, WITHOUT COMPLICATIONS: ICD-10-CM

## 2018-10-30 DIAGNOSIS — F31.9 BIPOLAR DISORDER, UNSPECIFIED: ICD-10-CM

## 2018-10-30 DIAGNOSIS — T76.21XA ADULT SEXUAL ABUSE, SUSPECTED, INITIAL ENCOUNTER: ICD-10-CM

## 2018-10-30 DIAGNOSIS — M79.7 FIBROMYALGIA: ICD-10-CM
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
## 2018-10-30 DIAGNOSIS — R45.851 SUICIDAL IDEATIONS: ICD-10-CM

## 2018-10-30 DIAGNOSIS — Z91.5 PERSONAL HISTORY OF SELF-HARM: ICD-10-CM

## 2018-10-30 DIAGNOSIS — E06.3 AUTOIMMUNE THYROIDITIS: ICD-10-CM

## 2018-10-30 DIAGNOSIS — Z88.0 ALLERGY STATUS TO PENICILLIN: ICD-10-CM

## 2018-10-30 DIAGNOSIS — Z88.8 ALLERGY STATUS TO OTHER DRUGS, MEDICAMENTS AND BIOLOGICAL SUBSTANCES: ICD-10-CM

## 2018-10-30 DIAGNOSIS — R45.850 HOMICIDAL IDEATIONS: ICD-10-CM
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
## 2018-10-30 DIAGNOSIS — Z87.891 PERSONAL HISTORY OF NICOTINE DEPENDENCE: ICD-10-CM
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
## 2018-10-30 DIAGNOSIS — Z87.19 PERSONAL HISTORY OF OTHER DISEASES OF THE DIGESTIVE SYSTEM: ICD-10-CM

## 2018-10-30 DIAGNOSIS — R44.0 AUDITORY HALLUCINATIONS: ICD-10-CM

## 2018-10-30 DIAGNOSIS — Z91.011 ALLERGY TO MILK PRODUCTS: ICD-10-CM

## 2018-10-30 DIAGNOSIS — F25.9 SCHIZOAFFECTIVE DISORDER, UNSPECIFIED: ICD-10-CM

## 2018-10-30 DIAGNOSIS — E78.5 HYPERLIPIDEMIA, UNSPECIFIED: ICD-10-CM

## 2018-10-30 DIAGNOSIS — F10.21 ALCOHOL DEPENDENCE, IN REMISSION: ICD-10-CM

## 2018-10-30 DIAGNOSIS — F43.10 POST-TRAUMATIC STRESS DISORDER, UNSPECIFIED: ICD-10-CM

## 2018-10-30 DIAGNOSIS — Z91.018 ALLERGY TO OTHER FOODS: ICD-10-CM

## 2018-11-07 ENCOUNTER — APPOINTMENT (OUTPATIENT)
Dept: PSYCHIATRY | Facility: CLINIC | Age: 48
End: 2018-11-07
Payer: MEDICAID

## 2018-11-07 PROCEDURE — 99214 OFFICE O/P EST MOD 30 MIN: CPT

## 2018-11-28 ENCOUNTER — APPOINTMENT (OUTPATIENT)
Dept: PSYCHIATRY | Facility: CLINIC | Age: 48
End: 2018-11-28
Payer: MEDICAID

## 2018-11-28 PROCEDURE — 99214 OFFICE O/P EST MOD 30 MIN: CPT

## 2019-01-01 ENCOUNTER — OUTPATIENT (OUTPATIENT)
Dept: OUTPATIENT SERVICES | Facility: HOSPITAL | Age: 49
LOS: 1 days | End: 2019-01-01

## 2019-01-08 ENCOUNTER — INPATIENT (INPATIENT)
Facility: HOSPITAL | Age: 49
LOS: 5 days | Discharge: HOME | End: 2019-01-14
Attending: PSYCHIATRY & NEUROLOGY | Admitting: PSYCHIATRY & NEUROLOGY
Payer: MEDICAID

## 2019-01-08 VITALS
DIASTOLIC BLOOD PRESSURE: 83 MMHG | HEIGHT: 63 IN | WEIGHT: 162.92 LBS | OXYGEN SATURATION: 100 % | TEMPERATURE: 98 F | HEART RATE: 93 BPM | SYSTOLIC BLOOD PRESSURE: 135 MMHG | RESPIRATION RATE: 17 BRPM

## 2019-01-08 DIAGNOSIS — F31.4 BIPOLAR DISORDER, CURRENT EPISODE DEPRESSED, SEVERE, WITHOUT PSYCHOTIC FEATURES: ICD-10-CM

## 2019-01-08 LAB
AMPHET UR-MCNC: NEGATIVE — SIGNIFICANT CHANGE UP
ANION GAP SERPL CALC-SCNC: 5 MMOL/L — SIGNIFICANT CHANGE UP (ref 5–17)
APAP SERPL-MCNC: < 2 UG/ML (ref 10–30)
APPEARANCE UR: CLEAR — SIGNIFICANT CHANGE UP
BACTERIA # UR AUTO: ABNORMAL
BARBITURATES UR SCN-MCNC: NEGATIVE — SIGNIFICANT CHANGE UP
BASOPHILS # BLD AUTO: 0.04 K/UL — SIGNIFICANT CHANGE UP (ref 0–0.2)
BASOPHILS NFR BLD AUTO: 0.6 % — SIGNIFICANT CHANGE UP (ref 0–2)
BENZODIAZ UR-MCNC: NEGATIVE — SIGNIFICANT CHANGE UP
BILIRUB UR-MCNC: NEGATIVE — SIGNIFICANT CHANGE UP
BUN SERPL-MCNC: 23 MG/DL — SIGNIFICANT CHANGE UP (ref 7–23)
CALCIUM SERPL-MCNC: 8.5 MG/DL — SIGNIFICANT CHANGE UP (ref 8.5–10.1)
CHLORIDE SERPL-SCNC: 110 MMOL/L — HIGH (ref 96–108)
CO2 SERPL-SCNC: 27 MMOL/L — SIGNIFICANT CHANGE UP (ref 22–31)
COCAINE METAB.OTHER UR-MCNC: NEGATIVE — SIGNIFICANT CHANGE UP
COLOR SPEC: YELLOW — SIGNIFICANT CHANGE UP
CREAT SERPL-MCNC: 0.83 MG/DL — SIGNIFICANT CHANGE UP (ref 0.5–1.3)
DIFF PNL FLD: ABNORMAL
EOSINOPHIL # BLD AUTO: 0.04 K/UL — SIGNIFICANT CHANGE UP (ref 0–0.5)
EOSINOPHIL NFR BLD AUTO: 0.6 % — SIGNIFICANT CHANGE UP (ref 0–6)
EPI CELLS # UR: SIGNIFICANT CHANGE UP
ETHANOL SERPL-MCNC: <10 MG/DL — SIGNIFICANT CHANGE UP (ref 0–10)
GLUCOSE SERPL-MCNC: 107 MG/DL — HIGH (ref 70–99)
GLUCOSE UR QL: NEGATIVE MG/DL — SIGNIFICANT CHANGE UP
HCT VFR BLD CALC: 40.7 % — SIGNIFICANT CHANGE UP (ref 34.5–45)
HGB BLD-MCNC: 13.4 G/DL — SIGNIFICANT CHANGE UP (ref 11.5–15.5)
IMM GRANULOCYTES NFR BLD AUTO: 0.1 % — SIGNIFICANT CHANGE UP (ref 0–1.5)
KETONES UR-MCNC: NEGATIVE — SIGNIFICANT CHANGE UP
LEUKOCYTE ESTERASE UR-ACNC: NEGATIVE — SIGNIFICANT CHANGE UP
LYMPHOCYTES # BLD AUTO: 1.59 K/UL — SIGNIFICANT CHANGE UP (ref 1–3.3)
LYMPHOCYTES # BLD AUTO: 22 % — SIGNIFICANT CHANGE UP (ref 13–44)
MCHC RBC-ENTMCNC: 29.9 PG — SIGNIFICANT CHANGE UP (ref 27–34)
MCHC RBC-ENTMCNC: 32.9 GM/DL — SIGNIFICANT CHANGE UP (ref 32–36)
MCV RBC AUTO: 90.8 FL — SIGNIFICANT CHANGE UP (ref 80–100)
METHADONE UR-MCNC: NEGATIVE — SIGNIFICANT CHANGE UP
MONOCYTES # BLD AUTO: 0.32 K/UL — SIGNIFICANT CHANGE UP (ref 0–0.9)
MONOCYTES NFR BLD AUTO: 4.4 % — SIGNIFICANT CHANGE UP (ref 2–14)
NEUTROPHILS # BLD AUTO: 5.22 K/UL — SIGNIFICANT CHANGE UP (ref 1.8–7.4)
NEUTROPHILS NFR BLD AUTO: 72.3 % — SIGNIFICANT CHANGE UP (ref 43–77)
NITRITE UR-MCNC: NEGATIVE — SIGNIFICANT CHANGE UP
NRBC # BLD: 0 /100 WBCS — SIGNIFICANT CHANGE UP (ref 0–0)
OPIATES UR-MCNC: NEGATIVE — SIGNIFICANT CHANGE UP
PCP SPEC-MCNC: SIGNIFICANT CHANGE UP
PCP UR-MCNC: NEGATIVE — SIGNIFICANT CHANGE UP
PH UR: 5 — SIGNIFICANT CHANGE UP (ref 5–8)
PLATELET # BLD AUTO: 268 K/UL — SIGNIFICANT CHANGE UP (ref 150–400)
POTASSIUM SERPL-MCNC: 4.4 MMOL/L — SIGNIFICANT CHANGE UP (ref 3.5–5.3)
POTASSIUM SERPL-SCNC: 4.4 MMOL/L — SIGNIFICANT CHANGE UP (ref 3.5–5.3)
PROT UR-MCNC: NEGATIVE MG/DL — SIGNIFICANT CHANGE UP
RBC # BLD: 4.48 M/UL — SIGNIFICANT CHANGE UP (ref 3.8–5.2)
RBC # FLD: 12.9 % — SIGNIFICANT CHANGE UP (ref 10.3–14.5)
RBC CASTS # UR COMP ASSIST: NEGATIVE /HPF — SIGNIFICANT CHANGE UP (ref 0–4)
SALICYLATES SERPL-MCNC: <1.7 MG/DL — LOW (ref 2.8–20)
SODIUM SERPL-SCNC: 142 MMOL/L — SIGNIFICANT CHANGE UP (ref 135–145)
SP GR SPEC: 1.01 — SIGNIFICANT CHANGE UP (ref 1.01–1.02)
THC UR QL: NEGATIVE — SIGNIFICANT CHANGE UP
UROBILINOGEN FLD QL: NEGATIVE MG/DL — SIGNIFICANT CHANGE UP
WBC # BLD: 7.22 K/UL — SIGNIFICANT CHANGE UP (ref 3.8–10.5)
WBC # FLD AUTO: 7.22 K/UL — SIGNIFICANT CHANGE UP (ref 3.8–10.5)
WBC UR QL: SIGNIFICANT CHANGE UP

## 2019-01-08 PROCEDURE — 90792 PSYCH DIAG EVAL W/MED SRVCS: CPT

## 2019-01-08 PROCEDURE — 99285 EMERGENCY DEPT VISIT HI MDM: CPT | Mod: 25

## 2019-01-08 PROCEDURE — 93010 ELECTROCARDIOGRAM REPORT: CPT

## 2019-01-08 RX ORDER — GABAPENTIN 400 MG/1
100 CAPSULE ORAL ONCE
Qty: 0 | Refills: 0 | Status: COMPLETED | OUTPATIENT
Start: 2019-01-08 | End: 2019-01-08

## 2019-01-08 RX ORDER — HALOPERIDOL DECANOATE 100 MG/ML
5 INJECTION INTRAMUSCULAR EVERY 6 HOURS
Qty: 0 | Refills: 0 | Status: DISCONTINUED | OUTPATIENT
Start: 2019-01-08 | End: 2019-01-14

## 2019-01-08 RX ORDER — MESALAMINE 400 MG
1200 TABLET, DELAYED RELEASE (ENTERIC COATED) ORAL DAILY
Qty: 0 | Refills: 0 | Status: DISCONTINUED | OUTPATIENT
Start: 2019-01-08 | End: 2019-01-09

## 2019-01-08 RX ORDER — LEVOTHYROXINE SODIUM 125 MCG
75 TABLET ORAL DAILY
Qty: 0 | Refills: 0 | Status: DISCONTINUED | OUTPATIENT
Start: 2019-01-08 | End: 2019-01-14

## 2019-01-08 RX ORDER — ARIPIPRAZOLE 15 MG/1
30 TABLET ORAL DAILY
Qty: 0 | Refills: 0 | Status: DISCONTINUED | OUTPATIENT
Start: 2019-01-08 | End: 2019-01-10

## 2019-01-08 RX ORDER — LAMOTRIGINE 25 MG/1
100 TABLET, ORALLY DISINTEGRATING ORAL ONCE
Qty: 0 | Refills: 0 | Status: COMPLETED | OUTPATIENT
Start: 2019-01-08 | End: 2019-01-08

## 2019-01-08 RX ORDER — DIPHENHYDRAMINE HCL 50 MG
50 CAPSULE ORAL EVERY 6 HOURS
Qty: 0 | Refills: 0 | Status: DISCONTINUED | OUTPATIENT
Start: 2019-01-08 | End: 2019-01-14

## 2019-01-08 RX ORDER — TRAZODONE HCL 50 MG
50 TABLET ORAL ONCE
Qty: 0 | Refills: 0 | Status: COMPLETED | OUTPATIENT
Start: 2019-01-08 | End: 2019-01-08

## 2019-01-08 RX ADMIN — Medication 50 MILLIGRAM(S): at 21:53

## 2019-01-08 RX ADMIN — ARIPIPRAZOLE 30 MILLIGRAM(S): 15 TABLET ORAL at 21:53

## 2019-01-08 RX ADMIN — GABAPENTIN 100 MILLIGRAM(S): 400 CAPSULE ORAL at 21:53

## 2019-01-08 RX ADMIN — LAMOTRIGINE 100 MILLIGRAM(S): 25 TABLET, ORALLY DISINTEGRATING ORAL at 21:53

## 2019-01-08 NOTE — H&P ADULT - ASSESSMENT
49 y/o female with PMHx Ulcerative colitis, Retinal vasculitis in 2011, Fibromyalgia, Hashimoto's thyroiditis, Bipolar disorder,  presented with auditory hallucinations, depression and suicidal and homicidal ideation without plans.     # Bipolar disorder, depressed type   -Primary management per psych    # Ulcerative colitis  -Resume Mesalamine   -Monitor for signs of flare up  -Follow up with GI outpatient    # Hashimoto's thyroiditis  -Continue with Synthroid 75 mg daily     DVT ppx  Encourage ambulation 47 y/o female with PMHx Ulcerative colitis, Retinal vasculitis in 2011, Fibromyalgia, Hashimoto's thyroiditis, Bipolar disorder,  presented with auditory hallucinations, depression and suicidal and homicidal ideation without plans.     # Bipolar disorder, depressed type   -Primary management per psych    # Ulcerative colitis  -Resume Apreso   -Monitor for signs of flare up  -Follow up with GI outpatient    # Hashimoto's thyroiditis  -Continue with Synthroid 75 mg daily   -Chest TSH    DVT ppx  Encourage ambulation

## 2019-01-08 NOTE — ED BEHAVIORAL HEALTH ASSESSMENT NOTE - SUMMARY
48 year-old  female, , living with boyfriend, history of Bipolar, history of multiple in-patient hospitalizations (last HH: 10/2017), history of suicide attempt (overdose), was self-referred for depression with auditory hallucination and suicidal ideation plan and intent and unspecific homicidal ideation.  PT presents depressed with SI and HI, requesting voluntary admission.

## 2019-01-08 NOTE — ED BEHAVIORAL HEALTH ASSESSMENT NOTE - REASON FOR REFERRAL
Please sign diflucan rx   depression with auditory hallucination and suicidal ideation plan and intent and unspecific homicidal ideation

## 2019-01-08 NOTE — ED BEHAVIORAL HEALTH ASSESSMENT NOTE - CURRENT MEDICATION
Abilify 30 mg daily, Lamictal 100mg and neurontin 100mg TID Naprosyn 375 mg Am and PM, Apriso 2000 mg daily; Synthroid 75 micrograms daily;

## 2019-01-08 NOTE — ED BEHAVIORAL HEALTH ASSESSMENT NOTE - HPI (INCLUDE ILLNESS QUALITY, SEVERITY, DURATION, TIMING, CONTEXT, MODIFYING FACTORS, ASSOCIATED SIGNS AND SYMPTOMS)
48 year-old  female, , living with boyfriend, history of Bipolar, history of multiple in-patient hospitalizations (last HH: 10/2018), history of suicide attempt (overdose), presents in ER self-referred for depression with and suicidal ideation plan and intent and unspecific homicidal ideation.    Patient presenting depressed, worsening again for the past several weeks.  hopelessness, anhedonia, amotivation. Denies manic symptoms. Reports suicidal ideation/intent/plan Reports homicidal ideation, not specific. Denies planning or intending on it.  Reports medication compliance; denying changes. Requesting  in-patient hospitalization for safety and stabilization.    MH: ulcerative colitis, Lupus, Hashimoto's disease, fibromyalgia, retinal vasculitis.

## 2019-01-08 NOTE — H&P ADULT - NSHPPHYSICALEXAM_GEN_ALL_CORE
Vital Signs Last 24 Hrs  T(C): 36.5 (08 Jan 2019 11:55), Max: 36.5 (08 Jan 2019 11:55)  T(F): 97.7 (08 Jan 2019 11:55), Max: 97.7 (08 Jan 2019 11:55)  HR: 93 (08 Jan 2019 11:55) (93 - 93)  BP: 135/83 (08 Jan 2019 11:55) (135/83 - 135/83)  RR: 17 (08 Jan 2019 11:55) (17 - 17)  SpO2: 100% (08 Jan 2019 11:55) (100% - 100%)

## 2019-01-08 NOTE — H&P ADULT - HISTORY OF PRESENT ILLNESS
47 y/o female with PMHx Ulcerative colitis, Retinal vasculitis in 2011, Fibromyalgia, Hashimoto's thyroiditis, Bipolar disorder,  presented with depression and suicidal ideation without plans. Has homicidal ideations, with reports that she is having evil thoughts about hurting someone without a specific plan. Also reports auditory hallucinations, states that she heard TV talking to her.   Patient seen and examined at bedside, denies fever, chills, abd pain, nausea , vomiting, rectal bleeding or melena.

## 2019-01-08 NOTE — ED BEHAVIORAL HEALTH ASSESSMENT NOTE - DESCRIPTION
calm , cooperative Lupus, DMII, Vasculitis in eye, Rebekah living with BF   Unemployed has 2 children 13 and 10

## 2019-01-08 NOTE — H&P ADULT - NSHPSOCIALHISTORY_GEN_ALL_CORE
Divorce but lives with boyfriend. Former smoker-quit >15 yrs ago. History of alcohol abuse, sober for 8 yrs, going to AA.
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
## 2019-01-08 NOTE — ED BEHAVIORAL HEALTH ASSESSMENT NOTE - DETAILS
Has overdosed in the past and cut her wrists; current suicidal ideation/intent/plan to cut wrist gained 15 lbs since started Depakote in 12/16 many relatives chronic pain issues Dr. Lay self

## 2019-01-08 NOTE — H&P ADULT - ATTENDING COMMENTS
47 y/o F PMHx as noted above admitted to inpatient Psychiatry for further management of  auditory hallucinations, depression and SI.    1)Bipolar disorder, depressed type   ~cont. management per Psychiatry    2)Ulcerative colitis  ~cont. Mesalamine as outlined above   ~monitor for signs of flare up  ~f/u w/ GI as outpatient    3)Hashimoto's thyroiditis  ~f/u TFTs  ~cont. Levothyroxine 75 mcg po daily     4)Vte ppx  ~encourage ambulation

## 2019-01-08 NOTE — ED PROVIDER NOTE - OBJECTIVE STATEMENT
with PMHx of fibromyalgia on Gabapentin, depression on Abilify, Trazadone, schizo affective disorder presents to the ED c/o SI x few days. +HI; "sometime gets pictures in her head of hurting people." +auditory hallucinations; heard TV talking directly to her. +depression x1 week. Reports that she feels "detached." No plan, but thinking of cutting herself or driving off of the road. Has not self-harmed in "a while." Has attempted to hurt herself in the past using prescription medicine. Last admitted to  in October 2018. Denies EtOH; Sober for 8 years; going to AA. Former smoker. No illicit drugs. Willing to be admitted if need be. Home 49 y/o with PMHx of fibromyalgia on Gabapentin, depression on Abilify, Trazadone, schizo affective disorder presents to the ED c/o SI x few days. +HI; "sometime gets pictures in her head of hurting people." +auditory hallucinations; heard TV talking directly to her. +depression x1 week. Reports that she feels "detached." No plan, but thinking of cutting herself or driving off of the road. Has not self-harmed in "a while." Has attempted to hurt herself in the past using prescription medicine. Last admitted to  in October 2018. Denies EtOH; Sober for 8 years; going to AA. Former smoker. No illicit drugs. Willing to be admitted if need be.

## 2019-01-08 NOTE — PATIENT PROFILE BEHAVIORAL HEALTH - NS PRO MODE OF ARRIVAL
Patient/Family Goals    • Patient/Family Long Term Goal Progressing    • Patient/Family Short Term Goal Progressing          HEMATOLOGIC - ADULT    • Maintains hematologic stability Progressing          GASTROINTESTINAL - ADULT    • Minimal or absence of n wheelchair

## 2019-01-09 DIAGNOSIS — Z71.89 OTHER SPECIFIED COUNSELING: ICD-10-CM

## 2019-01-09 LAB — TSH SERPL-MCNC: 3.27 UU/ML — SIGNIFICANT CHANGE UP (ref 0.34–4.82)

## 2019-01-09 PROCEDURE — 99222 1ST HOSP IP/OBS MODERATE 55: CPT

## 2019-01-09 RX ORDER — TRAZODONE HCL 50 MG
150 TABLET ORAL AT BEDTIME
Qty: 0 | Refills: 0 | Status: DISCONTINUED | OUTPATIENT
Start: 2019-01-09 | End: 2019-01-14

## 2019-01-09 RX ORDER — DOCUSATE SODIUM 100 MG
100 CAPSULE ORAL DAILY
Qty: 0 | Refills: 0 | Status: DISCONTINUED | OUTPATIENT
Start: 2019-01-09 | End: 2019-01-14

## 2019-01-09 RX ORDER — ACETAMINOPHEN 500 MG
650 TABLET ORAL EVERY 6 HOURS
Qty: 0 | Refills: 0 | Status: DISCONTINUED | OUTPATIENT
Start: 2019-01-09 | End: 2019-01-14

## 2019-01-09 RX ORDER — FLUVOXAMINE MALEATE 25 MG/1
50 TABLET ORAL ONCE
Qty: 0 | Refills: 0 | Status: COMPLETED | OUTPATIENT
Start: 2019-01-09 | End: 2019-01-09

## 2019-01-09 RX ORDER — MESALAMINE 400 MG
1200 TABLET, DELAYED RELEASE (ENTERIC COATED) ORAL DAILY
Qty: 0 | Refills: 0 | Status: DISCONTINUED | OUTPATIENT
Start: 2019-01-09 | End: 2019-01-14

## 2019-01-09 RX ORDER — MAGNESIUM HYDROXIDE 400 MG/1
30 TABLET, CHEWABLE ORAL DAILY
Qty: 0 | Refills: 0 | Status: DISCONTINUED | OUTPATIENT
Start: 2019-01-09 | End: 2019-01-14

## 2019-01-09 RX ORDER — FLUVOXAMINE MALEATE 25 MG/1
50 TABLET ORAL DAILY
Qty: 0 | Refills: 0 | Status: DISCONTINUED | OUTPATIENT
Start: 2019-01-10 | End: 2019-01-10

## 2019-01-09 RX ORDER — GABAPENTIN 400 MG/1
300 CAPSULE ORAL THREE TIMES A DAY
Qty: 0 | Refills: 0 | Status: DISCONTINUED | OUTPATIENT
Start: 2019-01-09 | End: 2019-01-10

## 2019-01-09 RX ORDER — MESALAMINE 400 MG
1.5 TABLET, DELAYED RELEASE (ENTERIC COATED) ORAL DAILY
Qty: 0 | Refills: 0 | Status: DISCONTINUED | OUTPATIENT
Start: 2019-01-09 | End: 2019-01-09

## 2019-01-09 RX ADMIN — ARIPIPRAZOLE 30 MILLIGRAM(S): 15 TABLET ORAL at 09:09

## 2019-01-09 RX ADMIN — Medication 250 MILLIGRAM(S): at 12:33

## 2019-01-09 RX ADMIN — Medication 1200 MILLIGRAM(S): at 12:03

## 2019-01-09 RX ADMIN — GABAPENTIN 300 MILLIGRAM(S): 400 CAPSULE ORAL at 12:02

## 2019-01-09 RX ADMIN — FLUVOXAMINE MALEATE 50 MILLIGRAM(S): 25 TABLET ORAL at 12:03

## 2019-01-09 RX ADMIN — Medication 150 MILLIGRAM(S): at 21:03

## 2019-01-09 RX ADMIN — Medication 250 MILLIGRAM(S): at 12:03

## 2019-01-09 RX ADMIN — Medication 75 MICROGRAM(S): at 06:21

## 2019-01-09 RX ADMIN — Medication 250 MILLIGRAM(S): at 21:33

## 2019-01-09 RX ADMIN — Medication 250 MILLIGRAM(S): at 22:28

## 2019-01-09 RX ADMIN — GABAPENTIN 300 MILLIGRAM(S): 400 CAPSULE ORAL at 21:03

## 2019-01-09 NOTE — BEHAVIORAL HEALTH ASSESSMENT NOTE - NSBHCHARTREVIEWVS_PSY_A_CORE FT
Vital Signs Last 24 Hrs  T(C): 36.8 (08 Jan 2019 22:31), Max: 36.8 (08 Jan 2019 22:31)  T(F): 98.2 (08 Jan 2019 22:31), Max: 98.2 (08 Jan 2019 22:31)  HR: --  BP: --  BP(mean): --  RR: 16 (09 Jan 2019 08:25) (16 - 17)  SpO2: 100% (09 Jan 2019 08:25) (100% - 100%)

## 2019-01-09 NOTE — BEHAVIORAL HEALTH ASSESSMENT NOTE - NSBHREFEROTHER_PSY_A_CORE_FT
Pt self referred with bf via recommendation from Jeffery Najera as pt had noted vague SI  at treatment program

## 2019-01-09 NOTE — BEHAVIORAL HEALTH ASSESSMENT NOTE - NSBHCHARTREVIEWIMAGING_PSY_A_CORE FT
MEDICATIONS  (STANDING):  ARIPiprazole 30 milliGRAM(s) Oral daily  gabapentin 300 milliGRAM(s) Oral three times a day  levothyroxine 75 MICROGram(s) Oral daily  mesalamine DR Capsule 1200 milliGRAM(s) Oral daily  traZODone 150 milliGRAM(s) Oral at bedtime    MEDICATIONS  (PRN):  acetaminophen   Tablet .. 650 milliGRAM(s) Oral every 6 hours PRN Temp greater or equal to 38C (100.4F), Mild Pain (1 - 3), Moderate Pain (4 - 6)  aluminum hydroxide/magnesium hydroxide/simethicone Suspension 30 milliLiter(s) Oral every 6 hours PRN Dyspepsia  diphenhydrAMINE   Injectable 50 milliGRAM(s) IntraMuscular every 6 hours PRN Agitation  docusate sodium 100 milliGRAM(s) Oral daily PRN Constipation  haloperidol    Injectable 5 milliGRAM(s) IntraMuscular every 6 hours PRN Agitation  LORazepam   Injectable 2 milliGRAM(s) IntraMuscular every 4 hours PRN Agitation  magnesium hydroxide Suspension 30 milliLiter(s) Oral daily PRN Constipation  naproxen 250 milliGRAM(s) Oral two times a day PRN Moderate Pain (4 - 6)

## 2019-01-09 NOTE — BEHAVIORAL HEALTH ASSESSMENT NOTE - DETAILS
Has overdosed in the past and cut her wrists; current suicidal ideation/intent/plan to cut wrist gained 15 lbs since started Depakote in 12/16 many relatives chronic pain issues

## 2019-01-09 NOTE — BEHAVIORAL HEALTH ASSESSMENT NOTE - NSBHCHARTREVIEWLAB_PSY_A_CORE FT
CBC Full  -  ( 2019 12:04 )  WBC Count : 7.22 K/uL  Hemoglobin : 13.4 g/dL  Hematocrit : 40.7 %  Platelet Count - Automated : 268 K/uL  Mean Cell Volume : 90.8 fl  Mean Cell Hemoglobin : 29.9 pg  Mean Cell Hemoglobin Concentration : 32.9 gm/dL  Auto Neutrophil # : 5.22 K/uL  Auto Lymphocyte # : 1.59 K/uL  Auto Monocyte # : 0.32 K/uL  Auto Eosinophil # : 0.04 K/uL  Auto Basophil # : 0.04 K/uL  Auto Neutrophil % : 72.3 %  Auto Lymphocyte % : 22.0 %  Auto Monocyte % : 4.4 %  Auto Eosinophil % : 0.6 %  Auto Basophil % : 0.6 %        142  |  110<H>  |  23  ----------------------------<  107<H>  4.4   |  27  |  0.83    Ca    8.5      2019 12:04        Urinalysis Basic - ( 2019 12:02 )    Color: Yellow / Appearance: Clear / S.015 / pH: x  Gluc: x / Ketone: Negative  / Bili: Negative / Urobili: Negative mg/dL   Blood: x / Protein: Negative mg/dL / Nitrite: Negative   Leuk Esterase: Negative / RBC: Negative /HPF / WBC 0-2   Sq Epi: x / Non Sq Epi: Occasional / Bacteria: Occasional

## 2019-01-09 NOTE — BEHAVIORAL HEALTH ASSESSMENT NOTE - PATIENT'S CHIEF COMPLAINT
" I was having an existential crisis. I am a black or white thinker  and the stress from the holidays was too much."

## 2019-01-10 DIAGNOSIS — R30.0 DYSURIA: ICD-10-CM

## 2019-01-10 LAB
APPEARANCE UR: ABNORMAL
BACTERIA # UR AUTO: ABNORMAL
BILIRUB UR-MCNC: NEGATIVE — SIGNIFICANT CHANGE UP
CHOLEST SERPL-MCNC: 209 MG/DL — HIGH (ref 10–199)
COLOR SPEC: YELLOW — SIGNIFICANT CHANGE UP
DIFF PNL FLD: ABNORMAL
EPI CELLS # UR: ABNORMAL
GLUCOSE UR QL: NEGATIVE MG/DL — SIGNIFICANT CHANGE UP
HBA1C BLD-MCNC: 5.2 % — SIGNIFICANT CHANGE UP (ref 4–5.6)
HDLC SERPL-MCNC: 77 MG/DL — SIGNIFICANT CHANGE UP
KETONES UR-MCNC: NEGATIVE — SIGNIFICANT CHANGE UP
LEUKOCYTE ESTERASE UR-ACNC: ABNORMAL
LIPID PNL WITH DIRECT LDL SERPL: 101 MG/DL — SIGNIFICANT CHANGE UP
NITRITE UR-MCNC: NEGATIVE — SIGNIFICANT CHANGE UP
PH UR: 5 — SIGNIFICANT CHANGE UP (ref 5–8)
PROT UR-MCNC: NEGATIVE MG/DL — SIGNIFICANT CHANGE UP
RBC CASTS # UR COMP ASSIST: SIGNIFICANT CHANGE UP /HPF (ref 0–4)
SP GR SPEC: 1.02 — SIGNIFICANT CHANGE UP (ref 1.01–1.02)
TOTAL CHOLESTEROL/HDL RATIO MEASUREMENT: 2.7 RATIO — LOW (ref 3.3–7.1)
TRIGL SERPL-MCNC: 153 MG/DL — HIGH (ref 10–149)
UROBILINOGEN FLD QL: NEGATIVE MG/DL — SIGNIFICANT CHANGE UP
WBC UR QL: ABNORMAL

## 2019-01-10 PROCEDURE — 99232 SBSQ HOSP IP/OBS MODERATE 35: CPT

## 2019-01-10 RX ORDER — GABAPENTIN 400 MG/1
600 CAPSULE ORAL AT BEDTIME
Qty: 0 | Refills: 0 | Status: DISCONTINUED | OUTPATIENT
Start: 2019-01-10 | End: 2019-01-14

## 2019-01-10 RX ORDER — ARIPIPRAZOLE 15 MG/1
30 TABLET ORAL AT BEDTIME
Qty: 0 | Refills: 0 | Status: DISCONTINUED | OUTPATIENT
Start: 2019-01-10 | End: 2019-01-14

## 2019-01-10 RX ORDER — FLUVOXAMINE MALEATE 25 MG/1
50 TABLET ORAL AT BEDTIME
Qty: 0 | Refills: 0 | Status: DISCONTINUED | OUTPATIENT
Start: 2019-01-10 | End: 2019-01-14

## 2019-01-10 RX ORDER — GABAPENTIN 400 MG/1
300 CAPSULE ORAL DAILY
Qty: 0 | Refills: 0 | Status: DISCONTINUED | OUTPATIENT
Start: 2019-01-10 | End: 2019-01-14

## 2019-01-10 RX ADMIN — FLUVOXAMINE MALEATE 50 MILLIGRAM(S): 25 TABLET ORAL at 21:36

## 2019-01-10 RX ADMIN — ARIPIPRAZOLE 30 MILLIGRAM(S): 15 TABLET ORAL at 21:36

## 2019-01-10 RX ADMIN — Medication 250 MILLIGRAM(S): at 10:15

## 2019-01-10 RX ADMIN — Medication 75 MICROGRAM(S): at 05:04

## 2019-01-10 RX ADMIN — Medication 1200 MILLIGRAM(S): at 09:23

## 2019-01-10 RX ADMIN — Medication 150 MILLIGRAM(S): at 21:35

## 2019-01-10 RX ADMIN — GABAPENTIN 600 MILLIGRAM(S): 400 CAPSULE ORAL at 21:36

## 2019-01-10 RX ADMIN — GABAPENTIN 300 MILLIGRAM(S): 400 CAPSULE ORAL at 09:23

## 2019-01-10 RX ADMIN — Medication 250 MILLIGRAM(S): at 21:38

## 2019-01-10 RX ADMIN — Medication 250 MILLIGRAM(S): at 10:45

## 2019-01-10 NOTE — PROGRESS NOTE BEHAVIORAL HEALTH - NSBHCHARTREVIEWLAB_PSY_A_CORE FT
Hemoglobin A1C, Whole Blood in AM (01.10.19 @ 07:14)    Hemoglobin A1C, Whole Blood: 5.2: Method: Immunoassay      Thyroid Stimulating Hormone, Serum in AM (01.09.19 @ 07:45)    Thyroid Stimulating Hormone, Serum: 3.27 uU/mL  Urine Microscopic-Add On (NC) (01.10.19 @ 13:16)    Red Blood Cell - Urine: 0-2 /HPF    White Blood Cell - Urine: 6-10    Bacteria: Few    Epithelial Cells: Many  Lipid Profile in AM (01.10.19 @ 07:14)    Total Cholesterol/HDL Ratio Measurement: 2.7 RATIO    Cholesterol, Serum: 209 mg/dL    Triglycerides, Serum: 153 mg/dL    HDL Cholesterol, Serum: 77: HDL Levels >/= 60 mg/dL are considered beneficial and a "negative" risk  factor.  Effective 08/15/2018: New reference range and interpretive comment. mg/dL    Direct LDL: 101: LDL Cholesterol (mg/dL) --- Interpretive Comment (for adults 18 and over)

## 2019-01-10 NOTE — PROGRESS NOTE BEHAVIORAL HEALTH - NSBHFUPINTERVALHXFT_PSY_A_CORE
Pt interviewed, privately.  Admits to coming to [facility] often, but feels she is better since here and that is is ready to discharged.  Her mood is reported as "better", and denies SIIP or HIIP.  She slept well, appetite is good.  Denies AH/or psychotic symptoms and denies symptoms on markus, however upon yesterdays eval was having AH, SI and vague HI, no plan or intent.  Pt reports she has a lot of supports and attends AA with 8 yrs clean, and wants to return to [facility].  repeat u/a is pos and Pt c/o burning.  urine c/s ordered and consult for meds if needed.

## 2019-01-10 NOTE — PROGRESS NOTE BEHAVIORAL HEALTH - NSBHCHARTREVIEWINVESTIGATE_PSY_A_CORE FT
Ventricular Rate 76 BPM    Atrial Rate 76 BPM    P-R Interval 136 ms    QRS Duration 66 ms    Q-T Interval 380 ms    QTC Calculation(Bezet) 427 ms    P Axis 40 degrees    R Axis 19 degrees    T Axis 31 degrees    Diagnosis Line Normal sinus rhythm  Normal ECG  When compared with ECG of 15-OCT-2018 14:10,  No significant change was found  Confirmed by SENAIT LOPES MD ([id number]) on 1/8/2019 7:42:19 PM

## 2019-01-11 RX ADMIN — Medication 250 MILLIGRAM(S): at 21:09

## 2019-01-11 RX ADMIN — Medication 250 MILLIGRAM(S): at 21:46

## 2019-01-11 RX ADMIN — Medication 75 MICROGRAM(S): at 06:42

## 2019-01-11 RX ADMIN — Medication 250 MILLIGRAM(S): at 08:29

## 2019-01-11 RX ADMIN — Medication 650 MILLIGRAM(S): at 17:29

## 2019-01-11 RX ADMIN — Medication 650 MILLIGRAM(S): at 09:44

## 2019-01-11 RX ADMIN — FLUVOXAMINE MALEATE 50 MILLIGRAM(S): 25 TABLET ORAL at 21:09

## 2019-01-11 RX ADMIN — Medication 1200 MILLIGRAM(S): at 08:29

## 2019-01-11 RX ADMIN — GABAPENTIN 300 MILLIGRAM(S): 400 CAPSULE ORAL at 08:29

## 2019-01-11 RX ADMIN — GABAPENTIN 600 MILLIGRAM(S): 400 CAPSULE ORAL at 21:09

## 2019-01-11 RX ADMIN — ARIPIPRAZOLE 30 MILLIGRAM(S): 15 TABLET ORAL at 21:09

## 2019-01-11 RX ADMIN — Medication 150 MILLIGRAM(S): at 21:07

## 2019-01-11 NOTE — DISCHARGE NOTE BEHAVIORAL HEALTH - NSBHDCDXVALIDYESFT_PSY_A_CORE
DIAGNOSIS - DSM V:   Primary Dx Bipolar 1 disorder, depressed, severe F31.4.    Medical Diagnosis (Corresponds to DSM IV - Axis III):  · Axis III  Lupus, Diabetes type II, Hoshimotos Disease, Vasculitis of eye

## 2019-01-11 NOTE — CHART NOTE - NSCHARTNOTEFT_GEN_A_CORE
Spoke with Dr. Lay this AM who advised of intent to d/c pt on Monday if she continues to progress through the weekend. Met with pt for d/c planning. Affect brighter, pt feeling more positive. She is agreeable with recommendation to return to [facility] for continuing outpatient treatment. She signed consent , called [id number] and left message for Megan Rios and faxed clinical packet for her review. Awaiting return call re: appt.

## 2019-01-11 NOTE — PROGRESS NOTE BEHAVIORAL HEALTH - PROBLEM SELECTOR PROBLEM 6
Other forms of systemic lupus erythematosus, unspecified organ involvement status
Other forms of systemic lupus erythematosus, unspecified organ involvement status
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
## 2019-01-11 NOTE — PROGRESS NOTE BEHAVIORAL HEALTH - NSBHFUPINTERVALHXFT_PSY_A_CORE
Pt  feels she is better since admission.  Her mood is reported as "better", denies diurnal variations.  She slept well, appetite is good.  Denies having SI. Denies AH/or psychotic symptoms. She also denies having thoughts about hurting people.   MEDICATIONS  (STANDING):  ARIPiprazole 30 milliGRAM(s) Oral at bedtime  fluvoxaMINE 50 milliGRAM(s) Oral at bedtime  gabapentin 300 milliGRAM(s) Oral daily  gabapentin 600 milliGRAM(s) Oral at bedtime  levothyroxine 75 MICROGram(s) Oral daily  mesalamine DR Capsule 1200 milliGRAM(s) Oral daily  naproxen 250 milliGRAM(s) Oral two times a day  traZODone 150 milliGRAM(s) Oral at bedtime    MEDICATIONS  (PRN):  acetaminophen   Tablet .. 650 milliGRAM(s) Oral every 6 hours PRN Temp greater or equal to 38C (100.4F), Mild Pain (1 - 3), Moderate Pain (4 - 6)  aluminum hydroxide/magnesium hydroxide/simethicone Suspension 30 milliLiter(s) Oral every 6 hours PRN Dyspepsia  diphenhydrAMINE   Injectable 50 milliGRAM(s) IntraMuscular every 6 hours PRN Agitation  docusate sodium 100 milliGRAM(s) Oral daily PRN Constipation  haloperidol    Injectable 5 milliGRAM(s) IntraMuscular every 6 hours PRN Agitation  LORazepam   Injectable 2 milliGRAM(s) IntraMuscular every 4 hours PRN Agitation  magnesium hydroxide Suspension 30 milliLiter(s) Oral daily PRN Constipation

## 2019-01-11 NOTE — DISCHARGE NOTE BEHAVIORAL HEALTH - NSBHDCSUICPROTECTFT_PSY_A_CORE
Patient is intelligent, employed, good support system in the community.  Pt understands that stopping her medication will result in being readmitted to the hospital.  Pt has the support of [facility] day program and a private psychiatrist for support.

## 2019-01-11 NOTE — DISCHARGE NOTE BEHAVIORAL HEALTH - NSBHDCALCOHOLREFERFT_PSY_A_CORE
Alcohol issues will be addressed in treatment at [facility]  Appt and contact info as above.  Pt also attends AA

## 2019-01-11 NOTE — DISCHARGE NOTE BEHAVIORAL HEALTH - MEDICATION SUMMARY - MEDICATIONS TO TAKE
I will START or STAY ON the medications listed below when I get home from the hospital:    Apriso  -- 2000 milligram(s) by mouth once a day  -- Indication: For IBD    naproxen 375 mg oral tablet  -- 1 tab(s) by mouth 2 times a day  -- Indication: For Pain    gabapentin 300 mg oral capsule  -- 2 cap(s) by mouth once a day (at bedtime)  -- Indication: For Anxiety    gabapentin 300 mg oral capsule  -- 1 cap(s) by mouth once a day in the morning   -- Indication: For Anxiety    fluvoxaMINE 50 mg oral tablet  -- 1 tab(s) by mouth once a day (at bedtime)  -- Indication: For Bipolar 1 disorder, depressed, mild    traZODone 150 mg oral tablet  -- 1 tab(s) by mouth once a day (at bedtime)  -- Indication: For Insomnia    Abilify 30 mg oral tablet  -- 1 tab(s) by mouth once a day (at bedtime)   -- Indication: For Bipolar 1 disorder, depressed, mild    Colace 100 mg oral capsule  -- 1 cap(s) by mouth once a day, As needed, Constipation  -- Indication: For Constipation    levothyroxine 75 mcg (0.075 mg) oral tablet  -- 1 tab(s) by mouth once a day  -- Indication: For Hypothyroidism

## 2019-01-11 NOTE — DISCHARGE NOTE BEHAVIORAL HEALTH - NSBHDCSWCOMMENTSFT_PSY_A_CORE
Pt educated about the signs and symptoms of mental illness and need for continuing in appropriate level of psychiatric outpatient treatment, as well as the need to continue taking the above medications as prescribed until directed otherwise by her outpatient provider.

## 2019-01-11 NOTE — PROGRESS NOTE BEHAVIORAL HEALTH - PROBLEM SELECTOR PROBLEM 3
Ulcerative colitis with rectal bleeding, unspecified location
Ulcerative colitis with rectal bleeding, unspecified location

## 2019-01-11 NOTE — DISCHARGE NOTE BEHAVIORAL HEALTH - NSBHDCCRISISPLAN1FT_PSY_A_CORE
Talk to a trusted family member, friend or my therapist and ask for help  Call the Suicide Hotline at [phone]  Call 911 or go to my nearest hospital emergency room
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
## 2019-01-11 NOTE — PROGRESS NOTE BEHAVIORAL HEALTH - PROBLEM SELECTOR PLAN 7
-healing well, no retained sutures  -no signs of infection  -tolerating PO diet  -tylenol for pain PRN
-healing well, no retained sutures  -no signs of infection  -tolerating PO diet  -tylenol for pain PRN

## 2019-01-11 NOTE — DISCHARGE NOTE BEHAVIORAL HEALTH - NSBHDCCRISISPLAN2FT_PSY_A_CORE
Call Dr. Hernandez at [facility] at [phone]  Tell staff at [facility]  Call Dr. Curry at [phone]

## 2019-01-11 NOTE — DISCHARGE NOTE BEHAVIORAL HEALTH - NSBHDCMEDSFT_PSY_A_CORE
Patient educated to not stop any medication unless otherwise told to do so by outpatient provider  Trazadone 150mg  Abilify 30mg  Luvox 50mg  Neurontin 300mg daily and 600mg HS  Patient responded well to meds and stabilized

## 2019-01-11 NOTE — DISCHARGE NOTE BEHAVIORAL HEALTH - NSBHDCTHERAPYFT_PSY_A_CORE
Individual, group and medication management.  Safety planning, discharge planning and family involvement as needed.

## 2019-01-11 NOTE — DISCHARGE NOTE BEHAVIORAL HEALTH - NSBHDCSUICFCTRMIT_PSY_A_CORE
no suicidal ideation/intent/plan, no history of violence, no access to firearms, no global insomnia, positive therapeutic relationships, supportive family and social supports, willingness to seek help, no homicidal ideations intent or plans, hopefulness for future, ability to cope with stress, good insight, good judgement, frustration tolerance, engaging in discharge and safety planning, motivation to participate in outpatient treatment, no manic / psychotic symptoms, depressive symptoms in partial remission, medication compliance.

## 2019-01-11 NOTE — DISCHARGE NOTE BEHAVIORAL HEALTH - MEDICATION SUMMARY - MEDICATIONS TO CHANGE
I will SWITCH the dose or number of times a day I take the medications listed below when I get home from the hospital:    traZODone 100 mg oral tablet  -- 1 tab(s) by mouth once a day (at bedtime)    Abilify 30 mg oral tablet  -- 1 tab(s) by mouth once a day    LaMICtal 100 mg oral tablet  -- 1 milligram(s) by mouth 3 times a day (after meals)    Neurontin 100 mg oral capsule  -- 1 cap(s) by mouth 3 times a day

## 2019-01-11 NOTE — DISCHARGE NOTE BEHAVIORAL HEALTH - CONDITIONS AT DISCHARGE
Patient alert, oriented x3, pleasant and cooperative.  Pt denies S/H IIP currently.  Nurse and  reviewed discharge plan with patient who agrees and accepts plan.  Pt provided with a copy of discharge paperwork.  Pt appropriately dressed for discharge and is transported home by family , friend or taxi to ensure safe arrival home.

## 2019-01-11 NOTE — DISCHARGE NOTE BEHAVIORAL HEALTH - NSBHDCSUICSAFETYFT_PSY_A_CORE
Patient instructed to return to the ED or call 911 if patient experiences SI, HI, hopelessness, worsening of symptoms or has any other concerns. Patient understanding of discharge plan and follow-up appointment(s). Patient to be picked up by sister and sister to stay with patient. Patient to reach out to sister or other family members if experiencing worsening symptoms. Patient engaged and understanding safety plan.

## 2019-01-11 NOTE — DISCHARGE NOTE BEHAVIORAL HEALTH - NSBHDCTESTSFT_PSY_A_CORE
Thyroid  HA1C  Cholesterol  LDL  HDL  Triglycerides Thyroid= 3.27  HA1C= 5.2  Cholesterol= 209  YQF=293  HDL= 77  Triglycerides= 153

## 2019-01-11 NOTE — CHART NOTE - NSCHARTNOTEFT_GEN_A_CORE
Spoke with Megan Rios at [facility]. Pt may return to program on Tues. 1/15/19 at 9:00AM. Per Megan pt does not see Dr. Stroud at the program, she sees a private psychiatrist, Dr. Gregory Curry located at [address] [place] [address] (tel: [phone]/ fax: [phone]) with whom she has an appt on 2/21. Called Dr. Curry's office to advise of pt's adm and d/c plans. Was able to move appt up to Thur. 1/24/19 at 2:30PM. Will fax d/c clinical to both of the above parties upon pt discharge.

## 2019-01-12 RX ADMIN — Medication 1200 MILLIGRAM(S): at 09:17

## 2019-01-12 RX ADMIN — Medication 75 MICROGRAM(S): at 06:52

## 2019-01-12 RX ADMIN — FLUVOXAMINE MALEATE 50 MILLIGRAM(S): 25 TABLET ORAL at 20:59

## 2019-01-12 RX ADMIN — Medication 250 MILLIGRAM(S): at 09:47

## 2019-01-12 RX ADMIN — Medication 250 MILLIGRAM(S): at 09:17

## 2019-01-12 RX ADMIN — Medication 250 MILLIGRAM(S): at 20:59

## 2019-01-12 RX ADMIN — Medication 150 MILLIGRAM(S): at 20:59

## 2019-01-12 RX ADMIN — ARIPIPRAZOLE 30 MILLIGRAM(S): 15 TABLET ORAL at 20:59

## 2019-01-12 RX ADMIN — GABAPENTIN 300 MILLIGRAM(S): 400 CAPSULE ORAL at 09:17

## 2019-01-12 RX ADMIN — GABAPENTIN 600 MILLIGRAM(S): 400 CAPSULE ORAL at 20:59

## 2019-01-13 RX ADMIN — Medication 1200 MILLIGRAM(S): at 09:38

## 2019-01-13 RX ADMIN — GABAPENTIN 600 MILLIGRAM(S): 400 CAPSULE ORAL at 20:49

## 2019-01-13 RX ADMIN — FLUVOXAMINE MALEATE 50 MILLIGRAM(S): 25 TABLET ORAL at 20:48

## 2019-01-13 RX ADMIN — ARIPIPRAZOLE 30 MILLIGRAM(S): 15 TABLET ORAL at 20:49

## 2019-01-13 RX ADMIN — Medication 250 MILLIGRAM(S): at 09:38

## 2019-01-13 RX ADMIN — Medication 150 MILLIGRAM(S): at 20:49

## 2019-01-13 RX ADMIN — GABAPENTIN 300 MILLIGRAM(S): 400 CAPSULE ORAL at 09:38

## 2019-01-13 RX ADMIN — Medication 250 MILLIGRAM(S): at 20:49

## 2019-01-13 RX ADMIN — Medication 75 MICROGRAM(S): at 06:31

## 2019-01-14 VITALS — RESPIRATION RATE: 12 BRPM | TEMPERATURE: 98 F | OXYGEN SATURATION: 99 %

## 2019-01-14 DIAGNOSIS — F31.31 BIPOLAR DISORDER, CURRENT EPISODE DEPRESSED, MILD: ICD-10-CM

## 2019-01-14 PROCEDURE — 99231 SBSQ HOSP IP/OBS SF/LOW 25: CPT

## 2019-01-14 RX ORDER — ARIPIPRAZOLE 15 MG/1
1 TABLET ORAL
Qty: 14 | Refills: 0 | OUTPATIENT
Start: 2019-01-14 | End: 2019-01-27

## 2019-01-14 RX ORDER — FLUVOXAMINE MALEATE 25 MG/1
1 TABLET ORAL
Qty: 15 | Refills: 1 | OUTPATIENT
Start: 2019-01-14 | End: 2019-02-12

## 2019-01-14 RX ORDER — ARIPIPRAZOLE 15 MG/1
1 TABLET ORAL
Qty: 0 | Refills: 0 | COMMUNITY

## 2019-01-14 RX ORDER — GABAPENTIN 400 MG/1
2 CAPSULE ORAL
Qty: 28 | Refills: 0 | OUTPATIENT
Start: 2019-01-14 | End: 2019-01-27

## 2019-01-14 RX ORDER — LEVOTHYROXINE SODIUM 125 MCG
1 TABLET ORAL
Qty: 0 | Refills: 0 | COMMUNITY

## 2019-01-14 RX ORDER — DOCUSATE SODIUM 100 MG
1 CAPSULE ORAL
Qty: 0 | Refills: 0 | DISCHARGE
Start: 2019-01-14

## 2019-01-14 RX ORDER — TRAZODONE HCL 50 MG
1 TABLET ORAL
Qty: 14 | Refills: 0
Start: 2019-01-14 | End: 2022-09-06

## 2019-01-14 RX ORDER — GABAPENTIN 400 MG/1
1 CAPSULE ORAL
Qty: 0 | Refills: 0 | COMMUNITY

## 2019-01-14 RX ORDER — ARIPIPRAZOLE 15 MG/1
1 TABLET ORAL
Qty: 14 | Refills: 0
Start: 2019-01-14 | End: 2022-09-06

## 2019-01-14 RX ORDER — TRAZODONE HCL 50 MG
1 TABLET ORAL
Qty: 14 | Refills: 0 | OUTPATIENT
Start: 2019-01-14 | End: 2019-01-27

## 2019-01-14 RX ORDER — GABAPENTIN 400 MG/1
1 CAPSULE ORAL
Qty: 15 | Refills: 1 | OUTPATIENT
Start: 2019-01-14 | End: 2019-02-12

## 2019-01-14 RX ORDER — LEVOTHYROXINE SODIUM 125 MCG
1 TABLET ORAL
Qty: 0 | Refills: 0 | DISCHARGE
Start: 2019-01-14

## 2019-01-14 RX ORDER — LAMOTRIGINE 25 MG/1
1 TABLET, ORALLY DISINTEGRATING ORAL
Qty: 0 | Refills: 0 | COMMUNITY

## 2019-01-14 RX ADMIN — Medication 75 MICROGRAM(S): at 06:10

## 2019-01-14 RX ADMIN — Medication 1200 MILLIGRAM(S): at 09:05

## 2019-01-14 RX ADMIN — Medication 250 MILLIGRAM(S): at 09:05

## 2019-01-14 RX ADMIN — GABAPENTIN 300 MILLIGRAM(S): 400 CAPSULE ORAL at 09:05

## 2019-01-14 RX ADMIN — Medication 250 MILLIGRAM(S): at 09:35

## 2019-01-14 NOTE — PROGRESS NOTE BEHAVIORAL HEALTH - NSBHCHARTREVIEWVS_PSY_A_CORE FT
Vital Signs Last 24 Hrs  T(C): 36.7 (11 Jan 2019 08:35), Max: 36.7 (11 Jan 2019 08:35)  T(F): 98.1 (11 Jan 2019 08:35), Max: 98.1 (11 Jan 2019 08:35)  HR: -- 72 sit and 79 stand  BP: -- 116/75 sit and 109/80 stand     RR: 12 (11 Jan 2019 08:35) (12 - 12)  SpO2: 99% (11 Jan 2019 08:35) (99% - 99%)
ICU Vital Signs Last 24 Hrs  T(C): 36.4 (10 Teja 2019 07:16), Max: 36.4 (10 Teja 2019 07:16)  T(F): 97.6 (10 Teja 2019 07:16), Max: 97.6 (10 Teja 2019 07:16)  HR: -- 74, 87  121/74, st 124/77  BP: --  BP(mean): --  ABP: --  ABP(mean): --  RR: 16 (10 Teja 2019 07:16) (16 - 16)  SpO2: 100% (10 Teja 2019 07:16) (100% - 100%)
Vital Signs Last 24 Hrs  T(C): 36.4 (14 Jan 2019 08:53), Max: 36.4 (14 Jan 2019 08:53)  T(F): 97.6 (14 Jan 2019 08:53), Max: 97.6 (14 Jan 2019 08:53)  HR: --  BP: --  BP(mean): --  RR: 12 (14 Jan 2019 08:53) (12 - 12)  SpO2: 99% (14 Jan 2019 08:53) (99% - 99%)

## 2019-01-14 NOTE — PROGRESS NOTE BEHAVIORAL HEALTH - RISK ASSESSMENT
Patient presents as a low risk for harm to self at this time, with risk factors including history of multiple in-patient hospitalizations, remote history of suicide attempt via overdose, history of manic and psychotic symptoms, history of depression and hopelessness, history of poor medication compliance, history of substance abuse, medical comorbidities, of which are outweighed by significant protective factors, including no suicidal ideation/intent/plan, no history of violence, no access to firearms, no global insomnia, positive therapeutic relationships, supportive family and social supports, willingness to seek help, no homicidal ideations intent or plans, hopefulness for future, ability to cope with stress,  frustration tolerance, engaging in discharge and safety planning, motivation to participate in outpatient treatment.
Risk Assessment risk factors include   Past suicidal behavior as well as current suicidality, auditory hallucinations, homicidal ideation  protective factors include compliance with and access to treatment.  Mitigation: pt is improved mood, sleep, no anxiety, denies current SI.
Risk Assessment risk factors include   Past suicidal behavior as well as current suicidality, auditory hallucinations, homicidal ideation  protective factors include compliance with and access to treatment.

## 2019-01-14 NOTE — PROGRESS NOTE BEHAVIORAL HEALTH - PROBLEM SELECTOR PLAN 1
continue current medication regimen  discharge home
-Continue Management as per primary psych team
-Continue Management as per primary psych team

## 2019-01-14 NOTE — PROGRESS NOTE BEHAVIORAL HEALTH - PROBLEM SELECTOR PROBLEM 1
Bipolar 1 disorder, depressed, mild
Schizoaffective disorder, bipolar type
Schizoaffective disorder, bipolar type

## 2019-01-14 NOTE — PROGRESS NOTE BEHAVIORAL HEALTH - AXIS III
Lupus, Diabetes type II, Hashimoto Disease, Vasculitis of eye
Lupus, Diabetes type II, Hashimoto Disease, Vasculitis of eye
Lupus, Diabetes type II, Hoshimotos Disease, Vasculitis of eye

## 2019-01-14 NOTE — PROGRESS NOTE BEHAVIORAL HEALTH - NSBHFUPINTERVALHXFT_PSY_A_CORE
Patient reporting that episode leading to her in-patient hospitalization was related to self discontinuing Gabapentin, leading to exacerbated anxiety and depression with suicidal ideation. Reports to "not do that again," and discuss with provider first before making any changes to medications. Reports since being in hospital, her mood has improved with suicidal ideation resolving. Reports depression being in partial remission, with mild residual depressed mood; however no hopelessness. Patient reporting that episode leading to her in-patient hospitalization was related to self discontinuing Gabapentin, leading to exacerbated anxiety and depression with suicidal ideation. Reports to "not do that again," and discuss with provider first before making any changes to medications. Reports since being in hospital, her mood has improved with suicidal ideation resolving. Reports depression being in partial remission, with mild residual depressed mood; however no hopelessness. Denies anhedonia, amotivation, anergia. Denies difficulty with concentration. Denies disturbances in sleep / appetite. Denies manic symptoms including elevated mood, increased irritability, mood lability, distractibility, grandiosity, pressured speech, increase in goal-directed activity, or decreased need for sleep. Denies psychotic symptoms including paranoia, ideas of reference, thought insertion/broadcasting, or auditory/visual/olfactory/tactile/gustatory hallucinations. Denies suicidal ideation/intent/plan. Denies homicidal ideation/intent/plan. Reports being "calm and stable." Reports well regimented medication management being reason for her stability  along with therapy, social supports and feeling safe on unit. Reports motivation to continue with out-patient treatment. Denies safety concerns at this time. Engaged in safety planning.

## 2019-01-14 NOTE — PROGRESS NOTE BEHAVIORAL HEALTH - PRIMARY DX
Bipolar 1 disorder, depressed, mild
Bipolar 1 disorder, depressed, severe
Bipolar 1 disorder, depressed, severe

## 2019-01-14 NOTE — PROGRESS NOTE BEHAVIORAL HEALTH - NSBHATTESTSEENBY_PSY_A_CORE
NP with telephonic supervision from Attending Psychiatrist

## 2019-01-14 NOTE — PROGRESS NOTE BEHAVIORAL HEALTH - SUMMARY
48 year-old  female, , living with boyfriend, history of Bipolar, history of multiple in-patient hospitalizations (last HH: 10/2017), history of suicide attempt (overdose), was self-referred for depression with auditory hallucination and suicidal ideation plan and intent and unspecific homicidal ideation.    Patient has partial remission in her depression, with improvement in psychiatric symptoms during in-patient hospitalization; with no hopelessness, suicidal ideation. Patient has no manic or psychotic symptoms. Patient is future oriented and engaged in safety planning. Patient symptoms not indicated imminent risk for harm to self; not warranting involuntary in-patient hospitalization. Patient is safe for discharge.
48 year-old  female, , living with boyfriend, history of Bipolar, history of multiple in-patient hospitalizations, history of suicide attempt (overdose), was self-referred for depression with auditory hallucination and suicidal ideation plan and intent and unspecific homicidal ideation.  PT presents depressed with SI and HI, requesting voluntary admission.
Summary: 48 year-old  female, , living with boyfriend, history of Bipolar, history of multiple in-patient hospitalizations (last HH: 10/2017), history of suicide attempt (overdose), was self-referred for depression with auditory hallucination and suicidal ideation plan and intent and unspecific homicidal ideation.  PT presents depressed with SI and HI, requesting voluntary admission. (copied from prior MD note).  1/10:  Pt feeling better and feels she is almost ready for discharge, denying SI/HI/AH.  Mood is presenting as labile and Pt is a continued risk requiring inpt psych admission.

## 2019-01-15 DIAGNOSIS — Z76.89 PERSONS ENCOUNTERING HEALTH SERVICES IN OTHER SPECIFIED CIRCUMSTANCES: ICD-10-CM

## 2019-01-17 DIAGNOSIS — R45.851 SUICIDAL IDEATIONS: ICD-10-CM

## 2019-01-17 DIAGNOSIS — K51.90 ULCERATIVE COLITIS, UNSPECIFIED, WITHOUT COMPLICATIONS: ICD-10-CM

## 2019-01-17 DIAGNOSIS — Z79.899 OTHER LONG TERM (CURRENT) DRUG THERAPY: ICD-10-CM

## 2019-01-17 DIAGNOSIS — Z88.8 ALLERGY STATUS TO OTHER DRUGS, MEDICAMENTS AND BIOLOGICAL SUBSTANCES: ICD-10-CM

## 2019-01-17 DIAGNOSIS — R45.850 HOMICIDAL IDEATIONS: ICD-10-CM

## 2019-01-17 DIAGNOSIS — E11.9 TYPE 2 DIABETES MELLITUS WITHOUT COMPLICATIONS: ICD-10-CM

## 2019-01-17 DIAGNOSIS — Z91.011 ALLERGY TO MILK PRODUCTS: ICD-10-CM

## 2019-01-17 DIAGNOSIS — L93.2 OTHER LOCAL LUPUS ERYTHEMATOSUS: ICD-10-CM

## 2019-01-17 DIAGNOSIS — I77.6 ARTERITIS, UNSPECIFIED: ICD-10-CM

## 2019-01-17 DIAGNOSIS — F31.4 BIPOLAR DISORDER, CURRENT EPISODE DEPRESSED, SEVERE, WITHOUT PSYCHOTIC FEATURES: ICD-10-CM

## 2019-01-17 DIAGNOSIS — Z87.891 PERSONAL HISTORY OF NICOTINE DEPENDENCE: ICD-10-CM

## 2019-01-17 DIAGNOSIS — E06.3 AUTOIMMUNE THYROIDITIS: ICD-10-CM

## 2019-01-17 DIAGNOSIS — R44.0 AUDITORY HALLUCINATIONS: ICD-10-CM

## 2019-01-17 DIAGNOSIS — Z88.0 ALLERGY STATUS TO PENICILLIN: ICD-10-CM

## 2019-01-24 ENCOUNTER — APPOINTMENT (OUTPATIENT)
Dept: PSYCHIATRY | Facility: CLINIC | Age: 49
End: 2019-01-24
Payer: MEDICAID

## 2019-01-24 PROCEDURE — 99214 OFFICE O/P EST MOD 30 MIN: CPT

## 2019-01-24 PROCEDURE — 99213 OFFICE O/P EST LOW 20 MIN: CPT

## 2019-02-06 ENCOUNTER — APPOINTMENT (OUTPATIENT)
Dept: PSYCHIATRY | Facility: CLINIC | Age: 49
End: 2019-02-06
Payer: MEDICAID

## 2019-02-06 PROCEDURE — 99214 OFFICE O/P EST MOD 30 MIN: CPT

## 2019-02-06 RX ORDER — FLUVOXAMINE MALEATE 50 MG/1
50 TABLET ORAL
Qty: 30 | Refills: 2 | Status: ACTIVE | COMMUNITY
Start: 2018-03-15 | End: 1900-01-01

## 2019-02-07 ENCOUNTER — EMERGENCY (EMERGENCY)
Facility: HOSPITAL | Age: 49
LOS: 0 days | Discharge: HOME | End: 2019-02-07
Attending: EMERGENCY MEDICINE | Admitting: EMERGENCY MEDICINE
Payer: MEDICAID

## 2019-02-07 VITALS — HEIGHT: 63 IN | WEIGHT: 162.92 LBS

## 2019-02-07 VITALS
HEART RATE: 70 BPM | DIASTOLIC BLOOD PRESSURE: 75 MMHG | RESPIRATION RATE: 17 BRPM | SYSTOLIC BLOOD PRESSURE: 135 MMHG | TEMPERATURE: 98 F | OXYGEN SATURATION: 100 %

## 2019-02-07 DIAGNOSIS — F60.3 BORDERLINE PERSONALITY DISORDER: ICD-10-CM

## 2019-02-07 DIAGNOSIS — F31.74 BIPOLAR DISORDER, IN FULL REMISSION, MOST RECENT EPISODE MANIC: ICD-10-CM

## 2019-02-07 DIAGNOSIS — F32.9 MAJOR DEPRESSIVE DISORDER, SINGLE EPISODE, UNSPECIFIED: ICD-10-CM

## 2019-02-07 DIAGNOSIS — Z79.899 OTHER LONG TERM (CURRENT) DRUG THERAPY: ICD-10-CM

## 2019-02-07 DIAGNOSIS — M32.9 SYSTEMIC LUPUS ERYTHEMATOSUS, UNSPECIFIED: ICD-10-CM

## 2019-02-07 DIAGNOSIS — Z91.5 PERSONAL HISTORY OF SELF-HARM: ICD-10-CM

## 2019-02-07 DIAGNOSIS — E06.3 AUTOIMMUNE THYROIDITIS: ICD-10-CM

## 2019-02-07 DIAGNOSIS — Z87.19 PERSONAL HISTORY OF OTHER DISEASES OF THE DIGESTIVE SYSTEM: ICD-10-CM

## 2019-02-07 DIAGNOSIS — E11.9 TYPE 2 DIABETES MELLITUS WITHOUT COMPLICATIONS: ICD-10-CM

## 2019-02-07 DIAGNOSIS — Z98.890 OTHER SPECIFIED POSTPROCEDURAL STATES: ICD-10-CM

## 2019-02-07 DIAGNOSIS — F25.9 SCHIZOAFFECTIVE DISORDER, UNSPECIFIED: ICD-10-CM

## 2019-02-07 DIAGNOSIS — R45.1 RESTLESSNESS AND AGITATION: ICD-10-CM

## 2019-02-07 DIAGNOSIS — H35.069 RETINAL VASCULITIS, UNSPECIFIED EYE: ICD-10-CM

## 2019-02-07 DIAGNOSIS — R45.851 SUICIDAL IDEATIONS: ICD-10-CM

## 2019-02-07 DIAGNOSIS — M79.7 FIBROMYALGIA: ICD-10-CM

## 2019-02-07 LAB
ALBUMIN SERPL ELPH-MCNC: 3.9 G/DL — SIGNIFICANT CHANGE UP (ref 3.3–5)
ALP SERPL-CCNC: 56 U/L — SIGNIFICANT CHANGE UP (ref 40–120)
ALT FLD-CCNC: 26 U/L — SIGNIFICANT CHANGE UP (ref 12–78)
AMPHET UR-MCNC: NEGATIVE — SIGNIFICANT CHANGE UP
ANION GAP SERPL CALC-SCNC: 7 MMOL/L — SIGNIFICANT CHANGE UP (ref 5–17)
APAP SERPL-MCNC: <2 UG/ML — LOW (ref 10–30)
APPEARANCE UR: CLEAR — SIGNIFICANT CHANGE UP
AST SERPL-CCNC: 16 U/L — SIGNIFICANT CHANGE UP (ref 15–37)
BACTERIA # UR AUTO: ABNORMAL
BARBITURATES UR SCN-MCNC: NEGATIVE — SIGNIFICANT CHANGE UP
BASOPHILS # BLD AUTO: 0.02 K/UL — SIGNIFICANT CHANGE UP (ref 0–0.2)
BASOPHILS NFR BLD AUTO: 0.3 % — SIGNIFICANT CHANGE UP (ref 0–2)
BENZODIAZ UR-MCNC: NEGATIVE — SIGNIFICANT CHANGE UP
BILIRUB SERPL-MCNC: 0.3 MG/DL — SIGNIFICANT CHANGE UP (ref 0.2–1.2)
BILIRUB UR-MCNC: NEGATIVE — SIGNIFICANT CHANGE UP
BUN SERPL-MCNC: 15 MG/DL — SIGNIFICANT CHANGE UP (ref 7–23)
CALCIUM SERPL-MCNC: 8.3 MG/DL — LOW (ref 8.5–10.1)
CHLORIDE SERPL-SCNC: 111 MMOL/L — HIGH (ref 96–108)
CO2 SERPL-SCNC: 24 MMOL/L — SIGNIFICANT CHANGE UP (ref 22–31)
COCAINE METAB.OTHER UR-MCNC: NEGATIVE — SIGNIFICANT CHANGE UP
COLOR SPEC: YELLOW — SIGNIFICANT CHANGE UP
CREAT SERPL-MCNC: 0.88 MG/DL — SIGNIFICANT CHANGE UP (ref 0.5–1.3)
DIFF PNL FLD: NEGATIVE — SIGNIFICANT CHANGE UP
EOSINOPHIL # BLD AUTO: 0.05 K/UL — SIGNIFICANT CHANGE UP (ref 0–0.5)
EOSINOPHIL NFR BLD AUTO: 0.9 % — SIGNIFICANT CHANGE UP (ref 0–6)
EPI CELLS # UR: ABNORMAL
ETHANOL SERPL-MCNC: <10 MG/DL — SIGNIFICANT CHANGE UP (ref 0–10)
GLUCOSE SERPL-MCNC: 102 MG/DL — HIGH (ref 70–99)
GLUCOSE UR QL: NEGATIVE MG/DL — SIGNIFICANT CHANGE UP
HCT VFR BLD CALC: 39.5 % — SIGNIFICANT CHANGE UP (ref 34.5–45)
HGB BLD-MCNC: 13.3 G/DL — SIGNIFICANT CHANGE UP (ref 11.5–15.5)
IMM GRANULOCYTES NFR BLD AUTO: 0.2 % — SIGNIFICANT CHANGE UP (ref 0–1.5)
KETONES UR-MCNC: NEGATIVE — SIGNIFICANT CHANGE UP
LEUKOCYTE ESTERASE UR-ACNC: ABNORMAL
LYMPHOCYTES # BLD AUTO: 1.75 K/UL — SIGNIFICANT CHANGE UP (ref 1–3.3)
LYMPHOCYTES # BLD AUTO: 29.8 % — SIGNIFICANT CHANGE UP (ref 13–44)
MCHC RBC-ENTMCNC: 30.4 PG — SIGNIFICANT CHANGE UP (ref 27–34)
MCHC RBC-ENTMCNC: 33.7 GM/DL — SIGNIFICANT CHANGE UP (ref 32–36)
MCV RBC AUTO: 90.4 FL — SIGNIFICANT CHANGE UP (ref 80–100)
METHADONE UR-MCNC: NEGATIVE — SIGNIFICANT CHANGE UP
MONOCYTES # BLD AUTO: 0.31 K/UL — SIGNIFICANT CHANGE UP (ref 0–0.9)
MONOCYTES NFR BLD AUTO: 5.3 % — SIGNIFICANT CHANGE UP (ref 2–14)
NEUTROPHILS # BLD AUTO: 3.74 K/UL — SIGNIFICANT CHANGE UP (ref 1.8–7.4)
NEUTROPHILS NFR BLD AUTO: 63.5 % — SIGNIFICANT CHANGE UP (ref 43–77)
NITRITE UR-MCNC: NEGATIVE — SIGNIFICANT CHANGE UP
NRBC # BLD: 0 /100 WBCS — SIGNIFICANT CHANGE UP (ref 0–0)
OPIATES UR-MCNC: NEGATIVE — SIGNIFICANT CHANGE UP
PCP SPEC-MCNC: SIGNIFICANT CHANGE UP
PCP UR-MCNC: NEGATIVE — SIGNIFICANT CHANGE UP
PH UR: 5 — SIGNIFICANT CHANGE UP (ref 5–8)
PLATELET # BLD AUTO: 229 K/UL — SIGNIFICANT CHANGE UP (ref 150–400)
POTASSIUM SERPL-MCNC: 3.7 MMOL/L — SIGNIFICANT CHANGE UP (ref 3.5–5.3)
POTASSIUM SERPL-SCNC: 3.7 MMOL/L — SIGNIFICANT CHANGE UP (ref 3.5–5.3)
PROT SERPL-MCNC: 6.9 GM/DL — SIGNIFICANT CHANGE UP (ref 6–8.3)
PROT UR-MCNC: NEGATIVE MG/DL — SIGNIFICANT CHANGE UP
RBC # BLD: 4.37 M/UL — SIGNIFICANT CHANGE UP (ref 3.8–5.2)
RBC # FLD: 12.8 % — SIGNIFICANT CHANGE UP (ref 10.3–14.5)
RBC CASTS # UR COMP ASSIST: SIGNIFICANT CHANGE UP /HPF (ref 0–4)
SALICYLATES SERPL-MCNC: <1.7 MG/DL — LOW (ref 2.8–20)
SODIUM SERPL-SCNC: 142 MMOL/L — SIGNIFICANT CHANGE UP (ref 135–145)
SP GR SPEC: 1.01 — SIGNIFICANT CHANGE UP (ref 1.01–1.02)
THC UR QL: NEGATIVE — SIGNIFICANT CHANGE UP
UROBILINOGEN FLD QL: NEGATIVE MG/DL — SIGNIFICANT CHANGE UP
WBC # BLD: 5.88 K/UL — SIGNIFICANT CHANGE UP (ref 3.8–10.5)
WBC # FLD AUTO: 5.88 K/UL — SIGNIFICANT CHANGE UP (ref 3.8–10.5)
WBC UR QL: SIGNIFICANT CHANGE UP

## 2019-02-07 PROCEDURE — 99285 EMERGENCY DEPT VISIT HI MDM: CPT

## 2019-02-07 PROCEDURE — 90792 PSYCH DIAG EVAL W/MED SRVCS: CPT

## 2019-02-07 NOTE — ED PROVIDER NOTE - OBJECTIVE STATEMENT
48 y/o female with PMHx of fibromyalgia presents to the ED c/o SI x2 weeks. +intermittent depression, agitation. Seen in ED 3 weeks ago. Compliant with medication this AM. Took Flexeril for fibromyalgia this AM. Participating in day program and seeing psychiatrist. Reports recent medication change.

## 2019-02-07 NOTE — ED BEHAVIORAL HEALTH ASSESSMENT NOTE - SUMMARY
49 year-old  female, , living with boyfriend, history of Bipolar, history of multiple in-patient hospitalizations (last HH: 10/2018), reported history of suicide attempt (overdose), presents in ER self-referred for depression with and suicidal ideation and thoughts of cutting seeking psych admission.    Pt admitted to wanting to change current MD in [place], to Dr Stroud at [facility] where she is already a patient there for therapy.  She was hoping for it to be set up by hospital via inpt admission.  She admits to depression, and anxiety and stress without adequate coping skills and utilizing psych admission to cope with stressors rather than working through with therapy as Pt admits she cannot tolerate discomfort.  Pt denies SIIP and would not hurt self due to responsibility to family.  Pt is not an imminent danger to self or others and is cleared psychiatrically for discharge.  SW to call [facility] directly to facilitate transfer of care to them.  Case review with Dr Lay.  Pt agreeable to safety plan to return to ED if feels unsafe, SI recurs, has multiple missed nights of sleep, AH.

## 2019-02-07 NOTE — ED ADULT NURSE NOTE - OBJECTIVE STATEMENT
pt is here because she is feeling depressed and agitated. Pt states she keeps having thoughts of hurting herself but wont because of her children so she is here for help
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
## 2019-02-07 NOTE — ED BEHAVIORAL HEALTH ASSESSMENT NOTE - RISK ASSESSMENT
min risk of self harm, in therapy, compliant with meds, able to go to Yoga and self care needs and is sober.  inadequate coping skills reported SA by OD in past.

## 2019-02-07 NOTE — ED ADULT NURSE NOTE - NSIMPLEMENTINTERV_GEN_ALL_ED
Implemented All Universal Safety Interventions:  [place] to call system. Call bell, personal items and telephone within reach. Instruct patient to call for assistance. Room bathroom lighting operational. Non-slip footwear when patient is off stretcher. Physically safe environment: no spills, clutter or unnecessary equipment. Stretcher in lowest position, wheels locked, appropriate side rails in place.

## 2019-02-07 NOTE — ED BEHAVIORAL HEALTH ASSESSMENT NOTE - DETAILS
Pt sees children on weekends discharge from  1/14/19 Pt reports OD in past but sister and bf of 5 yrs did not know of any SA gained 15 lbs since started Depakote in 12/16 many relatives chronic pain issues n/a Dr Lay

## 2019-02-07 NOTE — ED BEHAVIORAL HEALTH ASSESSMENT NOTE - OTHER
sister with [facility] Mika Colon COREEN to coordinate with [facility] change of clinical tx to Dr Kern

## 2019-02-07 NOTE — ED PROVIDER NOTE - NSFOLLOWUPINSTRUCTIONS_ED_ALL_ED_FT
Follow-up with psychiatric services as arranges by social work.      Depression    Depression is a mental illness that usually causes feelings of sadness, hopelessness, or helplessness. Some people with this disorder do not feel particularly sad but lose interest in doing things they used to enjoy. Major depressive disorder also can cause physical symptoms. It can interfere with work, school, relationships, and other normal everyday activities. If you were started on a medication, make sure to take exactly as prescribed and follow up with a psychiatrist.    SEEK IMMEDIATE MEDICAL CARE IF YOU HAVE ANY OF THE FOLLOWING SYMPTOMS: thoughts about hurting or killing yourself, thoughts about hurting or killing somebody else, hallucinations, or worsening depression.

## 2019-02-07 NOTE — ED BEHAVIORAL HEALTH NOTE - BEHAVIORAL HEALTH NOTE
SW spoke with pt's therapist Jasmina Thorne over the phone. As per Jasmina, pt does not need to be referred, as she is already receiving services with [facility]. Pt has own psychiatrist currently, but is not being seen at [facility] for clinical management. Jasmina reported that the prescribing physician is only there M - W, and the transfer of care would need to be completed when pt returns to program. As per Jasmina, she will follow up with pt once pt returns to program. COREEN expressed this to JENNIFER Soria. SW followed up with Yang again to see if SW should follow up with pt to inform her. As per Yang, she would like SW to attempt to follow up again tomorrow  and attempt to help facilitate transfer of care tomorrow, and speak with supervisor.

## 2019-02-07 NOTE — ED BEHAVIORAL HEALTH ASSESSMENT NOTE - DESCRIPTION
calm , cooperative Lupus, DMII, Vasculitis in eye, Hashimoto's living with BF   Unemployed has 2 children 13 and 10

## 2019-02-07 NOTE — ED ADULT NURSE NOTE - NSFALLRSKUNASSIST_ED_ALL_ED
" 18 1120   Quick Adds   Quick Adds Certification   Type of Visit Initial Occupational Therapy Evaluation   General Information   Start of Care Date 18   Referring Physician Christina Lincoln MD   Orders Evaluate and treat as indicated   Order Date 11/15/18   Diagnosis Developmental Delay (R62.50)   Onset Date 16   Patient Age 4 years, 7 months, 18 days   Birth / Developmental / Adoptive History Child was born 1 week 3 days past due date via . Mom was on bedrest for most of pregnancy with placenta previa with hemorrhage under placenta. Child does not have an medical dx, however as an infant child's mother reports the doctor stated she had lower tone.   Social History Child lives with her mom, dad, and younger sister. Child's mom is also expecting third child. Child's mom is a stay at home mom and mom reports she does not have much interaction with peers her age, however they have been trying to attend community programs to introduce child to new environments and peers.    Additional Services PT   Additional Services Comment Child attends birth-3 class 1x/week as well as [employer] groups on occasion   Patient / Family Goals Statement Mom stated \"I just want to see where she's at and what you think and work on social skills\"   General Observations/Additional Occupational Profile info Child is a 4 year old girl who is being seen for OT evaluation due to developmental delay and sensory concerns impacting participation in ADL, play, and social participation. Child was seen for PT evaluation on 10/31/18 and was referred to OT services at that time due to the physical therapist noting concerns regarding child's reaction to sensory stimuli and per mom's report of similar issues at home.    Falls Screen   Are you concerned about your child s balance? Yes   Does your child trip or fall more often than you would expect? Yes   Is your child fearful of falling or hesitant during daily activities? Yes   Is " "your child receiving physical therapy services? Yes   Falls Screen Comments Child is receiving PT services   Subjective / Caregiver Report   Caregiver report obtained by Interview;Questionnaire   Caregiver report obtained from Child's momMariola   Objective Testing   Developmental Tests, Functional Tests, Standardized Tests Completed Peabody Developmental Motor Scales - 2  (Child Sensory Profile-2)   Behavior During Evaluation   Social Skills Child's mom reports child does not have much interaction with peers her age, and when around peers child prefers to play alone. Child will speak in full sentences to family but does not often engage with others.    Play Skills  Child enjoys playing with baby dolls and play food. When with same aged peers child will engage in parallel play but does not often engage in reciprocal play. Child will play with younger sister and did bring toys to younger sister during evaluation   Communication Skills  Child will wave hello/goodbye to familiar friends. Child did not speak verbally during evaluation and child did keep distance between self and this writer throughout. Child frequently looks to mom for comfort/reassurance   Attention Child attended to preferred activities for 5-10 minutes. With non-preferred tasks child required frequent verbal cues and encouragement from mom and child did walk away from non-preferred tasks within 1-2 minutes   Emotional Regulation Child will cover ears and hide with loud noises. No behaviors noted or reported by mom. Child was shy throughout evaluation   Activities of Daily Living  Age appropriate   Parent present during evaluation?  Yes, mom Mariola   Results of testing are representative of the child s skill level? Child's mom reports child does engage/play with toys more frequently and with greater ease at home   Basic Sensory Skills   Proprioceptive Child's mom states she is \"more of a cuddly kid.\" She does frequently rub up against mom/family and push " "on table for support   Vestibular Child enjoys swings. Mom does not report concern with motion sickness or seeking or avoiding vestibular input   Tactile Child demonstrated no aversion to sensory sand bin. Child's mom has no concerns regarding tactile input   Oral Sensory Child did put toys in mouth during evaluation   Auditory Child's mom reports many concerns regarding child's reactions to auditory stimuli. Adverse reactions to loud noises also noted during evaluation and during physical therapy sessions per PT's report. Child is very bothered by loud noises such as vacuums, car horns, and noise-making toys. Child is also bothered by loud voices, especially men's voices. Child's auditory defensiveness does impact performance on daily tasks, for example child will not sit on toilet because mom states \"the last time she sat on the toilet a toy hit the floor and made a loud noise and she became upset.\"    Visual PT did report child appeared aversive to light up toy, however toy did make noise as well so unsure of cause of aversion.    Olfactory No concerns   Physical Findings   Physical Findings Comments Child is receiving PT services   Activities of Daily Living   Bathing Mom reports no conerns.   Upper Body Dressing  Child assists with leigh ann/doffing her UB clothing   Lower Body Dressing  Child doffs all LB clothing; donns socks and attempts to leigh ann shoes but does require assist   Toileting  Child indicates soiled diaper inconsistently. Child will often refuse to sit on toilet due to auditory concern noted above   Grooming  No concerns   Eating / Self Feeding  Child does feed self with fork and spoon.    Fine Motor Skills   Hand Dominance  Not yet developed   Grasp  Age appropriate   Pencil Grasp  Efficient pattern    Hand Strength  Below age appropriate   Hand Strength Comment  Hand strength decreased as evidenced by difficulty with legos and Mr. Potato Head   Functional hand skills that are below age appropriate: " Other functional skills below age level;Fasteners;Stringing beads   Other functional skills below age level Turning pages in book   Visual Motor Integration Skills Scribbling Skills   Scribbling Skills  Spontaneously scribbles in a horizontal direction ;Spontaneously scribbles in a vertical direction   Bilateral Skills   Bilateral Skills Comments  Difficulty with bilateral hand tasks such as pulling apart legos   Cognitive Functioning   Cognitive Functioning Deficits Reported / Observed Distractibility;Sustained attention;Alertness/response to stimuli   General Therapy Recommendations   Recommendations Occupational Therapy treatment    Planned Occupational Therapy Interventions  Therapeutic Procedures;Therapeutic Activities ;Neuromuscular Re-education;Cognitive Skills;Self-Care/ADL;Sensory Integration;Standardized Testing   Clinical Impression   Criteria for Skilled Therapeutic Interventions Met Yes, treatment indicated   Occupational Therapy Diagnosis Delay in gross and fine motor skills and impaired response to sensory stimuli impacting performance on daily ADL, play, social participation, and education.    Influenced by the Following Impairments Decreased strength, balance, and coordination; sensory concerns   Assessment of Occupational Performance 3-5 Performance Deficits   Identified Performance Deficits play, dressing, toileting, social particiaption   Clinical Decision Making (Complexity) Moderate complexity   Therapy Frequency 1x/week   Predicted Duration of Therapy Intervention 12 weeks   Risks and Benefits of Treatment Have Been Explained Yes   Patient/Family and Other Staff in Agreement with Plan of Care Yes   Clinical Impression Comments Child will benefit from skilled OT services to increase independence and efficiency with ADL, play, social participation, sensory integration, and education skills   Education Assessment   Barriers to Learning Cognitive;Emotional;Language   Preferred Learning Style  Demonstration   Pediatric OT Eval Goals   OT Pediatric Goals 1;2;3;4;5   Pediatric OT Goal 1   Goal Identifier Auditory defensiveness   Goal Description Child will play with a music or sound toy with minimal auditory defensiveness noted x 3 sessions in order to decrease auditory defensiveness for improved performance in daily tasks   Target Date 02/15/19   Pediatric OT Goal 2   Goal Identifier Bilateral hand coordination   Goal Description Child will complete 5 minutes of play with moderate resistive toys with min assist x3 sessions to increase bilateral hand use in midline to improve fine motor skills for learning and development.    Target Date 02/15/19   Pediatric OT Goal 3   Goal Identifier Pre-writing strokes   Goal Description Child will imitate a horizontal and vertical line with independence x3 sessions to increase visual motor skills for play.   Target Date 02/15/19   Pediatric OT Goal 4   Goal Identifier Reciprocal play   Goal Description Child will participate in turn taking activity/game with at least 3 reciprocations x 3 sessions to increase independence with age-appropriate play and social skills.    Target Date 02/15/19   Pediatric OT Goal 5   Goal Identifier Turning pages/hand skills   Goal Description Child will independently turn pages in a book singularly x 3 sessions in order to increase independence with ADLs, fine motor skills, and play skills.    Target Date 02/15/19   Therapy Certification   Certification date from 11/23/18   Certification date to 02/15/19   Total Evaluation Time   Total Evaluation Time 30   Total treatment time 12   Standardized test time 33     MARCOS Dorman/L  [facility]  [phone]     no

## 2019-02-07 NOTE — ED BEHAVIORAL HEALTH ASSESSMENT NOTE - HPI (INCLUDE ILLNESS QUALITY, SEVERITY, DURATION, TIMING, CONTEXT, MODIFYING FACTORS, ASSOCIATED SIGNS AND SYMPTOMS)
49 year-old  female, , living with boyfriend, history of Bipolar, history of multiple in-patient hospitalizations (last HH: 10/2018), reported history of suicide attempt (overdose), presents in ER self-referred for depression with and suicidal ideation and thoughts of cutting seeking psych admission.    Patient reports depressed, worsening again for the past several weeks with inability to function.  Pt admits she is able to go to [facility] and [facility] for therapy, in addition to seeing Dr Sargent in [place].    Pt admits to seeing Dr Sargent as a struggle due to distance and is trying to see Dr Stroud at [facility]. BF reports she thought it would be helpful to get approved there by going through hospital admission.  Pt endorsing SI with visual images of cutting, denies intent as her responsibility to her children stops her.  Pt bf and sister did not recall If Pt attempted suicide in her life.  When she was in HS sister states Pt cut wrist when opening a window and told people she tried to end her life.  Sister reports she has anxiety and imagines things to be real which are not.  Pt reports sleep is good and appetite is good.  She is not having psychotic thinking, AH, Delusions.  Her mood is presenting as stable, affect full occasional tearfulness  when discussing how she misses children. Admits she is going through Menopause and feeling depressed and miller.  She is compliant with meds, which have been adjusted this week then back to earlier dose.  Pt admits to having no coping skills and relies upon hospital admission to cope with stressor, rather than working through them in therapy.  Pt admitted to feeling a burden lifted when offered to contact [facility] to transfer clinical care and med management to them, rather than seek hospital admission for  purpose of changing doctors.  Pt denies being a safety concern or needing to be in hospital this time.  Pt agrees to return to ED if SI recurs or feels unsafe, auditory hallucinations, inability to sleep.  MH: ulcerative colitis, Lupus, Hashimoto's disease, fibromyalgia, retinal vasculitis.

## 2019-02-07 NOTE — ED ADULT TRIAGE NOTE - CHIEF COMPLAINT QUOTE
patient states she was discharged from [facility] January 15 and has been participating in her day program and seeing her psychiatrist.  patient states her antidepressant was increased and then decreased.  she has been having intermittent SI and is feeling suicidal today

## 2019-02-07 NOTE — ED BEHAVIORAL HEALTH NOTE - BEHAVIORAL HEALTH NOTE
2/7/2019 8:27 PM (ET) Paula Reynolds:  Pt currently receives therapeutic services through [facility]. Pt's therapist is Jasmina Thorne ([phone]). Pt wants to also see psychiatrist at [facility] (Dr. Stroud). COREEN obtained release of information to send referral to [facility] and discuss case with [facility]. Release of information fax attached on allscripts. COREEN followed up with [facility] ( [phone]). COREEN spoke to Baylee and indicated that pt is already receiving therapeutic services from program, and now would like to receive medication management as well. COREEN requested info on how to send a referral or get the pt to see psychiatrist Baylee unable to reach on call staff, but would continue to try. COREEN awaiting return phone call. COREEN provided her contact info and dept contact info in the event that on call does not call back until tomorrow.

## 2019-02-11 ENCOUNTER — APPOINTMENT (OUTPATIENT)
Dept: PSYCHIATRY | Facility: CLINIC | Age: 49
End: 2019-02-11

## 2019-02-11 NOTE — PROGRESS NOTE BEHAVIORAL HEALTH - IMPULSE CONTROL
[FreeTextEntry1] : 52 w who is here for initial consultation of migraine. She sees a NP at Dr. Monahan's office. She has been on botox for her migraines and it's helping. Her migraine started in high school. Whenever it was spring time, she will have migraine attacks. In her 30's they were worse but she still found relief in using advil. She has apparently normal MRI of brain. In 2010, she had 28 ha days. She started propranolol but eventually she was weaned off of it because she was tired and had weight gain. \par \par Earlier in 2018, she experienced sinus pressure, foggy, back of neck pain. It was worsened with humidity. It's pulsating and can affect either side of her head. She has been given lidocaine injections, acupuncture, physical therapy, botox since may.  she currently uses axert and [facility] for prn treatment. \par Headache frequency: 12 in July, 8 in June, 8 in May. \par \par Interval history: Patient is here for followup. She never started amitriptyline because of being on tizanidine. Patient did not disclose that during her initial visit with me. She was advised that she should not take both medications together. She states that the Botox has helped to decrease her migraines to 3 a month. She also Started acupuncture with an outside acupuncturist.\par \par interval history: headache freq is the same as before.\par \par interval history: nov 2 migraines dec 4 migraines. jan 5 migraines. \par  Normal

## 2019-02-21 ENCOUNTER — APPOINTMENT (OUTPATIENT)
Dept: PSYCHIATRY | Facility: CLINIC | Age: 49
End: 2019-02-21

## 2019-03-05 ENCOUNTER — APPOINTMENT (OUTPATIENT)
Dept: PSYCHIATRY | Facility: CLINIC | Age: 49
End: 2019-03-05

## 2019-03-28 ENCOUNTER — INPATIENT (INPATIENT)
Facility: HOSPITAL | Age: 49
LOS: 5 days | Discharge: HOME | End: 2019-04-03
Attending: PSYCHIATRY & NEUROLOGY | Admitting: PSYCHIATRY & NEUROLOGY
Payer: MEDICAID

## 2019-03-28 VITALS
HEART RATE: 91 BPM | SYSTOLIC BLOOD PRESSURE: 130 MMHG | OXYGEN SATURATION: 100 % | HEIGHT: 63 IN | RESPIRATION RATE: 18 BRPM | DIASTOLIC BLOOD PRESSURE: 78 MMHG | WEIGHT: 158.07 LBS | TEMPERATURE: 98 F

## 2019-03-28 DIAGNOSIS — F43.10 POST-TRAUMATIC STRESS DISORDER, UNSPECIFIED: ICD-10-CM

## 2019-03-28 DIAGNOSIS — F25.0 SCHIZOAFFECTIVE DISORDER, BIPOLAR TYPE: ICD-10-CM

## 2019-03-28 DIAGNOSIS — F44.9 DISSOCIATIVE AND CONVERSION DISORDER, UNSPECIFIED: ICD-10-CM

## 2019-03-28 DIAGNOSIS — F42.2 MIXED OBSESSIONAL THOUGHTS AND ACTS: ICD-10-CM

## 2019-03-28 LAB
ALBUMIN SERPL ELPH-MCNC: 3.8 G/DL — SIGNIFICANT CHANGE UP (ref 3.3–5)
ALP SERPL-CCNC: 61 U/L — SIGNIFICANT CHANGE UP (ref 40–120)
ALT FLD-CCNC: 24 U/L — SIGNIFICANT CHANGE UP (ref 12–78)
ANION GAP SERPL CALC-SCNC: 8 MMOL/L — SIGNIFICANT CHANGE UP (ref 5–17)
APAP SERPL-MCNC: < 2 UG/ML (ref 10–30)
APPEARANCE UR: CLEAR — SIGNIFICANT CHANGE UP
AST SERPL-CCNC: 17 U/L — SIGNIFICANT CHANGE UP (ref 15–37)
BASOPHILS # BLD AUTO: 0.03 K/UL — SIGNIFICANT CHANGE UP (ref 0–0.2)
BASOPHILS NFR BLD AUTO: 0.4 % — SIGNIFICANT CHANGE UP (ref 0–2)
BILIRUB SERPL-MCNC: 0.2 MG/DL — SIGNIFICANT CHANGE UP (ref 0.2–1.2)
BILIRUB UR-MCNC: NEGATIVE — SIGNIFICANT CHANGE UP
BUN SERPL-MCNC: 20 MG/DL — SIGNIFICANT CHANGE UP (ref 7–23)
CALCIUM SERPL-MCNC: 8.4 MG/DL — LOW (ref 8.5–10.1)
CHLORIDE SERPL-SCNC: 112 MMOL/L — HIGH (ref 96–108)
CO2 SERPL-SCNC: 22 MMOL/L — SIGNIFICANT CHANGE UP (ref 22–31)
COLOR SPEC: YELLOW — SIGNIFICANT CHANGE UP
CREAT SERPL-MCNC: 0.85 MG/DL — SIGNIFICANT CHANGE UP (ref 0.5–1.3)
DIFF PNL FLD: ABNORMAL
EOSINOPHIL # BLD AUTO: 0.09 K/UL — SIGNIFICANT CHANGE UP (ref 0–0.5)
EOSINOPHIL NFR BLD AUTO: 1.2 % — SIGNIFICANT CHANGE UP (ref 0–6)
ETHANOL SERPL-MCNC: <10 MG/DL — SIGNIFICANT CHANGE UP (ref 0–10)
GLUCOSE SERPL-MCNC: 98 MG/DL — SIGNIFICANT CHANGE UP (ref 70–99)
GLUCOSE UR QL: NEGATIVE MG/DL — SIGNIFICANT CHANGE UP
HCT VFR BLD CALC: 41.2 % — SIGNIFICANT CHANGE UP (ref 34.5–45)
HGB BLD-MCNC: 13.6 G/DL — SIGNIFICANT CHANGE UP (ref 11.5–15.5)
IMM GRANULOCYTES NFR BLD AUTO: 0.3 % — SIGNIFICANT CHANGE UP (ref 0–1.5)
KETONES UR-MCNC: NEGATIVE — SIGNIFICANT CHANGE UP
LEUKOCYTE ESTERASE UR-ACNC: ABNORMAL
LYMPHOCYTES # BLD AUTO: 2.11 K/UL — SIGNIFICANT CHANGE UP (ref 1–3.3)
LYMPHOCYTES # BLD AUTO: 27.5 % — SIGNIFICANT CHANGE UP (ref 13–44)
MCHC RBC-ENTMCNC: 29.9 PG — SIGNIFICANT CHANGE UP (ref 27–34)
MCHC RBC-ENTMCNC: 33 GM/DL — SIGNIFICANT CHANGE UP (ref 32–36)
MCV RBC AUTO: 90.5 FL — SIGNIFICANT CHANGE UP (ref 80–100)
MONOCYTES # BLD AUTO: 0.3 K/UL — SIGNIFICANT CHANGE UP (ref 0–0.9)
MONOCYTES NFR BLD AUTO: 3.9 % — SIGNIFICANT CHANGE UP (ref 2–14)
NEUTROPHILS # BLD AUTO: 5.11 K/UL — SIGNIFICANT CHANGE UP (ref 1.8–7.4)
NEUTROPHILS NFR BLD AUTO: 66.7 % — SIGNIFICANT CHANGE UP (ref 43–77)
NITRITE UR-MCNC: NEGATIVE — SIGNIFICANT CHANGE UP
NRBC # BLD: 0 /100 WBCS — SIGNIFICANT CHANGE UP (ref 0–0)
PCP SPEC-MCNC: SIGNIFICANT CHANGE UP
PH UR: 5 — SIGNIFICANT CHANGE UP (ref 5–8)
PLATELET # BLD AUTO: 239 K/UL — SIGNIFICANT CHANGE UP (ref 150–400)
POTASSIUM SERPL-MCNC: 4 MMOL/L — SIGNIFICANT CHANGE UP (ref 3.5–5.3)
POTASSIUM SERPL-SCNC: 4 MMOL/L — SIGNIFICANT CHANGE UP (ref 3.5–5.3)
PROT SERPL-MCNC: 6.8 GM/DL — SIGNIFICANT CHANGE UP (ref 6–8.3)
PROT UR-MCNC: NEGATIVE MG/DL — SIGNIFICANT CHANGE UP
RBC # BLD: 4.55 M/UL — SIGNIFICANT CHANGE UP (ref 3.8–5.2)
RBC # FLD: 13.2 % — SIGNIFICANT CHANGE UP (ref 10.3–14.5)
SALICYLATES SERPL-MCNC: <1.7 MG/DL — LOW (ref 2.8–20)
SODIUM SERPL-SCNC: 142 MMOL/L — SIGNIFICANT CHANGE UP (ref 135–145)
SP GR SPEC: 1.02 — SIGNIFICANT CHANGE UP (ref 1.01–1.02)
TSH SERPL-MCNC: 0.92 UU/ML — SIGNIFICANT CHANGE UP (ref 0.34–4.82)
UROBILINOGEN FLD QL: 1 MG/DL
WBC # BLD: 7.66 K/UL — SIGNIFICANT CHANGE UP (ref 3.8–10.5)
WBC # FLD AUTO: 7.66 K/UL — SIGNIFICANT CHANGE UP (ref 3.8–10.5)

## 2019-03-28 PROCEDURE — 99285 EMERGENCY DEPT VISIT HI MDM: CPT

## 2019-03-28 PROCEDURE — 99232 SBSQ HOSP IP/OBS MODERATE 35: CPT

## 2019-03-28 PROCEDURE — 93010 ELECTROCARDIOGRAM REPORT: CPT

## 2019-03-28 RX ORDER — DOCUSATE SODIUM 100 MG
100 CAPSULE ORAL DAILY
Qty: 0 | Refills: 0 | Status: DISCONTINUED | OUTPATIENT
Start: 2019-03-28 | End: 2019-04-03

## 2019-03-28 RX ORDER — MESALAMINE 400 MG
400 TABLET, DELAYED RELEASE (ENTERIC COATED) ORAL
Qty: 0 | Refills: 0 | Status: DISCONTINUED | OUTPATIENT
Start: 2019-03-28 | End: 2019-04-03

## 2019-03-28 RX ORDER — FLUVOXAMINE MALEATE 25 MG/1
50 TABLET ORAL AT BEDTIME
Qty: 0 | Refills: 0 | Status: DISCONTINUED | OUTPATIENT
Start: 2019-03-28 | End: 2019-03-29

## 2019-03-28 RX ORDER — ARIPIPRAZOLE 15 MG/1
30 TABLET ORAL AT BEDTIME
Qty: 0 | Refills: 0 | Status: DISCONTINUED | OUTPATIENT
Start: 2019-03-28 | End: 2019-03-28

## 2019-03-28 RX ORDER — GABAPENTIN 400 MG/1
300 CAPSULE ORAL THREE TIMES A DAY
Qty: 0 | Refills: 0 | Status: DISCONTINUED | OUTPATIENT
Start: 2019-03-28 | End: 2019-04-03

## 2019-03-28 RX ORDER — LEVOTHYROXINE SODIUM 125 MCG
75 TABLET ORAL DAILY
Qty: 0 | Refills: 0 | Status: DISCONTINUED | OUTPATIENT
Start: 2019-03-28 | End: 2019-04-03

## 2019-03-28 RX ORDER — TRAZODONE HCL 50 MG
100 TABLET ORAL AT BEDTIME
Qty: 0 | Refills: 0 | Status: DISCONTINUED | OUTPATIENT
Start: 2019-03-28 | End: 2019-04-03

## 2019-03-28 RX ORDER — HALOPERIDOL DECANOATE 100 MG/ML
5 INJECTION INTRAMUSCULAR EVERY 6 HOURS
Qty: 0 | Refills: 0 | Status: DISCONTINUED | OUTPATIENT
Start: 2019-03-28 | End: 2019-04-03

## 2019-03-28 RX ORDER — ARIPIPRAZOLE 15 MG/1
30 TABLET ORAL AT BEDTIME
Qty: 0 | Refills: 0 | Status: DISCONTINUED | OUTPATIENT
Start: 2019-03-28 | End: 2019-04-03

## 2019-03-28 RX ORDER — DIPHENHYDRAMINE HCL 50 MG
50 CAPSULE ORAL EVERY 6 HOURS
Qty: 0 | Refills: 0 | Status: DISCONTINUED | OUTPATIENT
Start: 2019-03-28 | End: 2019-04-03

## 2019-03-28 RX ADMIN — Medication 100 MILLIGRAM(S): at 21:11

## 2019-03-28 RX ADMIN — ARIPIPRAZOLE 30 MILLIGRAM(S): 15 TABLET ORAL at 21:15

## 2019-03-28 RX ADMIN — GABAPENTIN 300 MILLIGRAM(S): 400 CAPSULE ORAL at 21:11

## 2019-03-28 RX ADMIN — Medication 400 MILLIGRAM(S): at 21:16

## 2019-03-28 RX ADMIN — Medication 375 MILLIGRAM(S): at 23:00

## 2019-03-28 RX ADMIN — Medication 375 MILLIGRAM(S): at 21:15

## 2019-03-28 RX ADMIN — FLUVOXAMINE MALEATE 50 MILLIGRAM(S): 25 TABLET ORAL at 21:11

## 2019-03-28 NOTE — ED BEHAVIORAL HEALTH ASSESSMENT NOTE - CURRENT MEDICATION
Abilify 30 mg daily, Lamictal 100mg and neurontin 100mg TID Naprosyn 375 mg Am and PM, Apriso 2000 mg daily; Synthroid 75 micrograms daily, Luvox 50mg po qd

## 2019-03-28 NOTE — ED PROVIDER NOTE - OBJECTIVE STATEMENT
50 y/o female with PMHx of fibromyalgia presents to the ED c/o suicidal and homicidal thoughts xfew days. EMS reports pt made suicidal and homicidal statements to psychiatric NP, told triage she wants to hurt herself. Pt reports she thought of driving her car in front of a train the other day. Pt thought of hurting her psychiatric NP with her high heels in the office today, and states she pictures hurting people in her head. Pt was seen at [facility] on 2/7/19 for SI, intermittent depression, agitation x2 weeks. On Abilify, Lamictal, Gabapentin, Levothyroxine. Pt was placed on Methylfolate a few weeks ago by her psych NP. Pt has been compliant with her meds. Non smoker, no EtOH, no illicit drug use. 50 y/o female with PMHx of fibromyalgia, ulcerative colitis, retinal vasculitis in 2011, Hashimoto's thyroiditis, bipolar disorder, depression, schizoaffective disorder presents to the ED c/o suicidal and homicidal thoughts xfew days. EMS reports pt made suicidal and homicidal statements to psychiatric NP, told triage she wants to hurt herself. Pt reports she thought of driving her car in front of a train the other day. Pt thought of hurting her psychiatric NP with her high heels in the office today, and states she pictures hurting people in her head. Pt was seen at [facility] on 2/7/19 for SI, intermittent depression, agitation x2 weeks. On Abilify, Lamictal, Gabapentin, Levothyroxine. Pt was placed on Methylfolate a few weeks ago by her psych NP. Pt has been compliant with her meds. Non smoker, no EtOH, no illicit drug use.

## 2019-03-28 NOTE — ED PROVIDER NOTE - SHIFT CHANGE DETAILS
patient signed out to Dr. Menjivar pending psych and reassess. Zackary Disla M.D., Attending Physician

## 2019-03-28 NOTE — ED PROVIDER NOTE - NS ED ROS FT
Constitutional: No fever or chills  Eyes: No visual changes  HEENT: No throat pain  CV: No chest pain  Resp: No SOB no cough  GI: No abd pain, nausea or vomiting  : No dysuria  MSK: No musculoskeletal pain  Skin: No rash  Neuro: No headache   Psych: +SI +HI

## 2019-03-28 NOTE — ED PROVIDER NOTE - NS_ ATTENDINGSCRIBEDETAILS _ED_A_ED_FT
I, Zackary Disla MD,  performed the initial face to face bedside interview with this patient regarding history of present illness, review of symptoms and relevant past medical, social and family history.  I completed an independent physical examination.  I was the initial provider who evaluated this patient.  The history, relevant review of systems, past medical and surgical history, medical decision making, and physical examination was documented by the scribe in my presence and I attest to the accuracy of the documentation.

## 2019-03-28 NOTE — ED BEHAVIORAL HEALTH ASSESSMENT NOTE - DESCRIPTION
tearful,  cooperative Lupus, DMII, Vasculitis in eye, Hashimoto's living with BF   Unemployed has 2 children 13 and 10

## 2019-03-28 NOTE — ED BEHAVIORAL HEALTH ASSESSMENT NOTE - HPI (INCLUDE ILLNESS QUALITY, SEVERITY, DURATION, TIMING, CONTEXT, MODIFYING FACTORS, ASSOCIATED SIGNS AND SYMPTOMS)
49 year-old  female, , living with boyfriend, history of Bipolar, history of multiple in-patient hospitalizations, reported history of suicide attempt (overdose), presents in ER self-referred for depression with and suicidal ideation and thoughts and vision of self driving under the train, having intrusive thoughts to harm other people, and urges to cut herself,  seeking psych admission.    Patient reports having intrusive thoughts "they are not my own thoughts, I don't want them, I get very upset for having them". She is also having "vision to hurt people".   Pt also explains tat she has periods of "Not hearing voices, like in my ear, but it is in my head "kill him" and I don't want to have that". Pt is tearful in ER.   Mood is depressed, worsening again for the past several weeks with inability to function. Pt attends [employer] where she sees Johanne Toledo [phone].   She sees NP Zina Resendiz in community (does not have phone ).   PT has insomnia, appetite is por. gets anxious, cruise, low energy and she is requesting voluntary admission.     Past med Hx: ulcerative colitis, Lupus, Hashimoto's disease, fibromyalgia, retinal vasculitis.
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
## 2019-03-28 NOTE — ED BEHAVIORAL HEALTH ASSESSMENT NOTE - SUMMARY
49 year-old  female, , living with boyfriend, history of Bipolar, history of multiple in-patient hospitalizations, reported history of suicide attempt (overdose), presents in ER self-referred for depression with and suicidal ideation and thoughts and vision of self driving under the train, having intrusive thoughts to harm other people, and urges to cut herself,  seeking psych admission.    Patient reports having intrusive thoughts "they are not my own thoughts, I don't want them, I get very upset for having them". She is also having "vision to hurt people".   Pt also explains tat she has periods of "Not hearing voices, like in my ear, but it is in my head "kill him" and I don't want to have that". Pt is tearful in ER.   She meets criteria for inpatient level of care.

## 2019-03-28 NOTE — ED ADULT NURSE NOTE - ED STAT RN HANDOFF DETAILS
Report given at 1755, RN in ED asked to hold pt in ED until around [id number] since the floor just got an influx of pt's. Pt's secure belongings taken home by boyfriend, pt's clothing returned back to pt for admission. Pt aware of POC. Pt ordered dinner, dinner to be sent up to [facility] so pt could eat upstairs.

## 2019-03-28 NOTE — ED BEHAVIORAL HEALTH ASSESSMENT NOTE - OTHER
sister with SO Mika Colon Not observed Intrusive thoughts COREEN to coordinate with [facility] change of clinical tx to Dr Kern

## 2019-03-28 NOTE — ED ADULT NURSE NOTE - NSIMPLEMENTINTERV_GEN_ALL_ED
Implemented All Universal Safety Interventions:  Era to call system. Call bell, personal items and telephone within reach. Instruct patient to call for assistance. Room bathroom lighting operational. Non-slip footwear when patient is off stretcher. Physically safe environment: no spills, clutter or unnecessary equipment. Stretcher in lowest position, wheels locked, appropriate side rails in place.
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
## 2019-03-28 NOTE — ED PROVIDER NOTE - CLINICAL SUMMARY MEDICAL DECISION MAKING FREE TEXT BOX
50 y/o female with PMHx of fibromyalgia, ulcerative colitis, retinal vasculitis in 2011, Hashimoto's thyroiditis, bipolar disorder, depression, schizoaffective disorder presents to ED BIB police for SI, HI. Pt states she has been having worsening suicidal thoughts over the past few days. Went to psychiatrist today where she made suicidal and homicidal comments and police was called. Pt had thoughts of driving the car into a train. Last hospitalization in January. Concern for pt's safety, will obtain psych consult, psych labs, reassess.

## 2019-03-28 NOTE — ED PROVIDER NOTE - ADDITIONAL RISK FACTOR FREE TEXT BOX
50 y/o female with PMHx of fibromyalgia presents to ED BIB police for SI, HI. Pt states she has been having worsening suicidal thoughts over the past few days. Went to psychiatrist today where she made suicidal and homicidal comments and police was called. Pt had thoughts of driving the car into a train. Last hospitalization in January. Concern for pt's safety, will obtain psych consult, psych labs, reassess. 50 y/o female with PMHx of fibromyalgia, ulcerative colitis, retinal vasculitis in 2011, Hashimoto's thyroiditis, bipolar disorder, depression, schizoaffective disorder presents to ED BIB police for SI, HI. Pt states she has been having worsening suicidal thoughts over the past few days. Went to psychiatrist today where she made suicidal and homicidal comments and police was called. Pt had thoughts of driving the car into a train. Last hospitalization in January. Concern for pt's safety, will obtain psych consult, psych labs, reassess.

## 2019-03-29 PROCEDURE — 93010 ELECTROCARDIOGRAM REPORT: CPT

## 2019-03-29 PROCEDURE — 99232 SBSQ HOSP IP/OBS MODERATE 35: CPT

## 2019-03-29 RX ORDER — LAMOTRIGINE 25 MG/1
100 TABLET, ORALLY DISINTEGRATING ORAL AT BEDTIME
Qty: 0 | Refills: 0 | Status: DISCONTINUED | OUTPATIENT
Start: 2019-03-29 | End: 2019-04-03

## 2019-03-29 RX ORDER — FLUVOXAMINE MALEATE 25 MG/1
75 TABLET ORAL AT BEDTIME
Qty: 0 | Refills: 0 | Status: DISCONTINUED | OUTPATIENT
Start: 2019-03-29 | End: 2019-04-03

## 2019-03-29 RX ADMIN — Medication 375 MILLIGRAM(S): at 17:39

## 2019-03-29 RX ADMIN — ARIPIPRAZOLE 30 MILLIGRAM(S): 15 TABLET ORAL at 21:23

## 2019-03-29 RX ADMIN — Medication 375 MILLIGRAM(S): at 06:22

## 2019-03-29 RX ADMIN — Medication 375 MILLIGRAM(S): at 07:22

## 2019-03-29 RX ADMIN — Medication 400 MILLIGRAM(S): at 17:39

## 2019-03-29 RX ADMIN — Medication 100 MILLIGRAM(S): at 21:23

## 2019-03-29 RX ADMIN — FLUVOXAMINE MALEATE 75 MILLIGRAM(S): 25 TABLET ORAL at 21:41

## 2019-03-29 RX ADMIN — LAMOTRIGINE 100 MILLIGRAM(S): 25 TABLET, ORALLY DISINTEGRATING ORAL at 21:23

## 2019-03-29 RX ADMIN — GABAPENTIN 300 MILLIGRAM(S): 400 CAPSULE ORAL at 21:23

## 2019-03-29 RX ADMIN — GABAPENTIN 300 MILLIGRAM(S): 400 CAPSULE ORAL at 06:22

## 2019-03-29 RX ADMIN — Medication 75 MICROGRAM(S): at 06:22

## 2019-03-29 RX ADMIN — Medication 400 MILLIGRAM(S): at 06:22

## 2019-03-30 RX ADMIN — Medication 375 MILLIGRAM(S): at 08:51

## 2019-03-30 RX ADMIN — LAMOTRIGINE 100 MILLIGRAM(S): 25 TABLET, ORALLY DISINTEGRATING ORAL at 21:07

## 2019-03-30 RX ADMIN — Medication 375 MILLIGRAM(S): at 22:54

## 2019-03-30 RX ADMIN — GABAPENTIN 300 MILLIGRAM(S): 400 CAPSULE ORAL at 08:50

## 2019-03-30 RX ADMIN — Medication 375 MILLIGRAM(S): at 21:06

## 2019-03-30 RX ADMIN — ARIPIPRAZOLE 30 MILLIGRAM(S): 15 TABLET ORAL at 21:06

## 2019-03-30 RX ADMIN — Medication 100 MILLIGRAM(S): at 21:07

## 2019-03-30 RX ADMIN — FLUVOXAMINE MALEATE 75 MILLIGRAM(S): 25 TABLET ORAL at 21:07

## 2019-03-30 RX ADMIN — GABAPENTIN 300 MILLIGRAM(S): 400 CAPSULE ORAL at 13:01

## 2019-03-30 RX ADMIN — Medication 400 MILLIGRAM(S): at 08:51

## 2019-03-30 RX ADMIN — GABAPENTIN 300 MILLIGRAM(S): 400 CAPSULE ORAL at 21:07

## 2019-03-30 RX ADMIN — Medication 375 MILLIGRAM(S): at 09:21

## 2019-03-30 RX ADMIN — Medication 75 MICROGRAM(S): at 05:57

## 2019-03-30 RX ADMIN — Medication 400 MILLIGRAM(S): at 21:06

## 2019-03-31 RX ADMIN — Medication 375 MILLIGRAM(S): at 09:12

## 2019-03-31 RX ADMIN — GABAPENTIN 300 MILLIGRAM(S): 400 CAPSULE ORAL at 09:03

## 2019-03-31 RX ADMIN — LAMOTRIGINE 100 MILLIGRAM(S): 25 TABLET, ORALLY DISINTEGRATING ORAL at 20:54

## 2019-03-31 RX ADMIN — FLUVOXAMINE MALEATE 75 MILLIGRAM(S): 25 TABLET ORAL at 20:54

## 2019-03-31 RX ADMIN — Medication 375 MILLIGRAM(S): at 20:55

## 2019-03-31 RX ADMIN — Medication 75 MICROGRAM(S): at 05:38

## 2019-03-31 RX ADMIN — GABAPENTIN 300 MILLIGRAM(S): 400 CAPSULE ORAL at 20:54

## 2019-03-31 RX ADMIN — ARIPIPRAZOLE 30 MILLIGRAM(S): 15 TABLET ORAL at 20:55

## 2019-03-31 RX ADMIN — GABAPENTIN 300 MILLIGRAM(S): 400 CAPSULE ORAL at 13:51

## 2019-03-31 RX ADMIN — Medication 400 MILLIGRAM(S): at 20:54

## 2019-03-31 RX ADMIN — Medication 400 MILLIGRAM(S): at 09:03

## 2019-03-31 NOTE — H&P ADULT - NSHPPHYSICALEXAM_GEN_ALL_CORE
Vital Signs Last 24 Hrs  T(C): 36.4 (31 Mar 2019 09:18), Max: 36.4 (31 Mar 2019 09:18)  T(F): 97.5 (31 Mar 2019 09:18), Max: 97.5 (31 Mar 2019 09:18)  RR: 18 (31 Mar 2019 09:18) (18 - 18)  SpO2: 100% (31 Mar 2019 09:18) (100% - 100%)    PHYSICAL EXAM:    GENERAL: Comfortable, no acute distress   HEAD:  Normocephalic, atraumatic  EYES: EOMI, PERRLA  HEENT: Moist mucous membranes  NECK: Supple, No JVD  NERVOUS SYSTEM:  Alert & Oriented X3, Motor Strength 5/5 B/L upper and lower extremities  CHEST/LUNG: Clear to auscultation bilaterally  HEART: Regular rate and rhythm  ABDOMEN: Soft, Nontender, Nondistended, Bowel sounds present  GENITOURINARY: Voiding, no palpable bladder  EXTREMITIES:   No clubbing, cyanosis, or edema  MUSCULOSKELETAL- No muscle tenderness, no joint tenderness  SKIN-no rash

## 2019-03-31 NOTE — H&P ADULT - HISTORY OF PRESENT ILLNESS
c/c: suicidal ideation    HPI: 49F, pmh of fibromyalgia, ulcerative colitis with no recent flares, retinal vasculitis, hypothyroidism, bipolar/depression, schizoaffective d/o who presented to the ED with suicidal and homicidal ideation. Per documentation, EMS reports pt made suicidal and homicidal statements to psychiatric NP, told triage she wants to hurt herself. Pt reports she thought of driving her car in front of a train the other day. Pt thought of hurting her psychiatric NP with her high heels in the office  and stated she pictures hurting people in her head. She was admitted to . pt seen and examined on 5N. FElt well. no sob/chest pain/cough/f/c/r/dysuria/frequency/abd pain/n/v.     ROS: all 10 systems reviewed and is as above otherwise negative.     PMH: as above  PSH: 2 sections, Right femur pinning  F/H: sibling-seizure/colitis  Social: quit smoking 15 years ago, h/o etoh abuse, now sober

## 2019-03-31 NOTE — H&P ADULT - ASSESSMENT
49F, pmh as above a/w:    1. Suicidal/homicidal ideation with h/o bipolar/depression/schizoaffective d/o:  -no medical contraindications for 5N admissino  -mx per psychiatry    2. Hypothyroid:  -continue synthroid    3. Ulcerative colitis:  -no s/o flare  -continue mesalamine.    WIll sign off, please reconsult prn.

## 2019-03-31 NOTE — H&P ADULT - NSHPLABSRESULTS_GEN_ALL_CORE
LABS:    Complete Blood Count + Automated Diff (03.28.19 @ 13:47)    WBC Count: 7.66 K/uL    RBC Count: 4.55 M/uL    Hemoglobin: 13.6 g/dL    Hematocrit: 41.2 %    Mean Cell Volume: 90.5 fl    Mean Cell Hemoglobin: 29.9 pg    Mean Cell Hemoglobin Conc: 33.0 gm/dL    Red Cell Distrib Width: 13.2 %    Platelet Count - Automated: 239 K/uL    Comprehensive Metabolic Panel (03.28.19 @ 13:47)    Sodium, Serum: 142 mmol/L    Potassium, Serum: 4.0 mmol/L    Chloride, Serum: 112 mmol/L    Carbon Dioxide, Serum: 22 mmol/L    Anion Gap, Serum: 8 mmol/L    Blood Urea Nitrogen, Serum: 20 mg/dL    Creatinine, Serum: 0.85 mg/dL    Glucose, Serum: 98 mg/dL    Calcium, Total Serum: 8.4 mg/dL    Protein Total, Serum: 6.8 gm/dL    Albumin, Serum: 3.8 g/dL    Bilirubin Total, Serum: 0.2 mg/dL    Alkaline Phosphatase, Serum: 61 U/L    Aspartate Aminotransferase (AST/SGOT): 17 U/L    Alanine Aminotransferase (ALT/SGPT): 24 U/L    12 Lead ECG (03.29.19 @ 09:10) >  Diagnosis Line Normal sinus rhythm  Possible Left atrial enlargement  Borderline ECG  When compared with ECG of 28-MAR-2019 13:57,  No significant change was found  Confirmed by LEVI GIRALDO MD ([id number]) on 3/29/2019 9:46:46 AM

## 2019-04-01 PROCEDURE — 99231 SBSQ HOSP IP/OBS SF/LOW 25: CPT

## 2019-04-01 RX ADMIN — LAMOTRIGINE 100 MILLIGRAM(S): 25 TABLET, ORALLY DISINTEGRATING ORAL at 21:14

## 2019-04-01 RX ADMIN — Medication 375 MILLIGRAM(S): at 00:03

## 2019-04-01 RX ADMIN — Medication 375 MILLIGRAM(S): at 11:00

## 2019-04-01 RX ADMIN — GABAPENTIN 300 MILLIGRAM(S): 400 CAPSULE ORAL at 21:14

## 2019-04-01 RX ADMIN — GABAPENTIN 300 MILLIGRAM(S): 400 CAPSULE ORAL at 14:53

## 2019-04-01 RX ADMIN — Medication 400 MILLIGRAM(S): at 09:48

## 2019-04-01 RX ADMIN — FLUVOXAMINE MALEATE 75 MILLIGRAM(S): 25 TABLET ORAL at 21:15

## 2019-04-01 RX ADMIN — Medication 375 MILLIGRAM(S): at 21:14

## 2019-04-01 RX ADMIN — Medication 100 MILLIGRAM(S): at 00:05

## 2019-04-01 RX ADMIN — Medication 100 MILLIGRAM(S): at 21:14

## 2019-04-01 RX ADMIN — Medication 375 MILLIGRAM(S): at 09:48

## 2019-04-01 RX ADMIN — ARIPIPRAZOLE 30 MILLIGRAM(S): 15 TABLET ORAL at 21:14

## 2019-04-01 RX ADMIN — GABAPENTIN 300 MILLIGRAM(S): 400 CAPSULE ORAL at 09:48

## 2019-04-01 RX ADMIN — Medication 75 MICROGRAM(S): at 05:37

## 2019-04-01 RX ADMIN — Medication 400 MILLIGRAM(S): at 21:14

## 2019-04-02 PROCEDURE — 99231 SBSQ HOSP IP/OBS SF/LOW 25: CPT

## 2019-04-02 RX ORDER — GENTAMICIN SULFATE 3 MG/ML
1 SOLUTION/ DROPS OPHTHALMIC
Qty: 0 | Refills: 0 | Status: DISCONTINUED | OUTPATIENT
Start: 2019-04-02 | End: 2019-04-03

## 2019-04-02 RX ADMIN — GABAPENTIN 300 MILLIGRAM(S): 400 CAPSULE ORAL at 20:58

## 2019-04-02 RX ADMIN — GABAPENTIN 300 MILLIGRAM(S): 400 CAPSULE ORAL at 13:46

## 2019-04-02 RX ADMIN — Medication 100 MILLIGRAM(S): at 21:08

## 2019-04-02 RX ADMIN — Medication 75 MICROGRAM(S): at 06:20

## 2019-04-02 RX ADMIN — Medication 375 MILLIGRAM(S): at 23:52

## 2019-04-02 RX ADMIN — GABAPENTIN 300 MILLIGRAM(S): 400 CAPSULE ORAL at 08:25

## 2019-04-02 RX ADMIN — FLUVOXAMINE MALEATE 75 MILLIGRAM(S): 25 TABLET ORAL at 20:59

## 2019-04-02 RX ADMIN — ARIPIPRAZOLE 30 MILLIGRAM(S): 15 TABLET ORAL at 20:58

## 2019-04-02 RX ADMIN — Medication 400 MILLIGRAM(S): at 20:57

## 2019-04-02 RX ADMIN — Medication 375 MILLIGRAM(S): at 20:58

## 2019-04-02 RX ADMIN — Medication 375 MILLIGRAM(S): at 08:25

## 2019-04-02 RX ADMIN — Medication 375 MILLIGRAM(S): at 01:53

## 2019-04-02 RX ADMIN — LAMOTRIGINE 100 MILLIGRAM(S): 25 TABLET, ORALLY DISINTEGRATING ORAL at 20:58

## 2019-04-02 RX ADMIN — Medication 400 MILLIGRAM(S): at 08:25

## 2019-04-02 NOTE — DISCHARGE NOTE BEHAVIORAL HEALTH - NSBHDCADDR2FT_A_CORE
[address] [place] [address]  Dr. Stroud  *Patient prescribed 2 weeks of meds plus a refill.  Please call Francesco Linares NP at [phone] the day before meds run out for an additional week of medication as your doctors appointment at [facility] is not until May 7.

## 2019-04-02 NOTE — DISCHARGE NOTE BEHAVIORAL HEALTH - HPI (INCLUDE ILLNESS QUALITY, SEVERITY, DURATION, TIMING, CONTEXT, MODIFYING FACTORS, ASSOCIATED SIGNS AND SYMPTOMS)
49 year-old  female, , living with boyfriend, history of Bipolar, history of multiple in-patient hospitalizations, reported history of suicide attempt (overdose), presents in ER self-referred for depression with and suicidal ideation and thoughts and vision of self driving under the train, having intrusive thoughts to harm other people, and urges to cut herself,  seeking psych admission.    Patient reports having intrusive thoughts "they are not my own thoughts, I don't want them, I get very upset for having them". She is also having "vision to hurt people".   Pt also explains tat she has periods of "Not hearing voices, like in my ear, but it is in my head "kill him" and I don't want to have that". Pt is tearful in ER.   Mood is depressed, worsening again for the past several weeks with inability to function. Pt attends [facility] where she sees Johanne Toledo [phone].   She sees NP Zina Resendiz in community (does not have phone ).   PT has insomnia, appetite is por. gets anxious, cruise, low energy and she is requesting voluntary admission.     Past med Hx: ulcerative colitis, Lupus, Hashimoto's disease, fibromyalgia, retinal vasculitis

## 2019-04-02 NOTE — DISCHARGE NOTE BEHAVIORAL HEALTH - NSBHDCVIOLSAFETYFT_PSY_A_CORE
Advised to return to hospital or go to nearest ED or call 911 or [phone] LIFENET or [phone] TALK hotlines for any severe, worsening or persistent symptoms including suicidal/homicidal ideations, intent or plans. Patient verbalized understanding of instructions.

## 2019-04-02 NOTE — DISCHARGE NOTE BEHAVIORAL HEALTH - NSBHDCDXVALIDYESFT_PSY_A_CORE
Primary Dx Schizoaffective disorder, bipolar type F25.0. Secondary Dx 1 PTSD (post-traumatic stress disorder) F43.10. 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 Secondary Dx 3 Mixed obsessional thoughts and acts F42.2.    Axis II:  Primary Dx Borderline personality disorder F60.3.

## 2019-04-02 NOTE — DISCHARGE NOTE BEHAVIORAL HEALTH - NSBHDCCRISISPLAN1FT_PSY_A_CORE
Advised to return to hospital or go to nearest ED or call 911 or [phone] LIFENET or [phone] TALK hotlines for any severe, worsening or persistent symptoms including suicidal/homicidal ideations, intent or plans. Patient verbalized understanding of instructions.

## 2019-04-02 NOTE — DISCHARGE NOTE BEHAVIORAL HEALTH - PAST PSYCHIATRIC HISTORY
Patient with multiple past psych admissions, last admission to  1/2019.  Patient attends outpatient treatment at [facility].

## 2019-04-02 NOTE — DISCHARGE NOTE BEHAVIORAL HEALTH - NSBHDCTESTSFT_PSY_A_CORE
Hemoglobin A1C, Whole Blood: 5.2  Thyroid Stimulating Hormone, Serum: 0.92 uU/mL (03.28.19 @ 14:53)  Lipid Profile in AM (01.10.19 @ 07:14)    Total Cholesterol/HDL Ratio Measurement: 2.7 RATIO    Cholesterol, Serum: 209 mg/dL    Triglycerides, Serum: 153 mg/dL    HDL Cholesterol, Serum: 77: HDL Levels >/= 60 mg/dL are considered beneficial and a "negative" risk  factor.  Effective 08/15/2018: New reference range and interpretive comment. mg/dL    Direct LDL: 101: LDL

## 2019-04-02 NOTE — CONSULT NOTE ADULT - ASSESSMENT
Assessment:                        1. L eye acute conjunctivitis                       2.                       3.                       4.    Plan:    Rx/ Gemtamycin eye dropps qid x 5 days  to both eyes . F/u if no improvement.

## 2019-04-02 NOTE — DISCHARGE NOTE BEHAVIORAL HEALTH - MEDICATION SUMMARY - MEDICATIONS TO CHANGE
I will SWITCH the dose or number of times a day I take the medications listed below when I get home from the hospital:    fluvoxaMINE 50 mg oral tablet  -- 1 tab(s) by mouth once a day (at bedtime)

## 2019-04-02 NOTE — CHART NOTE - NSCHARTNOTEFT_GEN_A_CORE
Faxed clinical to [facility].  Left message for Megan Rios, , that patient would love to see Dr. Stroud for meds as well as come to the program.  Awaiting a call back.

## 2019-04-02 NOTE — DISCHARGE NOTE BEHAVIORAL HEALTH - NSBHDCADDR1FT_A_CORE
[address] [place] [address] [address] [place] [address]  Therapist : Jasmina Toledo [phone]
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
## 2019-04-02 NOTE — DISCHARGE NOTE BEHAVIORAL HEALTH - REASON FOR ADMISSION
Intrusive thoughts about harming self and others.  "I have been having a bad couple of weeks, I have trouble functioning.  I can barely go to yoga".

## 2019-04-02 NOTE — DISCHARGE NOTE BEHAVIORAL HEALTH - SECONDARY DIAGNOSIS.
PTSD (post-traumatic stress disorder) Dissociative disorder Mixed obsessional thoughts and acts Hashimoto's disease

## 2019-04-02 NOTE — DISCHARGE NOTE BEHAVIORAL HEALTH - MEDICATION SUMMARY - MEDICATIONS TO TAKE
I will START or STAY ON the medications listed below when I get home from the hospital:    mesalamine 400 mg oral delayed release capsule  -- 1 cap(s) by mouth 2 times a day  -- Indication: For Ulcerative Colitis    naproxen 375 mg oral tablet  -- 1 tab(s) by mouth 2 times a day  -- Indication: For Generalized Pain     gabapentin 300 mg oral capsule  -- 1 cap(s) by mouth once a day in the morning   -- Indication: For Anxiety / Neuropathic Pain    lamoTRIgine 100 mg oral tablet  -- 1 tab(s) by mouth once a day (at bedtime)  -- Indication: For Schizoaffective disorder, bipolar type    fluvoxaMINE 25 mg oral tablet  -- 3 tab(s) by mouth once a day (at bedtime)  -- Indication: For Mixed obsessional thoughts and acts    traZODone 100 mg oral tablet  -- 1 tab(s) by mouth once a day (at bedtime)  -- Indication: For Insomnia    Abilify 30 mg oral tablet  -- 1 tab(s) by mouth once a day (at bedtime)   -- Indication: For Schizoaffective disorder, bipolar type    Colace 100 mg oral capsule  -- 1 cap(s) by mouth once a day, As needed, Constipation  -- Indication: For Constipation    Gentak 0.3% ophthalmic solution  -- 1 drop(s) to each affected eye 4 times a day  -- Indication: For Conjuctivitis     levothyroxine 75 mcg (0.075 mg) oral tablet  -- 1 tab(s) by mouth once a day  -- Indication: For Hashimoto's disease I will START or STAY ON the medications listed below when I get home from the hospital:    mesalamine 400 mg oral delayed release capsule  -- 1 cap(s) by mouth 2 times a day  -- Indication: For Ulcerative Colitis    naproxen 375 mg oral tablet  -- 1 tab(s) by mouth 2 times a day  -- Indication: For Generalized Pain     lamoTRIgine 100 mg oral tablet  -- 1 tab(s) by mouth once a day (at bedtime)  -- Indication: For Schizoaffective disorder, bipolar type    gabapentin 300 mg oral capsule  -- 1 cap(s) by mouth 3 times a day  -- Indication: For Neuropathic pain / Anxiety     traZODone 100 mg oral tablet  -- 1 tab(s) by mouth once a day (at bedtime)  -- Indication: For Schizoaffective disorder, bipolar type    fluvoxaMINE 25 mg oral tablet  -- 3 tab(s) by mouth once a day (at bedtime)  -- Indication: For Schizoaffective disorder, bipolar type    Abilify 30 mg oral tablet  -- 1 tab(s) by mouth once a day (at bedtime)   -- Indication: For Schizoaffective disorder, bipolar type    Colace 100 mg oral capsule  -- 1 cap(s) by mouth once a day, As needed, Constipation  -- Indication: For Constipation    Gentak 0.3% ophthalmic solution  -- 1 drop(s) to each affected eye 4 times a day  -- Indication: For Conjuctivitis     levothyroxine 75 mcg (0.075 mg) oral tablet  -- 1 tab(s) by mouth once a day  -- Indication: For Hashimoto's disease

## 2019-04-02 NOTE — CONSULT NOTE ADULT - SUBJECTIVE AND OBJECTIVE BOX
CC:Pink eye     HPI: HPI: 49F, pmh of fibromyalgia, ulcerative colitis with no recent flares, retinal vasculitis, hypothyroidism, bipolar/depression, schizoaffective d/o who admitted to n and today she developed pink eye.     ROS: all 10 systems reviewed and is as above otherwise negative.     PMH: as above  PSH: 2 sections, Right femur pinning  F/H: sibling-seizure/colitis  Social: quit smoking 15 years ago, h/o etoh abuse, now sober (31 Mar 2019 12:03)      PMH: as above  PSH:   Meds: per reconciliation sheet, noted below  MEDICATIONS  (STANDING):  ARIPiprazole 30 milliGRAM(s) Oral at bedtime  fluvoxaMINE 75 milliGRAM(s) Oral at bedtime  gabapentin 300 milliGRAM(s) Oral three times a day  gentamicin 0.3% Ophthalmic Solution 1 Drop(s) Both EYES four times a day  lamoTRIgine 100 milliGRAM(s) Oral at bedtime  levothyroxine 75 MICROGram(s) Oral daily  mesalamine DR Capsule 400 milliGRAM(s) Oral two times a day  naproxen 375 milliGRAM(s) Oral two times a day  traZODone Oral Tab/Cap - Peds 100 milliGRAM(s) Oral at bedtime    MEDICATIONS  (PRN):  diphenhydrAMINE   Injectable 50 milliGRAM(s) IntraMuscular every 6 hours PRN Agitation  docusate sodium 100 milliGRAM(s) Oral daily PRN Constipation  haloperidol    Injectable 5 milliGRAM(s) IntraMuscular every 6 hours PRN Agitation  LORazepam   Injectable 2 milliGRAM(s) IntraMuscular every 4 hours PRN Agitation    Allergies    Gluten (Unknown)  Milk (Unknown)  penicillin (Unknown)    Intolerances      Social: no smoking, no alcohol, no illegal drugs; no recent travel, no exposure to TB  FAMILY HISTORY:  Family history of colitis (Sibling)  Family history of seizures (Sibling)    ROS: the patient denies fever, no chills, no HA, no dizziness, no sore throat, no blurry vision, no CP, no palpitations, no SOB, no cough, no abdominal pain, no diarrhea, no N/V, no dysuria, no leg pain, no claudication, no rash, no joint aches, no rectal pain or bleeding, no night sweats    Vital Signs Last 24 Hrs  T(C): 36.8 (02 Apr 2019 07:29), Max: 36.8 (02 Apr 2019 07:29)  T(F): 98.2 (02 Apr 2019 07:29), Max: 98.2 (02 Apr 2019 07:29)  HR: --  BP: --  BP(mean): --  RR: 16 (02 Apr 2019 07:29) (16 - 16)  SpO2: 99% (02 Apr 2019 07:29) (99% - 99%)  Daily     Daily     PE:    Constitutional:   HEENT:  EOMI, PERRLA.  L eye conjunctiva injected with mild drainage.  Neck: supple  Back: no tenderness  Respiratory: clear  Cardiovascular: S1S2 regular, no murmurs  Abdomen: soft, not tender, not distended, positive BS  Genitourinary: deferred  Rectal: deferred  Musculoskeletal: no muscle tenderness, no joint swelling or tenderness  Extremities: no pedal edema  Neurological: AxOx3, moving all extremities, no focal deficits  Skin: no rashes    Labs:                   Radiology:

## 2019-04-02 NOTE — DISCHARGE NOTE BEHAVIORAL HEALTH - NSBHDCVIOLPROTECTFT_PSY_A_CORE
Patient is intelligent, well connected to aftercare at [facility], aware and insightful of her symptoms and knows when she needs to come into the hospital.

## 2019-04-02 NOTE — DISCHARGE NOTE BEHAVIORAL HEALTH - NSBHDCSUICSAFETYFT_PSY_A_CORE
Advised to return to hospital or go to nearest ED or call 911 or [phone] LIFENET or [phone] TALK hotlines for any severe, worsening or persistent symptoms including suicidal/homicidal ideations, intent or plans. Patient verbalized understanding of instructions.

## 2019-04-02 NOTE — DISCHARGE NOTE BEHAVIORAL HEALTH - NSBHDCCONDITIONFT_PSY_A_CORE
Pt was interactive with other peers; patient is appropriate; patient reports no longer experiencing intrusive thoughts; reports being happy with increase of Luvox; reports wanting new NP when discharged; Denies depressed mood, stating being okay. Reports anxious mood however. Denies suicidal ideation or homicidal ideation. Reports good sleep.

## 2019-04-03 VITALS — TEMPERATURE: 98 F | RESPIRATION RATE: 16 BRPM | OXYGEN SATURATION: 100 %

## 2019-04-03 PROCEDURE — 99231 SBSQ HOSP IP/OBS SF/LOW 25: CPT

## 2019-04-03 RX ORDER — FLUVOXAMINE MALEATE 25 MG/1
3 TABLET ORAL
Qty: 45 | Refills: 1
Start: 2019-04-03 | End: 2019-05-02

## 2019-04-03 RX ORDER — FLUVOXAMINE MALEATE 25 MG/1
3 TABLET ORAL
Qty: 45 | Refills: 1
Start: 2019-04-03 | End: 2022-09-22

## 2019-04-03 RX ORDER — MESALAMINE 400 MG
2000 TABLET, DELAYED RELEASE (ENTERIC COATED) ORAL
Qty: 0 | Refills: 0 | COMMUNITY

## 2019-04-03 RX ORDER — LAMOTRIGINE 25 MG/1
1 TABLET, ORALLY DISINTEGRATING ORAL
Qty: 15 | Refills: 1
Start: 2019-04-03 | End: 2019-05-02

## 2019-04-03 RX ORDER — GENTAMICIN SULFATE 3 MG/ML
1 SOLUTION/ DROPS OPHTHALMIC
Qty: 0 | Refills: 0 | DISCHARGE
Start: 2019-04-03

## 2019-04-03 RX ORDER — LAMOTRIGINE 25 MG/1
1 TABLET, ORALLY DISINTEGRATING ORAL
Qty: 15 | Refills: 0 | OUTPATIENT
Start: 2019-04-03 | End: 2019-04-17

## 2019-04-03 RX ORDER — TRAZODONE HCL 50 MG
1 TABLET ORAL
Qty: 15 | Refills: 0 | OUTPATIENT
Start: 2019-04-03 | End: 2019-04-17

## 2019-04-03 RX ORDER — ARIPIPRAZOLE 15 MG/1
1 TABLET ORAL
Qty: 15 | Refills: 0 | OUTPATIENT
Start: 2019-04-03 | End: 2019-04-17

## 2019-04-03 RX ORDER — GABAPENTIN 400 MG/1
1 CAPSULE ORAL
Qty: 45 | Refills: 1 | OUTPATIENT
Start: 2019-04-03 | End: 2019-05-02

## 2019-04-03 RX ORDER — MESALAMINE 400 MG
1 TABLET, DELAYED RELEASE (ENTERIC COATED) ORAL
Qty: 0 | Refills: 0 | DISCHARGE
Start: 2019-04-03

## 2019-04-03 RX ORDER — TRAZODONE HCL 50 MG
1 TABLET ORAL
Qty: 15 | Refills: 1
Start: 2019-04-03 | End: 2019-05-02

## 2019-04-03 RX ORDER — ARIPIPRAZOLE 15 MG/1
1 TABLET ORAL
Qty: 15 | Refills: 1
Start: 2019-04-03 | End: 2019-05-02

## 2019-04-03 RX ORDER — FLUVOXAMINE MALEATE 25 MG/1
3 TABLET ORAL
Qty: 45 | Refills: 0 | OUTPATIENT
Start: 2019-04-03 | End: 2019-04-17

## 2019-04-03 RX ORDER — FLUVOXAMINE MALEATE 25 MG/1
3 TABLET ORAL
Qty: 45 | Refills: 0
Start: 2019-04-03 | End: 2022-09-07

## 2019-04-03 RX ORDER — ARIPIPRAZOLE 15 MG/1
1 TABLET ORAL
Qty: 15 | Refills: 1
Start: 2019-04-03 | End: 2022-09-22

## 2019-04-03 RX ORDER — GABAPENTIN 400 MG/1
1 CAPSULE ORAL
Qty: 0 | Refills: 0 | DISCHARGE
Start: 2019-04-03

## 2019-04-03 RX ADMIN — Medication 375 MILLIGRAM(S): at 08:41

## 2019-04-03 RX ADMIN — GABAPENTIN 300 MILLIGRAM(S): 400 CAPSULE ORAL at 08:41

## 2019-04-03 RX ADMIN — GENTAMICIN SULFATE 1 DROP(S): 3 SOLUTION/ DROPS OPHTHALMIC at 12:21

## 2019-04-03 RX ADMIN — Medication 75 MICROGRAM(S): at 06:26

## 2019-04-03 RX ADMIN — GENTAMICIN SULFATE 1 DROP(S): 3 SOLUTION/ DROPS OPHTHALMIC at 08:42

## 2019-04-03 RX ADMIN — Medication 400 MILLIGRAM(S): at 08:41

## 2019-04-03 RX ADMIN — GABAPENTIN 300 MILLIGRAM(S): 400 CAPSULE ORAL at 12:22

## 2019-04-03 NOTE — PROGRESS NOTE BEHAVIORAL HEALTH - PERCEPTIONS
No abnormalities
No abnormalities
Auditory hallucinations
No abnormalities
Auditory hallucinations

## 2019-04-03 NOTE — PROGRESS NOTE BEHAVIORAL HEALTH - NSBHPTASSESSDT_PSY_A_CORE
01-Apr-2019 14:28
03-Apr-2019 08:28
29-Mar-2019 15:26
02-Apr-2019 14:47
31-Mar-2019 17:51
29-Mar-2019 18:13
30-Mar-2019 14:21

## 2019-04-03 NOTE — PROGRESS NOTE BEHAVIORAL HEALTH - PROBLEM SELECTOR PLAN 2
home meds
ARIPiprazole 30 milliGRAM(s) Oral at bedtime  fluvoxaMINE 75 milliGRAM(s) Oral at bedtime  gabapentin 300 milliGRAM(s) Oral three times a day  lamoTRIgine 100 milliGRAM(s) Oral at bedtime  levothyroxine 75 MICROGram(s) Oral daily  mesalamine DR Capsule 400 milliGRAM(s) Oral two times a day  traZODone Oral Tab/Cap - Peds 100 milliGRAM(s) Oral at bedtime
home meds
ARIPiprazole 30 milliGRAM(s) Oral at bedtime  fluvoxaMINE 75 milliGRAM(s) Oral at bedtime  gabapentin 300 milliGRAM(s) Oral three times a day  lamoTRIgine 100 milliGRAM(s) Oral at bedtime  levothyroxine 75 MICROGram(s) Oral daily  mesalamine DR Capsule 400 milliGRAM(s) Oral two times a day  traZODone Oral Tab/Cap - Peds 100 milliGRAM(s) Oral at bedtime

## 2019-04-03 NOTE — PROGRESS NOTE BEHAVIORAL HEALTH - RISK ASSESSMENT
High risk to suicide.   Hx of multiple admissions and SA, hx fo trauma and substance abuse, Intrusive thoughts about suicide and homicide with intrusive images of killing people and driving herself under the moving train.   Has supportive boyfriend and is future oriented, she wants help.
low to moderate risk  Hx of multiple admissions and SA, hx fo trauma and substance abuse, Intrusive thoughts about suicide and homicide with intrusive images of killing people and driving herself under the moving train.   Has supportive boyfriend and is future oriented, she wants help, medication compliance, future oriented.
low to moderate risk  Hx of multiple admissions and SA, hx fo trauma and substance abuse, Intrusive thoughts about suicide and homicide with intrusive images of killing people and driving herself under the moving train.   Has supportive boyfriend and is future oriented, she wants help, medication compliance, future oriented.
High risk to suicide.   Hx of multiple admissions and SA, hx fo trauma and substance abuse, Intrusive thoughts about suicide and homicide with intrusive images of killing people and driving herself under the moving train.   Has supportive boyfriend and is future oriented, she wants help.

## 2019-04-03 NOTE — PROGRESS NOTE BEHAVIORAL HEALTH - SUMMARY
49 year-old  female, , living with boyfriend, history of Bipolar, history of multiple in-patient hospitalizations, reported history of suicide attempt (overdose), presents in ER self-referred for depression with and suicidal ideation and thoughts and vision of self driving under the train, having intrusive thoughts to harm other people, and urges to cut herself,  seeking psych admission.    Patient reports having intrusive thoughts "they are not my own thoughts, I don't want them, I get very upset for having them". She is also having "vision to hurt people".   Pt also explains tat she has periods of "Not hearing voices, like in my ear, but it is in my head "kill him" and I don't want to have that". Pt is tearful in ER.   She meets criteria for inpatient level of care.    Suggest increasing luvox to 75 mg po qd.
49 year-old  female, , living with boyfriend, history of Bipolar, history of multiple in-patient hospitalizations, reported history of suicide attempt (overdose), presents in ER self-referred for depression with and suicidal ideation and thoughts and vision of self driving under the train, having intrusive thoughts to harm other people, and urges to cut herself,  seeking psych admission.    Patient reports having intrusive thoughts "they are not my own thoughts, I don't want them, I get very upset for having them". She is also having "vision to hurt people".   Pt also explains tat she has periods of "Not hearing voices, like in my ear, but it is in my head "kill him" and I don't want to have that". Pt is tearful in ER.   She meets criteria for inpatient level of care.    Suggest increasing luvox to 75 mg po qd.
49 year-old  female, , living with boyfriend, history of Bipolar, history of multiple in-patient hospitalizations, reported history of suicide attempt (overdose), presents in ER self-referred for depression with and suicidal ideation and thoughts and vision of self driving under the train, having intrusive thoughts to harm other people, and urges to cut herself,  seeking psych admission.    Patient reports having intrusive thoughts "they are not my own thoughts, I don't want them, I get very upset for having them". She is also having "vision to hurt people".   Pt also explains tat she has periods of "Not hearing voices, like in my ear, but it is in my head "kill him" and I don't want to have that". Pt is tearful in ER.   She meets criteria for inpatient level of care.
49 year-old  female, , living with boyfriend, history of Bipolar, history of multiple in-patient hospitalizations, reported history of suicide attempt (overdose), presents in ER self-referred for depression with and suicidal ideation and thoughts and vision of self driving under the train, having intrusive thoughts to harm other people, and urges to cut herself,  seeking psych admission.    Patient reports having intrusive thoughts "they are not my own thoughts, I don't want them, I get very upset for having them". She is also having "vision to hurt people".     Patient has seen improvement in her intrusive thoughts; tolerating medication management well; with no suicidal ideation. Patient has no auditory / visual hallucination at this time. Patient motivated to return to out-patient treatment. Feels safe with being discharged. Patient has therapeutic relationships and social supports. Patient is future oriented. Patient engaged in safety planning. Patient is safe for discharge.
49 year-old  female, , living with boyfriend, history of Bipolar, history of multiple in-patient hospitalizations, reported history of suicide attempt (overdose), presents in ER self-referred for depression with and suicidal ideation and thoughts and vision of self driving under the train, having intrusive thoughts to harm other people, and urges to cut herself,  seeking psych admission.    Patient reports having intrusive thoughts "they are not my own thoughts, I don't want them, I get very upset for having them". She is also having "vision to hurt people".   Pt also explains tat she has periods of "Not hearing voices, like in my ear, but it is in my head "kill him" and I don't want to have that". Pt is tearful in ER.   She meets criteria for inpatient level of care.    Patient has seen improvement in her intrusive thoughts; tolerating medication management well; with no suicidal ideation. Patient has no auditory / visual hallucination at this time. Will continue to monitor. Discharge planning ongoing
49 year-old  female, , living with boyfriend, history of Bipolar, history of multiple in-patient hospitalizations, reported history of suicide attempt (overdose), presents in ER self-referred for depression with and suicidal ideation and thoughts and vision of self driving under the train, having intrusive thoughts to harm other people, and urges to cut herself,  seeking psych admission.    Patient reports having intrusive thoughts "they are not my own thoughts, I don't want them, I get very upset for having them". She is also having "vision to hurt people".   Pt also explains tat she has periods of "Not hearing voices, like in my ear, but it is in my head "kill him" and I don't want to have that". Pt is tearful in ER.   She meets criteria for inpatient level of care.    Patient has seen improvement in her intrusive thoughts; tolerating medication management well; with no suicidal ideation. Patient has no auditory / visual hallucination at this time. Patient motivated to return to out-patient treatment. Feels safe with being discharged tomorrow.
49 year-old  female, , living with boyfriend, history of Bipolar, history of multiple in-patient hospitalizations, reported history of suicide attempt (overdose), presents in ER self-referred for depression with and suicidal ideation and thoughts and vision of self driving under the train, having intrusive thoughts to harm other people, and urges to cut herself,  seeking psych admission.    Patient reports having intrusive thoughts "they are not my own thoughts, I don't want them, I get very upset for having them". She is also having "vision to hurt people".   Pt also explains tat she has periods of "Not hearing voices, like in my ear, but it is in my head "kill him" and I don't want to have that". Pt is tearful in ER.   She meets criteria for inpatient level of care.

## 2019-04-03 NOTE — PROGRESS NOTE BEHAVIORAL HEALTH - AXIS III
Lupus, DMII, Vasculitis in eye, Hashimoto's

## 2019-04-03 NOTE — PROGRESS NOTE BEHAVIORAL HEALTH - NSBHFUPINTERVALCCFT_PSY_A_CORE
"I am not having intrusive thoughts anymore."
I am doing well
I am doing well
I still have those thoughts
"the thoughts are still there."
"Its hard to see other people being sad"

## 2019-04-03 NOTE — PROGRESS NOTE BEHAVIORAL HEALTH - NSBHATTESTSEENBY_PSY_A_CORE
attending Psychiatrist without NP/Trainee
NP with telephonic supervision from Attending Psychiatrist

## 2019-04-03 NOTE — PROGRESS NOTE BEHAVIORAL HEALTH - SECONDARY DX1
PTSD (post-traumatic stress disorder)

## 2019-04-03 NOTE — PROGRESS NOTE BEHAVIORAL HEALTH - THOUGHT PROCESS
Normal reasoning/Linear
Linear/Normal reasoning
Normal reasoning/Linear
Linear/Normal reasoning
Normal reasoning/Linear
Linear/Normal reasoning
Linear/Normal reasoning

## 2019-04-03 NOTE — PROGRESS NOTE BEHAVIORAL HEALTH - SECONDARY DX2
Dissociative disorder

## 2019-04-03 NOTE — PROGRESS NOTE BEHAVIORAL HEALTH - NSBHFUPINTERVALHXFT_PSY_A_CORE
Patient was interactive with other peers; patient is appropriate; patient walking hallway with peer; patient reports no longer experiencing intrusive thoughts; reports being still happy with increase of Luvox; Denies depressed mood, stating being okay. Reports anxious mood however. Denies suicidal ideation or homicidal ideation. Reports good sleep. Participated in groups today. Looking forward to going home. Denies issues. Patient has therapeutic relationships and social supports. Patient is future oriented. Patient engaged in safety planning.
Pt was interactive with other peers; patient is appropriate; patient reports no longer experiencing intrusive thoughts; reports being happy with increase of Luvox; reports wanting new NP when discharged; Denies depressed mood, stating being okay. Reports anxious mood however. Denies suicidal ideation or homicidal ideation. Reports good sleep.
Patient was interactive with other peers; patient is appropriate; patient reports no longer experiencing intrusive thoughts; reports being still happy with increase of Luvox; Denies depressed mood, stating being okay. Reports anxious mood however. Denies suicidal ideation or homicidal ideation. Reports good sleep. Participated in groups today. Looking forward to going home.
Pt is presenting brighter, not tearful, casually dressed. Observed on the unit infarcting with peers,. appears comfortable 'verbalizes depressed mood and intrusive thoughts that nother her. With thoughts about killni self and others people  she has images of  her doing that. It is ego dystonic and pt gets distressed. No Ah at this time. No VH.   Denies PI.
Pt with improved eye contact. Pt still with thoughts of hurting self and others. Claims she is able to prevent herself from acting on the thoughts but the thoughts are "annoying".  Pt continuing to distract herself from responding to internal stimuli. She is willing to continue with medication and is wanting to give the medication more time to work rather than to increase them.
Pt was interactive with other peers and was superficially responding to their disappointments in relationships and living situation. Pt seemed distracted enough from her own stressors to ask if she really needed to stay in the hospital.  Pt's luvox was increased , no added sedation . .

## 2019-04-03 NOTE — PROGRESS NOTE BEHAVIORAL HEALTH - NSBHCHARTREVIEWLAB_PSY_A_CORE FT
Thyroid Stimulating Hormone, Serum: 0.92 uU/mL (03.28.19 @ 14:53)
Thyroid Stimulating Hormone, Serum: 0.92 uU/mL (03.28.19 @ 14:53)
CBC Full  -  ( 28 Mar 2019 13:47 )  WBC Count : 7.66 K/uL  RBC Count : 4.55 M/uL  Hemoglobin : 13.6 g/dL  Hematocrit : 41.2 %  Platelet Count - Automated : 239 K/uL  Mean Cell Volume : 90.5 fl  Mean Cell Hemoglobin : 29.9 pg  Mean Cell Hemoglobin Concentration : 33.0 gm/dL  Auto Neutrophil # : 5.11 K/uL  Auto Lymphocyte # : 2.11 K/uL  Auto Monocyte # : 0.30 K/uL  Auto Eosinophil # : 0.09 K/uL  Auto Basophil # : 0.03 K/uL  Auto Neutrophil % : 66.7 %  Auto Lymphocyte % : 27.5 %  Auto Monocyte % : 3.9 %  Auto Eosinophil % : 1.2 %  Auto Basophil % : 0.4 %
Thyroid Stimulating Hormone, Serum: 0.92 uU/mL (03.28.19 @ 14:53)
Thyroid Stimulating Hormone, Serum: 0.92 uU/mL (03.28.19 @ 14:53)
CBC Full  -  ( 28 Mar 2019 13:47 )  WBC Count : 7.66 K/uL  RBC Count : 4.55 M/uL  Hemoglobin : 13.6 g/dL  Hematocrit : 41.2 %  Platelet Count - Automated : 239 K/uL  Mean Cell Volume : 90.5 fl  Mean Cell Hemoglobin : 29.9 pg  Mean Cell Hemoglobin Concentration : 33.0 gm/dL  Auto Neutrophil # : 5.11 K/uL  Auto Lymphocyte # : 2.11 K/uL  Auto Monocyte # : 0.30 K/uL  Auto Eosinophil # : 0.09 K/uL  Auto Basophil # : 0.03 K/uL  Auto Neutrophil % : 66.7 %  Auto Lymphocyte % : 27.5 %  Auto Monocyte % : 3.9 %  Auto Eosinophil % : 1.2 %  Auto Basophil % : 0.4 %

## 2019-04-03 NOTE — PROGRESS NOTE BEHAVIORAL HEALTH - SECONDARY DX3
Mixed obsessional thoughts and acts

## 2019-04-03 NOTE — PROGRESS NOTE BEHAVIORAL HEALTH - NSBHADMITIPOBSFT_PSY_A_CORE
not at imminent risk in hospital

## 2019-04-03 NOTE — PROGRESS NOTE BEHAVIORAL HEALTH - PROBLEM SELECTOR PROBLEM 2
PTSD (post-traumatic stress disorder)

## 2019-04-03 NOTE — PROGRESS NOTE BEHAVIORAL HEALTH - PROBLEM SELECTOR PLAN 3
Increasing luvox
Luvox was increased from 50mg to 75mg daily
Increasing luvox
Luvox was increased from 50mg to 75mg daily

## 2019-04-03 NOTE — CHART NOTE - NSCHARTNOTEFT_GEN_A_CORE
Discharge complete for patient.  Spoke with [facility] multiple times as well as [facility] to get assistance with aftercare.  Finally an appointment was offered for the [facility] program and medication management.  Pt was explained in detail the discharge plan and patient accepted.  Pt doing well.  Denies any intrusive thoughts.   Denies thoughts to harm herself or others.  Pt picked up by her boyfriend.

## 2019-04-03 NOTE — PROGRESS NOTE BEHAVIORAL HEALTH - PROBLEM SELECTOR PLAN 1
home meds
home meds
ARIPiprazole 30 milliGRAM(s) Oral at bedtime  fluvoxaMINE 75 milliGRAM(s) Oral at bedtime  gabapentin 300 milliGRAM(s) Oral three times a day  lamoTRIgine 100 milliGRAM(s) Oral at bedtime  levothyroxine 75 MICROGram(s) Oral daily  mesalamine DR Capsule 400 milliGRAM(s) Oral two times a day  traZODone Oral Tab/Cap - Peds 100 milliGRAM(s) Oral at bedtime

## 2019-04-03 NOTE — PROGRESS NOTE BEHAVIORAL HEALTH - THOUGHT CONTENT
Preoccupations
Preoccupations/Other
Other/Preoccupations
Other/Preoccupations
Preoccupations
Preoccupations
Other/Preoccupations

## 2019-04-03 NOTE — PROGRESS NOTE BEHAVIORAL HEALTH - NSBHCHARTREVIEWVS_PSY_A_CORE FT
Vital Signs Last 24 Hrs  T(C): 36.7 (29 Mar 2019 07:39), Max: 36.7 (28 Mar 2019 19:37)  T(F): 98 (29 Mar 2019 07:39), Max: 98.1 (28 Mar 2019 19:37)    RR: 14 (29 Mar 2019 07:39) (14 - 14)  SpO2: 100% (29 Mar 2019 07:39) (100% - 100%)
Vital Signs Last 24 Hrs  T(C): 36.4 (01 Apr 2019 07:25), Max: 36.4 (01 Apr 2019 07:25)  T(F): 97.6 (01 Apr 2019 07:25), Max: 97.6 (01 Apr 2019 07:25)  HR: --  BP: --  BP(mean): --  RR: 16 (01 Apr 2019 07:25) (16 - 16)  SpO2: 99% (01 Apr 2019 07:25) (99% - 99%)
Vital Signs Last 24 Hrs  T(C): 36.8 (02 Apr 2019 07:29), Max: 36.8 (02 Apr 2019 07:29)  T(F): 98.2 (02 Apr 2019 07:29), Max: 98.2 (02 Apr 2019 07:29)  HR: --  BP: --  BP(mean): --  RR: 16 (02 Apr 2019 07:29) (16 - 16)  SpO2: 99% (02 Apr 2019 07:29) (99% - 99%)
Vital Signs Last 24 Hrs  T(C): 36.6 (30 Mar 2019 08:25), Max: 36.6 (30 Mar 2019 08:25)  T(F): 97.9 (30 Mar 2019 08:25), Max: 97.9 (30 Mar 2019 08:25)  HR: --  BP: --115/76  BP(mean): --  RR: 17 (30 Mar 2019 08:25) (17 - 17)  SpO2: 100% (30 Mar 2019 08:25) (100% - 100%)
Vital Signs Last 24 Hrs  T(C): 36.8 (02 Apr 2019 07:29), Max: 36.8 (02 Apr 2019 07:29)  T(F): 98.2 (02 Apr 2019 07:29), Max: 98.2 (02 Apr 2019 07:29)  HR: --  BP: --  BP(mean): --  RR: 16 (02 Apr 2019 07:29) (16 - 16)  SpO2: 99% (02 Apr 2019 07:29) (99% - 99%)
Vital Signs Last 24 Hrs  T(C): 36.4 (31 Mar 2019 09:18), Max: 36.4 (31 Mar 2019 09:18)  T(F): 97.5 (31 Mar 2019 09:18), Max: 97.5 (31 Mar 2019 09:18)  HR: --  BP: --  BP(mean): --  RR: 18 (31 Mar 2019 09:18) (18 - 18)  SpO2: 100% (31 Mar 2019 09:18) (100% - 100%)

## 2019-04-03 NOTE — PROGRESS NOTE BEHAVIORAL HEALTH - PROBLEM SELECTOR PROBLEM 3
Mixed obsessional thoughts and acts

## 2019-04-03 NOTE — PROGRESS NOTE BEHAVIORAL HEALTH - PRIMARY DX
Schizoaffective disorder, bipolar type

## 2019-04-08 DIAGNOSIS — F25.0 SCHIZOAFFECTIVE DISORDER, BIPOLAR TYPE: ICD-10-CM

## 2019-04-08 DIAGNOSIS — F60.3 BORDERLINE PERSONALITY DISORDER: ICD-10-CM

## 2019-04-08 DIAGNOSIS — F43.10 POST-TRAUMATIC STRESS DISORDER, UNSPECIFIED: ICD-10-CM

## 2019-04-08 DIAGNOSIS — E06.3 AUTOIMMUNE THYROIDITIS: ICD-10-CM

## 2019-04-08 DIAGNOSIS — L93.2 OTHER LOCAL LUPUS ERYTHEMATOSUS: ICD-10-CM

## 2019-04-08 DIAGNOSIS — F44.9 DISSOCIATIVE AND CONVERSION DISORDER, UNSPECIFIED: ICD-10-CM

## 2019-04-08 DIAGNOSIS — F42.2 MIXED OBSESSIONAL THOUGHTS AND ACTS: ICD-10-CM

## 2019-04-08 DIAGNOSIS — E11.9 TYPE 2 DIABETES MELLITUS WITHOUT COMPLICATIONS: ICD-10-CM

## 2019-04-10 ENCOUNTER — OUTPATIENT (OUTPATIENT)
Dept: OUTPATIENT SERVICES | Facility: HOSPITAL | Age: 49
LOS: 1 days | Discharge: STILL ADMITTED OR ADMINISTRATIVE | End: 2019-04-10

## 2019-04-10 ENCOUNTER — OUTPATIENT (OUTPATIENT)
Dept: OUTPATIENT SERVICES | Facility: HOSPITAL | Age: 49
LOS: 1 days | Discharge: HOME | End: 2019-04-10

## 2019-04-10 ENCOUNTER — INPATIENT (INPATIENT)
Facility: HOSPITAL | Age: 49
LOS: 8 days | Discharge: HOME | End: 2019-04-19
Attending: PSYCHIATRY & NEUROLOGY | Admitting: PSYCHIATRY & NEUROLOGY
Payer: MEDICAID

## 2019-04-10 VITALS
DIASTOLIC BLOOD PRESSURE: 87 MMHG | HEIGHT: 64 IN | TEMPERATURE: 98 F | SYSTOLIC BLOOD PRESSURE: 150 MMHG | WEIGHT: 158.29 LBS | HEART RATE: 69 BPM

## 2019-04-10 DIAGNOSIS — F25.9 SCHIZOAFFECTIVE DISORDER, UNSPECIFIED: ICD-10-CM

## 2019-04-10 PROCEDURE — 99222 1ST HOSP IP/OBS MODERATE 55: CPT

## 2019-04-10 RX ORDER — LAMOTRIGINE 25 MG/1
100 TABLET, ORALLY DISINTEGRATING ORAL AT BEDTIME
Qty: 0 | Refills: 0 | Status: DISCONTINUED | OUTPATIENT
Start: 2019-04-10 | End: 2019-04-11

## 2019-04-10 RX ORDER — LEVOTHYROXINE SODIUM 125 MCG
75 TABLET ORAL DAILY
Qty: 0 | Refills: 0 | Status: DISCONTINUED | OUTPATIENT
Start: 2019-04-10 | End: 2019-04-19

## 2019-04-10 RX ORDER — ARIPIPRAZOLE 15 MG/1
30 TABLET ORAL AT BEDTIME
Qty: 0 | Refills: 0 | Status: DISCONTINUED | OUTPATIENT
Start: 2019-04-10 | End: 2019-04-19

## 2019-04-10 RX ORDER — MESALAMINE 400 MG
375 TABLET, DELAYED RELEASE (ENTERIC COATED) ORAL DAILY
Qty: 0 | Refills: 0 | Status: DISCONTINUED | OUTPATIENT
Start: 2019-04-10 | End: 2019-04-10

## 2019-04-10 RX ORDER — MESALAMINE 400 MG
650 TABLET, DELAYED RELEASE (ENTERIC COATED) ORAL DAILY
Qty: 0 | Refills: 0 | Status: DISCONTINUED | OUTPATIENT
Start: 2019-04-10 | End: 2019-04-10

## 2019-04-10 RX ORDER — GABAPENTIN 400 MG/1
300 CAPSULE ORAL THREE TIMES A DAY
Qty: 0 | Refills: 0 | Status: DISCONTINUED | OUTPATIENT
Start: 2019-04-10 | End: 2019-04-11

## 2019-04-10 RX ORDER — HYDROXYZINE HCL 10 MG
25 TABLET ORAL EVERY 6 HOURS
Qty: 0 | Refills: 0 | Status: DISCONTINUED | OUTPATIENT
Start: 2019-04-10 | End: 2019-04-19

## 2019-04-10 RX ORDER — LANOLIN ALCOHOL/MO/W.PET/CERES
5 CREAM (GRAM) TOPICAL AT BEDTIME
Qty: 0 | Refills: 0 | Status: DISCONTINUED | OUTPATIENT
Start: 2019-04-10 | End: 2019-04-19

## 2019-04-10 RX ORDER — TRAZODONE HCL 50 MG
100 TABLET ORAL AT BEDTIME
Qty: 0 | Refills: 0 | Status: DISCONTINUED | OUTPATIENT
Start: 2019-04-10 | End: 2019-04-19

## 2019-04-10 RX ORDER — FLUVOXAMINE MALEATE 25 MG/1
75 TABLET ORAL AT BEDTIME
Qty: 0 | Refills: 0 | Status: DISCONTINUED | OUTPATIENT
Start: 2019-04-10 | End: 2019-04-15

## 2019-04-10 RX ADMIN — Medication 5 MILLIGRAM(S): at 21:34

## 2019-04-10 RX ADMIN — ARIPIPRAZOLE 30 MILLIGRAM(S): 15 TABLET ORAL at 21:34

## 2019-04-10 RX ADMIN — Medication 375 MILLIGRAM(S): at 23:29

## 2019-04-10 RX ADMIN — LAMOTRIGINE 100 MILLIGRAM(S): 25 TABLET, ORALLY DISINTEGRATING ORAL at 21:34

## 2019-04-10 RX ADMIN — FLUVOXAMINE MALEATE 75 MILLIGRAM(S): 25 TABLET ORAL at 21:34

## 2019-04-10 RX ADMIN — GABAPENTIN 300 MILLIGRAM(S): 400 CAPSULE ORAL at 21:34

## 2019-04-10 RX ADMIN — Medication 100 MILLIGRAM(S): at 21:34

## 2019-04-10 NOTE — CHART NOTE - NSCHARTNOTEFT_GEN_A_CORE
Screening Medical Evaluation  [facility]    [facility] admitting diagnosis: Schizoaffective disorder    PAST MEDICAL & SURGICAL HISTORY:        Allergies    No Known Allergies    Intolerances        Social History:     FAMILY HISTORY:      MEDICATIONS  (STANDING):  ARIPiprazole 30 milliGRAM(s) Oral at bedtime  fluvoxaMINE 75 milliGRAM(s) Oral at bedtime  gabapentin 300 milliGRAM(s) Oral three times a day  lamoTRIgine 100 milliGRAM(s) Oral at bedtime  levothyroxine 75 MICROGram(s) Oral daily  melatonin. 5 milliGRAM(s) Oral at bedtime  naproxen 375 milliGRAM(s) Oral two times a day  traZODone 100 milliGRAM(s) Oral at bedtime    MEDICATIONS  (PRN):  hydrOXYzine hydrochloride 25 milliGRAM(s) Oral every 6 hours PRN Anxiety  LORazepam     Tablet 2 milliGRAM(s) Oral every 6 hours PRN Agitation  LORazepam   Injectable 2 milliGRAM(s) IntraMuscular once PRN severe agitation      Vital Signs Last 24 Hrs  T(C): 36.7 (10 Apr 2019 18:02), Max: 36.7 (10 Apr 2019 18:02)  T(F): 98 (10 Apr 2019 18:02), Max: 98 (10 Apr 2019 18:02)  HR: 69 (10 Apr 2019 18:02) (69 - 69)  BP: 150/87 (10 Apr 2019 18:02) (150/87 - 150/87)  RR: 18  SpO2: --  CAPILLARY BLOOD GLUCOSE            PHYSICAL EXAM:  GENERAL: NAD, well-developed  HEAD:  Atraumatic, Normocephalic  EYES: EOMI, PERRLA, conjunctiva and sclera clear  NECK: Supple.  CHEST/LUNG: Clear to auscultation bilaterally; No wheeze  HEART: Regular rate and rhythm; No murmurs, rubs, or gallops  ABDOMEN: Soft, Nontender, Nondistended; Bowel sounds present  EXTREMITIES:  2+ Peripheral Pulses, No cyanosis, or edema  PSYCH: AAOx3  NEUROLOGY: non-focal  SKIN: No rashes or lesions    LABS:                    RADIOLOGY & ADDITIONAL TESTS:    Assessment and Plan:  49 year old female [facility] with admitting diagnosis of Schizoaffective disorder and PMH of fibromyalgia, osteoarthritis, ulcerative colitis, hashimoto’s thyroiditis. Denies any fever, chills, headache, chest pain, SOB, abdominal pain, N/V/D/C, dysuria. Physical exam unremarkable.  1)	Fibromyalgia: Continue gabapentin 300mg oral TID.  2)	Hypothyroidism: Continue synthroid 75mcg oral daily.  3)	Osteoarthritis: Continue naproxen 375 mg oral BID.  4)	Schizoaffective disorder: follow care plan as per primary psych team.

## 2019-04-10 NOTE — PHARMACOTHERAPY INTERVENTION NOTE - COMMENTS
Patient ordered for mesalamine 650 mg once daily for treatment of ulcerative colitis and naproxen 375 mg TID around the clock for osteoarthritis. Called MD and recommended using alternative pain medication in patient with UC such as acetaminophen or tramadol. Per Dr, patient was taking both mesalamine and naproxen at home and will talk to patient about changing pain medication.     In addition, recommended changing formulation of mesalamine to Delzicol 400 mg tabs or Pentasa 500 mg tabs which is what pharmacy has on formulary. Patient was taking Apriso 375 mg formulation at home, which is not formulary. Dr to ask patient whether to switch formulations or if patient wants to take their own medication. Patient ordered for mesalamine 650 mg once daily for treatment of ulcerative colitis and naproxen 375 mg TID around the clock for osteoarthritis. Called MD and recommended using alternative pain medication in patient with UC such as acetaminophen or tramadol. Per Dr, patient was taking both mesalamine and naproxen at home and will talk to patient about changing pain medication.     In addition, recommended changing formulation of mesalamine to Delzicol 400 mg tabs or Pentasa 500 mg tabs which is what pharmacy has on formulary. Patient was taking Apriso 375 mg formulation at home, which is not formulary. Dr to ask patient whether to switch formulations or if patient wants to take their own medication.    **Update: Dr Wood followed up with patient who insists on continuing naproxen for pain and wants to hold off on mesalamine therapy and believes UC diagnosis may not be true. Dr Wood will ask for GI consult tomorrow to follow up on patient diagnosis and therapy.

## 2019-04-11 DIAGNOSIS — F25.0 SCHIZOAFFECTIVE DISORDER, BIPOLAR TYPE: ICD-10-CM

## 2019-04-11 DIAGNOSIS — F60.3 BORDERLINE PERSONALITY DISORDER: ICD-10-CM

## 2019-04-11 DIAGNOSIS — F42.9 OBSESSIVE-COMPULSIVE DISORDER, UNSPECIFIED: ICD-10-CM

## 2019-04-11 DIAGNOSIS — E06.3 AUTOIMMUNE THYROIDITIS: ICD-10-CM

## 2019-04-11 DIAGNOSIS — K51.90 ULCERATIVE COLITIS, UNSPECIFIED, WITHOUT COMPLICATIONS: ICD-10-CM

## 2019-04-11 LAB
ALBUMIN SERPL ELPH-MCNC: 4.5 G/DL — SIGNIFICANT CHANGE UP (ref 3.3–5)
ALP SERPL-CCNC: 54 U/L — SIGNIFICANT CHANGE UP (ref 40–120)
ALT FLD-CCNC: 12 U/L — SIGNIFICANT CHANGE UP (ref 4–33)
ANION GAP SERPL CALC-SCNC: 13 MMO/L — SIGNIFICANT CHANGE UP (ref 7–14)
AST SERPL-CCNC: 17 U/L — SIGNIFICANT CHANGE UP (ref 4–32)
BASOPHILS # BLD AUTO: 0.03 K/UL — SIGNIFICANT CHANGE UP (ref 0–0.2)
BASOPHILS NFR BLD AUTO: 0.4 % — SIGNIFICANT CHANGE UP (ref 0–2)
BILIRUB SERPL-MCNC: 0.3 MG/DL — SIGNIFICANT CHANGE UP (ref 0.2–1.2)
BUN SERPL-MCNC: 15 MG/DL — SIGNIFICANT CHANGE UP (ref 7–23)
CALCIUM SERPL-MCNC: 9.3 MG/DL — SIGNIFICANT CHANGE UP (ref 8.4–10.5)
CHLORIDE SERPL-SCNC: 104 MMOL/L — SIGNIFICANT CHANGE UP (ref 98–107)
CHOLEST SERPL-MCNC: 209 MG/DL — HIGH (ref 120–199)
CO2 SERPL-SCNC: 23 MMOL/L — SIGNIFICANT CHANGE UP (ref 22–31)
CREAT SERPL-MCNC: 0.94 MG/DL — SIGNIFICANT CHANGE UP (ref 0.5–1.3)
EOSINOPHIL # BLD AUTO: 0.09 K/UL — SIGNIFICANT CHANGE UP (ref 0–0.5)
EOSINOPHIL NFR BLD AUTO: 1.3 % — SIGNIFICANT CHANGE UP (ref 0–6)
GLUCOSE SERPL-MCNC: 143 MG/DL — HIGH (ref 70–99)
HBA1C BLD-MCNC: 5 % — SIGNIFICANT CHANGE UP (ref 4–5.6)
HCG SERPL-ACNC: < 5 MIU/ML — SIGNIFICANT CHANGE UP
HCT VFR BLD CALC: 45.3 % — HIGH (ref 34.5–45)
HDLC SERPL-MCNC: 86 MG/DL — HIGH (ref 45–65)
HGB BLD-MCNC: 14.3 G/DL — SIGNIFICANT CHANGE UP (ref 11.5–15.5)
IMM GRANULOCYTES NFR BLD AUTO: 0.3 % — SIGNIFICANT CHANGE UP (ref 0–1.5)
LIPID PNL WITH DIRECT LDL SERPL: 127 MG/DL — SIGNIFICANT CHANGE UP
LYMPHOCYTES # BLD AUTO: 1.45 K/UL — SIGNIFICANT CHANGE UP (ref 1–3.3)
LYMPHOCYTES # BLD AUTO: 21.1 % — SIGNIFICANT CHANGE UP (ref 13–44)
MAGNESIUM SERPL-MCNC: 1.9 MG/DL — SIGNIFICANT CHANGE UP (ref 1.6–2.6)
MCHC RBC-ENTMCNC: 29.7 PG — SIGNIFICANT CHANGE UP (ref 27–34)
MCHC RBC-ENTMCNC: 31.6 % — LOW (ref 32–36)
MCV RBC AUTO: 94.2 FL — SIGNIFICANT CHANGE UP (ref 80–100)
MONOCYTES # BLD AUTO: 0.2 K/UL — SIGNIFICANT CHANGE UP (ref 0–0.9)
MONOCYTES NFR BLD AUTO: 2.9 % — SIGNIFICANT CHANGE UP (ref 2–14)
NEUTROPHILS # BLD AUTO: 5.08 K/UL — SIGNIFICANT CHANGE UP (ref 1.8–7.4)
NEUTROPHILS NFR BLD AUTO: 74 % — SIGNIFICANT CHANGE UP (ref 43–77)
NRBC # FLD: 0 K/UL — SIGNIFICANT CHANGE UP (ref 0–0)
PLATELET # BLD AUTO: 250 K/UL — SIGNIFICANT CHANGE UP (ref 150–400)
PMV BLD: 10.2 FL — SIGNIFICANT CHANGE UP (ref 7–13)
POTASSIUM SERPL-MCNC: 4.1 MMOL/L — SIGNIFICANT CHANGE UP (ref 3.5–5.3)
POTASSIUM SERPL-SCNC: 4.1 MMOL/L — SIGNIFICANT CHANGE UP (ref 3.5–5.3)
PROT SERPL-MCNC: 7 G/DL — SIGNIFICANT CHANGE UP (ref 6–8.3)
RBC # BLD: 4.81 M/UL — SIGNIFICANT CHANGE UP (ref 3.8–5.2)
RBC # FLD: 13.3 % — SIGNIFICANT CHANGE UP (ref 10.3–14.5)
SODIUM SERPL-SCNC: 140 MMOL/L — SIGNIFICANT CHANGE UP (ref 135–145)
TRIGL SERPL-MCNC: 145 MG/DL — SIGNIFICANT CHANGE UP (ref 10–149)
TSH SERPL-MCNC: 2.15 UIU/ML — SIGNIFICANT CHANGE UP (ref 0.27–4.2)
WBC # BLD: 6.87 K/UL — SIGNIFICANT CHANGE UP (ref 3.8–10.5)
WBC # FLD AUTO: 6.87 K/UL — SIGNIFICANT CHANGE UP (ref 3.8–10.5)

## 2019-04-11 PROCEDURE — 99232 SBSQ HOSP IP/OBS MODERATE 35: CPT | Mod: GC

## 2019-04-11 RX ORDER — ACETAMINOPHEN 500 MG
650 TABLET ORAL EVERY 6 HOURS
Qty: 0 | Refills: 0 | Status: DISCONTINUED | OUTPATIENT
Start: 2019-04-11 | End: 2019-04-19

## 2019-04-11 RX ORDER — GABAPENTIN 400 MG/1
300 CAPSULE ORAL THREE TIMES A DAY
Qty: 0 | Refills: 0 | Status: DISCONTINUED | OUTPATIENT
Start: 2019-04-11 | End: 2019-04-19

## 2019-04-11 RX ORDER — LAMOTRIGINE 25 MG/1
100 TABLET, ORALLY DISINTEGRATING ORAL AT BEDTIME
Qty: 0 | Refills: 0 | Status: DISCONTINUED | OUTPATIENT
Start: 2019-04-11 | End: 2019-04-19

## 2019-04-11 RX ADMIN — Medication 375 MILLIGRAM(S): at 22:05

## 2019-04-11 RX ADMIN — Medication 650 MILLIGRAM(S): at 12:43

## 2019-04-11 RX ADMIN — Medication 5 MILLIGRAM(S): at 21:07

## 2019-04-11 RX ADMIN — LAMOTRIGINE 100 MILLIGRAM(S): 25 TABLET, ORALLY DISINTEGRATING ORAL at 21:07

## 2019-04-11 RX ADMIN — Medication 100 MILLIGRAM(S): at 21:07

## 2019-04-11 RX ADMIN — Medication 75 MICROGRAM(S): at 08:11

## 2019-04-11 RX ADMIN — Medication 650 MILLIGRAM(S): at 15:37

## 2019-04-11 RX ADMIN — GABAPENTIN 300 MILLIGRAM(S): 400 CAPSULE ORAL at 12:30

## 2019-04-11 RX ADMIN — ARIPIPRAZOLE 30 MILLIGRAM(S): 15 TABLET ORAL at 21:07

## 2019-04-11 RX ADMIN — Medication 375 MILLIGRAM(S): at 08:11

## 2019-04-11 RX ADMIN — FLUVOXAMINE MALEATE 75 MILLIGRAM(S): 25 TABLET ORAL at 21:07

## 2019-04-11 RX ADMIN — GABAPENTIN 300 MILLIGRAM(S): 400 CAPSULE ORAL at 21:07

## 2019-04-11 RX ADMIN — GABAPENTIN 300 MILLIGRAM(S): 400 CAPSULE ORAL at 08:11

## 2019-04-11 RX ADMIN — Medication 375 MILLIGRAM(S): at 21:07

## 2019-04-12 PROCEDURE — 70450 CT HEAD/BRAIN W/O DYE: CPT | Mod: 26

## 2019-04-12 PROCEDURE — 99232 SBSQ HOSP IP/OBS MODERATE 35: CPT

## 2019-04-12 RX ADMIN — Medication 75 MICROGRAM(S): at 09:23

## 2019-04-12 RX ADMIN — Medication 5 MILLIGRAM(S): at 21:35

## 2019-04-12 RX ADMIN — GABAPENTIN 300 MILLIGRAM(S): 400 CAPSULE ORAL at 21:35

## 2019-04-12 RX ADMIN — ARIPIPRAZOLE 30 MILLIGRAM(S): 15 TABLET ORAL at 21:35

## 2019-04-12 RX ADMIN — LAMOTRIGINE 100 MILLIGRAM(S): 25 TABLET, ORALLY DISINTEGRATING ORAL at 21:35

## 2019-04-12 RX ADMIN — Medication 375 MILLIGRAM(S): at 21:35

## 2019-04-12 RX ADMIN — GABAPENTIN 300 MILLIGRAM(S): 400 CAPSULE ORAL at 14:10

## 2019-04-12 RX ADMIN — GABAPENTIN 300 MILLIGRAM(S): 400 CAPSULE ORAL at 09:23

## 2019-04-12 RX ADMIN — Medication 375 MILLIGRAM(S): at 22:05

## 2019-04-12 RX ADMIN — FLUVOXAMINE MALEATE 75 MILLIGRAM(S): 25 TABLET ORAL at 21:35

## 2019-04-12 RX ADMIN — Medication 100 MILLIGRAM(S): at 21:35

## 2019-04-12 RX ADMIN — Medication 375 MILLIGRAM(S): at 09:23

## 2019-04-13 RX ADMIN — Medication 100 MILLIGRAM(S): at 20:41

## 2019-04-13 RX ADMIN — LAMOTRIGINE 100 MILLIGRAM(S): 25 TABLET, ORALLY DISINTEGRATING ORAL at 20:41

## 2019-04-13 RX ADMIN — FLUVOXAMINE MALEATE 75 MILLIGRAM(S): 25 TABLET ORAL at 20:41

## 2019-04-13 RX ADMIN — GABAPENTIN 300 MILLIGRAM(S): 400 CAPSULE ORAL at 13:12

## 2019-04-13 RX ADMIN — GABAPENTIN 300 MILLIGRAM(S): 400 CAPSULE ORAL at 09:27

## 2019-04-13 RX ADMIN — Medication 375 MILLIGRAM(S): at 09:27

## 2019-04-13 RX ADMIN — Medication 5 MILLIGRAM(S): at 20:41

## 2019-04-13 RX ADMIN — Medication 75 MICROGRAM(S): at 09:27

## 2019-04-13 RX ADMIN — ARIPIPRAZOLE 30 MILLIGRAM(S): 15 TABLET ORAL at 20:41

## 2019-04-13 RX ADMIN — GABAPENTIN 300 MILLIGRAM(S): 400 CAPSULE ORAL at 20:41

## 2019-04-14 PROCEDURE — 99232 SBSQ HOSP IP/OBS MODERATE 35: CPT

## 2019-04-14 RX ADMIN — Medication 375 MILLIGRAM(S): at 08:44

## 2019-04-14 RX ADMIN — Medication 75 MICROGRAM(S): at 08:44

## 2019-04-14 RX ADMIN — Medication 375 MILLIGRAM(S): at 22:01

## 2019-04-14 RX ADMIN — FLUVOXAMINE MALEATE 75 MILLIGRAM(S): 25 TABLET ORAL at 20:53

## 2019-04-14 RX ADMIN — Medication 375 MILLIGRAM(S): at 08:45

## 2019-04-14 RX ADMIN — Medication 650 MILLIGRAM(S): at 10:51

## 2019-04-14 RX ADMIN — ARIPIPRAZOLE 30 MILLIGRAM(S): 15 TABLET ORAL at 20:53

## 2019-04-14 RX ADMIN — GABAPENTIN 300 MILLIGRAM(S): 400 CAPSULE ORAL at 12:15

## 2019-04-14 RX ADMIN — GABAPENTIN 300 MILLIGRAM(S): 400 CAPSULE ORAL at 08:44

## 2019-04-14 RX ADMIN — Medication 650 MILLIGRAM(S): at 11:55

## 2019-04-14 RX ADMIN — LAMOTRIGINE 100 MILLIGRAM(S): 25 TABLET, ORALLY DISINTEGRATING ORAL at 20:53

## 2019-04-14 RX ADMIN — GABAPENTIN 300 MILLIGRAM(S): 400 CAPSULE ORAL at 20:53

## 2019-04-14 RX ADMIN — Medication 100 MILLIGRAM(S): at 20:53

## 2019-04-14 RX ADMIN — Medication 5 MILLIGRAM(S): at 20:53

## 2019-04-15 PROCEDURE — 70552 MRI BRAIN STEM W/DYE: CPT | Mod: 26

## 2019-04-15 PROCEDURE — 99232 SBSQ HOSP IP/OBS MODERATE 35: CPT | Mod: GC

## 2019-04-15 RX ORDER — FLUVOXAMINE MALEATE 25 MG/1
100 TABLET ORAL AT BEDTIME
Qty: 0 | Refills: 0 | Status: DISCONTINUED | OUTPATIENT
Start: 2019-04-15 | End: 2019-04-18

## 2019-04-15 RX ADMIN — ARIPIPRAZOLE 30 MILLIGRAM(S): 15 TABLET ORAL at 21:11

## 2019-04-15 RX ADMIN — Medication 375 MILLIGRAM(S): at 08:46

## 2019-04-15 RX ADMIN — LAMOTRIGINE 100 MILLIGRAM(S): 25 TABLET, ORALLY DISINTEGRATING ORAL at 21:11

## 2019-04-15 RX ADMIN — Medication 5 MILLIGRAM(S): at 21:11

## 2019-04-15 RX ADMIN — GABAPENTIN 300 MILLIGRAM(S): 400 CAPSULE ORAL at 16:51

## 2019-04-15 RX ADMIN — FLUVOXAMINE MALEATE 100 MILLIGRAM(S): 25 TABLET ORAL at 21:11

## 2019-04-15 RX ADMIN — Medication 100 MILLIGRAM(S): at 21:11

## 2019-04-15 RX ADMIN — Medication 375 MILLIGRAM(S): at 21:53

## 2019-04-15 RX ADMIN — GABAPENTIN 300 MILLIGRAM(S): 400 CAPSULE ORAL at 21:11

## 2019-04-15 RX ADMIN — Medication 375 MILLIGRAM(S): at 09:15

## 2019-04-15 RX ADMIN — GABAPENTIN 300 MILLIGRAM(S): 400 CAPSULE ORAL at 08:46

## 2019-04-15 RX ADMIN — Medication 75 MICROGRAM(S): at 08:46

## 2019-04-16 PROCEDURE — 99232 SBSQ HOSP IP/OBS MODERATE 35: CPT | Mod: GC

## 2019-04-16 RX ADMIN — Medication 375 MILLIGRAM(S): at 20:47

## 2019-04-16 RX ADMIN — Medication 650 MILLIGRAM(S): at 21:25

## 2019-04-16 RX ADMIN — FLUVOXAMINE MALEATE 100 MILLIGRAM(S): 25 TABLET ORAL at 20:47

## 2019-04-16 RX ADMIN — Medication 100 MILLIGRAM(S): at 20:47

## 2019-04-16 RX ADMIN — LAMOTRIGINE 100 MILLIGRAM(S): 25 TABLET, ORALLY DISINTEGRATING ORAL at 20:47

## 2019-04-16 RX ADMIN — Medication 5 MILLIGRAM(S): at 20:47

## 2019-04-16 RX ADMIN — GABAPENTIN 300 MILLIGRAM(S): 400 CAPSULE ORAL at 08:55

## 2019-04-16 RX ADMIN — Medication 650 MILLIGRAM(S): at 20:35

## 2019-04-16 RX ADMIN — GABAPENTIN 300 MILLIGRAM(S): 400 CAPSULE ORAL at 20:47

## 2019-04-16 RX ADMIN — GABAPENTIN 300 MILLIGRAM(S): 400 CAPSULE ORAL at 12:22

## 2019-04-16 RX ADMIN — Medication 375 MILLIGRAM(S): at 21:25

## 2019-04-16 RX ADMIN — Medication 375 MILLIGRAM(S): at 08:54

## 2019-04-16 RX ADMIN — ARIPIPRAZOLE 30 MILLIGRAM(S): 15 TABLET ORAL at 20:47

## 2019-04-16 RX ADMIN — Medication 375 MILLIGRAM(S): at 08:55

## 2019-04-16 RX ADMIN — Medication 75 MICROGRAM(S): at 08:55

## 2019-04-17 PROCEDURE — 99232 SBSQ HOSP IP/OBS MODERATE 35: CPT | Mod: GC

## 2019-04-17 RX ADMIN — Medication 375 MILLIGRAM(S): at 09:09

## 2019-04-17 RX ADMIN — FLUVOXAMINE MALEATE 100 MILLIGRAM(S): 25 TABLET ORAL at 20:57

## 2019-04-17 RX ADMIN — Medication 5 MILLIGRAM(S): at 20:57

## 2019-04-17 RX ADMIN — GABAPENTIN 300 MILLIGRAM(S): 400 CAPSULE ORAL at 09:09

## 2019-04-17 RX ADMIN — ARIPIPRAZOLE 30 MILLIGRAM(S): 15 TABLET ORAL at 20:57

## 2019-04-17 RX ADMIN — GABAPENTIN 300 MILLIGRAM(S): 400 CAPSULE ORAL at 13:31

## 2019-04-17 RX ADMIN — Medication 100 MILLIGRAM(S): at 20:56

## 2019-04-17 RX ADMIN — Medication 75 MICROGRAM(S): at 09:09

## 2019-04-17 RX ADMIN — Medication 375 MILLIGRAM(S): at 22:10

## 2019-04-17 RX ADMIN — Medication 375 MILLIGRAM(S): at 20:57

## 2019-04-17 RX ADMIN — Medication 375 MILLIGRAM(S): at 21:45

## 2019-04-17 RX ADMIN — LAMOTRIGINE 100 MILLIGRAM(S): 25 TABLET, ORALLY DISINTEGRATING ORAL at 20:57

## 2019-04-17 RX ADMIN — GABAPENTIN 300 MILLIGRAM(S): 400 CAPSULE ORAL at 20:57

## 2019-04-18 PROCEDURE — 99232 SBSQ HOSP IP/OBS MODERATE 35: CPT | Mod: GC

## 2019-04-18 RX ORDER — TRAZODONE HCL 50 MG
1 TABLET ORAL
Qty: 14 | Refills: 0
Start: 2019-04-18

## 2019-04-18 RX ORDER — ARIPIPRAZOLE 15 MG/1
1 TABLET ORAL
Qty: 14 | Refills: 0
Start: 2019-04-18

## 2019-04-18 RX ORDER — LAMOTRIGINE 25 MG/1
1 TABLET, ORALLY DISINTEGRATING ORAL
Qty: 14 | Refills: 0
Start: 2019-04-18

## 2019-04-18 RX ORDER — FLUVOXAMINE MALEATE 25 MG/1
5 TABLET ORAL
Qty: 14 | Refills: 0 | OUTPATIENT
Start: 2019-04-18

## 2019-04-18 RX ORDER — FLUVOXAMINE MALEATE 25 MG/1
1 TABLET ORAL
Qty: 14 | Refills: 0
Start: 2019-04-18

## 2019-04-18 RX ORDER — FLUVOXAMINE MALEATE 25 MG/1
125 TABLET ORAL AT BEDTIME
Qty: 0 | Refills: 0 | Status: DISCONTINUED | OUTPATIENT
Start: 2019-04-18 | End: 2019-04-19

## 2019-04-18 RX ADMIN — GABAPENTIN 300 MILLIGRAM(S): 400 CAPSULE ORAL at 09:17

## 2019-04-18 RX ADMIN — ARIPIPRAZOLE 30 MILLIGRAM(S): 15 TABLET ORAL at 20:33

## 2019-04-18 RX ADMIN — GABAPENTIN 300 MILLIGRAM(S): 400 CAPSULE ORAL at 14:11

## 2019-04-18 RX ADMIN — Medication 5 MILLIGRAM(S): at 20:33

## 2019-04-18 RX ADMIN — FLUVOXAMINE MALEATE 125 MILLIGRAM(S): 25 TABLET ORAL at 20:33

## 2019-04-18 RX ADMIN — Medication 375 MILLIGRAM(S): at 20:33

## 2019-04-18 RX ADMIN — Medication 375 MILLIGRAM(S): at 09:17

## 2019-04-18 RX ADMIN — Medication 100 MILLIGRAM(S): at 20:34

## 2019-04-18 RX ADMIN — Medication 375 MILLIGRAM(S): at 21:11

## 2019-04-18 RX ADMIN — Medication 75 MICROGRAM(S): at 09:17

## 2019-04-18 RX ADMIN — GABAPENTIN 300 MILLIGRAM(S): 400 CAPSULE ORAL at 20:33

## 2019-04-18 RX ADMIN — LAMOTRIGINE 100 MILLIGRAM(S): 25 TABLET, ORALLY DISINTEGRATING ORAL at 20:33

## 2019-04-19 VITALS — DIASTOLIC BLOOD PRESSURE: 64 MMHG | SYSTOLIC BLOOD PRESSURE: 112 MMHG | HEART RATE: 59 BPM | TEMPERATURE: 98 F

## 2019-04-19 PROCEDURE — 99238 HOSP IP/OBS DSCHRG MGMT 30/<: CPT | Mod: GC

## 2019-04-19 RX ADMIN — Medication 375 MILLIGRAM(S): at 08:50

## 2019-04-19 RX ADMIN — GABAPENTIN 300 MILLIGRAM(S): 400 CAPSULE ORAL at 08:50

## 2019-04-19 RX ADMIN — Medication 75 MICROGRAM(S): at 08:50

## 2019-04-25 ENCOUNTER — APPOINTMENT (OUTPATIENT)
Dept: PSYCHIATRY | Facility: CLINIC | Age: 49
End: 2019-04-25

## 2019-04-30 ENCOUNTER — APPOINTMENT (OUTPATIENT)
Dept: NEUROLOGY | Facility: CLINIC | Age: 49
End: 2019-04-30

## 2019-06-01 PROCEDURE — G9005: CPT

## 2019-06-14 ENCOUNTER — INPATIENT (INPATIENT)
Facility: HOSPITAL | Age: 49
LOS: 10 days | Discharge: HOME | End: 2019-06-25
Attending: PSYCHIATRY & NEUROLOGY | Admitting: PSYCHIATRY & NEUROLOGY
Payer: MEDICAID

## 2019-06-14 ENCOUNTER — OUTPATIENT (OUTPATIENT)
Dept: OUTPATIENT SERVICES | Facility: HOSPITAL | Age: 49
LOS: 1 days | Discharge: STILL ADMITTED OR ADMINISTRATIVE | End: 2019-06-14
Payer: MEDICAID

## 2019-06-14 VITALS — HEIGHT: 63 IN | TEMPERATURE: 98 F | WEIGHT: 156.97 LBS

## 2019-06-14 DIAGNOSIS — K51.90 ULCERATIVE COLITIS, UNSPECIFIED, WITHOUT COMPLICATIONS: ICD-10-CM

## 2019-06-14 DIAGNOSIS — E03.9 HYPOTHYROIDISM, UNSPECIFIED: ICD-10-CM

## 2019-06-14 DIAGNOSIS — F33.9 MAJOR DEPRESSIVE DISORDER, RECURRENT, UNSPECIFIED: ICD-10-CM

## 2019-06-14 DIAGNOSIS — F42.9 OBSESSIVE-COMPULSIVE DISORDER, UNSPECIFIED: ICD-10-CM

## 2019-06-14 DIAGNOSIS — F25.9 SCHIZOAFFECTIVE DISORDER, UNSPECIFIED: ICD-10-CM

## 2019-06-14 DIAGNOSIS — F60.89 OTHER SPECIFIC PERSONALITY DISORDERS: ICD-10-CM

## 2019-06-14 PROCEDURE — 99222 1ST HOSP IP/OBS MODERATE 55: CPT

## 2019-06-14 RX ORDER — DIPHENHYDRAMINE HCL 50 MG
50 CAPSULE ORAL ONCE
Refills: 0 | Status: DISCONTINUED | OUTPATIENT
Start: 2019-06-14 | End: 2019-06-25

## 2019-06-14 RX ORDER — HALOPERIDOL DECANOATE 100 MG/ML
5 INJECTION INTRAMUSCULAR ONCE
Refills: 0 | Status: DISCONTINUED | OUTPATIENT
Start: 2019-06-14 | End: 2019-06-25

## 2019-06-14 RX ORDER — MESALAMINE 400 MG
400 TABLET, DELAYED RELEASE (ENTERIC COATED) ORAL
Refills: 0 | Status: DISCONTINUED | OUTPATIENT
Start: 2019-06-14 | End: 2019-06-25

## 2019-06-14 RX ORDER — TRAZODONE HCL 50 MG
100 TABLET ORAL AT BEDTIME
Refills: 0 | Status: DISCONTINUED | OUTPATIENT
Start: 2019-06-14 | End: 2019-06-17

## 2019-06-14 RX ORDER — GABAPENTIN 400 MG/1
300 CAPSULE ORAL THREE TIMES A DAY
Refills: 0 | Status: DISCONTINUED | OUTPATIENT
Start: 2019-06-14 | End: 2019-06-25

## 2019-06-14 RX ORDER — FLUVOXAMINE MALEATE 25 MG/1
125 TABLET ORAL AT BEDTIME
Refills: 0 | Status: DISCONTINUED | OUTPATIENT
Start: 2019-06-14 | End: 2019-06-19

## 2019-06-14 RX ORDER — ARIPIPRAZOLE 15 MG/1
30 TABLET ORAL DAILY
Refills: 0 | Status: DISCONTINUED | OUTPATIENT
Start: 2019-06-14 | End: 2019-06-25

## 2019-06-14 RX ORDER — DOCUSATE SODIUM 100 MG
100 CAPSULE ORAL DAILY
Refills: 0 | Status: DISCONTINUED | OUTPATIENT
Start: 2019-06-14 | End: 2019-06-25

## 2019-06-14 RX ORDER — LAMOTRIGINE 25 MG/1
125 TABLET, ORALLY DISINTEGRATING ORAL AT BEDTIME
Refills: 0 | Status: DISCONTINUED | OUTPATIENT
Start: 2019-06-14 | End: 2019-06-25

## 2019-06-14 RX ORDER — LANOLIN ALCOHOL/MO/W.PET/CERES
10 CREAM (GRAM) TOPICAL AT BEDTIME
Refills: 0 | Status: DISCONTINUED | OUTPATIENT
Start: 2019-06-14 | End: 2019-06-25

## 2019-06-14 RX ORDER — FLUVOXAMINE MALEATE 25 MG/1
100 TABLET ORAL AT BEDTIME
Refills: 0 | Status: DISCONTINUED | OUTPATIENT
Start: 2019-06-14 | End: 2019-06-14

## 2019-06-14 RX ORDER — FLUVOXAMINE MALEATE 25 MG/1
25 TABLET ORAL AT BEDTIME
Refills: 0 | Status: DISCONTINUED | OUTPATIENT
Start: 2019-06-14 | End: 2019-06-14

## 2019-06-14 RX ORDER — LEVOTHYROXINE SODIUM 125 MCG
75 TABLET ORAL DAILY
Refills: 0 | Status: DISCONTINUED | OUTPATIENT
Start: 2019-06-14 | End: 2019-06-25

## 2019-06-14 RX ADMIN — LAMOTRIGINE 125 MILLIGRAM(S): 25 TABLET, ORALLY DISINTEGRATING ORAL at 21:33

## 2019-06-14 RX ADMIN — Medication 400 MILLIGRAM(S): at 21:33

## 2019-06-14 RX ADMIN — Medication 100 MILLIGRAM(S): at 21:34

## 2019-06-14 RX ADMIN — GABAPENTIN 300 MILLIGRAM(S): 400 CAPSULE ORAL at 21:33

## 2019-06-14 RX ADMIN — Medication 10 MILLIGRAM(S): at 21:33

## 2019-06-14 RX ADMIN — FLUVOXAMINE MALEATE 125 MILLIGRAM(S): 25 TABLET ORAL at 21:33

## 2019-06-14 NOTE — CHART NOTE - NSCHARTNOTEFT_GEN_A_CORE
Screening Medical Evaluation  Patient Admitted from: [facility] admitting diagnosis: Recurrent major depressive disorder    PAST MEDICAL & SURGICAL HISTORY:  Retinal vasculitis, unspecified laterality  Hashimoto's disease  Ulcerative colitis with rectal bleeding, unspecified location  Schizo-affective schizophrenia   delivery delivered  No significant past surgical history        Allergies    Gluten (Unknown)  Milk (Unknown)  penicillin (Unknown)    Intolerances        Social History:     FAMILY HISTORY:  Family history of colitis (Sibling)  Family history of seizures (Sibling)      MEDICATIONS  (STANDING):  ARIPiprazole 30 milliGRAM(s) Oral daily  fluvoxaMINE 125 milliGRAM(s) Oral at bedtime  gabapentin 300 milliGRAM(s) Oral three times a day  lamoTRIgine 125 milliGRAM(s) Oral at bedtime  levothyroxine 75 MICROGram(s) Oral daily  melatonin. 10 milliGRAM(s) Oral at bedtime  mesalamine DR Capsule 400 milliGRAM(s) Oral two times a day  traZODone 100 milliGRAM(s) Oral at bedtime    MEDICATIONS  (PRN):  diphenhydrAMINE   Injectable 50 milliGRAM(s) IntraMuscular once PRN agitation  docusate sodium 100 milliGRAM(s) Oral daily PRN Constipation  haloperidol     Tablet 5 milliGRAM(s) Oral once PRN agitation  haloperidol    Injectable 5 milliGRAM(s) IntraMuscular once PRN agitation  LORazepam     Tablet 2 milliGRAM(s) Oral once PRN Agitation  LORazepam   Injectable 2 milliGRAM(s) IntraMuscular once PRN Agitation      Vital Signs Last 24 Hrs  T(C): 36.9 (2019 18:32), Max: 36.9 (2019 18:32)  T(F): 98.5 (2019 18:32), Max: 98.5 (2019 18:32)  HR: -- 80  BP: -- 132/86  BP(mean): --  RR: --  SpO2: --  CAPILLARY BLOOD GLUCOSE            PHYSICAL EXAM:  GENERAL: NAD, well-developed  HEAD:  Atraumatic, Normocephalic  EYES: EOMI, PERRLA, conjunctiva and sclera clear  NECK: Supple, No JVD  CHEST/LUNG: Clear to auscultation bilaterally; No wheeze  HEART: Regular rate and rhythm; No murmurs, rubs, or gallops  ABDOMEN: Soft, Nontender, Nondistended; Bowel sounds present  EXTREMITIES:  2+ Peripheral Pulses, No clubbing, cyanosis, or edema  PSYCH: AAOx3  NEUROLOGY: non-focal  SKIN: In tact    LABS:                    RADIOLOGY & ADDITIONAL TESTS:    Assessment and Plan: 50 yo F with PMH of hypothyroidism is admitted to [facility] with a primary psychiatric diagnosis of Recurrent major depressive disorder. The pt currently denies having any medical complaints such as chest pain, sob, abdominal pain, n/v/d/c, or any problems with urination or bowel movements. The rest of her screening physical is unremarkable.    1.Recurrent major depressive disorder-Plan: continue with meds as per primary physiatric team  2. Hypothyroidism-Plan: continue with levothyroxine

## 2019-06-15 LAB
ALBUMIN SERPL ELPH-MCNC: 4.6 G/DL — SIGNIFICANT CHANGE UP (ref 3.3–5)
ALP SERPL-CCNC: 63 U/L — SIGNIFICANT CHANGE UP (ref 40–120)
ALT FLD-CCNC: 25 U/L — SIGNIFICANT CHANGE UP (ref 4–33)
AMPHET UR-MCNC: NEGATIVE — SIGNIFICANT CHANGE UP
ANION GAP SERPL CALC-SCNC: 15 MMO/L — HIGH (ref 7–14)
APPEARANCE UR: CLEAR — SIGNIFICANT CHANGE UP
AST SERPL-CCNC: 22 U/L — SIGNIFICANT CHANGE UP (ref 4–32)
BACTERIA # UR AUTO: NEGATIVE — SIGNIFICANT CHANGE UP
BARBITURATES UR SCN-MCNC: NEGATIVE — SIGNIFICANT CHANGE UP
BASOPHILS # BLD AUTO: 0.05 K/UL — SIGNIFICANT CHANGE UP (ref 0–0.2)
BASOPHILS NFR BLD AUTO: 0.9 % — SIGNIFICANT CHANGE UP (ref 0–2)
BENZODIAZ UR-MCNC: NEGATIVE — SIGNIFICANT CHANGE UP
BILIRUB SERPL-MCNC: 0.5 MG/DL — SIGNIFICANT CHANGE UP (ref 0.2–1.2)
BILIRUB UR-MCNC: NEGATIVE — SIGNIFICANT CHANGE UP
BLOOD UR QL VISUAL: SIGNIFICANT CHANGE UP
BUN SERPL-MCNC: 11 MG/DL — SIGNIFICANT CHANGE UP (ref 7–23)
CALCIUM SERPL-MCNC: 9.4 MG/DL — SIGNIFICANT CHANGE UP (ref 8.4–10.5)
CANNABINOIDS UR-MCNC: NEGATIVE — SIGNIFICANT CHANGE UP
CHLORIDE SERPL-SCNC: 101 MMOL/L — SIGNIFICANT CHANGE UP (ref 98–107)
CHOLEST SERPL-MCNC: 206 MG/DL — HIGH (ref 120–199)
CO2 SERPL-SCNC: 23 MMOL/L — SIGNIFICANT CHANGE UP (ref 22–31)
COCAINE METAB.OTHER UR-MCNC: NEGATIVE — SIGNIFICANT CHANGE UP
COLOR SPEC: YELLOW — SIGNIFICANT CHANGE UP
CREAT SERPL-MCNC: 0.96 MG/DL — SIGNIFICANT CHANGE UP (ref 0.5–1.3)
EOSINOPHIL # BLD AUTO: 0.08 K/UL — SIGNIFICANT CHANGE UP (ref 0–0.5)
EOSINOPHIL NFR BLD AUTO: 1.4 % — SIGNIFICANT CHANGE UP (ref 0–6)
GLUCOSE SERPL-MCNC: 105 MG/DL — HIGH (ref 70–99)
GLUCOSE UR-MCNC: NEGATIVE — SIGNIFICANT CHANGE UP
HBA1C BLD-MCNC: 4.9 % — SIGNIFICANT CHANGE UP (ref 4–5.6)
HCG SERPL-ACNC: < 5 MIU/ML — SIGNIFICANT CHANGE UP
HCT VFR BLD CALC: 41.5 % — SIGNIFICANT CHANGE UP (ref 34.5–45)
HDLC SERPL-MCNC: 86 MG/DL — HIGH (ref 45–65)
HGB BLD-MCNC: 13 G/DL — SIGNIFICANT CHANGE UP (ref 11.5–15.5)
HYALINE CASTS # UR AUTO: NEGATIVE — SIGNIFICANT CHANGE UP
IMM GRANULOCYTES NFR BLD AUTO: 0.3 % — SIGNIFICANT CHANGE UP (ref 0–1.5)
KETONES UR-MCNC: NEGATIVE — SIGNIFICANT CHANGE UP
LEUKOCYTE ESTERASE UR-ACNC: SIGNIFICANT CHANGE UP
LIPID PNL WITH DIRECT LDL SERPL: 127 MG/DL — SIGNIFICANT CHANGE UP
LYMPHOCYTES # BLD AUTO: 1.45 K/UL — SIGNIFICANT CHANGE UP (ref 1–3.3)
LYMPHOCYTES # BLD AUTO: 24.7 % — SIGNIFICANT CHANGE UP (ref 13–44)
MAGNESIUM SERPL-MCNC: 2 MG/DL — SIGNIFICANT CHANGE UP (ref 1.6–2.6)
MCHC RBC-ENTMCNC: 28.8 PG — SIGNIFICANT CHANGE UP (ref 27–34)
MCHC RBC-ENTMCNC: 31.3 % — LOW (ref 32–36)
MCV RBC AUTO: 91.8 FL — SIGNIFICANT CHANGE UP (ref 80–100)
METHADONE UR-MCNC: NEGATIVE — SIGNIFICANT CHANGE UP
MONOCYTES # BLD AUTO: 0.27 K/UL — SIGNIFICANT CHANGE UP (ref 0–0.9)
MONOCYTES NFR BLD AUTO: 4.6 % — SIGNIFICANT CHANGE UP (ref 2–14)
NEUTROPHILS # BLD AUTO: 4 K/UL — SIGNIFICANT CHANGE UP (ref 1.8–7.4)
NEUTROPHILS NFR BLD AUTO: 68.1 % — SIGNIFICANT CHANGE UP (ref 43–77)
NITRITE UR-MCNC: NEGATIVE — SIGNIFICANT CHANGE UP
NRBC # FLD: 0 K/UL — SIGNIFICANT CHANGE UP (ref 0–0)
OPIATES UR-MCNC: NEGATIVE — SIGNIFICANT CHANGE UP
OXYCODONE UR-MCNC: NEGATIVE — SIGNIFICANT CHANGE UP
PCP UR-MCNC: NEGATIVE — SIGNIFICANT CHANGE UP
PH UR: 5.5 — SIGNIFICANT CHANGE UP (ref 5–8)
PLATELET # BLD AUTO: 297 K/UL — SIGNIFICANT CHANGE UP (ref 150–400)
PMV BLD: 10.5 FL — SIGNIFICANT CHANGE UP (ref 7–13)
POTASSIUM SERPL-MCNC: 4.1 MMOL/L — SIGNIFICANT CHANGE UP (ref 3.5–5.3)
POTASSIUM SERPL-SCNC: 4.1 MMOL/L — SIGNIFICANT CHANGE UP (ref 3.5–5.3)
PROT SERPL-MCNC: 6.4 G/DL — SIGNIFICANT CHANGE UP (ref 6–8.3)
PROT UR-MCNC: NEGATIVE — SIGNIFICANT CHANGE UP
RBC # BLD: 4.52 M/UL — SIGNIFICANT CHANGE UP (ref 3.8–5.2)
RBC # FLD: 13.2 % — SIGNIFICANT CHANGE UP (ref 10.3–14.5)
RBC CASTS # UR COMP ASSIST: SIGNIFICANT CHANGE UP (ref 0–?)
SODIUM SERPL-SCNC: 139 MMOL/L — SIGNIFICANT CHANGE UP (ref 135–145)
SP GR SPEC: 1.02 — SIGNIFICANT CHANGE UP (ref 1–1.04)
SQUAMOUS # UR AUTO: SIGNIFICANT CHANGE UP
TRIGL SERPL-MCNC: 90 MG/DL — SIGNIFICANT CHANGE UP (ref 10–149)
TSH SERPL-MCNC: 1.42 UIU/ML — SIGNIFICANT CHANGE UP (ref 0.27–4.2)
UROBILINOGEN FLD QL: NORMAL — SIGNIFICANT CHANGE UP
WBC # BLD: 5.87 K/UL — SIGNIFICANT CHANGE UP (ref 3.8–10.5)
WBC # FLD AUTO: 5.87 K/UL — SIGNIFICANT CHANGE UP (ref 3.8–10.5)
WBC UR QL: SIGNIFICANT CHANGE UP (ref 0–?)

## 2019-06-15 PROCEDURE — 93010 ELECTROCARDIOGRAM REPORT: CPT

## 2019-06-15 PROCEDURE — 99231 SBSQ HOSP IP/OBS SF/LOW 25: CPT

## 2019-06-15 RX ORDER — ACETAMINOPHEN 500 MG
650 TABLET ORAL ONCE
Refills: 0 | Status: COMPLETED | OUTPATIENT
Start: 2019-06-15 | End: 2019-06-15

## 2019-06-15 RX ADMIN — GABAPENTIN 300 MILLIGRAM(S): 400 CAPSULE ORAL at 20:39

## 2019-06-15 RX ADMIN — GABAPENTIN 300 MILLIGRAM(S): 400 CAPSULE ORAL at 07:50

## 2019-06-15 RX ADMIN — Medication 10 MILLIGRAM(S): at 20:39

## 2019-06-15 RX ADMIN — Medication 75 MICROGRAM(S): at 07:50

## 2019-06-15 RX ADMIN — Medication 100 MILLIGRAM(S): at 20:39

## 2019-06-15 RX ADMIN — LAMOTRIGINE 125 MILLIGRAM(S): 25 TABLET, ORALLY DISINTEGRATING ORAL at 20:39

## 2019-06-15 RX ADMIN — FLUVOXAMINE MALEATE 125 MILLIGRAM(S): 25 TABLET ORAL at 20:39

## 2019-06-15 RX ADMIN — Medication 400 MILLIGRAM(S): at 20:39

## 2019-06-15 RX ADMIN — ARIPIPRAZOLE 30 MILLIGRAM(S): 15 TABLET ORAL at 07:50

## 2019-06-15 RX ADMIN — Medication 650 MILLIGRAM(S): at 22:00

## 2019-06-15 RX ADMIN — Medication 650 MILLIGRAM(S): at 21:00

## 2019-06-15 RX ADMIN — Medication 400 MILLIGRAM(S): at 12:04

## 2019-06-15 RX ADMIN — GABAPENTIN 300 MILLIGRAM(S): 400 CAPSULE ORAL at 12:04

## 2019-06-16 PROCEDURE — 99231 SBSQ HOSP IP/OBS SF/LOW 25: CPT

## 2019-06-16 RX ADMIN — ARIPIPRAZOLE 30 MILLIGRAM(S): 15 TABLET ORAL at 09:52

## 2019-06-16 RX ADMIN — GABAPENTIN 300 MILLIGRAM(S): 400 CAPSULE ORAL at 21:00

## 2019-06-16 RX ADMIN — GABAPENTIN 300 MILLIGRAM(S): 400 CAPSULE ORAL at 09:52

## 2019-06-16 RX ADMIN — GABAPENTIN 300 MILLIGRAM(S): 400 CAPSULE ORAL at 12:47

## 2019-06-16 RX ADMIN — Medication 400 MILLIGRAM(S): at 09:52

## 2019-06-16 RX ADMIN — Medication 400 MILLIGRAM(S): at 21:00

## 2019-06-16 RX ADMIN — FLUVOXAMINE MALEATE 125 MILLIGRAM(S): 25 TABLET ORAL at 21:00

## 2019-06-16 RX ADMIN — Medication 75 MICROGRAM(S): at 09:52

## 2019-06-16 RX ADMIN — Medication 100 MILLIGRAM(S): at 21:00

## 2019-06-16 RX ADMIN — LAMOTRIGINE 125 MILLIGRAM(S): 25 TABLET, ORALLY DISINTEGRATING ORAL at 21:00

## 2019-06-16 RX ADMIN — Medication 10 MILLIGRAM(S): at 21:00

## 2019-06-17 PROCEDURE — 99232 SBSQ HOSP IP/OBS MODERATE 35: CPT

## 2019-06-17 RX ORDER — TRAZODONE HCL 50 MG
50 TABLET ORAL AT BEDTIME
Refills: 0 | Status: DISCONTINUED | OUTPATIENT
Start: 2019-06-17 | End: 2019-06-25

## 2019-06-17 RX ADMIN — ARIPIPRAZOLE 30 MILLIGRAM(S): 15 TABLET ORAL at 08:39

## 2019-06-17 RX ADMIN — GABAPENTIN 300 MILLIGRAM(S): 400 CAPSULE ORAL at 16:16

## 2019-06-17 RX ADMIN — LAMOTRIGINE 125 MILLIGRAM(S): 25 TABLET, ORALLY DISINTEGRATING ORAL at 20:33

## 2019-06-17 RX ADMIN — Medication 400 MILLIGRAM(S): at 08:39

## 2019-06-17 RX ADMIN — FLUVOXAMINE MALEATE 125 MILLIGRAM(S): 25 TABLET ORAL at 20:33

## 2019-06-17 RX ADMIN — Medication 75 MICROGRAM(S): at 08:39

## 2019-06-17 RX ADMIN — Medication 50 MILLIGRAM(S): at 21:30

## 2019-06-17 RX ADMIN — Medication 10 MILLIGRAM(S): at 20:33

## 2019-06-17 RX ADMIN — GABAPENTIN 300 MILLIGRAM(S): 400 CAPSULE ORAL at 20:33

## 2019-06-17 RX ADMIN — GABAPENTIN 300 MILLIGRAM(S): 400 CAPSULE ORAL at 08:39

## 2019-06-17 RX ADMIN — Medication 400 MILLIGRAM(S): at 20:33

## 2019-06-18 PROCEDURE — 99232 SBSQ HOSP IP/OBS MODERATE 35: CPT

## 2019-06-18 RX ORDER — LOPERAMIDE HCL 2 MG
2 TABLET ORAL
Refills: 0 | Status: DISCONTINUED | OUTPATIENT
Start: 2019-06-18 | End: 2019-06-25

## 2019-06-18 RX ADMIN — FLUVOXAMINE MALEATE 125 MILLIGRAM(S): 25 TABLET ORAL at 21:06

## 2019-06-18 RX ADMIN — GABAPENTIN 300 MILLIGRAM(S): 400 CAPSULE ORAL at 12:50

## 2019-06-18 RX ADMIN — Medication 400 MILLIGRAM(S): at 08:06

## 2019-06-18 RX ADMIN — Medication 2 MILLIGRAM(S): at 20:50

## 2019-06-18 RX ADMIN — Medication 50 MILLIGRAM(S): at 21:06

## 2019-06-18 RX ADMIN — Medication 75 MICROGRAM(S): at 06:41

## 2019-06-18 RX ADMIN — GABAPENTIN 300 MILLIGRAM(S): 400 CAPSULE ORAL at 21:06

## 2019-06-18 RX ADMIN — GABAPENTIN 300 MILLIGRAM(S): 400 CAPSULE ORAL at 08:06

## 2019-06-18 RX ADMIN — LAMOTRIGINE 125 MILLIGRAM(S): 25 TABLET, ORALLY DISINTEGRATING ORAL at 21:06

## 2019-06-18 RX ADMIN — Medication 400 MILLIGRAM(S): at 21:06

## 2019-06-18 RX ADMIN — Medication 10 MILLIGRAM(S): at 21:06

## 2019-06-18 RX ADMIN — ARIPIPRAZOLE 30 MILLIGRAM(S): 15 TABLET ORAL at 08:05

## 2019-06-19 PROCEDURE — 90853 GROUP PSYCHOTHERAPY: CPT

## 2019-06-19 PROCEDURE — 99232 SBSQ HOSP IP/OBS MODERATE 35: CPT

## 2019-06-19 RX ORDER — ACETAMINOPHEN 500 MG
650 TABLET ORAL ONCE
Refills: 0 | Status: COMPLETED | OUTPATIENT
Start: 2019-06-19 | End: 2019-06-19

## 2019-06-19 RX ORDER — FLUVOXAMINE MALEATE 25 MG/1
150 TABLET ORAL AT BEDTIME
Refills: 0 | Status: DISCONTINUED | OUTPATIENT
Start: 2019-06-19 | End: 2019-06-21

## 2019-06-19 RX ADMIN — GABAPENTIN 300 MILLIGRAM(S): 400 CAPSULE ORAL at 09:04

## 2019-06-19 RX ADMIN — GABAPENTIN 300 MILLIGRAM(S): 400 CAPSULE ORAL at 20:50

## 2019-06-19 RX ADMIN — ARIPIPRAZOLE 30 MILLIGRAM(S): 15 TABLET ORAL at 09:02

## 2019-06-19 RX ADMIN — Medication 400 MILLIGRAM(S): at 20:50

## 2019-06-19 RX ADMIN — Medication 2 MILLIGRAM(S): at 16:06

## 2019-06-19 RX ADMIN — Medication 50 MILLIGRAM(S): at 20:50

## 2019-06-19 RX ADMIN — GABAPENTIN 300 MILLIGRAM(S): 400 CAPSULE ORAL at 13:29

## 2019-06-19 RX ADMIN — Medication 75 MICROGRAM(S): at 09:04

## 2019-06-19 RX ADMIN — LAMOTRIGINE 125 MILLIGRAM(S): 25 TABLET, ORALLY DISINTEGRATING ORAL at 20:50

## 2019-06-19 RX ADMIN — FLUVOXAMINE MALEATE 150 MILLIGRAM(S): 25 TABLET ORAL at 20:50

## 2019-06-19 RX ADMIN — Medication 400 MILLIGRAM(S): at 09:04

## 2019-06-19 RX ADMIN — Medication 10 MILLIGRAM(S): at 20:50

## 2019-06-20 PROCEDURE — 90853 GROUP PSYCHOTHERAPY: CPT

## 2019-06-20 PROCEDURE — 99232 SBSQ HOSP IP/OBS MODERATE 35: CPT

## 2019-06-20 RX ADMIN — FLUVOXAMINE MALEATE 150 MILLIGRAM(S): 25 TABLET ORAL at 20:49

## 2019-06-20 RX ADMIN — Medication 50 MILLIGRAM(S): at 20:49

## 2019-06-20 RX ADMIN — Medication 75 MICROGRAM(S): at 10:49

## 2019-06-20 RX ADMIN — Medication 650 MILLIGRAM(S): at 08:20

## 2019-06-20 RX ADMIN — ARIPIPRAZOLE 30 MILLIGRAM(S): 15 TABLET ORAL at 10:49

## 2019-06-20 RX ADMIN — Medication 10 MILLIGRAM(S): at 20:49

## 2019-06-20 RX ADMIN — Medication 400 MILLIGRAM(S): at 20:49

## 2019-06-20 RX ADMIN — GABAPENTIN 300 MILLIGRAM(S): 400 CAPSULE ORAL at 12:58

## 2019-06-20 RX ADMIN — LAMOTRIGINE 125 MILLIGRAM(S): 25 TABLET, ORALLY DISINTEGRATING ORAL at 20:49

## 2019-06-20 RX ADMIN — GABAPENTIN 300 MILLIGRAM(S): 400 CAPSULE ORAL at 10:49

## 2019-06-20 RX ADMIN — Medication 650 MILLIGRAM(S): at 07:22

## 2019-06-20 RX ADMIN — GABAPENTIN 300 MILLIGRAM(S): 400 CAPSULE ORAL at 20:49

## 2019-06-20 RX ADMIN — Medication 400 MILLIGRAM(S): at 10:49

## 2019-06-21 PROCEDURE — 99232 SBSQ HOSP IP/OBS MODERATE 35: CPT

## 2019-06-21 RX ORDER — FLUVOXAMINE MALEATE 25 MG/1
175 TABLET ORAL AT BEDTIME
Refills: 0 | Status: DISCONTINUED | OUTPATIENT
Start: 2019-06-21 | End: 2019-06-24

## 2019-06-21 RX ADMIN — FLUVOXAMINE MALEATE 175 MILLIGRAM(S): 25 TABLET ORAL at 21:01

## 2019-06-21 RX ADMIN — ARIPIPRAZOLE 30 MILLIGRAM(S): 15 TABLET ORAL at 08:43

## 2019-06-21 RX ADMIN — GABAPENTIN 300 MILLIGRAM(S): 400 CAPSULE ORAL at 08:44

## 2019-06-21 RX ADMIN — Medication 400 MILLIGRAM(S): at 21:01

## 2019-06-21 RX ADMIN — GABAPENTIN 300 MILLIGRAM(S): 400 CAPSULE ORAL at 12:52

## 2019-06-21 RX ADMIN — LAMOTRIGINE 125 MILLIGRAM(S): 25 TABLET, ORALLY DISINTEGRATING ORAL at 21:01

## 2019-06-21 RX ADMIN — GABAPENTIN 300 MILLIGRAM(S): 400 CAPSULE ORAL at 21:01

## 2019-06-21 RX ADMIN — Medication 50 MILLIGRAM(S): at 21:01

## 2019-06-21 RX ADMIN — Medication 10 MILLIGRAM(S): at 21:01

## 2019-06-21 RX ADMIN — Medication 75 MICROGRAM(S): at 08:44

## 2019-06-21 RX ADMIN — Medication 400 MILLIGRAM(S): at 08:44

## 2019-06-22 RX ADMIN — Medication 400 MILLIGRAM(S): at 09:07

## 2019-06-22 RX ADMIN — Medication 2 MILLIGRAM(S): at 11:59

## 2019-06-22 RX ADMIN — ARIPIPRAZOLE 30 MILLIGRAM(S): 15 TABLET ORAL at 11:08

## 2019-06-22 RX ADMIN — FLUVOXAMINE MALEATE 175 MILLIGRAM(S): 25 TABLET ORAL at 21:03

## 2019-06-22 RX ADMIN — Medication 10 MILLIGRAM(S): at 20:59

## 2019-06-22 RX ADMIN — Medication 75 MICROGRAM(S): at 09:07

## 2019-06-22 RX ADMIN — LAMOTRIGINE 125 MILLIGRAM(S): 25 TABLET, ORALLY DISINTEGRATING ORAL at 20:57

## 2019-06-22 RX ADMIN — Medication 400 MILLIGRAM(S): at 20:59

## 2019-06-22 RX ADMIN — Medication 50 MILLIGRAM(S): at 20:59

## 2019-06-22 RX ADMIN — GABAPENTIN 300 MILLIGRAM(S): 400 CAPSULE ORAL at 17:48

## 2019-06-22 RX ADMIN — GABAPENTIN 300 MILLIGRAM(S): 400 CAPSULE ORAL at 20:57

## 2019-06-22 RX ADMIN — GABAPENTIN 300 MILLIGRAM(S): 400 CAPSULE ORAL at 09:07

## 2019-06-22 NOTE — H&P ADULT - PMH
Statement Selected  delivery delivered    Hashimoto's disease    Retinal vasculitis, unspecified laterality    Schizo-affective schizophrenia    Ulcerative colitis with rectal bleeding, unspecified location

## 2019-06-23 RX ORDER — TRAZODONE HCL 50 MG
25 TABLET ORAL ONCE
Refills: 0 | Status: COMPLETED | OUTPATIENT
Start: 2019-06-23 | End: 2019-06-23

## 2019-06-23 RX ADMIN — LAMOTRIGINE 125 MILLIGRAM(S): 25 TABLET, ORALLY DISINTEGRATING ORAL at 21:07

## 2019-06-23 RX ADMIN — GABAPENTIN 300 MILLIGRAM(S): 400 CAPSULE ORAL at 08:45

## 2019-06-23 RX ADMIN — GABAPENTIN 300 MILLIGRAM(S): 400 CAPSULE ORAL at 13:00

## 2019-06-23 RX ADMIN — Medication 400 MILLIGRAM(S): at 08:46

## 2019-06-23 RX ADMIN — ARIPIPRAZOLE 30 MILLIGRAM(S): 15 TABLET ORAL at 08:45

## 2019-06-23 RX ADMIN — Medication 10 MILLIGRAM(S): at 21:07

## 2019-06-23 RX ADMIN — Medication 400 MILLIGRAM(S): at 21:07

## 2019-06-23 RX ADMIN — FLUVOXAMINE MALEATE 175 MILLIGRAM(S): 25 TABLET ORAL at 21:07

## 2019-06-23 RX ADMIN — GABAPENTIN 300 MILLIGRAM(S): 400 CAPSULE ORAL at 21:07

## 2019-06-23 RX ADMIN — Medication 25 MILLIGRAM(S): at 22:50

## 2019-06-23 RX ADMIN — Medication 50 MILLIGRAM(S): at 21:07

## 2019-06-23 RX ADMIN — Medication 75 MICROGRAM(S): at 08:45

## 2019-06-24 PROCEDURE — 99232 SBSQ HOSP IP/OBS MODERATE 35: CPT

## 2019-06-24 RX ORDER — ARIPIPRAZOLE 15 MG/1
1 TABLET ORAL
Qty: 15 | Refills: 0
Start: 2019-06-24 | End: 2019-07-08

## 2019-06-24 RX ORDER — GABAPENTIN 400 MG/1
1 CAPSULE ORAL
Qty: 45 | Refills: 0
Start: 2019-06-24 | End: 2019-07-08

## 2019-06-24 RX ORDER — TRAZODONE HCL 50 MG
1 TABLET ORAL
Qty: 15 | Refills: 0
Start: 2019-06-24 | End: 2019-07-08

## 2019-06-24 RX ORDER — FLUVOXAMINE MALEATE 25 MG/1
2 TABLET ORAL
Qty: 30 | Refills: 0
Start: 2019-06-24 | End: 2019-07-08

## 2019-06-24 RX ORDER — LEVOTHYROXINE SODIUM 125 MCG
1 TABLET ORAL
Qty: 15 | Refills: 0
Start: 2019-06-24 | End: 2019-07-08

## 2019-06-24 RX ORDER — FLUVOXAMINE MALEATE 25 MG/1
200 TABLET ORAL AT BEDTIME
Refills: 0 | Status: DISCONTINUED | OUTPATIENT
Start: 2019-06-24 | End: 2019-06-25

## 2019-06-24 RX ORDER — LANOLIN ALCOHOL/MO/W.PET/CERES
1 CREAM (GRAM) TOPICAL
Qty: 15 | Refills: 0
Start: 2019-06-24 | End: 2022-09-07

## 2019-06-24 RX ORDER — LAMOTRIGINE 25 MG/1
5 TABLET, ORALLY DISINTEGRATING ORAL
Qty: 75 | Refills: 0
Start: 2019-06-24 | End: 2019-07-08

## 2019-06-24 RX ORDER — MESALAMINE 400 MG
1 TABLET, DELAYED RELEASE (ENTERIC COATED) ORAL
Qty: 30 | Refills: 0
Start: 2019-06-24 | End: 2019-07-08

## 2019-06-24 RX ORDER — LANOLIN ALCOHOL/MO/W.PET/CERES
1 CREAM (GRAM) TOPICAL
Qty: 15 | Refills: 0
Start: 2019-06-24 | End: 2019-07-08

## 2019-06-24 RX ADMIN — GABAPENTIN 300 MILLIGRAM(S): 400 CAPSULE ORAL at 08:44

## 2019-06-24 RX ADMIN — GABAPENTIN 300 MILLIGRAM(S): 400 CAPSULE ORAL at 20:34

## 2019-06-24 RX ADMIN — FLUVOXAMINE MALEATE 200 MILLIGRAM(S): 25 TABLET ORAL at 20:34

## 2019-06-24 RX ADMIN — GABAPENTIN 300 MILLIGRAM(S): 400 CAPSULE ORAL at 13:20

## 2019-06-24 RX ADMIN — Medication 400 MILLIGRAM(S): at 08:44

## 2019-06-24 RX ADMIN — Medication 400 MILLIGRAM(S): at 20:34

## 2019-06-24 RX ADMIN — Medication 10 MILLIGRAM(S): at 20:34

## 2019-06-24 RX ADMIN — LAMOTRIGINE 125 MILLIGRAM(S): 25 TABLET, ORALLY DISINTEGRATING ORAL at 20:34

## 2019-06-24 RX ADMIN — Medication 75 MICROGRAM(S): at 08:44

## 2019-06-24 RX ADMIN — Medication 50 MILLIGRAM(S): at 20:34

## 2019-06-24 RX ADMIN — ARIPIPRAZOLE 30 MILLIGRAM(S): 15 TABLET ORAL at 08:44

## 2019-06-25 VITALS — TEMPERATURE: 98 F

## 2019-06-25 PROCEDURE — 90853 GROUP PSYCHOTHERAPY: CPT

## 2019-06-25 PROCEDURE — 99238 HOSP IP/OBS DSCHRG MGMT 30/<: CPT

## 2019-06-25 RX ADMIN — Medication 400 MILLIGRAM(S): at 08:57

## 2019-06-25 RX ADMIN — GABAPENTIN 300 MILLIGRAM(S): 400 CAPSULE ORAL at 08:56

## 2019-06-25 RX ADMIN — Medication 75 MICROGRAM(S): at 08:56

## 2019-06-25 RX ADMIN — GABAPENTIN 300 MILLIGRAM(S): 400 CAPSULE ORAL at 12:32

## 2019-06-25 RX ADMIN — ARIPIPRAZOLE 30 MILLIGRAM(S): 15 TABLET ORAL at 08:57

## 2019-08-05 NOTE — PATIENT PROFILE BEHAVIORAL HEALTH - NS SC CAGE ALCOHOL GUILTY ABOUT
PHYSICAL THERAPY TREATMENT NOTE - INPATIENT    Room Number: 351/351-A     Session: 2  Number of Visits to Meet Established Goals: 3    Presenting Problem: S/p Right Cemented Bipolar hip replacement on 08/02/19  History related to current admission: Naren Peripheral vascular disease, unspecified ([facility]    • Pneumonia, organism unspecified(486)    • PONV (postoperative nausea and vomiting)    • Pulmonary embolism (HCC)    • Seizure disorder ([facility]    • Unspecified essential hypertension    • Unspecified sleep Static Sitting: Poor +  Dynamic Sitting: Poor +           Static Standing: Poor  Dynamic Standing: Poor -    ACTIVITY TOLERANCE           BP: (!) 166/73(Sitting : 130/68, After standing & return to sitting- 143/96)  BP Location: Right arm  BP Method: Autom standing balance. Unsafe to transfer to bedside chair this time due to fairly drowsy. Patient required max assist of 2 to return to supine in bed. THERAPEUTIC EXERCISES  Lower Extremity AAROM & PROM exercises for right LE as per [facility] rehab protocol.   Lef conservation;Patient education; Family education;Gait training;Neuromuscular re-educate;Strengthening;Transfer training;Balance training  Rehab Potential : Fair  Frequency (Obs): Daily    CURRENT GOALS   Goal #1 Patient is able to demonstrate supine - sit E NA

## 2019-08-15 NOTE — H&P ADULT - REASON FOR ADMISSION
"Insomnia" Otezla Pregnancy And Lactation Text: This medication is Pregnancy Category C and it isn't known if it is safe during pregnancy. It is unknown if it is excreted in breast milk.

## 2019-12-07 ENCOUNTER — EMERGENCY (EMERGENCY)
Facility: HOSPITAL | Age: 49
LOS: 0 days | Discharge: PSYCHIATRIC OR SUBSTANCE USE FACILITY | End: 2019-12-08
Attending: EMERGENCY MEDICINE
Payer: MEDICAID

## 2019-12-07 VITALS — WEIGHT: 143.08 LBS | HEIGHT: 63 IN

## 2019-12-07 DIAGNOSIS — F44.9 DISSOCIATIVE AND CONVERSION DISORDER, UNSPECIFIED: ICD-10-CM

## 2019-12-07 DIAGNOSIS — R44.3 HALLUCINATIONS, UNSPECIFIED: ICD-10-CM

## 2019-12-07 DIAGNOSIS — F25.0 SCHIZOAFFECTIVE DISORDER, BIPOLAR TYPE: ICD-10-CM

## 2019-12-07 DIAGNOSIS — F43.10 POST-TRAUMATIC STRESS DISORDER, UNSPECIFIED: ICD-10-CM

## 2019-12-07 LAB
ALBUMIN SERPL ELPH-MCNC: 3.8 G/DL — SIGNIFICANT CHANGE UP (ref 3.3–5)
ALP SERPL-CCNC: 94 U/L — SIGNIFICANT CHANGE UP (ref 40–120)
ALT FLD-CCNC: 38 U/L — SIGNIFICANT CHANGE UP (ref 12–78)
ANION GAP SERPL CALC-SCNC: 4 MMOL/L — LOW (ref 5–17)
APAP SERPL-MCNC: <2 UG/ML — LOW (ref 10–30)
APPEARANCE UR: CLEAR — SIGNIFICANT CHANGE UP
AST SERPL-CCNC: 23 U/L — SIGNIFICANT CHANGE UP (ref 15–37)
BASOPHILS # BLD AUTO: 0.03 K/UL — SIGNIFICANT CHANGE UP (ref 0–0.2)
BASOPHILS NFR BLD AUTO: 0.5 % — SIGNIFICANT CHANGE UP (ref 0–2)
BILIRUB SERPL-MCNC: 0.3 MG/DL — SIGNIFICANT CHANGE UP (ref 0.2–1.2)
BILIRUB UR-MCNC: NEGATIVE — SIGNIFICANT CHANGE UP
BUN SERPL-MCNC: 13 MG/DL — SIGNIFICANT CHANGE UP (ref 7–23)
CALCIUM SERPL-MCNC: 9 MG/DL — SIGNIFICANT CHANGE UP (ref 8.5–10.1)
CHLORIDE SERPL-SCNC: 108 MMOL/L — SIGNIFICANT CHANGE UP (ref 96–108)
CO2 SERPL-SCNC: 29 MMOL/L — SIGNIFICANT CHANGE UP (ref 22–31)
COLOR SPEC: YELLOW — SIGNIFICANT CHANGE UP
CREAT SERPL-MCNC: 0.83 MG/DL — SIGNIFICANT CHANGE UP (ref 0.5–1.3)
DIFF PNL FLD: NEGATIVE — SIGNIFICANT CHANGE UP
EOSINOPHIL # BLD AUTO: 0.18 K/UL — SIGNIFICANT CHANGE UP (ref 0–0.5)
EOSINOPHIL NFR BLD AUTO: 2.7 % — SIGNIFICANT CHANGE UP (ref 0–6)
ETHANOL SERPL-MCNC: <10 MG/DL — SIGNIFICANT CHANGE UP (ref 0–10)
GLUCOSE SERPL-MCNC: 91 MG/DL — SIGNIFICANT CHANGE UP (ref 70–99)
GLUCOSE UR QL: NEGATIVE MG/DL — SIGNIFICANT CHANGE UP
HCT VFR BLD CALC: 40.1 % — SIGNIFICANT CHANGE UP (ref 34.5–45)
HGB BLD-MCNC: 13.3 G/DL — SIGNIFICANT CHANGE UP (ref 11.5–15.5)
IMM GRANULOCYTES NFR BLD AUTO: 0.2 % — SIGNIFICANT CHANGE UP (ref 0–1.5)
KETONES UR-MCNC: NEGATIVE — SIGNIFICANT CHANGE UP
LEUKOCYTE ESTERASE UR-ACNC: NEGATIVE — SIGNIFICANT CHANGE UP
LYMPHOCYTES # BLD AUTO: 1.41 K/UL — SIGNIFICANT CHANGE UP (ref 1–3.3)
LYMPHOCYTES # BLD AUTO: 21.5 % — SIGNIFICANT CHANGE UP (ref 13–44)
MCHC RBC-ENTMCNC: 29.4 PG — SIGNIFICANT CHANGE UP (ref 27–34)
MCHC RBC-ENTMCNC: 33.2 GM/DL — SIGNIFICANT CHANGE UP (ref 32–36)
MCV RBC AUTO: 88.7 FL — SIGNIFICANT CHANGE UP (ref 80–100)
MONOCYTES # BLD AUTO: 0.42 K/UL — SIGNIFICANT CHANGE UP (ref 0–0.9)
MONOCYTES NFR BLD AUTO: 6.4 % — SIGNIFICANT CHANGE UP (ref 2–14)
NEUTROPHILS # BLD AUTO: 4.52 K/UL — SIGNIFICANT CHANGE UP (ref 1.8–7.4)
NEUTROPHILS NFR BLD AUTO: 68.7 % — SIGNIFICANT CHANGE UP (ref 43–77)
NITRITE UR-MCNC: NEGATIVE — SIGNIFICANT CHANGE UP
PCP SPEC-MCNC: SIGNIFICANT CHANGE UP
PH UR: 7 — SIGNIFICANT CHANGE UP (ref 5–8)
PLATELET # BLD AUTO: 239 K/UL — SIGNIFICANT CHANGE UP (ref 150–400)
POTASSIUM SERPL-MCNC: 4.1 MMOL/L — SIGNIFICANT CHANGE UP (ref 3.5–5.3)
POTASSIUM SERPL-SCNC: 4.1 MMOL/L — SIGNIFICANT CHANGE UP (ref 3.5–5.3)
PROT SERPL-MCNC: 6.8 GM/DL — SIGNIFICANT CHANGE UP (ref 6–8.3)
PROT UR-MCNC: NEGATIVE MG/DL — SIGNIFICANT CHANGE UP
RBC # BLD: 4.52 M/UL — SIGNIFICANT CHANGE UP (ref 3.8–5.2)
RBC # FLD: 13.8 % — SIGNIFICANT CHANGE UP (ref 10.3–14.5)
SALICYLATES SERPL-MCNC: <1.7 MG/DL — LOW (ref 2.8–20)
SODIUM SERPL-SCNC: 141 MMOL/L — SIGNIFICANT CHANGE UP (ref 135–145)
SP GR SPEC: 1 — LOW (ref 1.01–1.02)
UROBILINOGEN FLD QL: NEGATIVE MG/DL — SIGNIFICANT CHANGE UP
WBC # BLD: 6.57 K/UL — SIGNIFICANT CHANGE UP (ref 3.8–10.5)
WBC # FLD AUTO: 6.57 K/UL — SIGNIFICANT CHANGE UP (ref 3.8–10.5)

## 2019-12-07 PROCEDURE — 36415 COLL VENOUS BLD VENIPUNCTURE: CPT

## 2019-12-07 PROCEDURE — 87086 URINE CULTURE/COLONY COUNT: CPT

## 2019-12-07 PROCEDURE — 81003 URINALYSIS AUTO W/O SCOPE: CPT

## 2019-12-07 PROCEDURE — 80053 COMPREHEN METABOLIC PANEL: CPT

## 2019-12-07 PROCEDURE — 93010 ELECTROCARDIOGRAM REPORT: CPT

## 2019-12-07 PROCEDURE — 80307 DRUG TEST PRSMV CHEM ANLYZR: CPT

## 2019-12-07 PROCEDURE — 85025 COMPLETE CBC W/AUTO DIFF WBC: CPT

## 2019-12-07 PROCEDURE — 99285 EMERGENCY DEPT VISIT HI MDM: CPT

## 2019-12-07 PROCEDURE — 93005 ELECTROCARDIOGRAM TRACING: CPT

## 2019-12-07 RX ORDER — LANOLIN ALCOHOL/MO/W.PET/CERES
10 CREAM (GRAM) TOPICAL AT BEDTIME
Refills: 0 | Status: COMPLETED | OUTPATIENT
Start: 2019-12-07 | End: 2019-12-08

## 2019-12-07 RX ORDER — HALOPERIDOL DECANOATE 100 MG/ML
5 INJECTION INTRAMUSCULAR ONCE
Refills: 0 | Status: DISCONTINUED | OUTPATIENT
Start: 2019-12-07 | End: 2019-12-07

## 2019-12-07 RX ORDER — DIPHENHYDRAMINE HCL 50 MG
50 CAPSULE ORAL ONCE
Refills: 0 | Status: DISCONTINUED | OUTPATIENT
Start: 2019-12-07 | End: 2019-12-07

## 2019-12-07 RX ORDER — GABAPENTIN 400 MG/1
300 CAPSULE ORAL ONCE
Refills: 0 | Status: COMPLETED | OUTPATIENT
Start: 2019-12-07 | End: 2019-12-07

## 2019-12-07 RX ADMIN — Medication 2 MILLIGRAM(S): at 22:18

## 2019-12-07 RX ADMIN — GABAPENTIN 300 MILLIGRAM(S): 400 CAPSULE ORAL at 22:18

## 2019-12-07 NOTE — ED BEHAVIORAL HEALTH ASSESSMENT NOTE - OTHER PAST PSYCHIATRIC HISTORY (INCLUDE DETAILS REGARDING ONSET, COURSE OF ILLNESS, INPATIENT/OUTPATIENT TREATMENT)
Patient states she has been admitted for about 30 times to inpatient psychiatry. Most recently HH March 2019.

## 2019-12-07 NOTE — ED BEHAVIORAL HEALTH ASSESSMENT NOTE - DESCRIPTION
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

## 2019-12-07 NOTE — ED BEHAVIORAL HEALTH ASSESSMENT NOTE - HPI (INCLUDE ILLNESS QUALITY, SEVERITY, DURATION, TIMING, CONTEXT, MODIFYING FACTORS, ASSOCIATED SIGNS AND SYMPTOMS)
49-year-old, single,  female, , mother of 2 underaged children; non-caregiver, lives with boyfriend; with a history of Bipolar with a history of multiple past in-patient hospitalizations; with a self-reported history of one prior suicide attempt (via overdose). With a past medical history of ulcerative colitis, Lupus, Hashimoto's disease, fibromyalgia, retinal vasculitis. With a history of illicit drug use; presented to the ER; brought in by sister; however, self-referred for "potential psychotic moment".       On assessment, patient states she has been having the runs since March 2019, and that she came to the hospital because she thought she was about to have a psychotic moment. She states she came to the hospital "before the psychotic episode happens". When she is asked whether she was having suicidal thoughts or plan to end her life, she states "it's not like that; it's psychosis". She denies current auditory or visual hallucinations, she denies current intent or plans to end her life. She states she has current outpatient psychiatric services in place and takes trazodone at bedtime for sleep. She states she also takes luvox, and Lamictal. She states she has stopped taking the Abilify she has been prescribed by Dr. Mick Adams. She states "I can't take Abilify; it makes me restless; I can't take Seroquel; it gives me EPS, but I can take Thorazine".  When she was asked whether she can take Haldol, she states she can take haldol with benadryl. She appears to be preoccupied with somatic complaints such as "having a diarrhea and not feeling well". She was not yet medically cleared at the time of this assessment. Case discussed with the medical attending. Patient will be reassessed tomorrow morning for safe discharge. Collateral from sister who brought her to the ED is warranted for safe dispo.

## 2019-12-07 NOTE — ED PROVIDER NOTE - OBJECTIVE STATEMENT
48 y/o female with a PMHx of schizoaffective disorder previously on Abilify was discontinued by doc and started on Ketamine IM injections, ulcerative colitis, presents to the ED for psychiatric evaluation. Pt states over the past few weeks she has had worsening symptoms of paranoia and recent auditory hallucinations. Pt is concern she is going to have a psychotic episode. Denies SI/HI, visual hallucinations. No other complaints at this time.

## 2019-12-07 NOTE — ED BEHAVIORAL HEALTH ASSESSMENT NOTE - SUMMARY
49-year-old, single,  female, , mother of 2 underaged children; non-caregiver, lives with boyfriend; with a history of Bipolar with a history of multiple past in-patient hospitalizations; with a self-reported history of one prior suicide attempt (via overdose). With a past medical history of ulcerative colitis, Lupus, Hashimoto's disease, fibromyalgia, retinal vasculitis. With a history of illicit drug use; presented to the ER; brought in by sister; however, self-referred for potential "psychotic moment".       Patient is without notable symptoms of psychosis despite reporting coming to the ER before her symptoms start. She denies current suicidal or homicidal; ideations; denies current intent or plans to die. No evidence of psychotic symptoms noted or reported at this time; however, given her multiple past psychiatric admissions, will need to obtain collateral from sister prior to discharging her.   Will reassess patient in AM.  Report given to Tele psychiatry.

## 2019-12-07 NOTE — ED BEHAVIORAL HEALTH ASSESSMENT NOTE - CURRENTLY ENROLLED STUDENT
--------------- APPROVED REPORT --------------





EKG Measurement

Heart [id number]

[id number]

[id number]

[id number]

[id number]



<Conclusion>

Normal sinus rhythm

Normal ECG
No
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
## 2019-12-07 NOTE — ED ADULT NURSE NOTE - OBJECTIVE STATEMENT
pt presents to ed ambulatory for psych evaluation, pt has hx of schizoaffective. pt states shes been having worsening paranoid and auditory hallucinations and is concerned of having a "psychotic break". pt ambulatory, cooperative, denies HI, denies SI, vitals stable, no complaints. dressed appropriately.

## 2019-12-07 NOTE — ED ADULT TRIAGE NOTE - CHIEF COMPLAINT QUOTE
Pt states " I have schizoaffective disorder and I feel like I'm going to have a psychotic episode and I want to catch it before it happens" pt has recent changes to medication regiment and feels that may be whats causing it. denies SI/HI at this time. accompanied by her sister.

## 2019-12-07 NOTE — ED BEHAVIORAL HEALTH ASSESSMENT NOTE - DESCRIPTION (FIRST USE, LAST USE, QUANTITY, FREQUENCY, DURATION)
history of alcohol abuse; sober > 1 year History of using weed. No recent use. negative for cannabis.

## 2019-12-07 NOTE — ED BEHAVIORAL HEALTH ASSESSMENT NOTE - PATIENT'S CHIEF COMPLAINT
"I have having the runs since March after I left the hospital; I think I am going to have a psychotic moment"

## 2019-12-07 NOTE — ED BEHAVIORAL HEALTH ASSESSMENT NOTE - SUICIDE PROTECTIVE FACTORS
Responsibility to family and others/Identifies reasons for living/Has future plans/Supportive social network of family or friends/Positive therapeutic relationships

## 2019-12-07 NOTE — ED BEHAVIORAL HEALTH ASSESSMENT NOTE - RISK ASSESSMENT
Low risk for suicide.   No current suicidal ideations or current suicidal plans. Low Acute Suicide Risk

## 2019-12-07 NOTE — ED PROVIDER NOTE - CLINICAL SUMMARY MEDICAL DECISION MAKING FREE TEXT BOX
48 y/o female with hx of schizoaffective disorder with worsening symptoms and paranoia with recent change in meds. Will check labs and consult psych.

## 2019-12-08 ENCOUNTER — INPATIENT (INPATIENT)
Facility: HOSPITAL | Age: 49
LOS: 8 days | Discharge: HOME | DRG: 885 | End: 2019-12-17
Attending: PSYCHIATRY & NEUROLOGY | Admitting: PSYCHIATRY & NEUROLOGY
Payer: MEDICAID

## 2019-12-08 VITALS
TEMPERATURE: 98 F | RESPIRATION RATE: 16 BRPM | SYSTOLIC BLOOD PRESSURE: 127 MMHG | OXYGEN SATURATION: 98 % | HEART RATE: 65 BPM | DIASTOLIC BLOOD PRESSURE: 80 MMHG

## 2019-12-08 VITALS — WEIGHT: 143.96 LBS | HEIGHT: 63 IN

## 2019-12-08 DIAGNOSIS — F29 UNSPECIFIED PSYCHOSIS NOT DUE TO A SUBSTANCE OR KNOWN PHYSIOLOGICAL CONDITION: ICD-10-CM

## 2019-12-08 LAB
CULTURE RESULTS: SIGNIFICANT CHANGE UP
SPECIMEN SOURCE: SIGNIFICANT CHANGE UP

## 2019-12-08 RX ORDER — LEVOTHYROXINE SODIUM 125 MCG
75 TABLET ORAL DAILY
Refills: 0 | Status: DISCONTINUED | OUTPATIENT
Start: 2019-12-08 | End: 2019-12-17

## 2019-12-08 RX ORDER — QUETIAPINE FUMARATE 200 MG/1
25 TABLET, FILM COATED ORAL AT BEDTIME
Refills: 0 | Status: DISCONTINUED | OUTPATIENT
Start: 2019-12-08 | End: 2019-12-10

## 2019-12-08 RX ORDER — ACETAMINOPHEN 500 MG
650 TABLET ORAL EVERY 6 HOURS
Refills: 0 | Status: DISCONTINUED | OUTPATIENT
Start: 2019-12-08 | End: 2019-12-17

## 2019-12-08 RX ORDER — LAMOTRIGINE 25 MG/1
50 TABLET, ORALLY DISINTEGRATING ORAL DAILY
Refills: 0 | Status: DISCONTINUED | OUTPATIENT
Start: 2019-12-08 | End: 2019-12-17

## 2019-12-08 RX ORDER — FLUVOXAMINE MALEATE 25 MG/1
50 TABLET ORAL DAILY
Refills: 0 | Status: DISCONTINUED | OUTPATIENT
Start: 2019-12-08 | End: 2019-12-17

## 2019-12-08 RX ORDER — HYDROXYZINE HCL 10 MG
25 TABLET ORAL ONCE
Refills: 0 | Status: COMPLETED | OUTPATIENT
Start: 2019-12-08 | End: 2019-12-08

## 2019-12-08 RX ORDER — HYDROXYZINE HCL 10 MG
25 TABLET ORAL EVERY 6 HOURS
Refills: 0 | Status: DISCONTINUED | OUTPATIENT
Start: 2019-12-08 | End: 2019-12-17

## 2019-12-08 RX ORDER — LAMOTRIGINE 25 MG/1
100 TABLET, ORALLY DISINTEGRATING ORAL AT BEDTIME
Refills: 0 | Status: DISCONTINUED | OUTPATIENT
Start: 2019-12-08 | End: 2019-12-17

## 2019-12-08 RX ORDER — GABAPENTIN 400 MG/1
100 CAPSULE ORAL AT BEDTIME
Refills: 0 | Status: DISCONTINUED | OUTPATIENT
Start: 2019-12-08 | End: 2019-12-17

## 2019-12-08 RX ADMIN — GABAPENTIN 100 MILLIGRAM(S): 400 CAPSULE ORAL at 20:35

## 2019-12-08 RX ADMIN — QUETIAPINE FUMARATE 25 MILLIGRAM(S): 200 TABLET, FILM COATED ORAL at 20:35

## 2019-12-08 RX ADMIN — Medication 650 MILLIGRAM(S): at 18:36

## 2019-12-08 RX ADMIN — Medication 10 MILLIGRAM(S): at 01:19

## 2019-12-08 RX ADMIN — FLUVOXAMINE MALEATE 50 MILLIGRAM(S): 25 TABLET ORAL at 10:21

## 2019-12-08 RX ADMIN — LAMOTRIGINE 100 MILLIGRAM(S): 25 TABLET, ORALLY DISINTEGRATING ORAL at 20:35

## 2019-12-08 RX ADMIN — Medication 25 MILLIGRAM(S): at 10:23

## 2019-12-08 RX ADMIN — LAMOTRIGINE 50 MILLIGRAM(S): 25 TABLET, ORALLY DISINTEGRATING ORAL at 20:36

## 2019-12-08 RX ADMIN — Medication 75 MICROGRAM(S): at 10:23

## 2019-12-08 RX ADMIN — Medication 15 MILLILITER(S): at 20:35

## 2019-12-08 RX ADMIN — Medication 650 MILLIGRAM(S): at 17:52

## 2019-12-08 NOTE — PATIENT PROFILE BEHAVIORAL HEALTH - HEALTH/HEALTHCARE ANXIETIES, PROFILE
"That I will never get better that I will always live in a state of confusion and forgetfulness and live a life without difficulty"

## 2019-12-08 NOTE — PATIENT PROFILE BEHAVIORAL HEALTH - NSBHHALLU__PSY_A_CORE
"I have been talking to my self telling the voices that have been telling me I am a bitch slut and telling the voices to shut up/auditory

## 2019-12-08 NOTE — PATIENT PROFILE BEHAVIORAL HEALTH - NSALCOHOLAMT_GEN_A_CORE_SD
Pt is calling because she thought she had an upcoming appt. Per office note pt would be contacted after speaking with Dr Groves.      Pt wants to know about coming off of her plavix  once a day and her potassium med once a day   Pt said she thought she was suppose to f/u in 6 months   Please advise  Marie [phone]   5-6 drinks

## 2019-12-08 NOTE — BEHAVIORAL HEALTH ASSESSMENT NOTE - SUMMARY
49-year-old, single,  female, , mother of 2 underaged children; non-caregiver, lives with boyfriend; with a history of Bipolar with a history of multiple past in-patient hospitalizations; with a self-reported history of one prior suicide attempt (via overdose). With a past medical history of ulcerative colitis, Lupus, Hashimoto's disease, fibromyalgia, retinal vasculitis. With a history of illicit drug use; presented to the ER; brought in by sister; however, self-referred for potential "psychotic moment".       Patient is without notable symptoms of psychosis despite reporting coming to the ER before her symptoms start. She denies current suicidal or homicidal; ideations; denies current intent or plans to die. She will now admitted to the inpatient unit for further stabilization.

## 2019-12-08 NOTE — BEHAVIORAL HEALTH ASSESSMENT NOTE - RISK ASSESSMENT
Low Acute Suicide Risk Low risk for suicide.   No current suicidal ideations or current suicidal plans.

## 2019-12-08 NOTE — PATIENT PROFILE BEHAVIORAL HEALTH - NSBHCOPING_PSY_A_CORE
computer/meditation/exercise/music/prayer/reading/painting, waking, martial arts,talking,Attends AA sober for 9 years

## 2019-12-08 NOTE — BEHAVIORAL HEALTH ASSESSMENT NOTE - NSBHCHARTREVIEWLAB_PSY_A_CORE FT
13.3   6.57  )-----------( 239      ( 07 Dec 2019 14:18 )             40.1   12-07    141  |  108  |  13  ----------------------------<  91  4.1   |  29  |  0.83    Ca    9.0      07 Dec 2019 14:18    TPro  6.8  /  Alb  3.8  /  TBili  0.3  /  DBili  x   /  AST  23  /  ALT  38  /  AlkPhos  94  12-07

## 2019-12-08 NOTE — PATIENT PROFILE BEHAVIORAL HEALTH - HEALTHCARE INFORMATION NEEDED, PROFILE
I like to read to I like to color and I like to do yoga while hospitlized If I get agitated I need a prn

## 2019-12-08 NOTE — PATIENT PROFILE BEHAVIORAL HEALTH - NSBHPRTFCTEX_PSY_A_CORE
AA support group/social supports/has dependent pets/family support/positive therapeutic relationship/other

## 2019-12-08 NOTE — PATIENT PROFILE BEHAVIORAL HEALTH - NSBHPSYTREAT_PSY_A_CORE
parents alcoholic/family history of psych problems/private therapist parents alcoholic/family history of psych problems

## 2019-12-08 NOTE — ED BEHAVIORAL HEALTH NOTE - BEHAVIORAL HEALTH NOTE
Seen for reassessment. Pt endorses decreased anxiety with Ativan PRN. Denies current SI/HI. However, endorses AH, derogatory content. Somatically focused, cannot name specific area or complaint. Feels that she cannot go home safely, as will decompensate further. States Abilify helped her in the past. Pt reports that her medications include: Lamictal 125mg qHS, Gabapentin 300mg qHS, Luvox 200mg qHS, Levothyroxine 25mcg daily-- unclear if reliable .    Spoke to sister Nga . Sister endorses feeling concerned that pt reports hearing voices and believes that this is in the context of medication changes by her outpatient provider, notably Abilify. She denies patient making any statements about wanting to harm herself or others. States that pt presented similarly prior to other hospitalizations. Was unable to provide a list of her medications, and is in support of her hospitalization for help prior to her decompensating (erratic behavior, escalating anxiety).    Offered voluntary admission and patient was in agreement. Does not meet criteria for involuntary admission.     Discussed with ED. Medically cleared per verbal report.     Pending bed availability.

## 2019-12-08 NOTE — BEHAVIORAL HEALTH ASSESSMENT NOTE - SUICIDE PROTECTIVE FACTORS
Has future plans/Supportive social network of family or friends/Identifies reasons for living/Positive therapeutic relationships/Responsibility to family and others

## 2019-12-08 NOTE — PATIENT PROFILE BEHAVIORAL HEALTH - NS SC CAGE ALCOHOL GUILTY ABOUT
Called and left message that Dr. Gerber said she would need to be evaluated first, and discuss side affects, etc...  She will need to make an appointment.     no

## 2019-12-08 NOTE — BEHAVIORAL HEALTH ASSESSMENT NOTE - DETAILS
discharge from  on 04/2019 Dr Lay History of one suicidal attempt via overdose per record. Patient denies any history of suicidal attempts. Several Relatives C/o having a diarrhea.
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
## 2019-12-09 LAB
CHOLEST SERPL-MCNC: 264 MG/DL — HIGH (ref 10–199)
HBA1C BLD-MCNC: 5.2 % — SIGNIFICANT CHANGE UP (ref 4–5.6)
HCG SERPL-ACNC: 1 MIU/ML — SIGNIFICANT CHANGE UP
HDLC SERPL-MCNC: 112 MG/DL — SIGNIFICANT CHANGE UP
LIPID PNL WITH DIRECT LDL SERPL: 133 MG/DL — HIGH
TOTAL CHOLESTEROL/HDL RATIO MEASUREMENT: 2.4 RATIO — LOW (ref 3.3–7.1)
TRIGL SERPL-MCNC: 94 MG/DL — SIGNIFICANT CHANGE UP (ref 10–149)
TSH SERPL-MCNC: 1.73 UIU/ML — SIGNIFICANT CHANGE UP (ref 0.27–4.2)

## 2019-12-09 PROCEDURE — 99233 SBSQ HOSP IP/OBS HIGH 50: CPT

## 2019-12-09 RX ADMIN — LAMOTRIGINE 50 MILLIGRAM(S): 25 TABLET, ORALLY DISINTEGRATING ORAL at 08:34

## 2019-12-09 RX ADMIN — GABAPENTIN 100 MILLIGRAM(S): 400 CAPSULE ORAL at 21:03

## 2019-12-09 RX ADMIN — QUETIAPINE FUMARATE 25 MILLIGRAM(S): 200 TABLET, FILM COATED ORAL at 21:03

## 2019-12-09 RX ADMIN — Medication 75 MICROGRAM(S): at 06:03

## 2019-12-09 RX ADMIN — LAMOTRIGINE 100 MILLIGRAM(S): 25 TABLET, ORALLY DISINTEGRATING ORAL at 21:02

## 2019-12-09 RX ADMIN — Medication 25 MILLIGRAM(S): at 09:34

## 2019-12-09 RX ADMIN — FLUVOXAMINE MALEATE 50 MILLIGRAM(S): 25 TABLET ORAL at 08:34
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
## 2019-12-09 NOTE — ED POST DISCHARGE NOTE - RESULT SUMMARY
+ UC, Pt. transferred to [facility] psych unit.  I spoke with them and they are aware.  Eric RODARTE

## 2019-12-09 NOTE — PROGRESS NOTE ADULT - SUBJECTIVE AND OBJECTIVE BOX
Progress:  follow up urine culture from transferred hospital    Allergies    penicillin (Unknown)    MEDICATIONS  (STANDING):  bismuth subsalicylate Liquid 15 milliLiter(s) Oral two times a day  fluvoxaMINE 50 milliGRAM(s) Oral daily  gabapentin 100 milliGRAM(s) Oral at bedtime  lamoTRIgine 100 milliGRAM(s) Oral at bedtime  lamoTRIgine 50 milliGRAM(s) Oral daily  levothyroxine 75 MICROGram(s) Oral daily  QUEtiapine 25 milliGRAM(s) Oral at bedtime    MEDICATIONS  (PRN):  acetaminophen   Tablet .. 650 milliGRAM(s) Oral every 6 hours PRN Moderate Pain (4 - 6)  hydrOXYzine hydrochloride 25 milliGRAM(s) Oral every 6 hours PRN Anxiety            Vital Signs Last 24 Hrs  T(F):   HR: 98.3 (09 Dec 2019 16:49) (99 - 99)  BP: 99/72 (09 Dec 2019 16:49) (112/84 - 112/84)-  RR: 16 (09 Dec 2019 16:49) (18 - 18)  SpO2: 97% (09 Dec 2019 16:49) (96% - 96%)    ROS ; No c/o constipation, sob. or pain.      PHYSICAL EXAM:  GENERAL: NAD, well-groomed, well-developed  HEAD:  Atraumatic, Normocephalic  EYES: EOMI, PERRLA, conjunctiva and sclera clear  ENMT: No tonsillar erythema, exudates, or enlargement; Moist mucous membranes, Good dentition, No lesions  NECK: Supple, No JVD, Normal thyroid  NERVOUS SYSTEM:  Alert & Oriented X3, Good concentration; Motor Strength 5/5 B/L upper and lower extremities; DTRs 2+ intact and symmetric  CHEST/LUNG: Clear to auscultation bilaterally; No rales, rhonchi, wheezing, or rubs  HEART: Regular rate and rhythm; No murmurs, rubs, or gallops  ABDOMEN: Soft, Nontender, Nondistended; Bowel sounds present  EXTREMITIES:  2+ Peripheral Pulses, No clubbing, cyanosis, or edema  LYMPH: No lymphadenopathy noted  SKIN: No rashes or lesions    LABS:                        13.3   6.57  )-----------( 239      ( 07 Dec 2019 14:18 )             40.1     12-07    141  |  108  |  13  ----------------------------<  91  4.1   |  29  |  0.83    Ca    9.0      07 Dec 2019 14:18    TPro  6.8  /  Alb  3.8  /  TBili  0.3  /  DBili  x   /  AST  23  /  ALT  38  /  AlkPhos  94  12-07      RADIOLOGY & ADDITIONAL TESTS:    urine culture; streptococcus group B sensitive to ampiciline

## 2019-12-09 NOTE — PROGRESS NOTE ADULT - ASSESSMENT
UTI ; start  ceftin  2x day for 3 days.  h/o  Colitis ; continue treatment .  anxiety disorder ; as per psych.

## 2019-12-09 NOTE — PROGRESS NOTE BEHAVIORAL HEALTH - NSBHFUPINTERVALCCFT_PSY_A_CORE
" I had a panic attack this morning, and I was very paranoid when I came to the hospital, but I feel better now"

## 2019-12-09 NOTE — PROGRESS NOTE BEHAVIORAL HEALTH - NSBHCHARTREVIEWVS_PSY_A_CORE FT
Vital Signs Last 24 Hrs  T(C): --  T(F): --  HR: 80 (09 Dec 2019 16:02) (80 - 99)  BP: 112/74 (09 Dec 2019 16:02) (112/74 - 112/84)  BP(mean): --  RR: 18 (09 Dec 2019 16:02) (18 - 18)  SpO2: 95% (09 Dec 2019 16:02) (95% - 96%)

## 2019-12-09 NOTE — PROGRESS NOTE BEHAVIORAL HEALTH - AXIS III
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

## 2019-12-10 LAB — T PALLIDUM AB TITR SER: NEGATIVE — SIGNIFICANT CHANGE UP

## 2019-12-10 PROCEDURE — 93010 ELECTROCARDIOGRAM REPORT: CPT

## 2019-12-10 PROCEDURE — 99232 SBSQ HOSP IP/OBS MODERATE 35: CPT

## 2019-12-10 RX ORDER — TRAZODONE HCL 50 MG
50 TABLET ORAL AT BEDTIME
Refills: 0 | Status: DISCONTINUED | OUTPATIENT
Start: 2019-12-10 | End: 2019-12-17

## 2019-12-10 RX ADMIN — Medication 50 MILLIGRAM(S): at 21:32

## 2019-12-10 RX ADMIN — FLUVOXAMINE MALEATE 50 MILLIGRAM(S): 25 TABLET ORAL at 08:44

## 2019-12-10 RX ADMIN — LAMOTRIGINE 50 MILLIGRAM(S): 25 TABLET, ORALLY DISINTEGRATING ORAL at 08:44

## 2019-12-10 RX ADMIN — LAMOTRIGINE 100 MILLIGRAM(S): 25 TABLET, ORALLY DISINTEGRATING ORAL at 21:32

## 2019-12-10 RX ADMIN — Medication 25 MILLIGRAM(S): at 00:21

## 2019-12-10 RX ADMIN — Medication 75 MICROGRAM(S): at 06:06

## 2019-12-10 RX ADMIN — Medication 150 MILLIGRAM(S): at 21:31

## 2019-12-10 RX ADMIN — GABAPENTIN 100 MILLIGRAM(S): 400 CAPSULE ORAL at 21:32

## 2019-12-10 NOTE — OCCUPATIONAL THERAPY INITIAL EVALUATION ADULT - DIAGNOSIS, OT EVAL
Primary Dx Schizoaffective disorder, bipolar type F25.0. Secondary Dx 1 PTSD (post-traumatic stress disorder) F43.10. 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

## 2019-12-10 NOTE — PROGRESS NOTE BEHAVIORAL HEALTH - NSBHFUPINTERVALHXFT_PSY_A_CORE
Met with and evaluated patient.  Chart reviewed and case discussed in tx team meeting and with Dr. Lucia.  No significant interval events are reported.  Patient discusses that she did not sleep well last night, and reports the Seroquel did not help her. Reports she has slept well on Trazadone in the past. Reports improved, stable mood, and that her coping skills are better now.   She continues to report her current boyfriend is supportive, and she is in sobriety and has "a wonderful sponsor"  Reports went to AA on unit. Denies any SI or HI.  No Rx SE or sx TD/EPS are noted or reported.  DC Seroquel, begin Trazadone 50mg hs

## 2019-12-10 NOTE — DIETITIAN INITIAL EVALUATION ADULT. - OTHER INFO
48yo female with pphx of schizoaffective disorder, PTSD, hx etoh abuse and pmhx ulcerative colitis and lupus.  Pt with diarrhea on admission, reports resolved at present time.  Pt currently on regular diet with reported good appetite and intake.  States she has intolerance to dairy (diet office informed) and meals to be adjusted accordingly.  Pt states she sometimes avoids gluten but it is not an allergy and does not want gluten free diet this admission.  Discussed alternative meal menu and obtained preferences to optimize po intake and gi tolerence.

## 2019-12-10 NOTE — DIETITIAN INITIAL EVALUATION ADULT. - PHYSICAL APPEARANCE
BMI 24.3, ht 5'3", wt 137# (states UBW ~140#; endorses weight loss this year but reports weight stable now recently)/well nourished

## 2019-12-10 NOTE — PROGRESS NOTE BEHAVIORAL HEALTH - NSBHCHARTREVIEWVS_PSY_A_CORE FT
Vital Signs Last 24 Hrs  T(C): 36.6 (10 Dec 2019 08:19), Max: 36.6 (10 Dec 2019 08:19)  T(F): 97.9 (10 Dec 2019 08:19), Max: 97.9 (10 Dec 2019 08:19)  HR: 80 (10 Dec 2019 15:52) (80 - 92)  BP: 123/86 (10 Dec 2019 15:52) (123/86 - 147/89)  BP(mean): --  RR: 17 (10 Dec 2019 15:52) (17 - 18)  SpO2: 98% (10 Dec 2019 15:52) (98% - 99%)

## 2019-12-10 NOTE — PROGRESS NOTE BEHAVIORAL HEALTH - AXIS III
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

## 2019-12-10 NOTE — PROGRESS NOTE ADULT - ASSESSMENT
UTI ;    Ceftin  2x day for 3 days.  h/o  Colitis ; continue treatment .  anxiety disorder ; as per psych.

## 2019-12-10 NOTE — OCCUPATIONAL THERAPY INITIAL EVALUATION ADULT - PERSONAL SAFETY AND JUDGMENT, REHAB EVAL
at risk behaviors demonstrated/lacks appropriate coping mechanisms; prior suicide attempt by OD./impaired

## 2019-12-10 NOTE — PROGRESS NOTE ADULT - SUBJECTIVE AND OBJECTIVE BOX
Progress:  follow up urine culture from transferred hospital    Allergies    penicillin (Unknown)    MEDICATIONS  (STANDING):  bismuth subsalicylate Liquid 15 milliLiter(s) Oral two times a day  fluvoxaMINE 50 milliGRAM(s) Oral daily  gabapentin 100 milliGRAM(s) Oral at bedtime  lamoTRIgine 100 milliGRAM(s) Oral at bedtime  lamoTRIgine 50 milliGRAM(s) Oral daily  levothyroxine 75 MICROGram(s) Oral daily  QUEtiapine 25 milliGRAM(s) Oral at bedtime    MEDICATIONS  (PRN):  acetaminophen   Tablet .. 650 milliGRAM(s) Oral every 6 hours PRN Moderate Pain (4 - 6)  hydrOXYzine hydrochloride 25 milliGRAM(s) Oral every 6 hours PRN Anxiety            Vital Signs Last 24 Hrs    HR: 98.3 (10 Dec 2019 16:49) (99 - 99)  BP: 123/86 (10 Dec 2019 16:49) (112/84 - 112/84)-  RR: 17 (10 Dec 2019 16:49) (18 - 18)  SpO2: 98% (10 Dec 2019 16:49) (96% - 96%)    ROS ; No c/o constipation, sob. or pain. Mood improved       PHYSICAL EXAM:  GENERAL: NAD, well-groomed, well-developed  CHEST/LUNG: Clear to auscultation bilaterally; No rales, rhonchi, wheezing, or rubs  HEART: Regular rate and rhythm; No murmurs, rubs, or gallops  ABDOMEN: Soft, Nontender, Nondistended; Bowel sounds present  EXTREMITIES:  2+ Peripheral Pulses, No clubbing, cyanosis, or edema  LYMPH: No lymphadenopathy noted  SKIN: No rashes or lesions    LABS:                        13.3   6.57  )-----------( 239      ( 07 Dec 2019 14:18 )             40.1     12-07    141  |  108  |  13  ----------------------------<  91  4.1   |  29  |  0.83    Ca    9.0      07 Dec 2019 14:18    TPro  6.8  /  Alb  3.8  /  TBili  0.3  /  DBili  x   /  AST  23  /  ALT  38  /  AlkPhos  94  12-07      RADIOLOGY & ADDITIONAL TESTS:    urine culture; streptococcus group B sensitive to ampiciline

## 2019-12-10 NOTE — OCCUPATIONAL THERAPY INITIAL EVALUATION ADULT - PLANNED THERAPY INTERVENTIONS, OT EVAL
motor coordination training/Recommendations: Pt. would benefit from skilled Occupational Therapy intervention services with CBT to increase self-awareness, increase socialization, learn community resources, learn time management, learn medication management, learn anger management techniques, learn coping skills, increase self-esteem, increase physical activity, increase attention span, improve sleep, stop substance abuse, identify strengths, express feelings, acquire new hobbies, learn relaxation techniques, learn stress management, improve self-scare, and to learn ways to increase their support system.

## 2019-12-10 NOTE — OCCUPATIONAL THERAPY INITIAL EVALUATION ADULT - PERTINENT HX OF CURRENT PROBLEM, REHAB EVAL
This is a 49 y.o. female who comes in c/o of having a psychotic moment, depression, and months of diarrhea. PPHX: Bipolar disorder, multiple psych admissions, 1 prior OD, ETOH abuse; cannabis use.

## 2019-12-10 NOTE — OCCUPATIONAL THERAPY INITIAL EVALUATION ADULT - SOCIAL CONCERNS
Complex psychosocial needs/coping issues/multiple past psych admissions; ETOH abuse and cannabis use in the past./Substance misuse

## 2019-12-11 PROCEDURE — 99232 SBSQ HOSP IP/OBS MODERATE 35: CPT

## 2019-12-11 RX ADMIN — FLUVOXAMINE MALEATE 50 MILLIGRAM(S): 25 TABLET ORAL at 09:05

## 2019-12-11 RX ADMIN — Medication 50 MILLIGRAM(S): at 20:20

## 2019-12-11 RX ADMIN — Medication 75 MICROGRAM(S): at 06:24

## 2019-12-11 RX ADMIN — GABAPENTIN 100 MILLIGRAM(S): 400 CAPSULE ORAL at 20:20

## 2019-12-11 RX ADMIN — LAMOTRIGINE 50 MILLIGRAM(S): 25 TABLET, ORALLY DISINTEGRATING ORAL at 09:49

## 2019-12-11 RX ADMIN — Medication 150 MILLIGRAM(S): at 20:20

## 2019-12-11 RX ADMIN — LAMOTRIGINE 100 MILLIGRAM(S): 25 TABLET, ORALLY DISINTEGRATING ORAL at 20:20

## 2019-12-11 RX ADMIN — Medication 150 MILLIGRAM(S): at 09:04

## 2019-12-11 NOTE — PROGRESS NOTE ADULT - ASSESSMENT
UTI ;   started to clindamycin , remained afebrile.  h/o  Colitis ; continue treatment .  anxiety disorder ; as per psych.  tachycardia; anxiety related. Continue treatment as per psych.

## 2019-12-11 NOTE — PROGRESS NOTE ADULT - SUBJECTIVE AND OBJECTIVE BOX
Progress:  follow uti .    Allergies    penicillin (Unknown)    MEDICATIONS  (STANDING):  bismuth subsalicylate Liquid 15 milliLiter(s) Oral two times a day  fluvoxaMINE 50 milliGRAM(s) Oral daily  gabapentin 100 milliGRAM(s) Oral at bedtime  lamoTRIgine 100 milliGRAM(s) Oral at bedtime  lamoTRIgine 50 milliGRAM(s) Oral daily  levothyroxine 75 MICROGram(s) Oral daily  QUEtiapine 25 milliGRAM(s) Oral at bedtime    MEDICATIONS  (PRN):  acetaminophen   Tablet .. 650 milliGRAM(s) Oral every 6 hours PRN Moderate Pain (4 - 6)  hydrOXYzine hydrochloride 25 milliGRAM(s) Oral every 6 hours PRN Anxiety    Vital Signs Last 24 Hrs  TEM ; 97.5 f (11 Dec 2019 )  HR: 120 (11 Dec 2019 )  BP: 132/85 (11 Dec 2019 )  RR: 18 (11 Dec 2019 )  SpO2: 96% (11 Dec 2019 )    ROS ; No c/o dysuria, sob.  Or pain. Mood improved       PHYSICAL EXAM:  GENERAL: NAD, well-groomed, well-developed  CHEST/LUNG: Clear to auscultation bilaterally; No rales, rhonchi, wheezing, or rubs  HEART: Regular rate and rhythm; No murmurs, rubs, or gallops  ABDOMEN: Soft, Nontender, Nondistended; Bowel sounds present  EXTREMITIES:  2+ Peripheral Pulses, No clubbing, cyanosis, or edema  LYMPH: No lymphadenopathy noted  SKIN: No rashes or lesions    LABS:                        13.3   6.57  )-----------( 239      ( 07 Dec 2019 14:18 )             40.1     12-07    141  |  108  |  13  ----------------------------<  91  4.1   |  29  |  0.83    Ca    9.0      07 Dec 2019 14:18    TPro  6.8  /  Alb  3.8  /  TBili  0.3  /  DBili  x   /  AST  23  /  ALT  38  /  AlkPhos  94  12-07      RADIOLOGY & ADDITIONAL TESTS:    urine culture; streptococcus group B sensitive to ampiciline

## 2019-12-11 NOTE — PROGRESS NOTE BEHAVIORAL HEALTH - NSBHFUPINTERVALHXFT_PSY_A_CORE
Met with and evaluated patient.  Chart reviewed and case discussed in tx team meeting and with Dr. Lucia.  No significant interval events are reported.  Patient discusses that she slept much better last night on Trazadone. . Reports improved, stable mood, and that her coping skills are better now. She reports that groups have been very helpful to her.   She continues to report her current boyfriend is supportive and that he visited today and that they had a good visit, , and she is in sobriety. and goes to AA regularly.   Reports went to AA on unit. Denies any SI or HI.  No Rx SE or sx TD/EPS are noted or reported.

## 2019-12-11 NOTE — PROGRESS NOTE BEHAVIORAL HEALTH - NSBHFUPINTERVALCCFT_PSY_A_CORE
" My mind is much clearer.  I think I got so confused and paranoid because I was not getting enough sleep"

## 2019-12-11 NOTE — PROGRESS NOTE BEHAVIORAL HEALTH - NSBHCHARTREVIEWVS_PSY_A_CORE FT
Vital Signs Last 24 Hrs  T(C): --  T(F): --  HR: 80 (10 Dec 2019 15:52) (80 - 80)  BP: 123/86 (10 Dec 2019 15:52) (123/86 - 123/86)  BP(mean): --  RR: 17 (10 Dec 2019 15:52) (17 - 17)  SpO2: 98% (10 Dec 2019 15:52) (98% - 98%)

## 2019-12-12 LAB
ALBUMIN SERPL ELPH-MCNC: 4 G/DL — SIGNIFICANT CHANGE UP (ref 3.3–5)
ALP SERPL-CCNC: 111 U/L — SIGNIFICANT CHANGE UP (ref 30–120)
ALT FLD-CCNC: 67 U/L DA — HIGH (ref 10–60)
ANION GAP SERPL CALC-SCNC: 8 MMOL/L — SIGNIFICANT CHANGE UP (ref 5–17)
AST SERPL-CCNC: 22 U/L — SIGNIFICANT CHANGE UP (ref 10–40)
BILIRUB SERPL-MCNC: 0.5 MG/DL — SIGNIFICANT CHANGE UP (ref 0.2–1.2)
BUN SERPL-MCNC: 16 MG/DL — SIGNIFICANT CHANGE UP (ref 7–23)
CALCIUM SERPL-MCNC: 9.2 MG/DL — SIGNIFICANT CHANGE UP (ref 8.4–10.5)
CHLORIDE SERPL-SCNC: 100 MMOL/L — SIGNIFICANT CHANGE UP (ref 96–108)
CO2 SERPL-SCNC: 29 MMOL/L — SIGNIFICANT CHANGE UP (ref 22–31)
CREAT SERPL-MCNC: 0.95 MG/DL — SIGNIFICANT CHANGE UP (ref 0.5–1.3)
GLUCOSE SERPL-MCNC: 105 MG/DL — HIGH (ref 70–99)
HCT VFR BLD CALC: 43 % — SIGNIFICANT CHANGE UP (ref 34.5–45)
HGB BLD-MCNC: 14.3 G/DL — SIGNIFICANT CHANGE UP (ref 11.5–15.5)
MCHC RBC-ENTMCNC: 29.4 PG — SIGNIFICANT CHANGE UP (ref 27–34)
MCHC RBC-ENTMCNC: 33.3 GM/DL — SIGNIFICANT CHANGE UP (ref 32–36)
MCV RBC AUTO: 88.5 FL — SIGNIFICANT CHANGE UP (ref 80–100)
NRBC # BLD: 0 /100 WBCS — SIGNIFICANT CHANGE UP (ref 0–0)
PLATELET # BLD AUTO: 268 K/UL — SIGNIFICANT CHANGE UP (ref 150–400)
POTASSIUM SERPL-MCNC: 4.4 MMOL/L — SIGNIFICANT CHANGE UP (ref 3.5–5.3)
POTASSIUM SERPL-SCNC: 4.4 MMOL/L — SIGNIFICANT CHANGE UP (ref 3.5–5.3)
PROT SERPL-MCNC: 7.2 G/DL — SIGNIFICANT CHANGE UP (ref 6–8.3)
RBC # BLD: 4.86 M/UL — SIGNIFICANT CHANGE UP (ref 3.8–5.2)
RBC # FLD: 13.3 % — SIGNIFICANT CHANGE UP (ref 10.3–14.5)
SODIUM SERPL-SCNC: 137 MMOL/L — SIGNIFICANT CHANGE UP (ref 135–145)
WBC # BLD: 6.91 K/UL — SIGNIFICANT CHANGE UP (ref 3.8–10.5)
WBC # FLD AUTO: 6.91 K/UL — SIGNIFICANT CHANGE UP (ref 3.8–10.5)

## 2019-12-12 PROCEDURE — 74176 CT ABD & PELVIS W/O CONTRAST: CPT | Mod: 26

## 2019-12-12 PROCEDURE — 99223 1ST HOSP IP/OBS HIGH 75: CPT

## 2019-12-12 PROCEDURE — 99232 SBSQ HOSP IP/OBS MODERATE 35: CPT

## 2019-12-12 RX ADMIN — FLUVOXAMINE MALEATE 50 MILLIGRAM(S): 25 TABLET ORAL at 08:34

## 2019-12-12 RX ADMIN — Medication 75 MICROGRAM(S): at 06:25

## 2019-12-12 RX ADMIN — GABAPENTIN 100 MILLIGRAM(S): 400 CAPSULE ORAL at 21:11

## 2019-12-12 RX ADMIN — Medication 50 MILLIGRAM(S): at 21:11

## 2019-12-12 RX ADMIN — LAMOTRIGINE 100 MILLIGRAM(S): 25 TABLET, ORALLY DISINTEGRATING ORAL at 21:11

## 2019-12-12 RX ADMIN — Medication 650 MILLIGRAM(S): at 12:37

## 2019-12-12 RX ADMIN — Medication 150 MILLIGRAM(S): at 08:34

## 2019-12-12 RX ADMIN — Medication 650 MILLIGRAM(S): at 13:15

## 2019-12-12 RX ADMIN — LAMOTRIGINE 50 MILLIGRAM(S): 25 TABLET, ORALLY DISINTEGRATING ORAL at 08:34

## 2019-12-12 RX ADMIN — Medication 150 MILLIGRAM(S): at 21:11

## 2019-12-12 NOTE — CONSULT NOTE ADULT - SUBJECTIVE AND OBJECTIVE BOX
HPI:    50 yo f pmh hypothyroidism, colitis, SLE, schizo affective schizophrenia is c/o RLQ sharp pain that started today that's worsening.  Last BM was today but was dark and thin.  Denies any fevers or chills.  Denies diarrhea/constipation.  Currently on Clindamycin for a UTI.     REVIEW OF SYSTEMS:  CONSTITUTIONAL: No fever, weight loss, or fatigue  RESPIRATORY: No cough, wheezing, chills or hemoptysis; No shortness of breath  CARDIOVASCULAR: No chest pain, palpitations, dizziness, or leg swelling  GASTROINTESTINAL: RLQ pain. No nausea, vomiting, or hematemesis; No diarrhea or constipation. No melena or hematochezia.  GENITOURINARY: No dysuria, frequency, hematuria, or incontinence  NEUROLOGICAL: No headaches, memory loss, loss of strength, numbness, or tremors  MUSCULOSKELETAL: + back pain 2/2 mattress she's sleeping on      PAST MEDICAL & SURGICAL HISTORY:  Retinal vasculitis, unspecified laterality  Hashimoto's disease  Ulcerative colitis with rectal bleeding, unspecified location  Schizo-affective schizophrenia   delivery delivered  No significant past surgical history      SOCIAL HISTORY:  Residence: [ ] [facility]  [ ] [facility]  [ ] Community  [ ] Substance abuse:   [ ] Tobacco:   [ ] Alcohol use:     Allergies    penicillin (Unknown)    Intolerances        MEDICATIONS  (STANDING):  clindamycin   Capsule 150 milliGRAM(s) Oral two times a day  fluvoxaMINE 50 milliGRAM(s) Oral daily  gabapentin 100 milliGRAM(s) Oral at bedtime  lamoTRIgine 100 milliGRAM(s) Oral at bedtime  lamoTRIgine 50 milliGRAM(s) Oral daily  levothyroxine 75 MICROGram(s) Oral daily  traZODone 50 milliGRAM(s) Oral at bedtime    MEDICATIONS  (PRN):  acetaminophen   Tablet .. 650 milliGRAM(s) Oral every 6 hours PRN Moderate Pain (4 - 6)  bismuth subsalicylate Liquid 15 milliLiter(s) Oral two times a day PRN Gas( Flatulence )  hydrOXYzine hydrochloride 25 milliGRAM(s) Oral every 6 hours PRN Anxiety      FAMILY HISTORY:  Family history of colitis (Sibling)  Family history of seizures (Sibling)      Vital Signs Last 24 Hrs  T(C): 36.4 (12 Dec 2019 08:16), Max: 36.4 (12 Dec 2019 08:16)  T(F): 97.5 (12 Dec 2019 08:16), Max: 97.5 (12 Dec 2019 08:16)  HR: 92 (12 Dec 2019 15:56) (86 - 92)  BP: 108/73 (12 Dec 2019 15:56) (108/73 - 113/85)  BP(mean): --  RR: 16 (12 Dec 2019 15:56) (16 - 16)  SpO2: 98% (12 Dec 2019 15:56) (98% - 98%)    PHYSICAL EXAM:    GENERAL: NAD, well-groomed, well-developed  HEAD:  Atraumatic, Normocephalic  EYES: EOMI, PERRLA, conjunctiva and sclera clear  ENMT: No tonsillar erythema, exudates, or enlargement; Moist mucous membranes, Good dentition, No lesions  NECK: Supple, No JVD, Normal thyroid  NERVOUS SYSTEM:  Alert & Oriented X3, Good concentration; Moving all 4 extremities; No gross sensory deficits  CHEST/LUNG: Clear to auscultation bilaterally; No rales, rhonchi, wheezing, or rubs  HEART: Regular rate and rhythm; No murmurs, rubs, or gallops  ABDOMEN: RLQ tenderness upon palpation. no rebound tenderness.  Soft, Nondistended; Bowel sounds present  EXTREMITIES:  2+ Peripheral Pulses, No clubbing, cyanosis, or edema  SKIN: No rashes or lesions  INCISION:     LABS:              CAPILLARY BLOOD GLUCOSE          RADIOLOGY & ADDITIONAL STUDIES:    EKG:   Personally Reviewed:  [ ] YES     Imaging:   Personally Reviewed:  [ ] YES     Consultant(s) Notes Reviewed:      Care Discussed with Consultants/Other Providers:

## 2019-12-12 NOTE — CONSULT NOTE ADULT - ASSESSMENT
50 yo f pmh hypothyroidism, colitis, SLE, schizo affective schizophrenia is c/o RLQ sharp pain   1. RLQ pain - cbc, cmp.  CT abd/pelv ordered.   NPO except meds for now.     2. Psych issues - continue current management    3. DVT proph - ambulate as needed.

## 2019-12-12 NOTE — PROGRESS NOTE BEHAVIORAL HEALTH - NSBHCHARTREVIEWVS_PSY_A_CORE FT
Vital Signs Last 24 Hrs  T(C): 36.4 (12 Dec 2019 08:16), Max: 36.4 (12 Dec 2019 08:16)  T(F): 97.5 (12 Dec 2019 08:16), Max: 97.5 (12 Dec 2019 08:16)  HR: 92 (12 Dec 2019 15:56) (85 - 92)  BP: 108/73 (12 Dec 2019 15:56) (108/73 - 114/79)  BP(mean): --  RR: 16 (12 Dec 2019 15:56) (16 - 18)  SpO2: 98% (12 Dec 2019 15:56) (96% - 98%)

## 2019-12-12 NOTE — PROGRESS NOTE BEHAVIORAL HEALTH - NS ED [facility] REVIEW OF ED CHART AVAILABLE LABS REVIEWED
05/12 1901 - 05/14 Axis 48 degrees Calculated R Axis -171 degrees Calculated T Axis Yunior Serrano MD, Collins Rodriges ([address] [place] [address]) on Yes

## 2019-12-12 NOTE — PROGRESS NOTE BEHAVIORAL HEALTH - AXIS III
Lupus, DM Type 2,, Vasculitis in eye, Hashimoto's Thyroiditis,  Fibromyalgia Lupus,, Vasculitis in eye, Hashimoto's Thyroiditis,  Fibromyalgia

## 2019-12-13 LAB
APPEARANCE UR: CLEAR — SIGNIFICANT CHANGE UP
BILIRUB UR-MCNC: NEGATIVE — SIGNIFICANT CHANGE UP
COLOR SPEC: YELLOW — SIGNIFICANT CHANGE UP
DIFF PNL FLD: NEGATIVE — SIGNIFICANT CHANGE UP
GLUCOSE UR QL: NEGATIVE MG/DL — SIGNIFICANT CHANGE UP
KETONES UR-MCNC: NEGATIVE — SIGNIFICANT CHANGE UP
LEUKOCYTE ESTERASE UR-ACNC: NEGATIVE — SIGNIFICANT CHANGE UP
NITRITE UR-MCNC: NEGATIVE — SIGNIFICANT CHANGE UP
PH UR: 6 — SIGNIFICANT CHANGE UP (ref 5–8)
PROT UR-MCNC: NEGATIVE MG/DL — SIGNIFICANT CHANGE UP
SP GR SPEC: 1.01 — SIGNIFICANT CHANGE UP (ref 1.01–1.02)
UROBILINOGEN FLD QL: NEGATIVE MG/DL — SIGNIFICANT CHANGE UP

## 2019-12-13 PROCEDURE — 99233 SBSQ HOSP IP/OBS HIGH 50: CPT

## 2019-12-13 RX ORDER — SENNA PLUS 8.6 MG/1
2 TABLET ORAL AT BEDTIME
Refills: 0 | Status: DISCONTINUED | OUTPATIENT
Start: 2019-12-13 | End: 2019-12-17

## 2019-12-13 RX ORDER — LEVOTHYROXINE SODIUM 125 MCG
1 TABLET ORAL
Qty: 30 | Refills: 0
Start: 2019-12-13 | End: 2022-09-22

## 2019-12-13 RX ORDER — POLYETHYLENE GLYCOL 3350 17 G/17G
17 POWDER, FOR SOLUTION ORAL DAILY
Refills: 0 | Status: DISCONTINUED | OUTPATIENT
Start: 2019-12-13 | End: 2019-12-17

## 2019-12-13 RX ORDER — GABAPENTIN 400 MG/1
1 CAPSULE ORAL
Qty: 30 | Refills: 0
Start: 2019-12-13 | End: 2020-01-11

## 2019-12-13 RX ORDER — FLUVOXAMINE MALEATE 25 MG/1
1 TABLET ORAL
Qty: 30 | Refills: 0
Start: 2019-12-13 | End: 2020-01-11

## 2019-12-13 RX ORDER — TRAZODONE HCL 50 MG
1 TABLET ORAL
Qty: 30 | Refills: 0
Start: 2019-12-13 | End: 2020-01-11

## 2019-12-13 RX ORDER — LAMOTRIGINE 25 MG/1
2 TABLET, ORALLY DISINTEGRATING ORAL
Qty: 60 | Refills: 0
Start: 2019-12-13 | End: 2020-01-11

## 2019-12-13 RX ORDER — LEVOTHYROXINE SODIUM 125 MCG
1 TABLET ORAL
Qty: 30 | Refills: 0
Start: 2019-12-13 | End: 2020-01-11

## 2019-12-13 RX ORDER — POLYETHYLENE GLYCOL 3350 17 G/17G
17 POWDER, FOR SOLUTION ORAL
Qty: 0 | Refills: 0 | DISCHARGE
Start: 2019-12-13

## 2019-12-13 RX ORDER — LAMOTRIGINE 25 MG/1
1 TABLET, ORALLY DISINTEGRATING ORAL
Qty: 30 | Refills: 0
Start: 2019-12-13 | End: 2020-01-11

## 2019-12-13 RX ORDER — GABAPENTIN 400 MG/1
1 CAPSULE ORAL
Qty: 30 | Refills: 0
Start: 2019-12-13 | End: 2022-09-22

## 2019-12-13 RX ORDER — SENNA PLUS 8.6 MG/1
2 TABLET ORAL
Qty: 0 | Refills: 0 | DISCHARGE
Start: 2019-12-13

## 2019-12-13 RX ADMIN — Medication 650 MILLIGRAM(S): at 21:59

## 2019-12-13 RX ADMIN — Medication 75 MICROGRAM(S): at 05:51

## 2019-12-13 RX ADMIN — Medication 150 MILLIGRAM(S): at 11:01

## 2019-12-13 RX ADMIN — FLUVOXAMINE MALEATE 50 MILLIGRAM(S): 25 TABLET ORAL at 11:00

## 2019-12-13 RX ADMIN — Medication 650 MILLIGRAM(S): at 21:14

## 2019-12-13 RX ADMIN — GABAPENTIN 100 MILLIGRAM(S): 400 CAPSULE ORAL at 21:12

## 2019-12-13 RX ADMIN — LAMOTRIGINE 100 MILLIGRAM(S): 25 TABLET, ORALLY DISINTEGRATING ORAL at 21:12

## 2019-12-13 RX ADMIN — LAMOTRIGINE 50 MILLIGRAM(S): 25 TABLET, ORALLY DISINTEGRATING ORAL at 11:01

## 2019-12-13 RX ADMIN — Medication 50 MILLIGRAM(S): at 21:12

## 2019-12-13 NOTE — DISCHARGE NOTE BEHAVIORAL HEALTH - NSBHDCTHERAPYFT_PSY_A_CORE
Individual and Group Tx   OT  Music Therapy  Pet Therapy Individual and Group Tx   OT   Creative Arts Therapy  Music Therapy  Pet Therapy

## 2019-12-13 NOTE — PROGRESS NOTE ADULT - ASSESSMENT
s/p abdominal pain; most likely retained stool ; start senna and dose of miralax .  Continue follow up as needed .   h/o  Colitis ; continue treatment .  ?UTI;  Repeat UA and urine culture.  anxiety disorder ; as per psych.  tachycardia; anxiety related. Continue treatment as per psych.

## 2019-12-13 NOTE — DISCHARGE NOTE BEHAVIORAL HEALTH - MEDICATION SUMMARY - MEDICATIONS TO TAKE
I will START or STAY ON the medications listed below when I get home from the hospital:    gabapentin 100 mg oral capsule  -- 1 cap(s) by mouth once a day (at bedtime) for mood  -- Indication: For Schizoaffective disorder, bipolar type    lamoTRIgine 100 mg oral tablet  -- 1 tab(s) by mouth once a day (at bedtime) x 30 days  for mood   -- Indication: For Schizoaffective disorder, bipolar type    lamoTRIgine 25 mg oral tablet  -- 2 tab(s) by mouth once a day x 30 days  for mood   -- Indication: For Schizoaffective disorder, bipolar type    traZODone 50 mg oral tablet  -- 1 tab(s) by mouth once a day (at bedtime) x 30 days  for depression/insomnia   -- Indication: For insomnia/depression    fluvoxaMINE 50 mg oral tablet  -- 1 tab(s) by mouth once a day x 30 days  for depression   -- Indication: For Schizoaffective disorder, bipolar type    senna oral tablet  -- 2 tab(s) by mouth once a day (at bedtime)to continue until seen by outpatient provider  -- Indication: For constipation    polyethylene glycol 3350 oral powder for reconstitution  -- 17 gram(s) by mouth once a day to continue until seen by outpatient proivder  -- Indication: For constipation    levothyroxine 75 mcg (0.075 mg) oral tablet  -- 1 tab(s) by mouth once a day x 30 days  for hypothyroid   -- Indication: For hypothyroidism

## 2019-12-13 NOTE — DISCHARGE NOTE BEHAVIORAL HEALTH - HPI (INCLUDE ILLNESS QUALITY, SEVERITY, DURATION, TIMING, CONTEXT, MODIFYING FACTORS, ASSOCIATED SIGNS AND SYMPTOMS)
: 49-year-old, single,  female, , mother of 2 underaged children; non-caregiver, lives with boyfriend; with a history of Bipolar with a history of multiple past in-patient hospitalizations; with a self-reported history of one prior suicide attempt (via overdose). With a past medical history of ulcerative colitis, Lupus, Hashimoto's disease, fibromyalgia, retinal vasculitis. With a history of illicit drug use; presented to the ER; brought in by sister; however, self-referred for "potential psychotic moment".       	On assessment, patient states she has been having the runs since March 2019, and that she came to the hospital because she thought she was about to have a psychotic moment. She states she came to the hospital "before the psychotic episode happens". When she is asked whether she was having suicidal thoughts or plan to end her life, she states "it's not like that; it's psychosis". She denies current auditory or visual hallucinations, she denies current intent or plans to end her life. She states she has current outpatient psychiatric services in place and takes trazodone at bedtime for sleep. She states she also takes luvox, and Lamictal. She states she has stopped taking the Abilify she has been prescribed by Dr. Mick Adams. She states "I can't take Abilify; it makes me restless; I can't take Seroquel; it gives me EPS, but I can take Thorazine".  When she was asked whether she can take Haldol, she states she can take haldol with benadryl. She appears to be preoccupied with somatic complaints such as "having a diarrhea and not feeling well". She was not yet medically cleared at the time of this assessment. Case discussed with the medical attending. Patient will be reassessed tomorrow morning for safe discharge.

## 2019-12-13 NOTE — DISCHARGE NOTE BEHAVIORAL HEALTH - MEDICATION SUMMARY - MEDICATIONS TO STOP TAKING
I will STOP taking the medications listed below when I get home from the hospital:    mesalamine 400 mg oral delayed release capsule  -- 1 cap(s) by mouth 2 times a day    gabapentin 300 mg oral capsule  -- 1 cap(s) by mouth 3 times a day    lamoTRIgine 25 mg oral tablet  -- 5 tab(s) by mouth once a day (at bedtime)

## 2019-12-13 NOTE — PROGRESS NOTE BEHAVIORAL HEALTH - NS ED [facility] SUICIDALITY PRESENT CURRENT INTENT DETAILS COLLATERAL FT
none spoke with Dr. White patient's outpatient provider and provided hand off information about patient's hosptialilzation

## 2019-12-13 NOTE — PROGRESS NOTE BEHAVIORAL HEALTH - NSBHCHARTREVIEWVS_PSY_A_CORE FT
Vital Signs Last 24 Hrs  T(C): --  T(F): --  HR: 981 (13 Dec 2019 08:02) (92 - 981)  BP: 133/84 (13 Dec 2019 08:02) (108/73 - 133/84)  BP(mean): --  RR: 16 (13 Dec 2019 08:02) (16 - 16)  SpO2: 94% (13 Dec 2019 08:02) (94% - 98%)

## 2019-12-13 NOTE — DISCHARGE NOTE BEHAVIORAL HEALTH - NSBHDCMEDICALFT_PSY_A_CORE
episode of LRQ pain, was seen by hospitalist and internist     hx  colitis    UTI    seen by internist

## 2019-12-13 NOTE — DISCHARGE NOTE BEHAVIORAL HEALTH - NSBHDCCRISISPLAN2FT_PSY_A_CORE
utilize healthy coping skills learned on the unit like drawing, coloring, beading, journaling, listening to music, playing board games, puzzles, watching tv, aromatherapy, meditation, chair yoga, talking to supports and creating daily structure.

## 2019-12-13 NOTE — PROGRESS NOTE ADULT - SUBJECTIVE AND OBJECTIVE BOX
Progress:  follow abdominal pain.    Allergies    penicillin (Unknown)    MEDICATIONS  (STANDING):  bismuth subsalicylate Liquid 15 milliLiter(s) Oral two times a day  fluvoxaMINE 50 milliGRAM(s) Oral daily  gabapentin 100 milliGRAM(s) Oral at bedtime  lamoTRIgine 100 milliGRAM(s) Oral at bedtime  lamoTRIgine 50 milliGRAM(s) Oral daily  levothyroxine 75 MICROGram(s) Oral daily  QUEtiapine 25 milliGRAM(s) Oral at bedtime    MEDICATIONS  (PRN):  acetaminophen   Tablet .. 650 milliGRAM(s) Oral every 6 hours PRN Moderate Pain (4 - 6)  hydrOXYzine hydrochloride 25 milliGRAM(s) Oral every 6 hours PRN Anxiety    Vital Signs Last 24 Hrs  TEM ; 98.1 f (13 Dec 2019 )  HR: 107 (13 Dec 2019 )  BP: 133/84 (13 Dec 2019 )  RR: 16 (13 Dec 2019 )  SpO2: 94% (13 Dec 2019 )    ROS ; pt had bowel movement this AM, no c/o nausea or vomiting , no abdominal pain.       PHYSICAL EXAM:  GENERAL: NAD, well-groomed, well-developed  CHEST/LUNG: Clear to auscultation bilaterally; No rales, rhonchi, wheezing, or rubs  HEART: Regular rate and rhythm; No murmurs, rubs, or gallops  ABDOMEN: Soft, Nontender, Nondistended; Bowel sounds present  EXTREMITIES:  2+ Peripheral Pulses, No clubbing, cyanosis, or edema  SKIN: No rashes or lesions    LABS:  12/13/2019   CT of abdomen and pelvis ; NO evidence of appendicitis , bladder wall thickening for possible cystitis. Moderate amount of retained fecal material in colon .                        13.3   6.57  )-----------( 239      ( 07 Dec 2019 14:18 )             40.1     12-07    141  |  108  |  13  ----------------------------<  91  4.1   |  29  |  0.83    Ca    9.0      07 Dec 2019 14:18    TPro  6.8  /  Alb  3.8  /  TBili  0.3  /  DBili  x   /  AST  23  /  ALT  38  /  AlkPhos  94  12-07      RADIOLOGY & ADDITIONAL TESTS:    urine culture; streptococcus group B sensitive to ampiciline

## 2019-12-13 NOTE — DISCHARGE NOTE BEHAVIORAL HEALTH - NSBHDCHANDOFFFT_PSY_A_CORE
called Dr White ([phone])  and left message for him to call me back to provide him with a handoff called Dr White ([phone])  and discussed patient's hospital admission and stay with him.

## 2019-12-13 NOTE — PROGRESS NOTE BEHAVIORAL HEALTH - NSBHFUPINTERVALHXFT_PSY_A_CORE
Met with and evaluated patient.  Chart reviewed and case discussed in tx team meeting and with Dr. Lucia.  No significant interval events are reported. . Reports improved, stable mood, and that her coping skills are better now. She continues to report that groups have been very helpful to her. Patient reports her abdominal pain is much better today.  She continues to report her current boyfriend is supportive, and she is in sobriety. and goes to AA regularly.   Reports went to AA on unit. Denies any SI or HI.  No Rx SE or sx TD/EPS are noted or reported. SW is seeking outpatient providers for patient as she reports she cannot afford to return to her previous provider Met with and evaluated patient.  Chart reviewed and case discussed in tx team meeting and with Dr. Lucia.  No significant interval events are reported except that patient submitted and retracted 72 hour letter  . Reports improved, stable mood, and that her coping skills are better now. She continues to report that groups have been very helpful to her. Patient reports her abdominal pain is much better today.  She continues to report her current boyfriend is supportive, and she is in sobriety. and goes to AA regularly.   Reports went to AA on unit. Denies any SI or HI.  No Rx SE or sx TD/EPS are noted or reported. SW is seeking outpatient providers for patient as she reports she cannot afford to return to her previous provider Met with and evaluated patient.  Chart reviewed and case discussed in tx team meeting and with Dr. Lucia.  No significant interval events are reported except that patient submitted and retracted 72 hour letter  . Reports improved, stable mood, and that her coping skills are better now. She continues to report that groups have been very helpful to her. Patient reports her abdominal pain is much better today.  She continues to report her current boyfriend is supportive, and she is in sobriety. and goes to AA regularly.   Reports went to AA on unit. Denies any SI or HI.  No Rx SE or sx TD/EPS are noted or reported. SW is seeking outpatient providers for patient as she reports she cannot afford to return to her previous provider.  Patient reports she has never had DM Type 1 or 2.   HgbA1c is 5.2.  She reports remote hx of being on Metformin due to increased blood glucose due to steroids, but reports has never been dx with DM Type 2 or Type 1.

## 2019-12-13 NOTE — DISCHARGE NOTE BEHAVIORAL HEALTH - NSBHDCDXVALIDYESFT_PSY_A_CORE
Patient had reported feeling like she was becoming psychotic in ED, and reports depressed mood at times.

## 2019-12-14 RX ADMIN — Medication 650 MILLIGRAM(S): at 13:17

## 2019-12-14 RX ADMIN — Medication 650 MILLIGRAM(S): at 14:04

## 2019-12-14 RX ADMIN — LAMOTRIGINE 50 MILLIGRAM(S): 25 TABLET, ORALLY DISINTEGRATING ORAL at 09:01

## 2019-12-14 RX ADMIN — Medication 50 MILLIGRAM(S): at 21:34

## 2019-12-14 RX ADMIN — FLUVOXAMINE MALEATE 50 MILLIGRAM(S): 25 TABLET ORAL at 09:01

## 2019-12-14 RX ADMIN — Medication 75 MICROGRAM(S): at 06:39

## 2019-12-14 RX ADMIN — LAMOTRIGINE 100 MILLIGRAM(S): 25 TABLET, ORALLY DISINTEGRATING ORAL at 21:34

## 2019-12-14 RX ADMIN — GABAPENTIN 100 MILLIGRAM(S): 400 CAPSULE ORAL at 21:34

## 2019-12-15 LAB
CULTURE RESULTS: SIGNIFICANT CHANGE UP
SPECIMEN SOURCE: SIGNIFICANT CHANGE UP

## 2019-12-15 RX ORDER — DIPHENHYDRAMINE HCL 50 MG
50 CAPSULE ORAL EVERY 6 HOURS
Refills: 0 | Status: DISCONTINUED | OUTPATIENT
Start: 2019-12-15 | End: 2019-12-17

## 2019-12-15 RX ADMIN — LAMOTRIGINE 50 MILLIGRAM(S): 25 TABLET, ORALLY DISINTEGRATING ORAL at 09:14

## 2019-12-15 RX ADMIN — LAMOTRIGINE 100 MILLIGRAM(S): 25 TABLET, ORALLY DISINTEGRATING ORAL at 21:48

## 2019-12-15 RX ADMIN — Medication 50 MILLIGRAM(S): at 21:48

## 2019-12-15 RX ADMIN — FLUVOXAMINE MALEATE 50 MILLIGRAM(S): 25 TABLET ORAL at 09:14

## 2019-12-15 RX ADMIN — Medication 75 MICROGRAM(S): at 05:53

## 2019-12-15 RX ADMIN — GABAPENTIN 100 MILLIGRAM(S): 400 CAPSULE ORAL at 21:48

## 2019-12-16 PROCEDURE — 99232 SBSQ HOSP IP/OBS MODERATE 35: CPT

## 2019-12-16 RX ADMIN — LAMOTRIGINE 100 MILLIGRAM(S): 25 TABLET, ORALLY DISINTEGRATING ORAL at 20:46

## 2019-12-16 RX ADMIN — LAMOTRIGINE 50 MILLIGRAM(S): 25 TABLET, ORALLY DISINTEGRATING ORAL at 08:49

## 2019-12-16 RX ADMIN — Medication 650 MILLIGRAM(S): at 10:55

## 2019-12-16 RX ADMIN — Medication 25 MILLIGRAM(S): at 23:35

## 2019-12-16 RX ADMIN — Medication 50 MILLIGRAM(S): at 20:46

## 2019-12-16 RX ADMIN — GABAPENTIN 100 MILLIGRAM(S): 400 CAPSULE ORAL at 20:46

## 2019-12-16 RX ADMIN — Medication 650 MILLIGRAM(S): at 11:30

## 2019-12-16 RX ADMIN — FLUVOXAMINE MALEATE 50 MILLIGRAM(S): 25 TABLET ORAL at 08:49

## 2019-12-16 RX ADMIN — Medication 75 MICROGRAM(S): at 06:33

## 2019-12-16 NOTE — PROGRESS NOTE BEHAVIORAL HEALTH - NSBHCHARTREVIEWVS_PSY_A_CORE FT
Vital Signs Last 24 Hrs  T(C): 36.4 (16 Dec 2019 07:30), Max: 36.4 (16 Dec 2019 07:30)  T(F): 97.5 (16 Dec 2019 07:30), Max: 97.5 (16 Dec 2019 07:30)  HR: 77 (16 Dec 2019 07:30) (70 - 77)  BP: 122/89 (16 Dec 2019 07:30) (122/89 - 131/88)  BP(mean): --  RR: 16 (16 Dec 2019 07:30) (16 - 17)  SpO2: 97% (16 Dec 2019 07:30) (96% - 97%)

## 2019-12-16 NOTE — PROGRESS NOTE BEHAVIORAL HEALTH - NSBHFUPINTERVALHXFT_PSY_A_CORE
Met with and evaluated patient.  Chart reviewed and case discussed in tx team meeting and with Dr. Lucia.  No significant interval events are reported. . Reports improved, stable mood, and that her coping skills are better now. She continues to report that groups have been very helpful to her. Patient reports her abdominal pain has resolved.   She continues to report her current boyfriend is supportive, and she is in sobriety. and goes to AA regularly.   Reports went to AA on unit. Denies any SI or HI.  No Rx SE or sx TD/EPS are noted or reported. Patient now reports she wants to return to her former psychiatrist, and that she feels comfortable seeing him.

## 2019-12-16 NOTE — PROGRESS NOTE BEHAVIORAL HEALTH - NSBHADMITIPBHPROVFT_PSY_A_CORE
spoke with patient's outpatient psychiatrist and provided sign off and information about patient's inpatient stay

## 2019-12-17 VITALS
RESPIRATION RATE: 16 BRPM | OXYGEN SATURATION: 94 % | TEMPERATURE: 98 F | WEIGHT: 140.21 LBS | HEART RATE: 108 BPM | SYSTOLIC BLOOD PRESSURE: 120 MMHG | DIASTOLIC BLOOD PRESSURE: 71 MMHG

## 2019-12-17 PROCEDURE — 74176 CT ABD & PELVIS W/O CONTRAST: CPT

## 2019-12-17 PROCEDURE — 80053 COMPREHEN METABOLIC PANEL: CPT

## 2019-12-17 PROCEDURE — 84443 ASSAY THYROID STIM HORMONE: CPT

## 2019-12-17 PROCEDURE — 36415 COLL VENOUS BLD VENIPUNCTURE: CPT

## 2019-12-17 PROCEDURE — 85027 COMPLETE CBC AUTOMATED: CPT

## 2019-12-17 PROCEDURE — 99285 EMERGENCY DEPT VISIT HI MDM: CPT

## 2019-12-17 PROCEDURE — 83036 HEMOGLOBIN GLYCOSYLATED A1C: CPT

## 2019-12-17 PROCEDURE — 99239 HOSP IP/OBS DSCHRG MGMT >30: CPT

## 2019-12-17 PROCEDURE — 80061 LIPID PANEL: CPT

## 2019-12-17 PROCEDURE — 86780 TREPONEMA PALLIDUM: CPT

## 2019-12-17 PROCEDURE — 81003 URINALYSIS AUTO W/O SCOPE: CPT

## 2019-12-17 PROCEDURE — 84702 CHORIONIC GONADOTROPIN TEST: CPT

## 2019-12-17 PROCEDURE — 87086 URINE CULTURE/COLONY COUNT: CPT

## 2019-12-17 PROCEDURE — 93005 ELECTROCARDIOGRAM TRACING: CPT

## 2019-12-17 RX ADMIN — FLUVOXAMINE MALEATE 50 MILLIGRAM(S): 25 TABLET ORAL at 08:30

## 2019-12-17 RX ADMIN — Medication 75 MICROGRAM(S): at 06:06

## 2019-12-17 RX ADMIN — LAMOTRIGINE 50 MILLIGRAM(S): 25 TABLET, ORALLY DISINTEGRATING ORAL at 08:30

## 2019-12-17 NOTE — PROGRESS NOTE BEHAVIORAL HEALTH - BODY HABITUS
Average build/Well nourished
Well nourished/Average build

## 2019-12-17 NOTE — PROGRESS NOTE BEHAVIORAL HEALTH - NSBHADMITIPOBSFT_PSY_A_CORE
Denies any SI or HI.  In behavioral control

## 2019-12-17 NOTE — PROGRESS NOTE BEHAVIORAL HEALTH - NSBHADMITMEDEDUDETAILS_A_CORE FT
Psychoeducation provided re: potential benefits and SE or Trazadone with patient agreeing to take it
Psychoeducation provided re: need for Rx and aftercare adherence for sx management and relapse prevention
Psychoeducation again  provided re: need for Rx and aftercare adherence for sx management and relapse prevention with teach back verbalized
Psychoeducation again  provided re: need for Rx and aftercare adherence for sx management and relapse prevention with teach back verbalized
Psychoeducation provided re: potential benefits and SE of Seroquel with patient agreeing to take it.  Declines any Care Notes, "I know about Seroquel"

## 2019-12-17 NOTE — PROGRESS NOTE BEHAVIORAL HEALTH - THOUGHT PROCESS
Linear/Normal reasoning
Linear/Normal reasoning
Normal reasoning/Linear
Normal reasoning/Linear
Linear/Normal reasoning

## 2019-12-17 NOTE — PROGRESS NOTE BEHAVIORAL HEALTH - SECONDARY DX1
<<-----Click on this checkbox to enter Procedure
Debulking, abdominal  02/22/2019    Active  [facility]  Splenectomy  02/22/2019    Active  [facility]  Omentectomy, open  02/22/2019    Active  [facility]  Right hemicolectomy with ileostomy  02/22/2019    Active  [facility]
PTSD (post-traumatic stress disorder)

## 2019-12-17 NOTE — PROGRESS NOTE BEHAVIORAL HEALTH - NSBHADMITIPBHPROVFT_PSY_A_CORE
spoke with patient's outpatient psychiatrist, Dr. White,  and provided sign off and information about patient's inpatient stay

## 2019-12-17 NOTE — PROGRESS NOTE BEHAVIORAL HEALTH - ATTENTION / CONCENTRATION
Spoke with Ms Mery-sister and advised Ms Patricio overdue for annual exam. She requested appt be scheduled. Instructed on date and time. verbalized understanding  
Normal
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
## 2019-12-17 NOTE — PROGRESS NOTE BEHAVIORAL HEALTH - RISK ASSESSMENT
Low risk for suicide.   No current suicidal ideations or current suicidal plans.  Risk factors: mood episode, hx of ETOH abuse, anxiety at times, hx of trauma,    Protective factors:  responsibility to others, IDs reasons to live, in sobriety and active in AA, supportive social network, positive therapeutic relationship

## 2019-12-17 NOTE — PROGRESS NOTE BEHAVIORAL HEALTH - NSBHCHARTREVIEWVS_PSY_A_CORE FT
Vital Signs Last 24 Hrs  T(C): 36.8 (17 Dec 2019 07:30), Max: 36.8 (17 Dec 2019 07:30)  T(F): 98.2 (17 Dec 2019 07:30), Max: 98.2 (17 Dec 2019 07:30)  HR: 108 (17 Dec 2019 07:30) (73 - 108)  BP: 120/71 (17 Dec 2019 07:30) (120/71 - 136/91)  BP(mean): --  RR: 16 (17 Dec 2019 07:30) (16 - 19)  SpO2: 94% (17 Dec 2019 07:30) (94% - 100%)

## 2019-12-17 NOTE — PROGRESS NOTE BEHAVIORAL HEALTH - SUMMARY
49-year-old, single,  female, , mother of 2 underaged children; non-caregiver, lives with boyfriend; with a history of Bipolar with a history of multiple past in-patient hospitalizations; with a self-reported history of one prior suicide attempt (via overdose). With a past medical history of ulcerative colitis, Lupus, Hashimoto's disease, fibromyalgia, retinal vasculitis. With a history of illicit drug use; presented to the ER; brought in by sister; however, self-referred for potential "psychotic moment".       Patient is without notable symptoms of psychosis despite reporting coming to the ER before her symptoms start. She denies current suicidal or homicidal; ideations; denies current intent or plans to die. She will now admitted to the inpatient unit for further stabilization  Patient is more stable in hospital  Improved mood and thoughts are clearer
49-year-old, single,  female, , mother of 2 underaged children; non-caregiver, lives with boyfriend; with a history of Bipolar with a history of multiple past in-patient hospitalizations; with a self-reported history of one prior suicide attempt (via overdose). With a past medical history of ulcerative colitis, Lupus, Hashimoto's disease, fibromyalgia, retinal vasculitis. With a history of illicit drug use; presented to the ER; brought in by sister; however, self-referred for potential "psychotic moment".       Patient is without notable symptoms of psychosis despite reporting coming to the ER before her symptoms start. She denies current suicidal or homicidal; ideations; denies current intent or plans to die. She will now admitted to the inpatient unit for further stabilization  Patient is more stable in hospital

## 2019-12-18 ENCOUNTER — APPOINTMENT (OUTPATIENT)
Dept: GASTROENTEROLOGY | Facility: CLINIC | Age: 49
End: 2019-12-18

## 2020-01-08 NOTE — ED BEHAVIORAL HEALTH ASSESSMENT NOTE - NS ED BHA SUICIDALITY PRESENT CURRENT PASSIVE IDEATION
[Pain Description/Quality: ___] : Pain description/quality: [unfilled] [NoTreatment Scheduled] : no treatment scheduled [80: Normal activity with effort; some signs or symptoms of disease.] : 80: Normal activity with effort; some signs or symptoms of disease.  [ECOG Performance Status: 0 - Fully active, able to carry on all pre-disease performance without restriction] : Performance Status: 0 - Fully active, able to carry on all pre-disease performance without restriction [Maximal Pain Intensity: 7/10] : 7/10 [Least Pain Intensity: 5/10] : 5/10 [Pain Duration: ___] : Pain duration: [unfilled] [Pain Location: ___] : Pain Location: [unfilled] Yes

## 2020-05-06 NOTE — BEHAVIORAL HEALTH ASSESSMENT NOTE - NSBHREFER_PSY_A_CORE
MEDICATION: MEDROXYPROGESTERONE  Medroxyprogesterone (brand: Provera) is used to treat heavy or painful menstrual periods, or absence of menstrual periods.  It also treats some forms of cancer and uterine disorders.  DIRECTIONS FOR USE:  This medicine is usually taken for only 5-10 days. Follow the directions on your prescription label carefully. Your dose and the number of days that you take it will depend upon the condition for which you are being treated.  If you are taking this for heavy bleeding, menstrual flow should stop within a few days after starting the medicine, and then a new menstrual period will begin 3-7 days after your last pill.  WHAT TO WATCH FOR:Nausea (Take the medicine with food). Headache, dizziness, gradual weight gain (These will go away when you finish the medicine; contact your doctor if severe). Abdominal pain, swelling or pain in the legs, severe sharp pressure or pain in the chest, shortness of breath, coughing up blood, severe headache, sudden visual changes, unexpected heavy vaginal bleeding (Contact your doctor or return to this facility promptly). Two missed periods in a row, lumps in the breast or rapid weight gain (Contact your doctor).  MEDICAL CONDITIONS: Before starting this medicine, be sure your doctor knows if you have any of the following conditions:  · Asthma; high blood pressure; kidney, liver, or heart disease; high blood pressure during pregnancy, severe fluid retention during your period, blood clots, heart attack, seizure, migraine headaches, breast cancer, high cholesterol, depression, dementia, diabetes, unexplained vaginal bleeding, incomplete , uterine or vaginal cancer, vision problems, pregnancy, or breastfeeding.  DRUG INTERACTIONS: Before starting this medicine, be sure your doctor knows if you are taking any of the following drugs:  · Tegretol (carbamazepine), phenobarbital, rifampin, Alise’s wort  (may decrease effect of  medroxyprogesterone)  · Cigarette smoking  WARNING:  · Long-term daily use of this medicine causes a slight increased risk for stroke, blood clots, high blood pressure, heart attack, dementia, gallbladder disease or vision problems. Cigarette smoking and age over 35 further increase these risks.  [NOTE: This information topic may not include all directions, precautions, medical conditions, drug/food interactions, and warnings for this drug. Check with your doctor, nurse, or pharmacist for any questions that you may have.]  © 2000-2012 [facility], [address] [place] [address], [place] [id number]. All rights reserved. This information is not intended as a substitute for professional medical care. Always follow your healthcare professional's instructions.  Heavy Menstrual Bleeding    Heavy menstrual bleeding means that your periods are heavier or longer than usual. You may soak through a pad or tampon every 1 to 3 hours on the heaviest days of your period. You may also pass large, dark clots. And your periods may last longer than 7 days.  If you have heavy periods often, this can cause a problem called anemia. With anemia, your red blood cell count is too low. Red blood cells are needed because they help carry oxygen throughout your body. Severe anemia may cause you to look pale and feel weak or tired. You might also become short of breath easily.  There are many possible causes of heavy menstrual bleeding. Hormonal imbalance is the most common cause. Having benign growths in your uterus, such as fibroids or polyps, is another cause. Taking certain medicines or having certain health problems or bleeding disorders are also causes.  To treat heavy menstrual bleeding, your healthcare provider may prescribe medicines first. If these don’t help, you may need further testing and treatments.  Home care  Medicines  If you’re prescribed medicines, be sure to take them as directed.  · To help control heavy bleeding, any of the  following may be used:  ? Hormone therapy (this includes all methods of hormonal birth control such as pills, shots, cream, ring, patch, or hormone-releasing IUD)  ? Nonsteroidal anti-inflammatory drugs (NSAIDs), such as ibuprofen  ? Antifibrinolytic medicines, such as transexamic acid  · To help treat anemia, iron supplements may be prescribed.            General care  · Get plenty of rest if you tire easily. Avoid heavy exertion.  · To help relieve pain or cramping, try using a heating pad on the lower belly or back. A warm bath may also help.  Follow-up care  Follow up with your healthcare provider, ob OB/gyn within a week   When to seek medical advice  Call your healthcare provider right away if any of these occur:  · Heavier bleeding (soaking 1 pad or tampon every hour for 3 hours)  · Heavy bleeding that lasts longer than 1 week  · Fever of 100.4ºF (38ºC) or higher, or as directed by your provider  · Pain or cramping that gets worse instead of better  · Signs of anemia such as pale skin, extreme fatigue or weakness, or shortness of breath  · Dizziness or fainting  Date Last Reviewed: 10/1/2017  © 3502-4998 [employer]. [address] [place] [address], [place] [address]. All rights reserved. This information is not intended as a substitute for professional medical care. Always follow your healthcare professional's instructions.            other source...

## 2020-07-13 NOTE — DISCHARGE NOTE BEHAVIORAL HEALTH - NSTOBACCOUSAGEY/N_GEN_A_CS
Bexarotene Pregnancy And Lactation Text: This medication is Pregnancy Category X and should not be given to women who are pregnant or may become pregnant. This medication should not be used if you are breast feeding. No

## 2020-09-12 ENCOUNTER — TRANSCRIPTION ENCOUNTER (OUTPATIENT)
Age: 50
End: 2020-09-12

## 2020-10-05 NOTE — PROGRESS NOTE BEHAVIORAL HEALTH - PROBLEM SELECTOR PROBLEM 2
Neuro surgery Dr. D'Amico evaluated patient.  A-line placed by ICU team.
Obsessive-compulsive disorder, unspecified type

## 2021-01-22 NOTE — PATIENT PROFILE BEHAVIORAL HEALTH - NSBHPRTFCTEX_PSY_A_CORE
Nursing/Physical therapy
social supports/family support/positive therapeutic relationship/has dependent pets

## 2021-02-19 ENCOUNTER — EMERGENCY (EMERGENCY)
Facility: HOSPITAL | Age: 51
LOS: 0 days | Discharge: HOME | End: 2021-02-19
Attending: EMERGENCY MEDICINE
Payer: MEDICAID

## 2021-02-19 ENCOUNTER — TRANSCRIPTION ENCOUNTER (OUTPATIENT)
Age: 51
End: 2021-02-19

## 2021-02-19 VITALS
OXYGEN SATURATION: 100 % | TEMPERATURE: 98 F | HEART RATE: 70 BPM | DIASTOLIC BLOOD PRESSURE: 58 MMHG | RESPIRATION RATE: 18 BRPM | SYSTOLIC BLOOD PRESSURE: 99 MMHG

## 2021-02-19 VITALS — WEIGHT: 145.06 LBS | HEIGHT: 63 IN

## 2021-02-19 DIAGNOSIS — Z87.19 PERSONAL HISTORY OF OTHER DISEASES OF THE DIGESTIVE SYSTEM: ICD-10-CM

## 2021-02-19 DIAGNOSIS — Z88.0 ALLERGY STATUS TO PENICILLIN: ICD-10-CM

## 2021-02-19 DIAGNOSIS — Z20.822 CONTACT WITH AND (SUSPECTED) EXPOSURE TO COVID-19: ICD-10-CM

## 2021-02-19 DIAGNOSIS — R07.9 CHEST PAIN, UNSPECIFIED: ICD-10-CM

## 2021-02-19 DIAGNOSIS — E06.3 AUTOIMMUNE THYROIDITIS: ICD-10-CM

## 2021-02-19 DIAGNOSIS — R06.02 SHORTNESS OF BREATH: ICD-10-CM

## 2021-02-19 DIAGNOSIS — Z82.49 FAMILY HISTORY OF ISCHEMIC HEART DISEASE AND OTHER DISEASES OF THE CIRCULATORY SYSTEM: ICD-10-CM

## 2021-02-19 DIAGNOSIS — F41.9 ANXIETY DISORDER, UNSPECIFIED: ICD-10-CM

## 2021-02-19 DIAGNOSIS — F25.9 SCHIZOAFFECTIVE DISORDER, UNSPECIFIED: ICD-10-CM

## 2021-02-19 LAB
ADD ON TEST-SPECIMEN IN LAB: SIGNIFICANT CHANGE UP
ALBUMIN SERPL ELPH-MCNC: 3.7 G/DL — SIGNIFICANT CHANGE UP (ref 3.3–5)
ALP SERPL-CCNC: 55 U/L — SIGNIFICANT CHANGE UP (ref 40–120)
ALT FLD-CCNC: 20 U/L — SIGNIFICANT CHANGE UP (ref 12–78)
ANION GAP SERPL CALC-SCNC: 5 MMOL/L — SIGNIFICANT CHANGE UP (ref 5–17)
AST SERPL-CCNC: 8 U/L — LOW (ref 15–37)
BASOPHILS # BLD AUTO: 0.03 K/UL — SIGNIFICANT CHANGE UP (ref 0–0.2)
BASOPHILS NFR BLD AUTO: 0.5 % — SIGNIFICANT CHANGE UP (ref 0–2)
BILIRUB SERPL-MCNC: 0.4 MG/DL — SIGNIFICANT CHANGE UP (ref 0.2–1.2)
BUN SERPL-MCNC: 15 MG/DL — SIGNIFICANT CHANGE UP (ref 7–23)
CALCIUM SERPL-MCNC: 9.7 MG/DL — SIGNIFICANT CHANGE UP (ref 8.5–10.1)
CHLORIDE SERPL-SCNC: 108 MMOL/L — SIGNIFICANT CHANGE UP (ref 96–108)
CO2 SERPL-SCNC: 28 MMOL/L — SIGNIFICANT CHANGE UP (ref 22–31)
CREAT SERPL-MCNC: 0.83 MG/DL — SIGNIFICANT CHANGE UP (ref 0.5–1.3)
D DIMER BLD IA.RAPID-MCNC: <150 NG/ML DDU — SIGNIFICANT CHANGE UP
EOSINOPHIL # BLD AUTO: 0.12 K/UL — SIGNIFICANT CHANGE UP (ref 0–0.5)
EOSINOPHIL NFR BLD AUTO: 2.2 % — SIGNIFICANT CHANGE UP (ref 0–6)
GLUCOSE SERPL-MCNC: 86 MG/DL — SIGNIFICANT CHANGE UP (ref 70–99)
HCT VFR BLD CALC: 43.6 % — SIGNIFICANT CHANGE UP (ref 34.5–45)
HGB BLD-MCNC: 14.2 G/DL — SIGNIFICANT CHANGE UP (ref 11.5–15.5)
IMM GRANULOCYTES NFR BLD AUTO: 0.2 % — SIGNIFICANT CHANGE UP (ref 0–1.5)
LYMPHOCYTES # BLD AUTO: 2.46 K/UL — SIGNIFICANT CHANGE UP (ref 1–3.3)
LYMPHOCYTES # BLD AUTO: 44.5 % — HIGH (ref 13–44)
MCHC RBC-ENTMCNC: 31.9 PG — SIGNIFICANT CHANGE UP (ref 27–34)
MCHC RBC-ENTMCNC: 32.6 GM/DL — SIGNIFICANT CHANGE UP (ref 32–36)
MCV RBC AUTO: 98 FL — SIGNIFICANT CHANGE UP (ref 80–100)
MONOCYTES # BLD AUTO: 0.36 K/UL — SIGNIFICANT CHANGE UP (ref 0–0.9)
MONOCYTES NFR BLD AUTO: 6.5 % — SIGNIFICANT CHANGE UP (ref 2–14)
NEUTROPHILS # BLD AUTO: 2.55 K/UL — SIGNIFICANT CHANGE UP (ref 1.8–7.4)
NEUTROPHILS NFR BLD AUTO: 46.1 % — SIGNIFICANT CHANGE UP (ref 43–77)
PLATELET # BLD AUTO: 234 K/UL — SIGNIFICANT CHANGE UP (ref 150–400)
POTASSIUM SERPL-MCNC: 4.6 MMOL/L — SIGNIFICANT CHANGE UP (ref 3.5–5.3)
POTASSIUM SERPL-SCNC: 4.6 MMOL/L — SIGNIFICANT CHANGE UP (ref 3.5–5.3)
PROT SERPL-MCNC: 7.2 GM/DL — SIGNIFICANT CHANGE UP (ref 6–8.3)
RBC # BLD: 4.45 M/UL — SIGNIFICANT CHANGE UP (ref 3.8–5.2)
RBC # FLD: 12.5 % — SIGNIFICANT CHANGE UP (ref 10.3–14.5)
SARS-COV-2 RNA SPEC QL NAA+PROBE: SIGNIFICANT CHANGE UP
SODIUM SERPL-SCNC: 141 MMOL/L — SIGNIFICANT CHANGE UP (ref 135–145)
TROPONIN I SERPL-MCNC: <0.015 NG/ML — SIGNIFICANT CHANGE UP (ref 0.01–0.04)
TROPONIN I SERPL-MCNC: <0.015 NG/ML — SIGNIFICANT CHANGE UP (ref 0.01–0.04)
WBC # BLD: 5.53 K/UL — SIGNIFICANT CHANGE UP (ref 3.8–10.5)
WBC # FLD AUTO: 5.53 K/UL — SIGNIFICANT CHANGE UP (ref 3.8–10.5)

## 2021-02-19 PROCEDURE — 85379 FIBRIN DEGRADATION QUANT: CPT

## 2021-02-19 PROCEDURE — U0005: CPT

## 2021-02-19 PROCEDURE — 93005 ELECTROCARDIOGRAM TRACING: CPT

## 2021-02-19 PROCEDURE — 80053 COMPREHEN METABOLIC PANEL: CPT

## 2021-02-19 PROCEDURE — 71046 X-RAY EXAM CHEST 2 VIEWS: CPT

## 2021-02-19 PROCEDURE — 96375 TX/PRO/DX INJ NEW DRUG ADDON: CPT

## 2021-02-19 PROCEDURE — U0003: CPT

## 2021-02-19 PROCEDURE — 84484 ASSAY OF TROPONIN QUANT: CPT

## 2021-02-19 PROCEDURE — 99284 EMERGENCY DEPT VISIT MOD MDM: CPT | Mod: 25

## 2021-02-19 PROCEDURE — 99284 EMERGENCY DEPT VISIT MOD MDM: CPT

## 2021-02-19 PROCEDURE — 85025 COMPLETE CBC W/AUTO DIFF WBC: CPT

## 2021-02-19 PROCEDURE — 96374 THER/PROPH/DIAG INJ IV PUSH: CPT

## 2021-02-19 PROCEDURE — 93010 ELECTROCARDIOGRAM REPORT: CPT

## 2021-02-19 PROCEDURE — 36415 COLL VENOUS BLD VENIPUNCTURE: CPT

## 2021-02-19 PROCEDURE — 71046 X-RAY EXAM CHEST 2 VIEWS: CPT | Mod: 26

## 2021-02-19 RX ORDER — KETOROLAC TROMETHAMINE 30 MG/ML
30 SYRINGE (ML) INJECTION ONCE
Refills: 0 | Status: DISCONTINUED | OUTPATIENT
Start: 2021-02-19 | End: 2021-02-19

## 2021-02-19 RX ADMIN — Medication 30 MILLIGRAM(S): at 15:20

## 2021-02-19 RX ADMIN — Medication 1 MILLIGRAM(S): at 12:33

## 2021-02-19 NOTE — ED STATDOCS - OBJECTIVE STATEMENT
52 y/o female with PMHx of Hashimoto's disease, ulcerative colitis, Schizo-affective schizophrenia, retinal vasculitis presents to the ED c/o chest pain, worsening. Pt also reports SOB x 2 weeks. Pt states that pain began at 9:30am this morning, states that pain radiates down left arm. Denies history of similar symptoms. Pt states she called Dr. Larson and advised to come to ED for further evaluation. 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 Pt also acknowledges recent stressors involving ex- and tenants with COVID exposure. Pt states that mother  of an MI at age 72, but denies any other cardiac history. Denies n/v, abd pain, cough, fevers, chills, denies aggravation of symptoms by exertion. Denies SI or HI.

## 2021-02-19 NOTE — ED ADULT NURSE NOTE - NSIMPLEMENTINTERV_GEN_ALL_ED
Implemented All Universal Safety Interventions:  [place] to call system. Call bell, personal items and telephone within reach. Instruct patient to call for assistance. Room bathroom lighting operational. Non-slip footwear when patient is off stretcher. Physically safe environment: no spills, clutter or unnecessary equipment. Stretcher in lowest position, wheels locked, appropriate side rails in place.

## 2021-02-19 NOTE — ED STATDOCS - NSFOLLOWUPINSTRUCTIONS_ED_ALL_ED_FT
CHEST PAIN - AfterCare(R) Instructions(ER/ED)           Chest Pain    WHAT YOU NEED TO KNOW:    Chest pain can be caused by a range of conditions, from not serious to life-threatening. Chest pain can be a symptom of a digestive problem, such as acid reflux or a stomach ulcer. An anxiety attack or a strong emotion, such as anger, can also cause chest pain. Infection, inflammation, or a fracture in the bones or cartilage in your chest can cause pain or discomfort. Sometimes chest pain or pressure is caused by poor blood flow to your heart (angina). Chest pain may also be caused by life-threatening conditions such as a heart attack or blood clot in your lungs.    DISCHARGE INSTRUCTIONS:    Call your local emergency number (911 in the [place]) or have someone call if:   •You have any of the following signs of a heart attack: ?Squeezing, pressure, or pain in your chest      ?You may also have any of the following: ?Discomfort or pain in your back, neck, jaw, stomach, or arm      ?Shortness of breath      ?Nausea or vomiting      ?Lightheadedness or a sudden cold sweat            Return to the emergency department if:   •You have chest discomfort that gets worse, even with medicine.      •You cough or vomit blood.      •Your bowel movements are black or bloody.      •You cannot stop vomiting, or it hurts to swallow.      Call your doctor if:   •You have questions or concerns about your condition or care.          Medicines:   •Medicines may be given to treat the cause of your chest pain. Examples include pain medicine, anxiety medicine, or medicines to increase blood flow to your heart.      •Do not take certain medicines without asking your healthcare provider first. These include NSAIDs, herbal or vitamin supplements, or hormones (estrogen or progestin).      •Take your medicine as directed. Contact your healthcare provider if you think your medicine is not helping or if you have side effects. Tell him or her if you are allergic to any medicine. Keep a list of the medicines, vitamins, and herbs you take. Include the amounts, and when and why you take them. Bring the list or the pill bottles to follow-up visits. Carry your medicine list with you in case of an emergency.      Healthy living tips: The following are general healthy guidelines. If the cause of your chest pain is known, your healthcare provider will give you specific guidelines to follow.  •Do not smoke. Nicotine and other chemicals in cigarettes and cigars can cause lung and heart damage. Ask your healthcare provider for information if you currently smoke and need help to quit. E-cigarettes or smokeless tobacco still contain nicotine. Talk to your healthcare provider before you use these products.      •Choose a variety of healthy foods as often as possible. Include fresh, frozen, or canned fruits and vegetables. Also include low-fat dairy products, fish, chicken (without skin), and lean meats. Your healthcare provider or a dietitian can help you create meal plans. You may need to avoid certain foods or drinks if your pain is caused by a digestion problem.  Healthy Foods           •Lower your sodium (salt) intake. Limit foods that are high in sodium, such as canned foods, salty snacks, and cold cuts. If you add salt when you cook food, do not add more at the table. Choose low-sodium canned foods as much as possible.             •Drink plenty of water every day. Water helps your body to control your temperature and blood pressure. Ask your healthcare provider how much water you should drink every day.      •Ask about activity. Your healthcare provider will tell you which activities to limit or avoid. Ask when you can drive, return to work, and have sex. Ask about the best exercise plan for you.      •Maintain a healthy weight. Ask your healthcare provider what a healthy weight is for you. Ask him or her to help you create a safe weight loss plan if you are overweight.      •Ask about vaccines you may need. Get the influenza (flu) vaccine every year as soon as recommended, usually in September or October. You may also need a pneumococcal vaccine to prevent pneumonia. The vaccine is usually given every 5 years, starting at age 65. Your healthcare provider can tell you if should get other vaccines, and when to get them.      Follow up with your healthcare provider within 72 hours, or as directed: You may need to return for more tests to find the cause of your chest pain. You may be referred to a specialist, such as a cardiologist or gastroenterologist. Write down your questions so you remember to ask them during your visits.       © Copyright [employer] 2021                     :  [facility]  	                       ANXIETY - AfterCare(R) Instructions(ER/ED)           Anxiety    WHAT YOU NEED TO KNOW:    Anxiety is a condition that causes you to feel extremely worried or nervous. The feelings are so strong that they can cause problems with your daily activities or sleep. Anxiety may be triggered by something you fear, or it may happen without a cause. Family or work stress, smoking, caffeine, and alcohol can increase your risk for anxiety. Certain medicines or health conditions can also increase your risk. Anxiety can become a long-term condition if it is not managed or treated.    DISCHARGE INSTRUCTIONS:    Call your local emergency number (911 in the [place]) if:   •You have chest pain, tightness, or heaviness that may spread to your shoulders, arms, jaw, neck, or back.      •You feel like hurting yourself or someone else.      Call your doctor if:   •Your symptoms get worse or do not get better with treatment.      •Your anxiety keeps you from doing your regular daily activities.      •You have new symptoms since your last visit.      •You have questions or concerns about your condition or care.      Medicines:   •Medicines may be given to help you feel more calm and relaxed, and decrease your symptoms.      •Take your medicine as directed. Contact your healthcare provider if you think your medicine is not helping or if you have side effects. Tell him of her if you are allergic to any medicine. Keep a list of the medicines, vitamins, and herbs you take. Include the amounts, and when and why you take them. Bring the list or the pill bottles to follow-up visits. Carry your medicine list with you in case of an emergency.      Manage anxiety:   •Talk to someone about your anxiety. Your healthcare provider may suggest counseling. Cognitive behavioral therapy can help you understand and change how you react to events that trigger your symptoms. You might feel more comfortable talking with a friend or family member about your anxiety. Choose someone you know will be supportive and encouraging.      •Find ways to relax. Activities such as exercise, meditation, or listening to music can help you relax. Spend time with friends, or do things you enjoy.      •Practice deep breathing. Deep breathing can help you relax when you feel anxious. Focus on taking slow, deep breaths several times a day, or during an anxiety attack. Breathe in through your nose and out through your mouth.      •Create a regular sleep routine. Regular sleep can help you feel calmer during the day. Go to sleep and wake up at the same times every day. Do not watch television or use the computer right before bed. Your room should be comfortable, dark, and quiet.      •Eat a variety of healthy foods. Healthy foods include fruits, vegetables, low-fat dairy products, lean meats, fish, whole-grain breads, and cooked beans. Healthy foods can help you feel less anxious and have more energy.  Healthy Foods           •Exercise regularly. Exercise can increase your energy level. Exercise may also lift your mood and help you sleep better. Your healthcare provider can help you create an exercise plan.  Walking for Exercise           •Do not smoke. Nicotine and other chemicals in cigarettes and cigars can increase anxiety. Ask your healthcare provider for information if you currently smoke and need help to quit. E-cigarettes or smokeless tobacco still contain nicotine. Talk to your healthcare provider before you use these products.      •Do not have caffeine. Caffeine can make your symptoms worse. Do not have foods or drinks that are meant to increase your energy level.      •Limit or do not drink alcohol. Ask your healthcare provider if alcohol is safe for you. You may not be able to drink alcohol if you take certain anxiety or depression medicines. Limit alcohol to 1 drink per day if you are a woman. Limit alcohol to 2 drinks per day if you are a man. A drink of alcohol is 12 ounces of beer, 5 ounces of wine, or 1½ ounces of liquor.      •Do not use drugs. Drugs can make your anxiety worse. It can also make anxiety hard to manage. Talk to your healthcare provider if you use drugs and want help to quit.      Follow up with your doctor within 2 weeks or as directed: Write down your questions so you remember to ask them during your visits.       © Copyright [employer] 2021           back to top                          © Copyright [employer] 2021

## 2021-02-19 NOTE — ED STATDOCS - PATIENT PORTAL LINK FT
You can access the FollowMyHealth Patient Portal offered by [facility] by registering at the following website: [email or web address]. By joining [employer]’s FollowMyHealth portal, you will also be able to view your health information using other applications (apps) compatible with our system.

## 2021-02-19 NOTE — ED STATDOCS - CLINICAL SUMMARY MEDICAL DECISION MAKING FREE TEXT BOX
Will evaluate for ACS. Low risk of PE, and PERC negative. Will check X-ray, serial cardiac enzymes, reassess. Will evaluate for ACS. Low risk of PE, and dimer negative. Will check X-ray, serial cardiac enzymes, reassess.

## 2021-02-19 NOTE — ED ADULT NURSE NOTE - OBJECTIVE STATEMENT
patient axox3, c/o chest pain since 9:30 this morning radiating down left arm. patient also endorsing SOB x2 weeks. patient denies headache, n/v/d, fever, chills, cough. patient with hx of schizoaffective schizophrenia. denies hx of SI, HI, auditory hallucinations, visual hallucinations. patient able to ambulate independently with a steady gait while maintaining safety. patient states she tests herself for covid x1 a week for her job, states she has gotten negative results.

## 2021-02-19 NOTE — ED STATDOCS - CARE PLAN
Principal Discharge DX:	Chest pain, unspecified type  Secondary Diagnosis:	Dyspnea, unspecified type  Secondary Diagnosis:	Anxiety

## 2021-02-19 NOTE — ED STATDOCS - CARE PROVIDER_API CALL
Nic Fajardo (MD)  Cardiovascular Disease  [address], [address]  [place] [address]  Phone: [phone]  Fax: [phone]  Follow Up Time:

## 2021-02-19 NOTE — ED STATDOCS - PROGRESS NOTE DETAILS
52 y/o female with PMHx of Hashimoto's disease, ulcerative colitis, Schizo-affective schizophrenia, Anxiety, retinal vasculitis presents to the ED c/o chest pain, worsening. Pt also reports SOB x 2 weeks. Pt states that pain began at 9:30am this morning, states that pain radiates down left arm. Denies history of similar symptoms.  Neg nausea, vomiting, dizziness, weakness.  Neg drug use, neg family h/o early MI, neg smoker.  Neg HTN, HLD, CAD, DM.  Pt tearful in Intake, reports she is stressed and anxious.  CTA B. RRR.  Abd: round, Active Bs x4, Soft, NT/ND.  Will F/U Labs. CXR.  Re-eval s/p meds.  Lucy Pappas PA-C On re-eval, pt reported feeling more relaxed.  Did still have intermittent episodes of sharp, left sided chest pains.  Will add on pain meds.  Initial labs WNL, Trop not elevated, CXR and EKG without acute changes.  2nd trop pending.  Lucy Pappas PA-C Story not suspicious for acs.  Low risk HEART score.  EKG unremarkable.  Trop x2 negative - sufficient for eval given  timing  of symptoms.  Low risk PE and dimer negative.  No e/o ptx/pna/carditis.  Story not c/w dissection - below threshold for further w/u.  Stable for outpatient f/u.  D/c home with strict return precautions and prompt outpatient f/u.  Tejas Flores M.D.

## 2021-03-17 NOTE — PROGRESS NOTE BEHAVIORAL HEALTH - NS ED [facility] MED ROS HEMATOLOGIC LYMPHATIC
No complaints Tazorac Counseling:  Patient advised that medication is irritating and drying.  Patient may need to apply sparingly and wash off after an hour before eventually leaving it on overnight.  The patient verbalized understanding of the proper use and possible adverse effects of tazorac.  All of the patient's questions and concerns were addressed.

## 2021-07-01 ENCOUNTER — TRANSCRIPTION ENCOUNTER (OUTPATIENT)
Age: 51
End: 2021-07-01

## 2021-07-21 NOTE — BEHAVIORAL HEALTH ASSESSMENT NOTE - HYGIENE
Fair Tazorac Pregnancy And Lactation Text: This medication is not safe during pregnancy. It is unknown if this medication is excreted in breast milk.

## 2021-07-30 NOTE — ED PROVIDER NOTE - RESPIRATORY, MLM
Date: 7/30/2021    Re:  Sukhjinder Lepe        To Whom it May Concern:    This letter is to certify that Mr. Sukhjinder Lepe was medically evaluated today.     Please excuse him from WORK from 7/30/21 through 8/01/21.     Thank you for your consideration of Mr. Lepe's health condition.    Regards,      KENYON Escobedo .  
Breath sounds clear and equal bilaterally.

## 2021-08-04 NOTE — H&P ADULT - PSH
Due to COVID-19 ACTION PLAN, the patient's office visit was converted to a video visit.    This visit is being performed via phone to discuss Telephonic Visit (LYLA - + covid)    Clinician Location: [facility] Ayla Licea [address] is in [place] and her identity has been established.   She understands that we are limiting office visits due to the coronavirus pandemic and was informed that consent to treat includes permission to submit charges to her insurance. It was also shared that without being seen and evaluated in person, there is a risk that the information and/or assessment may be incomplete or inaccurate.  11-20 minutes were spent in this encounter.        Patient ID: Laurita is a 29 year old female.    S:     Laurita Rock is a 29 year old  at 30w5d EDC 10/8/2021 for routine return OB visit.    No complaints. Denies cramping, bleeding, leaking fluid.     O:   Visit Vitals  LMP 2021 (Exact Date)       SEE ACOG FLOWSHEET    ASSESSMENT/PLAN:    1. Routine Care    Last office visit 2021; discharged from [facility] 8/3 + covid  Discussed kick counts  Reviewed signs & symptoms of  labor   1 hr GTT ordered  Sched f/u US for anatomy - US at [facility] per MFM - report not available    2. + covid  - + covid , ER for tachycardia, SOB, fever  -  ER at [facility]- tx to [facility] due to 7 minute fetal decel  - treated with remdesevir, dexamethasone, heparin  - CT  - ill-defines ground glass opacities left lower lobe; neg PE  - discharged 8/3 in stable condition, EFM wnl at [facility]  - still c/o cough, fatigue  - rec return to ER if worsening SOB, tachycardia, fever  - MFM consult ASAP    LYLA 1-2 weeks.    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

## 2021-08-04 NOTE — PATIENT PROFILE BEHAVIORAL HEALTH - LIVES WITH, PROFILE
Patient presented to unit for scheduled NST for obesity. G2, P0; FRANKI 8/18/21; EGA 38wks today. Pt has hx of HSV, on valtrex. BPP completed today; results pending. Pt sees SALVADOR Hunt in office. Next appointment is Monday 8/9/21. EFM and toco applied. VS obtained. Ice water, juice, and crackers given.   Lives with Boyfriend children on weekends

## 2021-08-10 NOTE — PATIENT PROFILE BEHAVIORAL HEALTH - NS PRO TALK SOMEONE YN
Dyspnea Dyspnea Dyspnea Dyspnea Dyspnea Dyspnea Dyspnea Dyspnea Dyspnea Dyspnea Dyspnea Dyspnea Dyspnea Dyspnea Dyspnea Dyspnea Dyspnea Dyspnea Dyspnea no

## 2021-09-03 NOTE — H&P ADULT - NSHPOUTPATIENTPROVIDERS_GEN_ALL_CORE
Subjective      Patient ID: Sharon Morales is a 55 y.o. female.    She is here for physical. Pt has been doing well overall. She is trying to follow well balanced meals. She is trying to lose ~6lbs. Stays well hydrated. She is swimming 3x wk for 1hr and pilates/weights/walking other days. Average 7-8 hrs of sleep a night. Normal BMs and urination. Postmenopausal. UTD with eye and dental exam. UTD with gyn care/mammo. UTD with colonoscopy in 2019 and due in 3yrs. UTD with immunizations. Due for fasting labs in few months.       The following have been reviewed and updated as appropriate in this visit:  Tobacco  Allergies  Meds  Problems  Med Hx  Surg Hx  Fam Hx       Review of Systems   Constitutional: Negative for chills, fatigue and fever.   HENT: Negative for ear discharge, ear pain, postnasal drip, rhinorrhea and sore throat.    Respiratory: Negative for cough, shortness of breath and wheezing.    Cardiovascular: Negative for chest pain and palpitations.   Gastrointestinal: Negative for abdominal pain, blood in stool and constipation.   Genitourinary: Negative for dysuria, frequency and hematuria.   Musculoskeletal: Negative for arthralgias and myalgias.   Skin: Negative for rash.   Neurological: Negative for dizziness and headaches.   Psychiatric/Behavioral: Negative for sleep disturbance and suicidal ideas. The patient is not nervous/anxious.        Current Outpatient Medications   Medication Sig Dispense Refill   • estradioL (ESTRACE) 0.01 % (0.1 mg/gram) vaginal cream USE VAGINALLY AS DIRECTED TWICE DAILY FOR 2 WEEKS, THEN TWICE WEEKLY THEREAFTER     • Lactobac no.41/Bifidobact no.7 (PROBIOTIC-10 ORAL) Take by mouth.     • levothyroxine (SYNTHROID) 100 mcg tablet TAKE 1 TABLET BY MOUTH EVERY DAY 90 tablet 0   • olopatadine (PATADAY) 0.2 % ophthalmic solution Administer 1 drop into both eyes daily. 5 mL 1   • XIIDRA 5 % dropperette dropperette        No current facility-administered medications for this  PCP-Dr. Kang stevenson visit.     Past Medical History:   Diagnosis Date   • Arthritis     Knees -sp B/L TKR   • At risk for venous thromboembolism (VTE) 2018   • Constipation 2018   • Hypothyroidism    • Kidney stones    • Last menstrual period (LMP) > 10 days ago 10/10/2018   • Leukopenia 2018   • Motion sickness    • Pain management 2018   • PONV (postoperative nausea and vomiting)    • Seasonal allergies    • Snores      Family History   Problem Relation Age of Onset   • Hypertension Biological Mother    • COPD Biological Mother    • Lymphoma Biological Mother    • Skin cancer Biological Father    • Stomach cancer Other      Past Surgical History:   Procedure Laterality Date   • BUNIONECTOMY Bilateral    •  SECTION      x 3   • JOINT REPLACEMENT Bilateral    • NASAL SINUS SURGERY  2021   • REVISION TOTAL KNEE ARTHROPLASTY Right 2018     Social History     Socioeconomic History   • Marital status:      Spouse name: Not on file   • Number of children: Not on file   • Years of education: Not on file   • Highest education level: Not on file   Occupational History   • Occupation: [employer]st   Tobacco Use   • Smoking status: Never Smoker   • Smokeless tobacco: Never Used   Vaping Use   • Vaping Use: Never used   Substance and Sexual Activity   • Alcohol use: Yes     Alcohol/week: 1.0 standard drinks     Types: 1 Glasses of wine per week     Comment: occas   • Drug use: No   • Sexual activity: Yes     Partners: Male   Other Topics Concern   • Not on file   Social History Narrative   • Not on file     Social Determinants of Health     Financial Resource Strain:    • Difficulty of Paying Living Expenses:    Food Insecurity:    • Worried About Running Out of Food in the Last Year:    • Ran Out of Food in the Last Year:    Transportation Needs:    • Lack of Transportation (Medical):    • Lack of Transportation (Non-Medical):    Physical Activity:    • Days of Exercise per Week:    • Minutes of  "Exercise per Session:    Stress:    • Feeling of Stress :    Social Connections:    • Frequency of Communication with Friends and Family:    • Frequency of Social Gatherings with Friends and Family:    • Attends Jehov[employer] witness Services:    • Active Member of Clubs or Organizations:    • Attends Club or Organization Meetings:    • Marital Status:    Intimate Partner Violence:    • Fear of Current or Ex-Partner:    • Emotionally Abused:    • Physically Abused:    • Sexually Abused:      Allergies   Allergen Reactions   • Avocado      Other reaction(s): vomiting       Objective   Visit Vitals  /80 (BP Location: Left upper arm, Patient Position: Sitting)   Pulse 76   Temp 36.7 °C (98 °F)   Resp 16   Ht 1.473 m (4' 10\")   Wt 53.5 kg (118 lb)   SpO2 99%   BMI 24.66 kg/m²     Physical Exam  Vitals and nursing note reviewed.   Constitutional:       Appearance: Normal appearance.   HENT:      Right Ear: Tympanic membrane normal. There is no impacted cerumen.      Left Ear: Tympanic membrane normal. There is no impacted cerumen.      Nose: Nose normal. No rhinorrhea.      Mouth/Throat:      Mouth: Mucous membranes are moist.      Pharynx: Oropharynx is clear. No posterior oropharyngeal erythema.   Cardiovascular:      Rate and Rhythm: Normal rate and regular rhythm.      Heart sounds: No murmur heard.     Pulmonary:      Effort: Pulmonary effort is normal.      Breath sounds: Normal breath sounds. No wheezing.   Abdominal:      General: Bowel sounds are normal.      Palpations: Abdomen is soft.      Tenderness: There is no abdominal tenderness.   Musculoskeletal:         General: Normal range of motion.      Cervical back: Normal range of motion and neck supple.   Skin:     General: Skin is warm.      Findings: No rash.   Neurological:      General: No focal deficit present.      Mental Status: She is alert and oriented to person, place, and time.      Cranial Nerves: No cranial nerve deficit.   Psychiatric:         Mood and " Affect: Mood normal.         Behavior: Behavior normal.         Assessment/Plan   Problem List Items Addressed This Visit        Endocrine/Metabolic    Hypothyroidism    Relevant Orders    TSH    T4, free       Hematologic    Leukopenia    Relevant Orders    CBC and differential      Other Visit Diagnoses     Routine physical examination    -  Primary    Relevant Orders    Comprehensive metabolic panel    Lipid panel      She is doing ok overall. Discussed diet and exercise. Will check updated fasting labs in few months. UTD with eye and dental exam. UTD with colonoscopy and due Jan 2022. UTD with immunizations but will get shingrex when she is ready. UTD with gyn care/mammo.     Natalia Wilson, DO  9/3/2021

## 2021-09-06 ENCOUNTER — EMERGENCY (EMERGENCY)
Facility: HOSPITAL | Age: 51
LOS: 0 days | Discharge: HOME | End: 2021-09-06
Attending: EMERGENCY MEDICINE
Payer: MEDICAID

## 2021-09-06 VITALS
OXYGEN SATURATION: 100 % | TEMPERATURE: 97 F | SYSTOLIC BLOOD PRESSURE: 109 MMHG | DIASTOLIC BLOOD PRESSURE: 77 MMHG | RESPIRATION RATE: 17 BRPM | HEART RATE: 63 BPM

## 2021-09-06 VITALS
DIASTOLIC BLOOD PRESSURE: 66 MMHG | TEMPERATURE: 98 F | WEIGHT: 136.91 LBS | RESPIRATION RATE: 18 BRPM | OXYGEN SATURATION: 100 % | HEART RATE: 70 BPM | SYSTOLIC BLOOD PRESSURE: 115 MMHG | HEIGHT: 63 IN

## 2021-09-06 DIAGNOSIS — Z88.0 ALLERGY STATUS TO PENICILLIN: ICD-10-CM

## 2021-09-06 DIAGNOSIS — E06.3 AUTOIMMUNE THYROIDITIS: ICD-10-CM

## 2021-09-06 DIAGNOSIS — Z98.890 OTHER SPECIFIED POSTPROCEDURAL STATES: ICD-10-CM

## 2021-09-06 DIAGNOSIS — K52.9 NONINFECTIVE GASTROENTERITIS AND COLITIS, UNSPECIFIED: ICD-10-CM

## 2021-09-06 DIAGNOSIS — F25.0 SCHIZOAFFECTIVE DISORDER, BIPOLAR TYPE: ICD-10-CM

## 2021-09-06 DIAGNOSIS — Z87.19 PERSONAL HISTORY OF OTHER DISEASES OF THE DIGESTIVE SYSTEM: ICD-10-CM

## 2021-09-06 DIAGNOSIS — K62.89 OTHER SPECIFIED DISEASES OF ANUS AND RECTUM: ICD-10-CM

## 2021-09-06 DIAGNOSIS — R10.9 UNSPECIFIED ABDOMINAL PAIN: ICD-10-CM

## 2021-09-06 LAB
ALBUMIN SERPL ELPH-MCNC: 4 G/DL — SIGNIFICANT CHANGE UP (ref 3.3–5)
ALP SERPL-CCNC: 58 U/L — SIGNIFICANT CHANGE UP (ref 40–120)
ALT FLD-CCNC: 30 U/L — SIGNIFICANT CHANGE UP (ref 12–78)
ANION GAP SERPL CALC-SCNC: 4 MMOL/L — LOW (ref 5–17)
APPEARANCE UR: CLEAR — SIGNIFICANT CHANGE UP
AST SERPL-CCNC: 20 U/L — SIGNIFICANT CHANGE UP (ref 15–37)
BASOPHILS # BLD AUTO: 0.03 K/UL — SIGNIFICANT CHANGE UP (ref 0–0.2)
BASOPHILS NFR BLD AUTO: 0.6 % — SIGNIFICANT CHANGE UP (ref 0–2)
BILIRUB SERPL-MCNC: 0.6 MG/DL — SIGNIFICANT CHANGE UP (ref 0.2–1.2)
BILIRUB UR-MCNC: NEGATIVE — SIGNIFICANT CHANGE UP
BUN SERPL-MCNC: 16 MG/DL — SIGNIFICANT CHANGE UP (ref 7–23)
C DIFF BY PCR RESULT: SIGNIFICANT CHANGE UP
C DIFF TOX GENS STL QL NAA+PROBE: SIGNIFICANT CHANGE UP
CALCIUM SERPL-MCNC: 9 MG/DL — SIGNIFICANT CHANGE UP (ref 8.5–10.1)
CHLORIDE SERPL-SCNC: 108 MMOL/L — SIGNIFICANT CHANGE UP (ref 96–108)
CO2 SERPL-SCNC: 27 MMOL/L — SIGNIFICANT CHANGE UP (ref 22–31)
COLOR SPEC: YELLOW — SIGNIFICANT CHANGE UP
CREAT SERPL-MCNC: 0.91 MG/DL — SIGNIFICANT CHANGE UP (ref 0.5–1.3)
DIFF PNL FLD: NEGATIVE — SIGNIFICANT CHANGE UP
EOSINOPHIL # BLD AUTO: 0.06 K/UL — SIGNIFICANT CHANGE UP (ref 0–0.5)
EOSINOPHIL NFR BLD AUTO: 1.2 % — SIGNIFICANT CHANGE UP (ref 0–6)
GLUCOSE SERPL-MCNC: 89 MG/DL — SIGNIFICANT CHANGE UP (ref 70–99)
GLUCOSE UR QL: NEGATIVE MG/DL — SIGNIFICANT CHANGE UP
HCG SERPL-ACNC: 1 MIU/ML — SIGNIFICANT CHANGE UP
HCT VFR BLD CALC: 40.4 % — SIGNIFICANT CHANGE UP (ref 34.5–45)
HGB BLD-MCNC: 13.5 G/DL — SIGNIFICANT CHANGE UP (ref 11.5–15.5)
IMM GRANULOCYTES NFR BLD AUTO: 0.2 % — SIGNIFICANT CHANGE UP (ref 0–1.5)
KETONES UR-MCNC: NEGATIVE — SIGNIFICANT CHANGE UP
LEUKOCYTE ESTERASE UR-ACNC: NEGATIVE — SIGNIFICANT CHANGE UP
LIDOCAIN IGE QN: 60 U/L — LOW (ref 73–393)
LYMPHOCYTES # BLD AUTO: 2.31 K/UL — SIGNIFICANT CHANGE UP (ref 1–3.3)
LYMPHOCYTES # BLD AUTO: 45.9 % — HIGH (ref 13–44)
MCHC RBC-ENTMCNC: 30.2 PG — SIGNIFICANT CHANGE UP (ref 27–34)
MCHC RBC-ENTMCNC: 33.4 GM/DL — SIGNIFICANT CHANGE UP (ref 32–36)
MCV RBC AUTO: 90.4 FL — SIGNIFICANT CHANGE UP (ref 80–100)
MONOCYTES # BLD AUTO: 0.41 K/UL — SIGNIFICANT CHANGE UP (ref 0–0.9)
MONOCYTES NFR BLD AUTO: 8.2 % — SIGNIFICANT CHANGE UP (ref 2–14)
NEUTROPHILS # BLD AUTO: 2.21 K/UL — SIGNIFICANT CHANGE UP (ref 1.8–7.4)
NEUTROPHILS NFR BLD AUTO: 43.9 % — SIGNIFICANT CHANGE UP (ref 43–77)
NITRITE UR-MCNC: NEGATIVE — SIGNIFICANT CHANGE UP
PH UR: 5 — SIGNIFICANT CHANGE UP (ref 5–8)
PLATELET # BLD AUTO: 257 K/UL — SIGNIFICANT CHANGE UP (ref 150–400)
POTASSIUM SERPL-MCNC: 3.6 MMOL/L — SIGNIFICANT CHANGE UP (ref 3.5–5.3)
POTASSIUM SERPL-SCNC: 3.6 MMOL/L — SIGNIFICANT CHANGE UP (ref 3.5–5.3)
PROT SERPL-MCNC: 7 GM/DL — SIGNIFICANT CHANGE UP (ref 6–8.3)
PROT UR-MCNC: NEGATIVE MG/DL — SIGNIFICANT CHANGE UP
RBC # BLD: 4.47 M/UL — SIGNIFICANT CHANGE UP (ref 3.8–5.2)
RBC # FLD: 12.6 % — SIGNIFICANT CHANGE UP (ref 10.3–14.5)
SODIUM SERPL-SCNC: 139 MMOL/L — SIGNIFICANT CHANGE UP (ref 135–145)
SP GR SPEC: 1 — LOW (ref 1.01–1.02)
UROBILINOGEN FLD QL: NEGATIVE MG/DL — SIGNIFICANT CHANGE UP
WBC # BLD: 5.03 K/UL — SIGNIFICANT CHANGE UP (ref 3.8–10.5)
WBC # FLD AUTO: 5.03 K/UL — SIGNIFICANT CHANGE UP (ref 3.8–10.5)

## 2021-09-06 PROCEDURE — 99285 EMERGENCY DEPT VISIT HI MDM: CPT

## 2021-09-06 PROCEDURE — 84702 CHORIONIC GONADOTROPIN TEST: CPT

## 2021-09-06 PROCEDURE — 87493 C DIFF AMPLIFIED PROBE: CPT

## 2021-09-06 PROCEDURE — 85025 COMPLETE CBC W/AUTO DIFF WBC: CPT

## 2021-09-06 PROCEDURE — 87045 FECES CULTURE AEROBIC BACT: CPT

## 2021-09-06 PROCEDURE — 99284 EMERGENCY DEPT VISIT MOD MDM: CPT

## 2021-09-06 PROCEDURE — 83690 ASSAY OF LIPASE: CPT

## 2021-09-06 PROCEDURE — 87046 STOOL CULTR AEROBIC BACT EA: CPT

## 2021-09-06 PROCEDURE — 74177 CT ABD & PELVIS W/CONTRAST: CPT

## 2021-09-06 PROCEDURE — 87177 OVA AND PARASITES SMEARS: CPT

## 2021-09-06 PROCEDURE — 87086 URINE CULTURE/COLONY COUNT: CPT

## 2021-09-06 PROCEDURE — 81003 URINALYSIS AUTO W/O SCOPE: CPT

## 2021-09-06 PROCEDURE — 80053 COMPREHEN METABOLIC PANEL: CPT

## 2021-09-06 PROCEDURE — 36415 COLL VENOUS BLD VENIPUNCTURE: CPT

## 2021-09-06 PROCEDURE — 74177 CT ABD & PELVIS W/CONTRAST: CPT | Mod: 26,MA

## 2021-09-06 RX ORDER — SODIUM CHLORIDE 9 MG/ML
2000 INJECTION INTRAMUSCULAR; INTRAVENOUS; SUBCUTANEOUS ONCE
Refills: 0 | Status: COMPLETED | OUTPATIENT
Start: 2021-09-06 | End: 2021-09-06

## 2021-09-06 RX ORDER — METRONIDAZOLE 500 MG
1 TABLET ORAL
Qty: 21 | Refills: 0
Start: 2021-09-06 | End: 2021-09-12

## 2021-09-06 RX ORDER — CIPROFLOXACIN LACTATE 400MG/40ML
1 VIAL (ML) INTRAVENOUS
Qty: 14 | Refills: 0
Start: 2021-09-06 | End: 2022-08-30

## 2021-09-06 RX ORDER — METRONIDAZOLE 500 MG
500 TABLET ORAL ONCE
Refills: 0 | Status: COMPLETED | OUTPATIENT
Start: 2021-09-06 | End: 2021-09-06

## 2021-09-06 RX ORDER — CIPROFLOXACIN LACTATE 400MG/40ML
500 VIAL (ML) INTRAVENOUS ONCE
Refills: 0 | Status: COMPLETED | OUTPATIENT
Start: 2021-09-06 | End: 2021-09-06

## 2021-09-06 RX ORDER — CIPROFLOXACIN LACTATE 400MG/40ML
1 VIAL (ML) INTRAVENOUS
Qty: 14 | Refills: 0
Start: 2021-09-06 | End: 2021-09-12

## 2021-09-06 RX ORDER — METRONIDAZOLE 500 MG
1 TABLET ORAL
Qty: 21 | Refills: 0
Start: 2021-09-06 | End: 2022-08-30

## 2021-09-06 RX ORDER — FAMOTIDINE 10 MG/ML
20 INJECTION INTRAVENOUS ONCE
Refills: 0 | Status: COMPLETED | OUTPATIENT
Start: 2021-09-06 | End: 2021-09-06

## 2021-09-06 RX ORDER — MORPHINE SULFATE 50 MG/1
4 CAPSULE, EXTENDED RELEASE ORAL ONCE
Refills: 0 | Status: DISCONTINUED | OUTPATIENT
Start: 2021-09-06 | End: 2021-09-06

## 2021-09-06 RX ADMIN — Medication 500 MILLIGRAM(S): at 04:27

## 2021-09-06 RX ADMIN — SODIUM CHLORIDE 2000 MILLILITER(S): 9 INJECTION INTRAMUSCULAR; INTRAVENOUS; SUBCUTANEOUS at 01:01

## 2021-09-06 NOTE — ED ADULT TRIAGE NOTE - CHIEF COMPLAINT QUOTE
pt presents to Ed s/p abd pain and chronic diarrhea. Now is incontinent of stool. Took imodium approximately 1 hr PTA with mild relief of diarrhea. +nausea, +fatigue.

## 2021-09-06 NOTE — ED PROVIDER NOTE - CARE PROVIDER_API CALL
Han Bangura)  Gastroenterology  [address] [place] [address], [address] [place] [address]  Phone: [phone]  Fax: [phone]  Follow Up Time:

## 2021-09-06 NOTE — ED PROVIDER NOTE - CLINICAL SUMMARY MEDICAL DECISION MAKING FREE TEXT BOX
Abdominal pain and diarrhea. Will get labs, send stool cultures including C diff and will check urine.  CT planned in ED.

## 2021-09-06 NOTE — ED PROVIDER NOTE - NSICDXFAMILYHX_GEN_ALL_CORE_FT
FAMILY HISTORY:  Sibling  Still living? Unknown  Family history of colitis, Age at diagnosis: Age Unknown  Family history of seizures, Age at diagnosis: Age Unknown

## 2021-09-06 NOTE — ED ADULT NURSE NOTE - OBJECTIVE STATEMENT
pt presents to Ed s/p abd pain and chronic diarrhea. Now is incontinent of stool. Took imodium approximately 1 hr PTA with mild relief of diarrhea. +nausea, +fatigue. pt denies fevers, chills, CP, SOB. No other complains.

## 2021-09-06 NOTE — ED PROVIDER NOTE - NSICDXPASTMEDICALHX_GEN_ALL_CORE_FT
PAST MEDICAL HISTORY:   delivery delivered     Hashimoto's disease     Retinal vasculitis, unspecified laterality     Schizo-affective schizophrenia     Ulcerative colitis with rectal bleeding, unspecified location

## 2021-09-06 NOTE — ED PROVIDER NOTE - PATIENT PORTAL LINK FT
You can access the FollowMyHealth Patient Portal offered by [facility] by registering at the following website: [email or web address]. By joining [employer]’s FollowMyHealth portal, you will also be able to view your health information using other applications (apps) compatible with our system.

## 2021-09-06 NOTE — ED PROVIDER NOTE - NSFOLLOWUPINSTRUCTIONS_ED_ALL_ED_FT
Tylenol as needed for pain.   Continue anti-diarrheals already prescribed.   See your gastroenterologist this week.	                       PROCTITIS - AfterCare(R) Instructions(ER/ED)           Proctitis    WHAT YOU NEED TO KNOW:    Proctitis is a condition where you have inflammation of the lining of your rectum. The rectum is the last part of your large intestine that ends at your anus. If the inflammation continues into your colon, it is called proctolitis. Proctitis may be a short-term or long-term condition.    DISCHARGE INSTRUCTIONS:    Medicines:   •Antibiotics: This medicine is given if a bacterial infection is causing your proctitis. Take them as directed.      •Antiviral medicine: This medicine is given if a viral infection is causing your proctitis.       •Antiinflammatory medicine: This medicine helps prevent swelling.       •Antiulcer medicine: This is given as a pill, suppository, or enema to coat the bowel and help prevent further damage to the tissues. It may also help with tissue healing.      •Steroids: Steroid medicine helps decrease swelling.       •Steroids: This medicine may be given to decrease redness, pain, and swelling.      Follow up with your healthcare provider as directed: You may need to return for other tests. Write down your questions so you remember to ask them during your visits.    Prevent proctitis:   •Ask about medicines to ease your symptoms: If you have constipation, ask about fiber supplements. If you have trouble controlling your bowel, ask about stool-forming medicines. Ask about a good skin care product to use if the skin around your anus is irritated.      •Ask about medicines to prevent proctitis: If you are having radiation, these medicines may help prevent you from having proctitis after your therapy.      •Practice safe sex: Do not have sex with someone who has an STI. This includes oral or anal sex. Do not have sex while you or your partner is being treated for a STI. Use new a latex condom or proper barrier each time you have sex.       •Get regular check-ups: Have a regular sexual health check if you often change sexual partners.      •Wash your hands often: Use soap and water. Use gel hand cleanser when there is no soap and water available. This will prevent the spread of germs. Always wash your hands after you use the toilet, when you work with food, and before and after you have sex. Clean your toilet seats, water taps, and door handles often.      Contact your healthcare provider if:   •You have bleeding or pain during or after sex.      •You have signs and symptoms that are new, do not improve, or get worse.      •You have questions or concerns about your condition or care.      Return to the emergency department if:   •You have severe abdominal or rectal pain that does not go away.      •You have blood, pus, or a foul-smelling discharge coming from your anus or rectum.      •You have joint pain, swollen lymph nodes, or night sweats.      •You have genital swelling or pain or unusual bleeding.      •Your stools are black or have blood on them.         © Copyright [employer] 2021           back to top                          © Copyright [employer] 2021

## 2021-09-06 NOTE — ED PROVIDER NOTE - OBJECTIVE STATEMENT
Pt is a 50 yo F with PMHx of schizoaffective schizophrenia, depression, ulcerative colitis, retinal vasculitis, hashimotos disease,  presents with burning bilateral upper abdominal pain, abdominal cramping, and profuse watery diarrhea.  Patient recently on macrobid for UTI.  She states she has had problems with diarrhea for years due to IBD, but for the past 2-3 days hasn't been able to eat well and has had diarrhea multiple times per hour.  Patient took mesalamine and hyoscyamine and imodium with minimal relief. Denies travel, denies fever. Pt is a 50 yo F with PMHx of schizoaffective schizophrenia, depression, ulcerative colitis, retinal vasculitis, hashimotos disease,  presents with burning bilateral upper abdominal pain, abdominal cramping, and profuse watery diarrhea.  Patient recently on macrobid for UTI.  She states she has had problems with diarrhea for years due to IBD, but for the past 2-3 days hasn't been able to eat well and has had diarrhea multiple times per hour.  Patient took mesalamine and hyoscyamine and imodium with minimal relief. Denies travel, denies fever. GI: Willem

## 2021-09-07 LAB
CULTURE RESULTS: SIGNIFICANT CHANGE UP
CULTURE RESULTS: SIGNIFICANT CHANGE UP
SPECIMEN SOURCE: SIGNIFICANT CHANGE UP
SPECIMEN SOURCE: SIGNIFICANT CHANGE UP

## 2021-09-08 ENCOUNTER — TRANSCRIPTION ENCOUNTER (OUTPATIENT)
Age: 51
End: 2021-09-08

## 2021-09-08 LAB
CULTURE RESULTS: SIGNIFICANT CHANGE UP
SPECIMEN SOURCE: SIGNIFICANT CHANGE UP

## 2021-10-16 ENCOUNTER — TRANSCRIPTION ENCOUNTER (OUTPATIENT)
Age: 51
End: 2021-10-16

## 2021-10-19 NOTE — PROGRESS NOTE BEHAVIORAL HEALTH - PRIMARY DX
No
Bipolar affective disorder, current episode depressed, current episode severity unspecified

## 2021-10-24 NOTE — ED ADULT NURSE NOTE - EXTENSIONS OF SELF_ADULT
Emergency Department Discharge Information for Adalberto schmidt was seen in the [facility] Emergency Department today for facial swelling by Dr. Singleton and Dr. Morley.     We think her condition is caused by an allergic reaction.     We recommend that Adalberto schmidt does not use the new bubble gum toothpaste that she used this evening.  epi pen tomorrow.     For fever or pain, Adalberto schmidt can have:    Acetaminophen (Tylenol) every 4 to 6 hours as needed (up to 5 doses in 24 hours). Her dose is: 5 ml (160 mg) of the infant's or children's liquid               (10.9-16.3 kg/24-35 lb)     Or    Ibuprofen (Advil, Motrin) every 6 hours as needed. Her dose is:   5 ml (100 mg) of the children's (not infant's) liquid                                               (10-15 kg/22-33 lb)    If necessary, it is safe to give both Tylenol and ibuprofen, as long as you are careful not to give Tylenol more than every 4 hours or ibuprofen more than every 6 hours.    These doses are based on your child s weight. If you have a prescription for these medicines, the dose may be a little different. Either dose is safe. If you have questions, ask a doctor or pharmacist.     Please return to the ED or contact her regular clinic if:     she becomes much more ill  she has trouble breathing   or you have any other concerns.      Please make an appointment to follow up with her primary care provider or regular clinic on Monday.   None

## 2021-11-16 ENCOUNTER — TRANSCRIPTION ENCOUNTER (OUTPATIENT)
Age: 51
End: 2021-11-16

## 2021-11-26 ENCOUNTER — TRANSCRIPTION ENCOUNTER (OUTPATIENT)
Age: 51
End: 2021-11-26

## 2021-11-29 ENCOUNTER — OUTPATIENT (OUTPATIENT)
Dept: OUTPATIENT SERVICES | Facility: HOSPITAL | Age: 51
LOS: 1 days | End: 2021-11-29
Payer: COMMERCIAL

## 2021-11-29 ENCOUNTER — APPOINTMENT (OUTPATIENT)
Dept: DISASTER EMERGENCY | Facility: HOSPITAL | Age: 51
End: 2021-11-29

## 2021-11-29 VITALS
DIASTOLIC BLOOD PRESSURE: 86 MMHG | HEART RATE: 61 BPM | OXYGEN SATURATION: 97 % | TEMPERATURE: 98 F | RESPIRATION RATE: 16 BRPM | SYSTOLIC BLOOD PRESSURE: 118 MMHG

## 2021-11-29 VITALS
HEIGHT: 63 IN | OXYGEN SATURATION: 99 % | RESPIRATION RATE: 18 BRPM | DIASTOLIC BLOOD PRESSURE: 73 MMHG | WEIGHT: 147.05 LBS | TEMPERATURE: 98 F | SYSTOLIC BLOOD PRESSURE: 124 MMHG | HEART RATE: 64 BPM

## 2021-11-29 DIAGNOSIS — U07.1 COVID-19: ICD-10-CM

## 2021-11-29 PROCEDURE — M0243: CPT

## 2021-11-29 RX ORDER — SODIUM CHLORIDE 9 MG/ML
250 INJECTION INTRAMUSCULAR; INTRAVENOUS; SUBCUTANEOUS
Refills: 0 | Status: COMPLETED | OUTPATIENT
Start: 2021-11-29 | End: 2021-11-29

## 2021-11-29 RX ADMIN — SODIUM CHLORIDE 310 MILLILITER(S): 9 INJECTION INTRAMUSCULAR; INTRAVENOUS; SUBCUTANEOUS at 08:39

## 2021-11-29 NOTE — CHART NOTE - NSCHARTNOTEFT_GEN_A_CORE
CC: Monoclonal Antibody Infusion - COVID 19 Positive    History: Patient presents for infusion of monoclonal antibody infusion. Patient has been screened and was deemed to be a candidate.    Date tested positive: 21      COVID Symptoms: Yes  Date of onset: 21  * Headache-   * congestion -       Risk Profile includes:   * pre-DM-    * Asthma  * OTHER: BMI >25, vasculitis & UC      Vaccination Status: Documented by RN  Date received 2nd dose:       penicillin (Unknown)      casirivimab/imdevimab in sodium chloride 0.9% (EUA) IVPB 100 milliLiter(s) IV Intermittent once  sodium chloride 0.9%. 250 milliLiter(s) IV Continuous <Continuous>        PMHx:  Infection due to severe acute respiratory syndrome coronavirus 2 (SARS-CoV-2)    No pertinent family history in first degree relatives    Family history of seizures (Sibling)    Family history of colitis (Sibling)     delivery delivered    Schizo-affective schizophrenia    Other forms of systemic lupus erythematosus, unspecified organ involvement status    Type 2 diabetes mellitus without complication, unspecified long term insulin use status    Ulcerative colitis with rectal bleeding, unspecified location    Hashimoto&#x27;s disease    Retinal vasculitis, unspecified laterality    Lupus    Schizo-affective schizophrenia    Other forms of systemic lupus erythematosus, unspecified organ involvement status    Lupus    No significant past surgical history    No significant past surgical history          T(C): 36.4 (21 @ 07:59), Max: 36.4 (21 @ 07:59)  HR: 64 (21 @ 07:59) (64 - 64)  BP: 124/73 (21 @ 07:59) (124/73 - 124/73)  RR: 18 (21 @ 07:59) (18 - 18)  SpO2: 99% (21 @ 07:59) (99% - 99%)      PE- Well developed, well-nourish, resting comfortably in NAD.   Neuro- A&Ox3, no gross sensory deficits to light touch or motor weaknesses.   Vasc- No peripheral edema or venous stasis noted.  Skin- No ecchymosis or bleeding.  MS- No bony tenderness       ASSESSMENT:  Pt is a 51y year old Female COVID positive and symptomatic who was referred to the infusion center for Monoclonal antibody infusion ([facility]).      PLAN:  - infusion procedure explained to patient   - Consent for monoclonal antibody infusion obtained   - Risk & benefits discussed/all questions answered  - Pregnancy waiver signed  - infusion of Regeneron   - will observe patient for one hour post infusion  and then if stable discharge home with outpatient follow up as planned by PMD.    POST INFUSION ASSESSMENT:   DISCHARGE at approximately 1030  hours    - Patient tolerated infusion well denies complaints of chest pain, SOB, dizziness or palpitations  - VSS for discharge home  - D/C instructions given/ fact sheet included.  - Patient to follow-up with PCP as needed. CC: Monoclonal Antibody Infusion - COVID 19 Positive    History: Patient presents for infusion of monoclonal antibody infusion. Patient has been screened and was deemed to be a candidate.    Date tested positive: 21      COVID Symptoms: Yes  Date of onset: 21  * Headache-   * congestion -       Risk Profile includes:   * pre-DM-    * Asthma  * OTHER: BMI >25, vasculitis & UC      Vaccination Status: Documented by RN  Date received 2nd dose:       penicillin (Unknown)      casirivimab/imdevimab in sodium chloride 0.9% (EUA) IVPB 100 milliLiter(s) IV Intermittent once  sodium chloride 0.9%. 250 milliLiter(s) IV Continuous <Continuous>        PMHx:  Infection due to severe acute respiratory syndrome coronavirus 2 (SARS-CoV-2)    No pertinent family history in first degree relatives    Family history of seizures (Sibling)    Family history of colitis (Sibling)     delivery delivered    Schizo-affective schizophrenia    Other forms of systemic lupus erythematosus, unspecified organ involvement status    Type 2 diabetes mellitus without complication, unspecified long term insulin use status    Ulcerative colitis with rectal bleeding, unspecified location    Hashimoto&#x27;s disease    Retinal vasculitis, unspecified laterality    Lupus    Schizo-affective schizophrenia    Other forms of systemic lupus erythematosus, unspecified organ involvement status    Lupus    No significant past surgical history    No significant past surgical history          T(C): 36.4 (21 @ 07:59), Max: 36.4 (21 @ 07:59)  HR: 64 (21 @ 07:59) (64 - 64)  BP: 124/73 (21 @ 07:59) (124/73 - 124/73)  RR: 18 (21 @ 07:59) (18 - 18)  SpO2: 99% (21 @ 07:59) (99% - 99%)      PE- Well developed, well-nourish, resting comfortably in NAD.   Neuro- A&Ox3, no gross sensory deficits to light touch or motor weaknesses.   Vasc- No peripheral edema or venous stasis noted.  Skin- No ecchymosis or bleeding.  MS- No bony tenderness       ASSESSMENT:  Pt is a 51y year old Female COVID positive and symptomatic who was referred to the infusion center for Monoclonal antibody infusion ([employer]).      PLAN:  - infusion procedure explained to patient   - Consent for monoclonal antibody infusion obtained   - Risk & benefits discussed/all questions answered  - Pregnancy waiver signed  - infusion of Regeneron   - will observe patient for one hour post infusion  and then if stable discharge home with outpatient follow up as planned by PMD.    POST INFUSION ASSESSMENT:   DISCHARGE at approximately 1030  hours    - Patient tolerated infusion well denies complaints of chest pain, SOB, dizziness or palpitations  - VSS for discharge home  - D/C instructions given/ fact sheet included.  - Patient to follow-up with PCP as needed. CC: Monoclonal Antibody Infusion - COVID 19 Positive    History: Patient presents for infusion of monoclonal antibody infusion. Patient has been screened and was deemed to be a candidate.    Date tested positive: 21      COVID Symptoms: Yes  Date of onset: 21  * Headache-   * congestion -   * cough  * malaise      Risk Profile includes:   * pre-DM-    * Asthma  * OTHER: BMI >25, vasculitis & UC      Vaccination Status: Documented by RN  Date received 2nd dose:       penicillin (Unknown)      casirivimab/imdevimab in sodium chloride 0.9% (EUA) IVPB 100 milliLiter(s) IV Intermittent once  sodium chloride 0.9%. 250 milliLiter(s) IV Continuous <Continuous>        PMHx:  Infection due to severe acute respiratory syndrome coronavirus 2 (SARS-CoV-2)    No pertinent family history in first degree relatives    Family history of seizures (Sibling)    Family history of colitis (Sibling)     delivery delivered    Schizo-affective schizophrenia    Other forms of systemic lupus erythematosus, unspecified organ involvement status    Type 2 diabetes mellitus without complication, unspecified long term insulin use status    Ulcerative colitis with rectal bleeding, unspecified location    Hashimoto&#x27;s disease    Retinal vasculitis, unspecified laterality    Lupus    Schizo-affective schizophrenia    Other forms of systemic lupus erythematosus, unspecified organ involvement status    Lupus    No significant past surgical history    No significant past surgical history          T(C): 36.4 (21 @ 07:59), Max: 36.4 (21 @ 07:59)  HR: 64 (21 @ 07:59) (64 - 64)  BP: 124/73 (21 @ 07:59) (124/73 - 124/73)  RR: 18 (21 @ 07:59) (18 - 18)  SpO2: 99% (21 @ 07:59) (99% - 99%)      PE- Well developed, well-nourish, resting comfortably in NAD.   Neuro- A&Ox3, no gross sensory deficits to light touch or motor weaknesses.   Vasc- No peripheral edema or venous stasis noted.  Skin- No ecchymosis or bleeding.  MS- No bony tenderness       ASSESSMENT:  Pt is a 51y year old Female COVID positive and symptomatic who was referred to the infusion center for Monoclonal antibody infusion ([facility]).      PLAN:  - infusion procedure explained to patient   - Consent for monoclonal antibody infusion obtained   - Risk & benefits discussed/all questions answered  - Pregnancy waiver signed  - infusion of Regeneron   - will observe patient for one hour post infusion  and then if stable discharge home with outpatient follow up as planned by PMD.    POST INFUSION ASSESSMENT:   DISCHARGE at approximately 1030  hours    - Patient tolerated infusion well denies complaints of chest pain, SOB, dizziness or palpitations  - VSS for discharge home  - D/C instructions given/ fact sheet included.  - Patient to follow-up with PCP as needed.

## 2021-12-13 NOTE — ED BEHAVIORAL HEALTH ASSESSMENT NOTE - NS ED [facility] OTHER STRE[address] DRUGS MEDICATION
rebound. Musculoskeletal:      Cervical back: Normal range of motion and neck supple. Skin:     General: Skin is warm and dry. Capillary Refill: Capillary refill takes less than 2 seconds. Neurological:      Mental Status: She is alert. She is disoriented. Cranial Nerves: No cranial nerve deficit. Coordination: Coordination normal.          Procedures     Central Line Placement Procedure Note    Indication: vascular access, hypovolemia and centrally administered medications    Consent: Unable to be obtained due to the emergent nature of this procedure. Procedure: The patient was positioned appropriately and the skin over the right femoral vein was prepped with Chloraprep. Local anesthesia was obtained by infiltration using 1% Lidocaine without epinephrine. A large bore needle was used to identify the vein. A guide wire was then inserted into the vein through the needle. A triple lumen catheter was then inserted into the vessel over the guide wire using the Seldinger technique. All ports showed good, free flowing blood return and were flushed with saline solution. The catheter was then securely fastened to the skin with suture at 20 cm. Two sutures were placed into the proximal eyelets. An antibiotic disk was placed and the site was then covered with a sterile dressing. A post procedure X-ray was not indicated. The patient tolerated the procedure well. Complications: None            MDM  Number of Diagnoses or Management Options  Diabetic ketoacidosis with coma associated with type 1 diabetes mellitus [facility])  Diagnosis management comments: Emergency department evaluation was notable for diabetic ketoacidosis. Patient has an impressive work-up, with an anion gap of 35 and a bicarb of less than 5. Potassium was stable. DKA protocols were initiated on this patient. Medications are being given to central access.   Patient was aggressively combative against the concept of an IJ which was the initial choice of preference for a central line. Therefore, a right femoral was placed. Patient was discussed with Dr. Grayson Domínguez who did agree to accept the patient in a consulting status for critical care     This information was relayed to the patient who understood this plan of care and was amenable to the plan. Patient was discussed with the admitting service (Dr. Td Coulter for Dr. Rika Gao) who concurred with the decision for admission, and have agreed to admit the patient to the ICU       ED Course as of 12/14/21 0216   Mon Dec 13, 2021   2330 Labs were drawn around 7 PM, but all of the labs were not drawn. I called the lab multiple times and mother shelbi no. I also discussed with the nurse who finally decided to redraw the labs. When the pH came back at 6.74 I ordered an amp of bicarb and bicarb drip. [KG]      ED Course User Index  [KG] Kaylene Abdi DO     ED Course as of 12/14/21 0216   Mon Dec 13, 2021   2330 Labs were drawn around 7 PM, but all of the labs were not drawn. I called the lab multiple times and mother shelbi no. I also discussed with the nurse who finally decided to redraw the labs. When the pH came back at 6.74 I ordered an amp of bicarb and bicarb drip. [KG]      ED Course User Index  [KG] Kaylene Abdi DO       --------------------------------------------- PAST HISTORY ---------------------------------------------  Past Medical History:  has a past medical history of Anemia, Back pain, Bipolar disorder ([facility] Chronic kidney disease, Cyst of ovary, Depression, Diabetes mellitus ([facility] D[facility], [facility] Hepatitis B, Herpes, Pneumonia, Psychiatric problem, Thyroid disease, and Unspecified diseases of blood and blood-forming organs. Past Surgical History:  has a past surgical history that includes Tonsillectomy; Tympanostomy tube placement; Cholecystectomy; Foreign Body Removal; ovarian cyst removal; and [place] tooth extraction.     Social History:  reports that she quit smoking about 11 years ago. Her smoking use included cigarettes. She has a 10.00 pack-year smoking history. She has never used smokeless tobacco. She reports previous alcohol use. She reports that she does not use drugs. Family History: family history includes Asthma in her brother, son, and son; Heart Disease in her father, mother, son, and son; High Blood Pressure in her mother. The patients home medications have been reviewed.     Allergies: Bactrim, Cephalosporins, and Sulfa antibiotics    -------------------------------------------------- RESULTS -------------------------------------------------    LABS:  Results for orders placed or performed during the hospital encounter of 12/13/21   COVID-19, Rapid    Specimen: Nasopharyngeal Swab   Result Value Ref Range    SARS-CoV-2, NAAT Not Detected Not Detected   CBC Auto Differential   Result Value Ref Range    WBC 21.9 (H) 4.5 - 11.5 E9/L    RBC 3.99 3.50 - 5.50 E12/L    Hemoglobin 12.7 11.5 - 15.5 g/dL    Hematocrit 39.9 34.0 - 48.0 %    .0 (H) 80.0 - 99.9 fL    MCH 31.8 26.0 - 35.0 pg    MCHC 31.8 (L) 32.0 - 34.5 %    RDW 12.3 11.5 - 15.0 fL    Platelets 382 (H) 107 - 450 E9/L    MPV 9.9 7.0 - 12.0 fL    Neutrophils % 84.0 (H) 43.0 - 80.0 %    Immature Granulocytes % 3.9 0.0 - 5.0 %    Lymphocytes % 7.5 (L) 20.0 - 42.0 %    Monocytes % 3.4 2.0 - 12.0 %    Eosinophils % 0.3 0.0 - 6.0 %    Basophils % 0.9 0.0 - 2.0 %    Neutrophils Absolute 18.37 (H) 1.80 - 7.30 E9/L    Immature Granulocytes # 0.85 E9/L    Lymphocytes Absolute 1.63 1.50 - 4.00 E9/L    Monocytes Absolute 0.74 0.10 - 0.95 E9/L    Eosinophils Absolute 0.07 0.05 - 0.50 E9/L    Basophils Absolute 0.19 0.00 - 0.20 E9/L   Comprehensive Metabolic Panel   Result Value Ref Range    Sodium 126 (L) 132 - 146 mmol/L    Potassium 5.5 (H) 3.5 - 5.0 mmol/L    Chloride 86 (L) 98 - 107 mmol/L    CO2 5 (LL) 22 - 29 mmol/L    Anion Gap 35 (H) 7 - 16 mmol/L    Glucose 787 (HH) 74 - 99 mg/dL BUN 26 (H) 6 - 20 mg/dL    CREATININE 1.0 0.5 - 1.0 mg/dL    GFR Non-African American >60 >=60 mL/min/1.73    GFR African American >60     Calcium 8.4 (L) 8.6 - 10.2 mg/dL    Total Protein 7.0 6.4 - 8.3 g/dL    Albumin 3.8 3.5 - 5.2 g/dL    Total Bilirubin <0.2 0.0 - 1.2 mg/dL    Alkaline Phosphatase 170 (H) 35 - 104 U/L    ALT 31 0 - 32 U/L    AST 24 0 - 31 U/L   Lipase   Result Value Ref Range    Lipase 19 13 - 60 U/L   Beta-Hydroxybutyrate   Result Value Ref Range    Beta-Hydroxybutyrate >4.50 (H) 0.02 - 0.27 mmol/L   PH, VENOUS   Result Value Ref Range    pH, Alan 6.74 (LL) 7.35 - 7.45   Lactic Acid, Plasma   Result Value Ref Range    Lactic Acid 2.2 0.5 - 2.2 mmol/L   Lactic Acid, Plasma   Result Value Ref Range    Lactic Acid <0.2 (L) 0.5 - 2.2 mmol/L   Urinalysis   Result Value Ref Range    Color, UA Yellow Straw/Yellow    Clarity, UA Clear Clear    Glucose, Ur 500 (A) Negative mg/dL    Bilirubin Urine Negative Negative    Ketones, Urine >=80 (A) Negative mg/dL    Specific Gravity, UA 1.025 1.005 - 1.030    Blood, Urine MODERATE (A) Negative    pH, UA 5.5 5.0 - 9.0    Protein, UA TRACE Negative mg/dL    Urobilinogen, Urine 0.2 <2.0 E.U./dL    Nitrite, Urine Negative Negative    Leukocyte Esterase, Urine Negative Negative   Microscopic Urinalysis   Result Value Ref Range    WBC, UA 1-3 0 - 5 /HPF    RBC, UA 1-3 0 - 2 /HPF    Epithelial Cells, UA RARE /HPF    Bacteria, UA RARE (A) None Seen /HPF   Basic Metabolic Panel   Result Value Ref Range    Sodium 139 132 - 146 mmol/L    Potassium 5.0 3.5 - 5.0 mmol/L    Chloride 102 98 - 107 mmol/L    CO2 5 (LL) 22 - 29 mmol/L    Anion Gap 32 (H) 7 - 16 mmol/L    Glucose 457 (H) 74 - 99 mg/dL    BUN 24 (H) 6 - 20 mg/dL    CREATININE 0.9 0.5 - 1.0 mg/dL    GFR Non-African American >60 >=60 mL/min/1.73    GFR African American >60     Calcium 7.8 (L) 8.6 - 10.2 mg/dL   Magnesium   Result Value Ref Range    Magnesium 1.9 1.6 - 2.6 mg/dL   Phosphorus   Result Value Ref Range    Phosphorus 4.3 2.5 - 4.5 mg/dL   Troponin   Result Value Ref Range    Troponin, High Sensitivity 10 (H) 0 - 9 ng/L   Pregnancy, Urine   Result Value Ref Range    HCG(Urine) Pregnancy Test NEGATIVE NEGATIVE   Mononucleosis screen   Result Value Ref Range    Mono Test Negative Negative   Basic metabolic panel   Result Value Ref Range    Sodium 136 132 - 146 mmol/L    Potassium 4.8 3.5 - 5.0 mmol/L    Chloride 99 98 - 107 mmol/L    CO2 4 (LL) 22 - 29 mmol/L    Anion Gap 33 (H) 7 - 16 mmol/L    Glucose 545 (HH) 74 - 99 mg/dL    BUN 25 (H) 6 - 20 mg/dL    CREATININE 0.9 0.5 - 1.0 mg/dL    GFR Non-African American >60 >=60 mL/min/1.73    GFR African American >60     Calcium 8.0 (L) 8.6 - 10.2 mg/dL   POCT Glucose   Result Value Ref Range    Meter Glucose >500 (H) 74 - 99 mg/dL   POCT Glucose   Result Value Ref Range    Meter Glucose >500 (H) 74 - 99 mg/dL   POCT Glucose   Result Value Ref Range    Meter Glucose 437 (H) 74 - 99 mg/dL   POCT Glucose   Result Value Ref Range    Meter Glucose >500 (H) 74 - 99 mg/dL   POCT Glucose   Result Value Ref Range    Meter Glucose 357 (H) 74 - 99 mg/dL   EKG 12 Lead   Result Value Ref Range    Ventricular Rate 124 BPM    Atrial Rate 124 BPM    P-R Interval 140 ms    QRS Duration 110 ms    Q-T Interval 328 ms    QTc Calculation (Bazett) 471 ms    P Axis 75 degrees    R Axis 77 degrees    T Axis 33 degrees       RADIOLOGY:  XR CHEST PORTABLE   Final Result   No acute process. ------------------------- NURSING NOTES AND VITALS REVIEWED ---------------------------  Date / Time Roomed:  12/13/2021  6:07 PM  ED Bed Assignment:  0206/0206-A    The nursing notes within the ED encounter and vital signs as below have been reviewed.      Patient Vitals for the past 24 hrs:   BP Temp Temp src Pulse Resp SpO2 Height Weight   12/14/21 0210 132/83 -- -- 124 -- -- -- --   12/14/21 0200 -- 98.8 °F (37.1 °C) Bladder -- -- -- 5' 4.96\" (1.65 m) 176 lb 5.9 oz (80 kg)   12/14/21 0100 107/64 98.9 °F (37.2 °C) Axillary 129 -- 97 % -- --   12/14/21 0045 116/61 -- -- 127 -- -- -- --   12/13/21 2316 116/61 -- -- 127 28 99 % -- --   12/13/21 1708 (!) 178/92 98.1 °F (36.7 °C) Oral 126 20 97 % 5' 5\" (1.651 m) 160 lb (72.6 kg)       Oxygen Saturation Interpretation: Normal    ------------------------------------------ PROGRESS NOTES ------------------------------------------  Re-evaluation(s):  Time: 8:56 PM EST  Patients symptoms show no change  Repeat physical examination is not changed    Counseling:  I have spoken with the patient and discussed todays results, in addition to providing specific details for the plan of care and counseling regarding the diagnosis and prognosis. Their questions are answered at this time and they are agreeable with the plan of admission.    --------------------------------- ADDITIONAL PROVIDER NOTES ---------------------------------  Consultations:  Time: 8:56 PM EST. Spoke with Dr. Shantell Wright for Dr. Nathalie Montgomery. Discussed case. They will admit the patient. This patient's ED course included: a personal history and physicial examination, re-evaluation prior to disposition, multiple bedside re-evaluations, IV medications, cardiac monitoring, continuous pulse oximetry and complex medical decision making and emergency management    This patient has remained hemodynamically stable during their ED course. Diagnosis:  1. Diabetic ketoacidosis with coma associated with type 1 diabetes mellitus [facility]        Disposition:  Patient's disposition: Admit to CCU/ICU  Patient's condition is serious. Please note that the withdrawal or failure to initiate urgent interventions for this patient would likely result in a life threatening deterioration or permanent disability. Accordingly this patient received 30 minutes of critical care time, excluding separately billable procedures.          [address] DO  Resident  12/13/21 [id number]       Nguyen Kenney DO  12/14/21 [id number] None known

## 2021-12-15 ENCOUNTER — EMERGENCY (EMERGENCY)
Facility: HOSPITAL | Age: 51
LOS: 0 days | Discharge: HOME | End: 2021-12-15
Attending: STUDENT IN AN ORGANIZED HEALTH CARE EDUCATION/TRAINING PROGRAM
Payer: MEDICAID

## 2021-12-15 VITALS — HEART RATE: 66 BPM | RESPIRATION RATE: 17 BRPM | OXYGEN SATURATION: 100 % | TEMPERATURE: 98 F

## 2021-12-15 VITALS — WEIGHT: 149.91 LBS | HEIGHT: 63 IN

## 2021-12-15 DIAGNOSIS — R06.02 SHORTNESS OF BREATH: ICD-10-CM

## 2021-12-15 DIAGNOSIS — Z88.0 ALLERGY STATUS TO PENICILLIN: ICD-10-CM

## 2021-12-15 DIAGNOSIS — R42 DIZZINESS AND GIDDINESS: ICD-10-CM

## 2021-12-15 DIAGNOSIS — M54.9 DORSALGIA, UNSPECIFIED: ICD-10-CM

## 2021-12-15 LAB
ALBUMIN SERPL ELPH-MCNC: 3.4 G/DL — SIGNIFICANT CHANGE UP (ref 3.3–5)
ALP SERPL-CCNC: 75 U/L — SIGNIFICANT CHANGE UP (ref 40–120)
ALT FLD-CCNC: 45 U/L — SIGNIFICANT CHANGE UP (ref 12–78)
ANION GAP SERPL CALC-SCNC: 3 MMOL/L — LOW (ref 5–17)
APPEARANCE UR: CLEAR — SIGNIFICANT CHANGE UP
AST SERPL-CCNC: 24 U/L — SIGNIFICANT CHANGE UP (ref 15–37)
BASOPHILS # BLD AUTO: 0.04 K/UL — SIGNIFICANT CHANGE UP (ref 0–0.2)
BASOPHILS NFR BLD AUTO: 0.6 % — SIGNIFICANT CHANGE UP (ref 0–2)
BILIRUB SERPL-MCNC: 0.3 MG/DL — SIGNIFICANT CHANGE UP (ref 0.2–1.2)
BILIRUB UR-MCNC: NEGATIVE — SIGNIFICANT CHANGE UP
BUN SERPL-MCNC: 15 MG/DL — SIGNIFICANT CHANGE UP (ref 7–23)
CALCIUM SERPL-MCNC: 8.8 MG/DL — SIGNIFICANT CHANGE UP (ref 8.5–10.1)
CHLORIDE SERPL-SCNC: 111 MMOL/L — HIGH (ref 96–108)
CO2 SERPL-SCNC: 30 MMOL/L — SIGNIFICANT CHANGE UP (ref 22–31)
COLOR SPEC: YELLOW — SIGNIFICANT CHANGE UP
CREAT SERPL-MCNC: 0.87 MG/DL — SIGNIFICANT CHANGE UP (ref 0.5–1.3)
D DIMER BLD IA.RAPID-MCNC: <150 NG/ML DDU — SIGNIFICANT CHANGE UP
DIFF PNL FLD: NEGATIVE — SIGNIFICANT CHANGE UP
EOSINOPHIL # BLD AUTO: 0.09 K/UL — SIGNIFICANT CHANGE UP (ref 0–0.5)
EOSINOPHIL NFR BLD AUTO: 1.3 % — SIGNIFICANT CHANGE UP (ref 0–6)
GLUCOSE SERPL-MCNC: 100 MG/DL — HIGH (ref 70–99)
GLUCOSE UR QL: NEGATIVE MG/DL — SIGNIFICANT CHANGE UP
HCT VFR BLD CALC: 39.3 % — SIGNIFICANT CHANGE UP (ref 34.5–45)
HGB BLD-MCNC: 12.9 G/DL — SIGNIFICANT CHANGE UP (ref 11.5–15.5)
IMM GRANULOCYTES NFR BLD AUTO: 0.1 % — SIGNIFICANT CHANGE UP (ref 0–1.5)
KETONES UR-MCNC: NEGATIVE — SIGNIFICANT CHANGE UP
LEUKOCYTE ESTERASE UR-ACNC: ABNORMAL
LYMPHOCYTES # BLD AUTO: 2.13 K/UL — SIGNIFICANT CHANGE UP (ref 1–3.3)
LYMPHOCYTES # BLD AUTO: 31.9 % — SIGNIFICANT CHANGE UP (ref 13–44)
MCHC RBC-ENTMCNC: 30.9 PG — SIGNIFICANT CHANGE UP (ref 27–34)
MCHC RBC-ENTMCNC: 32.8 GM/DL — SIGNIFICANT CHANGE UP (ref 32–36)
MCV RBC AUTO: 94 FL — SIGNIFICANT CHANGE UP (ref 80–100)
MONOCYTES # BLD AUTO: 0.46 K/UL — SIGNIFICANT CHANGE UP (ref 0–0.9)
MONOCYTES NFR BLD AUTO: 6.9 % — SIGNIFICANT CHANGE UP (ref 2–14)
NEUTROPHILS # BLD AUTO: 3.94 K/UL — SIGNIFICANT CHANGE UP (ref 1.8–7.4)
NEUTROPHILS NFR BLD AUTO: 59.2 % — SIGNIFICANT CHANGE UP (ref 43–77)
NITRITE UR-MCNC: NEGATIVE — SIGNIFICANT CHANGE UP
PH UR: 7 — SIGNIFICANT CHANGE UP (ref 5–8)
PLATELET # BLD AUTO: 269 K/UL — SIGNIFICANT CHANGE UP (ref 150–400)
POTASSIUM SERPL-MCNC: 4.4 MMOL/L — SIGNIFICANT CHANGE UP (ref 3.5–5.3)
POTASSIUM SERPL-SCNC: 4.4 MMOL/L — SIGNIFICANT CHANGE UP (ref 3.5–5.3)
PROT SERPL-MCNC: 6.6 GM/DL — SIGNIFICANT CHANGE UP (ref 6–8.3)
PROT UR-MCNC: NEGATIVE MG/DL — SIGNIFICANT CHANGE UP
RBC # BLD: 4.18 M/UL — SIGNIFICANT CHANGE UP (ref 3.8–5.2)
RBC # FLD: 13.2 % — SIGNIFICANT CHANGE UP (ref 10.3–14.5)
SODIUM SERPL-SCNC: 144 MMOL/L — SIGNIFICANT CHANGE UP (ref 135–145)
SP GR SPEC: 1.01 — SIGNIFICANT CHANGE UP (ref 1.01–1.02)
TROPONIN I, HIGH SENSITIVITY RESULT: 8.47 NG/L — SIGNIFICANT CHANGE UP
TSH SERPL-MCNC: 1.72 UU/ML — SIGNIFICANT CHANGE UP (ref 0.34–4.82)
UROBILINOGEN FLD QL: NEGATIVE MG/DL — SIGNIFICANT CHANGE UP
WBC # BLD: 6.67 K/UL — SIGNIFICANT CHANGE UP (ref 3.8–10.5)
WBC # FLD AUTO: 6.67 K/UL — SIGNIFICANT CHANGE UP (ref 3.8–10.5)

## 2021-12-15 PROCEDURE — 36415 COLL VENOUS BLD VENIPUNCTURE: CPT

## 2021-12-15 PROCEDURE — 87086 URINE CULTURE/COLONY COUNT: CPT

## 2021-12-15 PROCEDURE — 71045 X-RAY EXAM CHEST 1 VIEW: CPT

## 2021-12-15 PROCEDURE — 81001 URINALYSIS AUTO W/SCOPE: CPT

## 2021-12-15 PROCEDURE — 93005 ELECTROCARDIOGRAM TRACING: CPT

## 2021-12-15 PROCEDURE — 85379 FIBRIN DEGRADATION QUANT: CPT

## 2021-12-15 PROCEDURE — 99284 EMERGENCY DEPT VISIT MOD MDM: CPT | Mod: 25

## 2021-12-15 PROCEDURE — 84443 ASSAY THYROID STIM HORMONE: CPT

## 2021-12-15 PROCEDURE — 93010 ELECTROCARDIOGRAM REPORT: CPT

## 2021-12-15 PROCEDURE — 80053 COMPREHEN METABOLIC PANEL: CPT

## 2021-12-15 PROCEDURE — 71045 X-RAY EXAM CHEST 1 VIEW: CPT | Mod: 26

## 2021-12-15 PROCEDURE — 85025 COMPLETE CBC W/AUTO DIFF WBC: CPT

## 2021-12-15 PROCEDURE — 99285 EMERGENCY DEPT VISIT HI MDM: CPT

## 2021-12-15 PROCEDURE — 84484 ASSAY OF TROPONIN QUANT: CPT

## 2021-12-15 RX ORDER — SODIUM CHLORIDE 9 MG/ML
1000 INJECTION INTRAMUSCULAR; INTRAVENOUS; SUBCUTANEOUS ONCE
Refills: 0 | Status: COMPLETED | OUTPATIENT
Start: 2021-12-15 | End: 2021-12-15

## 2021-12-15 RX ADMIN — SODIUM CHLORIDE 1000 MILLILITER(S): 9 INJECTION INTRAMUSCULAR; INTRAVENOUS; SUBCUTANEOUS at 15:47

## 2021-12-15 NOTE — ED STATDOCS - CLINICAL SUMMARY MEDICAL DECISION MAKING FREE TEXT BOX
50 y/o F with generalized weakness. R upper back pain. EKG, CXR, labs including d-dimer and reeval. 50 y/o F with generalized weakness. R upper back pain. EKG, CXR, labs including d-dimer and reeval.            CXR Negative, D-DImer negative,  UA trance leukocytes, occasional bacteria,, Labs otherwise normal.  will DC with return precautions.  Maria D Reyes PA-C

## 2021-12-15 NOTE — ED STATDOCS - ATTENDING CONTRIBUTION TO CARE
I, Theresa Bustillos DO,  performed the initial face to face bedside interview with this patient regarding history of present illness, review of symptoms and relevant past medical, social and family history.  I completed an independent physical examination.  I was the initial provider who evaluated this patient. I have signed out the follow up of any pending tests (i.e. labs, radiological studies) to the ACP.  I have communicated the patient’s plan of care and disposition with the ACP.  The history, relevant review of systems, past medical and surgical history, medical decision making, and physical examination was documented by the scribe in my presence and I attest to the accuracy of the documentation.

## 2021-12-15 NOTE — ED ADULT NURSE NOTE - NSIMPLEMENTINTERV_GEN_ALL_ED
Implemented All Universal Safety Interventions:  [place] to call system. Call bell, personal items and telephone within reach. Instruct patient to call for assistance. Room bathroom lighting operational. Non-slip footwear when patient is off stretcher. Physically safe environment: no spills, clutter or unnecessary equipment. Stretcher in lowest position, wheels locked, appropriate side rails in place.

## 2021-12-15 NOTE — ED STATDOCS - NSFOLLOWUPINSTRUCTIONS_ED_ALL_ED_FT
Dyspnea    WHAT YOU NEED TO KNOW:    Dyspnea is breathing difficulty or discomfort. You may have labored, painful, or shallow breathing. You may feel breathless or short of breath. Dyspnea can occur during rest or with activity. You may have dyspnea for a short time, or it might become chronic. Dyspnea is often a symptom of a disease or condition.    DISCHARGE INSTRUCTIONS:    Seek care immediately if:   •Your signs and symptoms are the same or worse within 24 hours of treatment.       •You have shaking chills or a fever over 102°F.       •You have new pain, pressure, or tightness in your chest.       •You have a new or worse cough or wheezing, or you cough up blood.      •You feel like you cannot get enough air.      •The skin over your ribs or on your neck sinks in when you breathe.       •You have a severe headache with vomiting and abdominal pain.       •You feel confused or dizzy.      Call your doctor or specialist if:   •You have questions or concerns about your condition or care.          Medicines:    •Medicines may be used to treat the cause of your dyspnea. Medicines may reduce swelling in your airway or decrease extra fluid from around your heart or lungs. Other medicines may be used to decrease anxiety and help you feel calm and relaxed.      •Take your medicine as directed. Contact your healthcare provider if you think your medicine is not helping or if you have side effects. Tell him or her if you are allergic to any medicine. Keep a list of the medicines, vitamins, and herbs you take. Include the amounts, and when and why you take them. Bring the list or the pill bottles to follow-up visits. Carry your medicine list with you in case of an emergency.      Manage long-term dyspnea:   •Create an action plan. You and your healthcare provider can work together to create a plan for how to handle episodes of dyspnea. The plan can include daily activities, treatment changes, and what to do if you have severe breathing problems.      •Lean forward on your elbows when you sit. This helps your lungs expand and may make it easier to breathe.      •Use pursed-lip breathing any time you feel short of breath. Breathe in through your nose and then slowly breathe out through your mouth with your lips slightly puckered. It should take you twice as long to breathe out as it did to breathe in.  Breathe in Breathe out           •Do not smoke. Nicotine and other chemicals in cigarettes and cigars can cause lung damage and make it harder to breathe. Ask your healthcare provider for information if you currently smoke and need help to quit. E-cigarettes or smokeless tobacco still contain nicotine. Talk to your healthcare provider before you use these products.       •Reach or maintain a healthy weight. Your healthcare provider can help you create a safe weight loss plan if you are overweight.      •Exercise as directed. Exercise can help your lungs work more easily. Exercise can also help you lose weight if needed. Try to get at least 30 minutes of exercise most days of the week. Your healthcare provider can help you create an exercise plan that is safe for you.      Follow up with your healthcare provider or specialist as directed: Write down your questions so you remember to ask them during your visits.

## 2021-12-15 NOTE — ED STATDOCS - CARE PROVIDER_API CALL
Gerson Mayen (DO)  Family Medicine  [address] [place] [address]  Phone: [phone]  Fax: [phone]  Follow Up Time:

## 2021-12-15 NOTE — ED STATDOCS - PATIENT PORTAL LINK FT
You can access the FollowMyHealth Patient Portal offered by [facility] by registering at the following website: [email or web address]. By joining [employer]’s FollowMyHealth portal, you will also be able to view your health information using other applications (apps) compatible with our system.

## 2021-12-15 NOTE — ED STATDOCS - OBJECTIVE STATEMENT
52 y/o F with PMHx of COVID-19 (+11/26/21) presents to the ED c/o +lightheadedness and +SOB. No fever. Also reports some +back pain. Allergic to penicillin. PCP: Dr. Gerson Mayen. 52 y/o F with PMHx of COVID-19 (+11/26/21) presents to the ED c/o +lightheadedness and +SOB. No fever. Also reports some +right back pain; no chest pain; abdominal pain; no urinary complaints; Allergic to penicillin. PCP: Dr. Gerson Mayen.

## 2021-12-15 NOTE — ED STATDOCS - CPE ED NEURO NORM
No results found for: HGBA1C    No results for input(s): POCGLU in the last 72 hours  Blood Sugar Average: Last 72 hrs:    Present on admission with history of diabetes  Home medication with metformin 1000 mg daily  Daughter states that patient had like to take too many medications so she only takes metformin once daily    Follow-up with A1c  Diabetic diet  Sliding scale coverage normal...

## 2021-12-15 NOTE — ED STATDOCS - PROGRESS NOTE DETAILS
Pt. is a 51 year old female Hx COVID 19, (11/21). presents with lightheadedness, SOB.  Pt. also with some right sided upper back pain.  PMD:  Faheem Reyes PA-C Pt. to have labs, CXR, EKG.  Reassess.  Maria D Reyes PA-C CXR Negative, D-DImer negative,  UA trance leukocytes, occasional bacteria,, Labs otherwise normal.  will DC with return precautions.  Maria D Reyes PA-C

## 2021-12-17 LAB
CULTURE RESULTS: SIGNIFICANT CHANGE UP
SPECIMEN SOURCE: SIGNIFICANT CHANGE UP

## 2022-02-17 NOTE — ED ADULT NURSE NOTE - CAS DISCH BELONGINGS RETURNED
Introduction Text (Please End With A Colon): The following procedure was deferred: Detail Level: Detailed uses socially at parties intermittent use Yes

## 2022-03-08 ENCOUNTER — EMERGENCY (EMERGENCY)
Facility: HOSPITAL | Age: 52
LOS: 1 days | Discharge: HOME | End: 2022-03-08
Attending: EMERGENCY MEDICINE
Payer: MEDICAID

## 2022-03-08 VITALS
HEART RATE: 76 BPM | RESPIRATION RATE: 18 BRPM | WEIGHT: 160.06 LBS | TEMPERATURE: 98 F | HEIGHT: 63 IN | OXYGEN SATURATION: 99 % | SYSTOLIC BLOOD PRESSURE: 132 MMHG | DIASTOLIC BLOOD PRESSURE: 87 MMHG

## 2022-03-08 PROCEDURE — 99285 EMERGENCY DEPT VISIT HI MDM: CPT

## 2022-03-08 PROCEDURE — 99285 EMERGENCY DEPT VISIT HI MDM: CPT | Mod: GC

## 2022-03-08 PROCEDURE — 99284 EMERGENCY DEPT VISIT MOD MDM: CPT

## 2022-03-08 NOTE — ED PROVIDER NOTE - PROGRESS NOTE DETAILS
attending Jose: ophtho at bedside. attending Jose: evaluated by ophtho resident. Concern for retinal vasculitis and ?possible temporal arteritis. ESR sent, result pending. Ophtho to discuss with neuroophtho attending later this morning. As per ophtho, ok to hold off on steroids at this point. Recommending tylenol for pain control PRN. Received signout from Dr. Cook and Dr. Garcia. MRI head and orbits w/wo IV contrast ordered. spoke to MRI team to expedite. Attending aware. Patient aware. neurology paged - Reno Mei PA-C Received signout from Dr. Cook and Dr. Garcia. MRI head and orbits w/wo IV contrast ordered. spoke to MRI team to expedite. Attending aware. Patient aware. case d/w neurology team - low suspicion for GCA given low CRP but will follow case - Reno Mei PA-C pt seen by neurology, recommends rheum labs. pt has possible history of SLE, does not currently follow with a rheumatologist. MRI scheduled for 10am - Reno Mei PA-C pt reassessed by neuroophthalmology - requests rheum consult concern for steroid treatment if GCA given prior psychosis. pt has history of SLE, not currently on treatment - Reno Mei PA-C pt reassessed by neuroophthalmology - requests rheum consult concern for steroid treatment if GCA given prior psychosis. pt has history of SLE, not currently on treatment. rheum consult placed- Reno Mei PA-C

## 2022-03-08 NOTE — ED PROVIDER NOTE - NSICDXPASTMEDICALHX_GEN_ALL_CORE_FT
Pt called to check status on refill    Pt needs refill on Zolpidem & Tramadol    Takes Tramadol for knee pain and pt was hoping to get this refill tonight as her knee pain is very bad PAST MEDICAL HISTORY:   delivery delivered     Hashimoto's disease     Retinal vasculitis, unspecified laterality     Schizo-affective schizophrenia     Ulcerative colitis with rectal bleeding, unspecified location

## 2022-03-08 NOTE — ED PROVIDER NOTE - NSFOLLOWUPINSTRUCTIONS_ED_ALL_ED_FT
Please follow up with the following teams involved in your care:  -Your primary medical doctor (within 1 week)  -Neuroophthalmology (within 1 week)  -Neurology (within 1 week)    Please see attached contact information to establish care with a rheumatologist.     Take acetaminophen 500-1000mg every 6 hrs as needed for pain. DO NOT EXCEED 4000mg DAILY.    Return to the ED for worsening visual changes, complete vision loss, numbness, tingling, weakness, or any other concerns.

## 2022-03-08 NOTE — ED PROVIDER NOTE - ATTENDING CONTRIBUTION TO CARE
attending Jose: 52yF h/o Retinal Vasculitis, Asthma, Hashimoto's, UC, Bipolar, Hypothyroidism PSHx of 2 C-sections p/w intermittent HA [id number], visual changes x2-3days. Vision changes described as a veil over eyes and L worse than R. Endorses pain with EOM. HA is usually located frontally, temporally, or behind the eyes. Had similar event in the past w/ black floaters where pt was found to have elevated CRP, diagnosed with retinal arteritis and was treated w/ Prednisone. Exam as above. Will obtain labs, ophtho eval in ED, +/- neuro consult, reassessy

## 2022-03-08 NOTE — ED PROVIDER NOTE - OBJECTIVE STATEMENT
52y F w/ PMHx of Retinal Vasculitis, Asthma, Hashimoto's, UC, Bipolar, Hypothyroidism PSHx of 2 C-sections presents to the ED c/o intermittent HA [id number], visual changes x2-3days. Also endorses mild dizziness tonight. Vision changes described as a veil over eyes and L worse than R. Endorses pain with extraocular movement. HA is usually located frontally, temporally, or behind the eyes. HA is mildly alleviated w/ Tylenol. Reports similar HA in the past but worse this time. Had similar event in the past w/ black floaters where pt was found to have elevated CRP and was treated w/ Prednisone in 2015.    Last saw eye doctor in Aug or Sept 2021. Scheduled to see him on Thursday. Haven't gotten steroids due to them causing manic episodes. Pt is a former cigarette smoker, social drinker. Denies street drug use. Denies vision loss. Denies new numbness/tingling in extremities, weakness, gait abnormalities. Denies CP, SOB, N/V/D, abd pain,  symptoms. Allergic to Penicillin, unsure of reaction.

## 2022-03-08 NOTE — ED PROVIDER NOTE - CLINICAL SUMMARY MEDICAL DECISION MAKING FREE TEXT BOX
purulent drainage of 4 cm bartholin gland abscess 51yo female pt with PMH of retinal arteritis see attending attestation

## 2022-03-08 NOTE — ED PROVIDER NOTE - PHYSICAL EXAMINATION
GENERAL: Awake, alert, NAD  HEENT: NC/AT, moist mucous membranes, PERRL, EOMI. Visual acuity 20/30 BL. Alan pressure 18 left, 19 right.   LUNGS: CTAB, no wheezes or crackles   CARDIAC: RRR, no m/r/g  ABDOMEN: Soft, non tender, non distended   EXT: No edema, no calf tenderness, 2+ PT pulses BL  NEURO: A&Ox3. Moving all extremities. CNs III-XII intact. sensations intact. strength intact.   SKIN: Warm and dry. No rash.  PSYCH: Normal affect.

## 2022-03-09 LAB
ALBUMIN SERPL ELPH-MCNC: 4.7 G/DL — SIGNIFICANT CHANGE UP (ref 3.3–5)
ALP SERPL-CCNC: 75 U/L — SIGNIFICANT CHANGE UP (ref 40–120)
ALT FLD-CCNC: 18 U/L — SIGNIFICANT CHANGE UP (ref 10–45)
ANION GAP SERPL CALC-SCNC: 10 MMOL/L — SIGNIFICANT CHANGE UP (ref 5–17)
AST SERPL-CCNC: 22 U/L — SIGNIFICANT CHANGE UP (ref 10–40)
BASOPHILS # BLD AUTO: 0.04 K/UL — SIGNIFICANT CHANGE UP (ref 0–0.2)
BASOPHILS NFR BLD AUTO: 0.6 % — SIGNIFICANT CHANGE UP (ref 0–2)
BILIRUB SERPL-MCNC: 0.3 MG/DL — SIGNIFICANT CHANGE UP (ref 0.2–1.2)
BUN SERPL-MCNC: 17 MG/DL — SIGNIFICANT CHANGE UP (ref 7–23)
CALCIUM SERPL-MCNC: 10 MG/DL — SIGNIFICANT CHANGE UP (ref 8.4–10.5)
CHLORIDE SERPL-SCNC: 102 MMOL/L — SIGNIFICANT CHANGE UP (ref 96–108)
CO2 SERPL-SCNC: 28 MMOL/L — SIGNIFICANT CHANGE UP (ref 22–31)
CREAT SERPL-MCNC: 0.69 MG/DL — SIGNIFICANT CHANGE UP (ref 0.5–1.3)
CRP SERPL-MCNC: <3 MG/L — SIGNIFICANT CHANGE UP (ref 0–4)
EGFR: 106 ML/MIN/1.73M2 — SIGNIFICANT CHANGE UP
EOSINOPHIL # BLD AUTO: 0.15 K/UL — SIGNIFICANT CHANGE UP (ref 0–0.5)
EOSINOPHIL NFR BLD AUTO: 2.3 % — SIGNIFICANT CHANGE UP (ref 0–6)
ERYTHROCYTE [SEDIMENTATION RATE] IN BLOOD: 8 MM/HR — SIGNIFICANT CHANGE UP (ref 0–20)
GLUCOSE SERPL-MCNC: 99 MG/DL — SIGNIFICANT CHANGE UP (ref 70–99)
HCT VFR BLD CALC: 41.5 % — SIGNIFICANT CHANGE UP (ref 34.5–45)
HGB BLD-MCNC: 13.6 G/DL — SIGNIFICANT CHANGE UP (ref 11.5–15.5)
HIV 1 & 2 AB SERPL IA.RAPID: SIGNIFICANT CHANGE UP
IMM GRANULOCYTES NFR BLD AUTO: 0.3 % — SIGNIFICANT CHANGE UP (ref 0–1.5)
LYMPHOCYTES # BLD AUTO: 2.37 K/UL — SIGNIFICANT CHANGE UP (ref 1–3.3)
LYMPHOCYTES # BLD AUTO: 36 % — SIGNIFICANT CHANGE UP (ref 13–44)
MCHC RBC-ENTMCNC: 30.1 PG — SIGNIFICANT CHANGE UP (ref 27–34)
MCHC RBC-ENTMCNC: 32.8 GM/DL — SIGNIFICANT CHANGE UP (ref 32–36)
MCV RBC AUTO: 91.8 FL — SIGNIFICANT CHANGE UP (ref 80–100)
MONOCYTES # BLD AUTO: 0.48 K/UL — SIGNIFICANT CHANGE UP (ref 0–0.9)
MONOCYTES NFR BLD AUTO: 7.3 % — SIGNIFICANT CHANGE UP (ref 2–14)
NEUTROPHILS # BLD AUTO: 3.52 K/UL — SIGNIFICANT CHANGE UP (ref 1.8–7.4)
NEUTROPHILS NFR BLD AUTO: 53.5 % — SIGNIFICANT CHANGE UP (ref 43–77)
NRBC # BLD: 0 /100 WBCS — SIGNIFICANT CHANGE UP (ref 0–0)
PLATELET # BLD AUTO: 264 K/UL — SIGNIFICANT CHANGE UP (ref 150–400)
POTASSIUM SERPL-MCNC: 4.2 MMOL/L — SIGNIFICANT CHANGE UP (ref 3.5–5.3)
POTASSIUM SERPL-SCNC: 4.2 MMOL/L — SIGNIFICANT CHANGE UP (ref 3.5–5.3)
PROT SERPL-MCNC: 7 G/DL — SIGNIFICANT CHANGE UP (ref 6–8.3)
RBC # BLD: 4.52 M/UL — SIGNIFICANT CHANGE UP (ref 3.8–5.2)
RBC # FLD: 13.2 % — SIGNIFICANT CHANGE UP (ref 10.3–14.5)
RHEUMATOID FACT SERPL-ACNC: <10 IU/ML — SIGNIFICANT CHANGE UP (ref 0–13)
SODIUM SERPL-SCNC: 140 MMOL/L — SIGNIFICANT CHANGE UP (ref 135–145)
WBC # BLD: 6.58 K/UL — SIGNIFICANT CHANGE UP (ref 3.8–10.5)
WBC # FLD AUTO: 6.58 K/UL — SIGNIFICANT CHANGE UP (ref 3.8–10.5)

## 2022-03-09 PROCEDURE — 70543 MRI ORBT/FAC/NCK W/O &W/DYE: CPT | Mod: 26,MA

## 2022-03-09 PROCEDURE — 70553 MRI BRAIN STEM W/O & W/DYE: CPT | Mod: 26,MA

## 2022-03-09 PROCEDURE — 99220: CPT

## 2022-03-09 PROCEDURE — 99284 EMERGENCY DEPT VISIT MOD MDM: CPT | Mod: GC

## 2022-03-09 RX ORDER — ACETAMINOPHEN 500 MG
975 TABLET ORAL EVERY 6 HOURS
Refills: 0 | Status: DISCONTINUED | OUTPATIENT
Start: 2022-03-09 | End: 2022-03-12

## 2022-03-09 RX ORDER — ACETAMINOPHEN 500 MG
975 TABLET ORAL ONCE
Refills: 0 | Status: COMPLETED | OUTPATIENT
Start: 2022-03-09 | End: 2022-03-09

## 2022-03-09 RX ADMIN — Medication 975 MILLIGRAM(S): at 16:04

## 2022-03-09 RX ADMIN — Medication 975 MILLIGRAM(S): at 06:20

## 2022-03-09 RX ADMIN — Medication 975 MILLIGRAM(S): at 18:37

## 2022-03-09 NOTE — ED ADULT NURSE REASSESSMENT NOTE - NS ED NURSE REASSESS COMMENT FT1
0720- patient alert & oriented x4. Verbalized feeling better. HA 3/10. scheduled for MRI. Screening forms with patient to fill out.

## 2022-03-09 NOTE — CONSULT NOTE ADULT - SUBJECTIVE AND OBJECTIVE BOX
HPI:    52 year old woman w/ PMHx of Retinal Vasculitis, Asthma, Hashimoto's, UC, Bipolar, Hypothyroidism PSHx of 2 C-sections presents to the ED c/o intermittent headache for 1 month and visual changes for 2-3 days associated with mild dizziness. Vision changes described as a veil over eyes, left side more affected than right. She also endorses pain with extraocular movement.  She has "halos" in L eye and area of scotoma in superonasal field OS. Pt complaining of scalp tenderness and temporal headache but denies jaw claudication. Her headaches are usually located frontally, temporally, or behind the eyes. She tried Tylenol, which minimally provided relief. Reports similar. Of note, she had similar event in the past w/ black floaters where pt was found to have elevated CRP and was treated w/ prednisone in .    Last saw eye doctor in Aug or 2021. Scheduled to see him on Thursday. Haven't gotten steroids due to them causing manic episodes. Pt is a former cigarette smoker, social drinker. Denies street drug use. Denies vision loss. Denies new numbness/tingling in extremities, weakness, gait abnormalities. Denies CP, SOB, N/V/D, abd pain,  symptoms.     Patient follows with Dr. Holland, neuro-ophthalmologist, and Dr. Dacosta, retinal specialist, at [facility]. She was told she was a retinal vasculitis. She was trialed on PO steroids but could not tolerate as it caused a manic episode flare. Was also treated several years ago with Cellcept which was stopped by a rheumatologist.    MEDICATIONS  (STANDING):    MEDICATIONS  (PRN):    PAST MEDICAL & SURGICAL HISTORY:   delivery delivered  Schizo-affective schizophrenia  Ulcerative colitis with rectal bleeding, unspecified location  Hashimoto's disease  Retinal vasculitis, unspecified laterality    FAMILY HISTORY:  Family history of seizures (Sibling)  Family history of colitis (Sibling)      Allergies  penicillin (Unknown)    SHx - No smoking, No ETOH, No drug abuse    Review of Systems:  CONSTITUTIONAL: No fevers, chills  HEENT: +visual changes as above.  No hearing loss, sneezing, congestion, runny nose or sore throat.  SKIN:  No rash or itching.  CARDIOVASCULAR:  No chest pain, chest pressure or chest discomfort. No palpitations or edema.  RESPIRATORY:  No shortness of breath, cough or sputum.  GASTROINTESTINAL:  No anorexia, nausea, vomiting or diarrhea. No abdominal pain.  GENITOURINARY:  No dysuria. No increased frequency. No retention. No incontinence.  NEUROLOGICAL:  See HPI  MUSCULOSKELETAL:  No muscle, back pain, joint pain or stiffness.  HEMATOLOGIC:  No anemia, bleeding or bruising.    Vital Signs Last 24 Hrs  T(C): 36.5 (09 Mar 2022 06:23), Max: 36.5 (08 Mar 2022 21:23)  T(F): 97.7 (09 Mar 2022 06:23), Max: 97.7 (08 Mar 2022 21:23)  HR: 75 (09 Mar 2022 06:23) (73 - 76)  BP: 107/67 (09 Mar 2022 06:23) (107/67 - 137/86)  BP(mean): 82 (09 Mar 2022 06:23) (82 - 103)  RR: 16 (09 Mar 2022 06:23) (16 - 18)  SpO2: 100% (09 Mar 2022 06:23) (97% - 100%)    PHYSICAL EXAM:  GENERAL: NAD  HEENT: Normocephalic;  conjunctivae and sclerae clear; moist mucous membranes;   NECK: Supple  CV: No edema  EXTREMITIES: No cyanosis; no edema; no calf tenderness  SKIN: Warm and dry; no rash             Neurological Exam:  - Mental Status: Alert, oriented to person, place, time, situation; speech is fluent with intact naming, repetition, and comprehension  - Cranial Nerves II-XII:    II:  PERRL; visual fields are full to confrontation  Fundoscopic exam performed w/ ophtho at bedside (refer to note)  III, IV, VI:  EOMI, no nystagmus  V:  facial sensation is intact in the V1-V3 distribution bilaterally.  VII:  face is symmetric with normal eye closure and smile  VIII:  hearing is intact to finger rub  IX, X:  uvula is midline and soft palate rises symmetrically  XI:  head turning and shoulder shrug are intact bilaterally  XII:  tongue protrudes in the midline  - Motor:  strength is 5/5 throughout; normal muscle bulk and tone throughout; no pronator drift  - Reflexes:  2+ and symmetric at the biceps, triceps, brachioradialis, knees, and ankles;  plantar reflexes downgoing bilaterally  - Sensory:  intact to light touch and symmetric throughout  - Coordination:  finger-nose-finger and heel-knee-shin intact without dysmetria; rapid alternating hand movements intact  - Gait:  normal steps, base, arm swing, and turning; Romberg testing is negative    Other:        140  |  102  |  17  ----------------------------<  99  4.2   |  28  |  0.69    Ca    10.0      08 Mar 2022 23:33    TPro  7.0  /  Alb  4.7  /  TBili  0.3  /  DBili  x   /  AST  22  /  ALT  18  /  AlkPhos  75                              13.6   6.58  )-----------( 264      ( 08 Mar 2022 23:33 )             41.5       C-Reactive Protein, Serum: <3: TEST REPEATED. mg/L (22 @ 23:33)            HPI:  52 year old woman w/ PMHx of Retinal Vasculitis, Asthma, Hashimoto's, UC, Bipolar, PSHx of 2 C-sections presents to the ED c/o intermittent headache for 1 month and visual changes for 2-3 days associated with mild dizziness. Vision changes described as a veil over eyes, left side more affected than right. She also endorses pain with extraocular movement.  She has "halos" in L eye and area of scotoma in superonasal field OS. Pt complaining of scalp tenderness and temporal headache but denies jaw claudication. Her headaches are usually located frontally, temporally, or behind the eyes. She tried Tylenol, which minimally provided relief. Reports similar. Of note, she had similar event in the past w/ black floaters where pt was found to have elevated CRP and was treated w/ prednisone in .    Last saw eye doctor in Aug or 2021. Scheduled to see him on Thursday. Haven't gotten steroids due to them causing manic episodes. Pt is a former cigarette smoker, social drinker. Denies street drug use. Denies vision loss. Denies new numbness/tingling in extremities, weakness, gait abnormalities. Denies CP, SOB, N/V/D, abd pain,  symptoms.     Patient follows with Dr. Holland, neuro-ophthalmologist, and Dr. Dacosta, retinal specialist, at [facility]. She was told she was a retinal vasculitis. She was trialed on PO steroids but could not tolerate as it caused a manic episode flare. Was also treated several years ago with Cellcept which was stopped by a rheumatologist.    MEDICATIONS  (STANDING):    MEDICATIONS  (PRN):    PAST MEDICAL & SURGICAL HISTORY:   delivery delivered  Schizo-affective schizophrenia  Ulcerative colitis with rectal bleeding, unspecified location  Hashimoto's disease  Retinal vasculitis, unspecified laterality    FAMILY HISTORY:  Family history of seizures (Sibling)  Family history of colitis (Sibling)      Allergies  penicillin (Unknown)    SHx - No smoking, No ETOH, No drug abuse    Review of Systems:  CONSTITUTIONAL: No fevers, chills  HEENT: +visual changes as above.  No hearing loss, sneezing, congestion, runny nose or sore throat.  SKIN:  No rash or itching.  CARDIOVASCULAR:  No chest pain, chest pressure or chest discomfort. No palpitations or edema.  RESPIRATORY:  No shortness of breath, cough or sputum.  GASTROINTESTINAL:  No anorexia, nausea, vomiting or diarrhea. No abdominal pain.  GENITOURINARY:  No dysuria. No increased frequency. No retention. No incontinence.  NEUROLOGICAL:  See HPI  MUSCULOSKELETAL:  No muscle, back pain, joint pain or stiffness.  HEMATOLOGIC:  No anemia, bleeding or bruising.    Vital Signs Last 24 Hrs  T(C): 36.5 (09 Mar 2022 06:23), Max: 36.5 (08 Mar 2022 21:23)  T(F): 97.7 (09 Mar 2022 06:23), Max: 97.7 (08 Mar 2022 21:23)  HR: 75 (09 Mar 2022 06:23) (73 - 76)  BP: 107/67 (09 Mar 2022 06:23) (107/67 - 137/86)  BP(mean): 82 (09 Mar 2022 06:23) (82 - 103)  RR: 16 (09 Mar 2022 06:23) (16 - 18)  SpO2: 100% (09 Mar 2022 06:23) (97% - 100%)    PHYSICAL EXAM:  GENERAL: NAD  HEENT: Normocephalic;  conjunctivae and sclerae clear; moist mucous membranes;   NECK: Supple  CV: No edema  EXTREMITIES: No cyanosis; no edema; no calf tenderness  SKIN: Warm and dry; no rash             Neurological Exam:  - Mental Status: Alert, oriented to person, place, time, situation; speech is fluent with intact naming, repetition, and comprehension  - Cranial Nerves II-XII:    II:  PERRL; visual fields are full to confrontation  Fundoscopic exam performed w/ ophtho at bedside (refer to note)  III, IV, VI:  EOMI, no nystagmus  V:  facial sensation is intact in the V1-V3 distribution bilaterally.  VII:  face is symmetric with normal eye closure and smile  VIII:  hearing is intact to finger rub  IX, X:  uvula is midline and soft palate rises symmetrically  XI:  head turning and shoulder shrug are intact bilaterally  XII:  tongue protrudes in the midline  - Motor:  strength is 5/5 throughout; normal muscle bulk and tone throughout; no pronator drift  - Reflexes:  2+ and symmetric at the biceps, triceps, brachioradialis, knees, and ankles;  plantar reflexes downgoing bilaterally  - Sensory:  intact to light touch and symmetric throughout  - Coordination:  finger-nose-finger and heel-knee-shin intact without dysmetria; rapid alternating hand movements intact  - Gait:  normal steps, base, arm swing, and turning; Romberg testing is negative    Other:        140  |  102  |  17  ----------------------------<  99  4.2   |  28  |  0.69    Ca    10.0      08 Mar 2022 23:33    TPro  7.0  /  Alb  4.7  /  TBili  0.3  /  DBili  x   /  AST  22  /  ALT  18  /  AlkPhos  75                              13.6   6.58  )-----------( 264      ( 08 Mar 2022 23:33 )             41.5       C-Reactive Protein, Serum: <3: TEST REPEATED. mg/L (22 @ 23:33)            HPI:  52 year old right-handed woman with past medical history Retinal Vasculitis, Asthma, Hashimoto's, UC, Bipolar, PSHx of 2 C-sections presents to the ED c/o intermittent headache for 1 month and visual changes for 2-3 days associated with mild dizziness. Vision changes described as a veil over eyes, left side more affected than right. She also endorses pain with extraocular movement. She has floaters in R eye and "halos" in L eye and area of scotoma in left superonasal field. Pt complaining of scalp tenderness and temporal headache but denies jaw claudication. Her headaches are usually located frontally, temporally, or behind the eyes. She has tried Tylenol, which provided some relief. Reports similar. Of note, she had similar event in the past w/ black floaters where pt was found to have elevated CRP and was treated w/ prednisone in .    Last saw eye doctor in Aug or 2021 and is scheduled to see him on Thursday. Pt is a former cigarette smoker, social drinker. Denies street drug use. Denies vision loss. Denies new numbness/tingling in extremities, weakness, gait abnormalities. Denies CP, SOB, N/V/D, abd pain,  symptoms.     Patient follows with Dr. Holland, neuro-ophthalmologist, and Dr. Dacosta, retinal specialist, at [facility]. She was told she was a retinal vasculitis. Patient states that she followed with Rheum last summer for fibromyalgia. She  for suspected lupus, was started in Cellcept and Plaquenil, but both were discontinued. Now she thinks her joint pain and muscle stiffness have gotten worse and would like to re-establish care with Rheumatology again. She has had a very bad psychosis as a reaction to oral steroids.    MEDICATIONS  (STANDING):    MEDICATIONS  (PRN):    PAST MEDICAL & SURGICAL HISTORY:   delivery delivered  Schizo-affective schizophrenia  Ulcerative colitis with rectal bleeding, unspecified location  Hashimoto's disease  Retinal vasculitis, unspecified laterality    FAMILY HISTORY:  Family history of seizures (Sibling)  Family history of colitis (Sibling)    Allergies  penicillin (Unknown)    SHx - No smoking, No ETOH, No drug abuse    Review of Systems:  CONSTITUTIONAL: No fevers, chills  HEENT: +visual changes as above. No hearing loss, sneezing, congestion, runny nose or sore throat.  SKIN:  No rash or itching.  CARDIOVASCULAR:  No chest pain, chest pressure or chest discomfort. No palpitations or edema.  RESPIRATORY:  No shortness of breath, cough or sputum.  GASTROINTESTINAL: No anorexia, nausea, vomiting or diarrhea. No abdominal pain.  GENITOURINARY:  No dysuria. No increased frequency. No retention. No incontinence.  NEUROLOGICAL:  See HPI  MUSCULOSKELETAL: +diffuse joint pain and myalgias  HEMATOLOGIC:  No anemia, bleeding or bruising.    Vital Signs Last 24 Hrs  T(C): 36.5 (09 Mar 2022 06:23), Max: 36.5 (08 Mar 2022 21:23)  T(F): 97.7 (09 Mar 2022 06:23), Max: 97.7 (08 Mar 2022 21:23)  HR: 75 (09 Mar 2022 06:23) (73 - 76)  BP: 107/67 (09 Mar 2022 06:23) (107/67 - 137/86)  BP(mean): 82 (09 Mar 2022 06:23) (82 - 103)  RR: 16 (09 Mar 2022 06:23) (16 - 18)  SpO2: 100% (09 Mar 2022 06:23) (97% - 100%)    PHYSICAL EXAM:  GENERAL: NAD  HEENT: Normocephalic;  conjunctivae and sclerae clear; moist mucous membranes;   NECK: Supple  CV: No edema  EXTREMITIES: No cyanosis; no edema; no calf tenderness  SKIN: Warm and dry; no rash             Neurological Exam:  - Mental Status: Alert, oriented to person, place, time, situation; speech is fluent with intact naming, repetition, and comprehension  - Cranial Nerves II-XII:    II:  PERRL; visual fields are full to confrontation  Fundoscopic exam performed w/ ophtho at bedside (refer to note)  III, IV, VI:  EOMI, no nystagmus  V:  facial sensation is intact in the V1-V3 distribution bilaterally.  VII:  face is symmetric with normal eye closure and smile  VIII:  hearing is intact to finger rub  IX, X:  uvula is midline and soft palate rises symmetrically  XI:  head turning and shoulder shrug are intact bilaterally  XII:  tongue protrudes in the midline  - Motor:  strength is 5/5 throughout; normal muscle bulk and tone throughout; no pronator drift  - Reflexes:  2+ and symmetric at the biceps, triceps, brachioradialis, knees, and ankles;  plantar reflexes downgoing bilaterally  - Sensory:  intact to light touch and symmetric throughout  - Coordination:  finger-nose-finger and heel-knee-shin intact without dysmetria; rapid alternating hand movements intact  - Gait:  normal steps, base, arm swing, and turning; Romberg testing is negative    Other:        140  |  102  |  17  ----------------------------<  99  4.2   |  28  |  0.69    Ca    10.0      08 Mar 2022 23:33    TPro  7.0  /  Alb  4.7  /  TBili  0.3  /  DBili  x   /  AST  22  /  ALT  18  /  AlkPhos  75                              13.6   6.58  )-----------( 264      ( 08 Mar 2022 23:33 )             41.5       C-Reactive Protein, Serum: <3: TEST REPEATED. mg/L (22 @ 23:33)            HPI:  52 year old right-handed woman with past medical history Retinal Vasculitis, Asthma, Hashimoto's, UC, Bipolar, PSHx of 2 C-sections presents to the ED c/o intermittent headache for 1 month and visual changes for 2-3 days associated with mild dizziness. Vision changes described as a veil over eyes, left side more affected than right. She also endorses pain with extraocular movement. She has floaters in R eye and "halos" in L eye and area of scotoma in left superonasal field. Pt complaining of scalp tenderness and temporal headache but denies jaw claudication. Her headaches are usually located frontally, temporally, or behind the eyes. She has tried Tylenol, which provided some relief. Reports similar. Of note, she had similar event in the past w/ black floaters where pt was found to have elevated CRP and was treated w/ prednisone in .    Last saw eye doctor in Aug or 2021 and is scheduled to see him on Thursday. Pt is a former cigarette smoker, social drinker. Denies street drug use. Denies vision loss. Denies new numbness/tingling in extremities, weakness, gait abnormalities. Denies CP, SOB, N/V/D, abd pain,  symptoms.     Patient follows with Dr. Holland, neuro-ophthalmologist, and Dr. Dacosta, retinal specialist, at [facility]. She was told she was a retinal vasculitis. Patient states that she followed with Rheum last summer for fibromyalgia. She  for suspected lupus, was started in Cellcept and Plaquenil, but both were discontinued. Now she thinks her joint pain and muscle stiffness have gotten worse and would like to re-establish care with Rheumatology again. She has had a very bad psychosis as a reaction to oral steroids.    MEDICATIONS  (STANDING):    MEDICATIONS  (PRN):    PAST MEDICAL & SURGICAL HISTORY:   delivery delivered  Schizo-affective schizophrenia  Ulcerative colitis with rectal bleeding, unspecified location  Hashimoto's disease  Retinal vasculitis, unspecified laterality    FAMILY HISTORY:  Family history of seizures (Sibling)  Family history of colitis (Sibling)    Allergies  penicillin (Unknown)    SHx - No current smoking, social alcohol use, No illicit drug abuse    Review of Systems:  CONSTITUTIONAL: No fevers, chills  HEENT: +visual changes as above. No hearing loss, sneezing, congestion, runny nose or sore throat.  SKIN:  No rash or itching.  CARDIOVASCULAR:  No chest pain, chest pressure or chest discomfort. No palpitations or edema.  RESPIRATORY:  No shortness of breath, cough or sputum.  GASTROINTESTINAL: No anorexia, nausea, vomiting or diarrhea. No abdominal pain.  GENITOURINARY:  No dysuria. No increased frequency. No retention. No incontinence.  NEUROLOGICAL:  See HPI  MUSCULOSKELETAL: +diffuse joint pain and myalgias  HEMATOLOGIC:  No anemia, bleeding or bruising.    Vital Signs Last 24 Hrs  T(C): 36.5 (09 Mar 2022 06:23), Max: 36.5 (08 Mar 2022 21:23)  T(F): 97.7 (09 Mar 2022 06:23), Max: 97.7 (08 Mar 2022 21:23)  HR: 75 (09 Mar 2022 06:23) (73 - 76)  BP: 107/67 (09 Mar 2022 06:23) (107/67 - 137/86)  BP(mean): 82 (09 Mar 2022 06:23) (82 - 103)  RR: 16 (09 Mar 2022 06:23) (16 - 18)  SpO2: 100% (09 Mar 2022 06:23) (97% - 100%)    PHYSICAL EXAM:  GENERAL: NAD  HEENT: Normocephalic;  conjunctivae and sclerae clear; moist mucous membranes;   NECK: Supple  CV: No edema  EXTREMITIES: No cyanosis; no edema; no calf tenderness  SKIN: Warm and dry; no rash             Neurological Exam:  - Mental Status: Alert, oriented to person, place, time, situation; speech is fluent with intact naming, repetition, and comprehension  - Cranial Nerves II-XII:    II:  PERRL; visual fields are full to confrontation  Fundoscopic exam performed w/ ophtho at bedside (refer to note)  III, IV, VI:  EOMI, no nystagmus  V:  facial sensation is intact in the V1-V3 distribution bilaterally.  VII:  face is symmetric with normal eye closure and smile  VIII:  hearing is intact to finger rub  IX, X:  uvula is midline and soft palate rises symmetrically  XI:  head turning and shoulder shrug are intact bilaterally  XII:  tongue protrudes in the midline  - Motor:  strength is 5/5 throughout; normal muscle bulk and tone throughout; no pronator drift  - Reflexes:  2+ and symmetric at the biceps, triceps, brachioradialis, knees, and ankles;  plantar reflexes downgoing bilaterally  - Sensory:  intact to light touch and symmetric throughout  - Coordination:  finger-nose-finger and heel-knee-shin intact without dysmetria; rapid alternating hand movements intact  - Gait:  normal steps, base, arm swing, and turning; Romberg testing is negative    Other:        140  |  102  |  17  ----------------------------<  99  4.2   |  28  |  0.69    Ca    10.0      08 Mar 2022 23:33    TPro  7.0  /  Alb  4.7  /  TBili  0.3  /  DBili  x   /  AST  22  /  ALT  18  /  AlkPhos  75                              13.6   6.58  )-----------( 264      ( 08 Mar 2022 23:33 )             41.5       C-Reactive Protein, Serum: <3: TEST REPEATED. mg/L (22 @ 23:33)

## 2022-03-09 NOTE — ED CDU PROVIDER DISPOSITION NOTE - PATIENT PORTAL LINK FT
You can access the FollowMyHealth Patient Portal offered by [facility] by registering at the following website: [email or web address]. By joining [facility]’s FollowMyHealth portal, you will also be able to view your health information using other applications (apps) compatible with our system.

## 2022-03-09 NOTE — CONSULT NOTE ADULT - ATTENDING COMMENTS
Patient with h/o chronic headaches pain and retinal vasculitis p/w worsening headaches and vision change  vision changes are chronic but notes recent worsening of symptoms  frontotemporal headache--neurology input appreciated  GCA suspicion is low (low inflammatory marker, lack of jaw claudication, normal temporal pulses)  check temporal artery biopsy Patient with h/o chronic headaches pain and retinal vasculitis p/w worsening headaches and vision change  vision changes are chronic but notes recent worsening of symptoms  frontotemporal headache--neurology input appreciated  GCA suspicion is low (normal inflammatory marker, lack of jaw claudication, normal temporal pulses)  check temporal artery US   please call with questions

## 2022-03-09 NOTE — CONSULT NOTE ADULT - ASSESSMENT
52 year old woman with PMHx of Retinal Vasculitis, Asthma, Hashimoto's, UC, Bipolar, Hypothyroidism PSHx of 2 C-sections presents to the ED c/o intermittent headache for 1 month and visual changes for 2-3 days associated with mild dizziness. Neurology consulted for temporal headache. Pt previously diagnosed with retinal vasculitis and follows with neuro-ophtho and retinal specialists. On ophtho exam, found to have perivascular sheathing OS consistent with retinal vasculitis. CRP normal. 20/20 OU visual acuity, no diplopia, visual fields full, no APD or red color desaturation.    Recommendations:  [] Follow up results of MRI brain and orbits with and without contrast  [] Follow up ESR  [] Headache management with IV Toradol 15-30mg q8h prn, IV Magnesium 2g, Reglan 10mg q8h prn for nausea, IV Tylenol 1,000mg, Depakote 500mg x1 if refractory to above  [] Patient cannot tolerate steroids, consider Rheumatology involvement for alternative immunosuppressant agent   [] Patient can follow up with her neuro-ophthalmologist and retinal specialist as outpatient    Patient to be discussed w/ neurology attending, Dr. Dueñas. 52 year old woman with PMHx of Retinal Vasculitis, Asthma, Hashimoto's disorder, UC, Bipolar, PSHx of 2 C-sections presents to the ED c/o intermittent headache for 1 month and visual changes for 2-3 days associated with mild dizziness. Neurology consulted for temporal headache. Pt previously diagnosed with retinal vasculitis and follows with neuro-ophtho and retinal specialists. On ophtho exam, found to have perivascular sheathing OS consistent with retinal vasculitis. CRP normal. 20/20 OU visual acuity, no diplopia, visual fields full, no APD or red color desaturation.    Retinal vasculitis of unknown etiology, has been associated with inflammatory bowel disease    Recommendations:  [] Follow up results of MRI brain and orbits with and without contrast   [] Follow up ESR  [] Headache management with IV Toradol 15-30mg q8h prn, IV Magnesium 2g, Reglan 10mg q8h prn for nausea, IV Tylenol 1,000mg, Depakote 500mg x1 if refractory to above  [] Patient cannot tolerate systemic steroids, although would consider periocular or intraocular glucocorticoids.  [] Obtain outpatient Rheumatology records for workup of underlying etiology  [] Patient can follow up with her neuro-ophthalmologist and retinal specialist as outpatient    Patient to be discussed w/ neurology attending, Dr. Dueñas. 52 year old woman with PMHx of Retinal Vasculitis, Asthma, Hashimoto's disorder, UC, Bipolar, PSHx of 2 C-sections presents to the ED c/o intermittent headache for 1 month and visual changes for 2-3 days associated with mild dizziness. Neurology consulted for temporal headache. Pt previously diagnosed with retinal vasculitis and follows with neuro-ophtho and retinal specialists. On ophtho exam, found to have perivascular sheathing OS consistent with retinal vasculitis. CRP normal. 20/20 OU visual acuity, no diplopia, visual fields full, no APD or red color desaturation.    Impression: Retinal vasculitis of unknown etiology, has been associated with inflammatory bowel disease, rule out other systemic inflammatory condition    Recommendations:  [] Follow up results of MRI brain and orbits with and without contrast   [] Follow up ESR  [] Headache management with IV Toradol 15-30mg q8h prn, IV Magnesium 2g, Reglan 10mg q8h prn for nausea, IV Tylenol 1,000mg, Depakote 500mg x1 if refractory to above  [] Patient cannot tolerate systemic steroids, management of retinal vasculitis per Ophthalmology  [] Obtain outpatient Rheumatology records for workup of underlying etiology, otherwise instruct patient to establish care  [] Patient can follow up with her neuro-ophthalmologist and retinal specialist as outpatient    If imaging negative for acute pathology, no neurological contraindication to discharge w/ follow up with Dr. Nissenbaum:   [id number] [place] [address] [phone]    Pt seen and discussed w/ neurology attending, Dr. Dueñas.    Karley Teresa,   PGY-3  Neurology Resident

## 2022-03-09 NOTE — ED CDU PROVIDER DISPOSITION NOTE - CLINICAL COURSE
52 year old woman with PMHx of Retinal Vasculitis, Asthma, Hashimoto's disorder, UC, Bipolar presenting to the ED for evaluation  for left visual changes starting last night. reports central visual darkness/shade that seems to have improved somewhat. Pt previously diagnosed with retinal vasculitis in 2016 and follows with neuro-ophtho and retinal specialists. In the ED patient with basic lab work which is unrevealing, seen by neuro and opthho in the ED and was placed In the CDU for MRI of brain and orbits, headache management, further bloodwork including ESR and basic autoimmune panel and rheumatology evaluation. Neuro recommending headache management, outpatient follow up for rheum and retinal/neuroophoth.  ophthalmology requesting infectious/inflammatory workup in ED and outpatient hypercoagulable workup. Imaging revealed:  Rheum recommended  while in CDU, 52 year old woman with PMHx of Retinal Vasculitis, Asthma, Hashimoto's disorder, UC, Bipolar presenting to the ED for evaluation  for left visual changes starting last night. reports central visual darkness/shade that seems to have improved somewhat. Pt previously diagnosed with retinal vasculitis in 2016 and follows with neuro-ophtho and retinal specialists. In the ED patient with basic lab work which is unrevealing, seen by neuro and opthho in the ED and was placed In the CDU for MRI of brain and orbits, headache management, further bloodwork including ESR and basic autoimmune panel and rheumatology evaluation. Neuro recommending headache management, outpatient follow up for rheum and retinal/neuroophoth.  ophthalmology requesting infectious/inflammatory workup in ED and outpatient hypercoagulable workup. Imaging revealed:  Rheum recommended  In CDU, VSS. MRI brain and orbits w/o acute findings - AVM unchanged from 2019 - pt aware. Cleared for dc by neuro. Normal temporal artery US. ESR normal 8. Pt feeling better and eager to go home. Results reviewed with pt and friend at bedside. No contraindications for DC documented by neuro, ophtho, rheum. All questions answered. Stable for DC.

## 2022-03-09 NOTE — CONSULT NOTE ADULT - ATTENDING COMMENTS
52y female w/ pmhx/ochx of Retinal Vasculitis, Asthma, Hashimoto's, UC, Bipolar, Hypothyroidism consulted for headache for 1 months and visual disturbance (haloes and superonasal scotoma) OS for approx 1 week. Pt also complaining of new onset bitemporal headache and scalp tenderness for the past few weeks. Rest of ROS for GCA negative. Has been previously diagnosed with retinal vasculitis and follows with Dr. Holland and Dr. Dacosta at [facility]. Has been unable to tolerate PO steroids in the past due to exacerbation of bipolar symptoms. ESR and CRP wnl. Bedside exam significant only for scalp tenderness bilaterally and perivascular sheathing OS on fundus exam. Low suspicion of GCA but would recommend Rheum to weigh in. Recommend to obtain neuro consult and MRI brain and orbits w/wo contrast for new onset/change in quality of headache. Recommend labs and carotid doppler as outlined in resident's note and follow up with retina outpatient for vasculitis OS. Will follow with MRI results

## 2022-03-09 NOTE — ED CDU PROVIDER INITIAL DAY NOTE - MEDICAL DECISION MAKING DETAILS
Attending MD Kang: 52 year old woman with PMHx of Retinal Vasculitis, Asthma, Hashimoto's disorder, UC, Bipolar, PSHx of 2 C-sections presents to the ED c/o intermittent headache for 1 month and visual changes for 2-3 days associated with mild dizziness. Impression: Retinal vasculitis of unknown etiology.  Plan;  CDU for MRI of brain and orbits and continued neuro evaluation.

## 2022-03-09 NOTE — ED CDU PROVIDER INITIAL DAY NOTE - NS ED ATTENDING STATEMENT MOD
This was a shared visit with the HILDA. I reviewed and verified the documentation and independently performed the documented:

## 2022-03-09 NOTE — ED CDU PROVIDER INITIAL DAY NOTE - DETAILS
Vital signs q4h  headache management  ESR/CRP  MRI brain and orbits  Rheumatology/neurology/opthalmology evaluation

## 2022-03-09 NOTE — ED ADULT NURSE NOTE - OBJECTIVE STATEMENT
52 y.o. F A&Ox4 PMHx of asthma, Hashimoto's thyroiditis, ulcerative colitis, and bipolar disorder presenting to ED via walk-in for headaches x 1 month and vision changes. Pt states that she has been having temporal and occular headaches on and off for 1 month, that at the worst they ar 9/10 and feel like pressure. States that 2-3 days ago she also started to experience vision changes that "feel like a veil over [her] eyes." Pt rates current pain as a 6/10, and denies gait changes, N/V/D, SOB, chest pain, dysuria, hematuria. Upon assessment, pt has negative facial droop, tongue protrudes at midline, no sensation changes, and strength is equal and bilateral in all four extremities. No increased work of breathing. Pt resting in stretcher at this time with SO at beside. ED Resident Lj Dejesus at bedside for eval. Neuro flowsheet started and placed in chart. Stretcher locked and lowered and call bell within reach.

## 2022-03-09 NOTE — ED ADULT NURSE REASSESSMENT NOTE - NS ED NURSE REASSESS COMMENT FT1
Pt. received from KAMALA Coleman. Pt. A&Ox4 resting in bed comfortably. Pt. respirations even and unlabored. Pt. abdomen soft, nondistended & nontender. Pt. skin warm dry intact appropriate for ethnicity. Pt. ambulates with observed steady gait. Pt. came to the ED complaining of intermittent headaches x1 month and L eye vision changes x2-3 days. Pt. states L eye vision is "foggy", and headaches are in the frontal region behind the eyes. Pt. remains in CDU pending transcranial doppler/pain control. Neuro check q4 maintained. Strength 5/5 B/L U/L extremities. Sensory intact. Pt. states L eye vision still "foggy" in the center of the eye. Pt. has no complaints of pain or discomfort at this time. Denies nausea/vomiting/abdominal pain/chest pain/pressure/SOB. L A/C IV secured and patent. Vitals remain stable - view flowsheet. Repeat labs to be drawn in the AM. Medications to be administered as ordered. Call bell within reach encouraged to call for assistance when needed. Side rails remain up for pt. safety. Will continue to monitor and reassess. Pt. received from KAMALA Coleman. Pt. A&Ox4 resting in bed comfortably. Pt. respirations even and unlabored. Pt. abdomen soft, nondistended & nontender. Pt. skin warm dry intact appropriate for ethnicity. Pt. ambulates with observed steady gait. Pt. came to the ED complaining of intermittent headaches x1 month and L eye vision changes x2-3 days. Pt. states L eye vision is "foggy", and headaches are in the frontal region behind the eyes. Pt. remains in CDU pending transcranial doppler/pain control. Neuro check q4 maintained. Strength 5/5 B/L U/L extremities. Sensory intact. Pt. states L eye vision still "foggy" in the center of the eye. Pt. states Tylenol has alleviated a lot of the pain from the headaches. Pt. has no complaints of pain or discomfort at this time. Denies nausea/vomiting/abdominal pain/chest pain/pressure/SOB. L A/C IV secured and patent. Vitals remain stable - view flowsheet. Repeat labs to be drawn in the AM. Medications to be administered as ordered. Call bell within reach encouraged to call for assistance when needed. Side rails remain up for pt. safety. Will continue to monitor and reassess. Pt. received from KAMALA Coleman. Pt. A&Ox4 resting in bed comfortably. Pt. respirations even and unlabored. Pt. abdomen soft, nondistended & nontender. Pt. skin warm dry intact appropriate for ethnicity. Pt. ambulates with observed steady gait. Pt. came to the ED complaining of intermittent headaches x1 month and L eye vision changes x2-3 days. Pt. states L eye vision is "foggy", and headaches are in the frontal region behind the eyes. Pt. remains in CDU pending transcranial doppler/pain control. Neuro check q4 maintained. Strength 5/5 B/L U/L extremities. Sensory intact. Pt. states L eye vision still "foggy" in the center of the eye. PERRLA present. Pt. states Tylenol has alleviated a lot of the pain from the headaches. Pt. has no complaints of pain or discomfort at this time. Denies nausea/vomiting/abdominal pain/chest pain/pressure/SOB. L A/C IV secured and patent. Vitals remain stable - view flowsheet. Repeat labs to be drawn in the AM. Medications to be administered as ordered. Call bell within reach encouraged to call for assistance when needed. Side rails remain up for pt. safety. Will continue to monitor and reassess.

## 2022-03-09 NOTE — ED CDU PROVIDER INITIAL DAY NOTE - ATTENDING CONTRIBUTION TO CARE
Attending MD Kang:   I personally have seen and examined this patient.  Physician assistant note reviewed and agree on plan of care and except where noted.

## 2022-03-09 NOTE — CONSULT NOTE ADULT - ATTENDING COMMENTS
HPI as per resident note, personally verified by me. Patient reports headache is now resolved but it is intermittent, main issue is diffuse body joint pains and myalgias    - Mental Status: Alert, oriented to person, place, time, situation; speech is fluent with intact naming, repetition, and comprehension. Follows commands. Attn/conc, recent and remote memory/fund of knowledge WNL  - Cranial Nerves II-XII:    II:  PERRL; visual fields are full to confrontation  III, IV, VI:  EOMI, R > L horizontal fatigable end gaze nystagmus  V:  facial sensation is intact in the V1-V3 distribution bilaterally.  VII:  face is symmetric with normal eye closure and smile  VIII:  hearing is intact to finger rub  IX, X:  uvula is midline and soft palate rises symmetrically  XI:  head turning and shoulder shrug are intact bilaterally  XII:  tongue protrudes in the midline  - Motor:  strength is 5/5 throughout; normal muscle bulk and tone throughout; no pronator drift  - Reflexes:  2+ and symmetric at the biceps, triceps, brachioradialis, knees, and ankles;  plantar reflexes downgoing bilaterally  - Sensory:  intact to light touch and symmetric throughout  - Coordination:  finger-nose-finger and heel-knee-shin intact without dysmetria; rapid alternating hand movements intact  - Gait and station:  normal steps, base, arm swing, and turning; Romberg testing is negative    CRP WNL    A/P:  Headache  Retinal vasculitis  Hashimoto's disease  Ulcerative colitis  Bipolar disorder    - Headache resolved, could be related to ongoing inflammatory disorder and eye symptoms. No obvious intracranial or optic nerve pathology seen on MRI brain and orbits, respectively. For any recurrence can try Tylenol 1g PO TID prn, as she had good response to this  - No neurologic contraindications to discharge if she is otherwise medically stable  - Continue to address above medical problems, as you are doing  - Will sign off, please call with additional questions or concerns

## 2022-03-09 NOTE — ED CDU PROVIDER INITIAL DAY NOTE - PROGRESS NOTE DETAILS
patient bring transported to MRI. rheum contacted and will see patient after clinic hours. - Maria A Tong PA-C returned from MRI, states she feels vision improving, does not wish to take anything stronger than tylenol for headache at this time. rheum fellow evaluated patient, awaiting final recs. ESR WNL - Maria A Tong PA-C CDU NOTE MESERET Cunningham: pt seen by Rheum attending- recommending temporal artery ultrasound, spoke with vascular will be done tomorrow. pt agreeable to stay overnight CDU NOTE MESERET Cunningham: MRI with either AVM vs Anomaly, discussed with neuro- outpt f/up

## 2022-03-09 NOTE — ED CDU PROVIDER INITIAL DAY NOTE - EYES, MLM
Clear bilaterally, pupils equal, round and reactive to light. detailed eye exam performed by the specialist

## 2022-03-09 NOTE — CONSULT NOTE ADULT - SUBJECTIVE AND OBJECTIVE BOX
HERIBERTO REILLY  [id number]    HISTORY OF PRESENT ILLNESS:  52 year old woman with PMHx of Retinal Vasculitis, Asthma, Hashimoto's disorder, UC, Bipolar presenting to the ED for evaluation  for left visual changes starting last night. reports central visual darkness/shade that seems to have improved somewhat. Pt previously diagnosed with retinal vasculitis in 2016 and follows with neuro-ophtho and retinal specialists. In the ED patient with basic lab work which is unrevealing, seen by neuro and opthho in the ED and was placed In the CDU for MRI of brain and orbits, headache management, further bloodwork including ESR and basic autoimmune panel and rheumatology evaluation. Neuro recommending headache management, outpatient follow up for rheum and retinal/neuroophoth.  ophthalmology requesting infectious/inflammatory workup in ED and outpatient hypercoagulable workup. Imaging revealed:    Rheum consulted for possible GCA and SLE.    PAST MEDICAL & SURGICAL HISTORY:   delivery delivered    Schizo-affective schizophrenia    Ulcerative colitis with rectal bleeding, unspecified location    Hashimoto&#x27;s disease    Retinal vasculitis, unspecified laterality    No significant past surgical history        Review of Systems:  Gen:  No fevers/chills, weight loss  HEENT:   CVS: No chest pain/palpitations  Resp: No SOB/wheezing  GI: No N/V/C/D/abdominal pain  MSK:  Skin: No new rashes  Neuro: No headaches    MEDICATIONS  (STANDING):    MEDICATIONS  (PRN):      Allergies    penicillin (Unknown)    Intolerances        PERTINENT MEDICATION HISTORY:    SOCIAL HISTORY:  OCCUPATION:  TRAVEL HISTORY:    FAMILY HISTORY:  Family history of seizures (Sibling)    Family history of colitis (Sibling)        Vital Signs Last 24 Hrs  T(C): 36.6 (09 Mar 2022 08:45), Max: 36.6 (09 Mar 2022 08:45)  T(F): 97.8 (09 Mar 2022 08:45), Max: 97.8 (09 Mar 2022 08:45)  HR: 69 (09 Mar 2022 08:45) (69 - 76)  BP: 107/78 (09 Mar 2022 08:45) (107/67 - 137/86)  BP(mean): 87 (09 Mar 2022 08:45) (82 - 103)  RR: 19 (09 Mar 2022 08:45) (16 - 19)  SpO2: 98% (09 Mar 2022 08:45) (97% - 100%)    Daily Height in cm: 160.02 (08 Mar 2022 21:23)    Daily     Physical Exam:  General: No apparent distress  HEENT: EOMI, MMM  CVS: +S1/S2, RRR  Resp: CTA b/l  GI: Soft, NT/ND  MSK:    Neuro: AAOx3  muscle strength RUE LUE ; RLE LLE   Skin: no  rashes    LABS:                        13.6   6.58  )-----------( 264      ( 08 Mar 2022 23:33 )             41.5     03-08    140  |  102  |  17  ----------------------------<  99  4.2   |  28  |  0.69    Ca    10.0      08 Mar 2022 23:33    TPro  7.0  /  Alb  4.7  /  TBili  0.3  /  DBili  x   /  AST  22  /  ALT  18  /  AlkPhos  75  03-08          RADIOLOGY & ADDITIONAL STUDIES:   HERIBERTO REILLY  [id number]    HISTORY OF PRESENT ILLNESS:  51 yo F with PMHx of Retinal Vasculitis, Asthma, Hashimoto's disorder, UC, Bipolar presenting to the ED for evaluation for left acute on chronic left visual changes. She suggests that the vision problem has been going on for years. She describes at as a floater as well as an obstruction not allowing her to see clearly. She was previously diagnosed with retinal vasculitis in 2016 and follows with neuro-ophtho and retinal specialists. Patient also with persistent headaches for the past few months that are not relieved by Tylenol and Ibuprofen. The location of the headache is temporal, frontal and behind her eyes. Denies jaw claudication, cough, fevers, weight loss. She has baseline arthritis. She's had chronic b/l hand pain that is constant and worse in the morning. She has left shoulder pain and right shoulder stiffness. She has chronic neck pain and low back pain. She had trauma to her right lower extremity as a child needing surgical intervention. She has chronic R hip pain as a result. She also has stiffness in her feet. Additionally she has tender points behind her neck and has myalgia pain as well. She reveals that she cannot steroids because it causes psychosis. She denies rashes, alopecia, oral ulcers. Admits to swelling of fingers and Raynauds. No miscarriages. Sister with Ankylosing Spondylitis and Sjogrens. Brother with Psoriatic Arthritis. Patient follows with Dr. Adams? Rheumatology.      Rheum consulted for possible GCA and SLE.    PAST MEDICAL & SURGICAL HISTORY:   delivery delivered    Schizo-affective schizophrenia    Ulcerative colitis with rectal bleeding, unspecified location    Hashimoto&#x27;s disease    Retinal vasculitis, unspecified laterality    No significant past surgical history        Review of Systems:  Gen:  No fevers/chills, weight loss  HEENT: Positive for vision problem  CVS: No chest pain/palpitations  Resp: No SOB/wheezing  GI: No N/V/C/D/abdominal pain  MSK: Positive for joint pain  Skin: No new rashes  Neuro: No headaches    MEDICATIONS  (STANDING):    MEDICATIONS  (PRN):      Allergies    penicillin (Unknown)    Intolerances        PERTINENT MEDICATION HISTORY:    SOCIAL HISTORY: Denies smoking, Admits to social alcohol use, no drugs  OCCUPATION: Office job  TRAVEL HISTORY: No recent travel    FAMILY HISTORY:  Family history of seizures (Sibling)    Family history of colitis (Sibling)        Vital Signs Last 24 Hrs  T(C): 36.6 (09 Mar 2022 08:45), Max: 36.6 (09 Mar 2022 08:45)  T(F): 97.8 (09 Mar 2022 08:45), Max: 97.8 (09 Mar 2022 08:45)  HR: 69 (09 Mar 2022 08:45) (69 - 76)  BP: 107/78 (09 Mar 2022 08:45) (107/67 - 137/86)  BP(mean): 87 (09 Mar 2022 08:45) (82 - 103)  RR: 19 (09 Mar 2022 08:45) (16 - 19)  SpO2: 98% (09 Mar 2022 08:45) (97% - 100%)    Daily Height in cm: 160.02 (08 Mar 2022 21:23)    Daily     Physical Exam:  General: No apparent distress  HEENT: EOMI, MMM, Scalp tenderness b/l  CVS: +S1/S2, RRR, no carotid or subclavian bruits, temporal artery pulses equal and present b/l  Resp: CTA b/l  GI: Soft, NT/ND  MSK: Heberden nodes in DIP joints b/l, no synovitis, tender points presents behind neck  Neuro: AAOx3  Skin: no  rashes    LABS:                        13.6   6.58  )-----------( 264      ( 08 Mar 2022 23:33 )             41.5     03-08    140  |  102  |  17  ----------------------------<  99  4.2   |  28  |  0.69    Ca    10.0      08 Mar 2022 23:33    TPro  7.0  /  Alb  4.7  /  TBili  0.3  /  DBili  x   /  AST  22  /  ALT  18  /  AlkPhos  75  03-08    ESR 8  CRP <3      RADIOLOGY & ADDITIONAL STUDIES:   HERIBERTO REILLY  [id number]    HISTORY OF PRESENT ILLNESS:  51 yo F with PMHx of Retinal Vasculitis, Asthma, Hashimoto's disorder, UC, Bipolar presenting to the ED for evaluation for left acute on chronic left visual changes. She suggests that the vision problem has been going on for years. She describes at as a floater as well as an obstruction not allowing her to see clearly. She was previously diagnosed with retinal vasculitis in 2016 and follows with neuro-ophtho and retinal specialists. Patient also with persistent headaches for the past few months that are not relieved by Tylenol and Ibuprofen. The location of the headache is temporal, frontal and behind her eyes. Denies jaw claudication, cough, fevers, weight loss. She has baseline arthritis. She's had chronic b/l hand pain that is constant and worse in the morning. She has left shoulder pain and right shoulder stiffness. She has chronic neck pain and low back pain. She had trauma to her right lower extremity as a child needing surgical intervention. She has chronic R hip pain as a result. She also has stiffness in her feet. Additionally she has tender points behind her neck and has myalgia pain as well. She reveals that she cannot steroids because it causes psychosis. She denies rashes, alopecia, oral ulcers. Admits to swelling of fingers and Raynauds. No miscarriages. Sister with Ankylosing Spondylitis and Sjogrens. Brother with Psoriatic Arthritis. Patient follows with Dr. Adams? Rheumatology.      Rheum consulted for possible GCA and SLE.    PAST MEDICAL & SURGICAL HISTORY:   delivery delivered    Schizo-affective schizophrenia    Ulcerative colitis with rectal bleeding, unspecified location    Hashimoto&#x27;s disease    Retinal vasculitis, unspecified laterality    No significant past surgical history        Review of Systems:  Gen:  No fevers/chills, weight loss  HEENT: Positive for vision problem  CVS: No chest pain/palpitations  Resp: No SOB/wheezing  GI: No N/V/C/D/abdominal pain  MSK: Positive for joint pain  Skin: No new rashes  Neuro: No headaches    MEDICATIONS  (STANDING):    MEDICATIONS  (PRN):      Allergies    penicillin (Unknown)    Intolerances        PERTINENT MEDICATION HISTORY:    SOCIAL HISTORY: Denies smoking, Admits to social alcohol use, no drugs  OCCUPATION: Office job  TRAVEL HISTORY: No recent travel    FAMILY HISTORY:  Family history of seizures (Sibling)    Family history of colitis (Sibling)        Vital Signs Last 24 Hrs  T(C): 36.6 (09 Mar 2022 08:45), Max: 36.6 (09 Mar 2022 08:45)  T(F): 97.8 (09 Mar 2022 08:45), Max: 97.8 (09 Mar 2022 08:45)  HR: 69 (09 Mar 2022 08:45) (69 - 76)  BP: 107/78 (09 Mar 2022 08:45) (107/67 - 137/86)  BP(mean): 87 (09 Mar 2022 08:45) (82 - 103)  RR: 19 (09 Mar 2022 08:45) (16 - 19)  SpO2: 98% (09 Mar 2022 08:45) (97% - 100%)    Daily Height in cm: 160.02 (08 Mar 2022 21:23)    Daily     Physical Exam:  General: No apparent distress  HEENT: EOMI, MMM, Scalp tenderness b/l  CVS: +S1/S2, RRR, no carotid or subclavian bruits, temporal artery pulses equal and present b/l  Resp: CTA b/l  GI: Soft, NT/ND  MSK: Heberden nodes in DIP joints b/l, no synovitis, tender points presents behind neck  Neuro: AAOx3  Skin: no  rashes    LABS:                        13.6   6.58  )-----------( 264      ( 08 Mar 2022 23:33 )             41.5     03-08    140  |  102  |  17  ----------------------------<  99  4.2   |  28  |  0.69    Ca    10.0      08 Mar 2022 23:33    TPro  7.0  /  Alb  4.7  /  TBili  0.3  /  DBili  x   /  AST  22  /  ALT  18  /  AlkPhos  75  03-08    ESR 8  CRP <3      RADIOLOGY & ADDITIONAL STUDIES:  MRI Head and Orbits with and without IV contrast    No acute infarct, hemorrhage, or mass effect. Redemonstration of linear   branching vessels left caudate nucleus, unchanged from 2019 likely   representing an arteriovenous malformation versus or a developmental   venous anomaly.     MRI orbits:  No lesions or abnormal enhancement.

## 2022-03-09 NOTE — ED ADULT NURSE NOTE - NS ED NURSE LEVEL OF CONSCIOUSNESS ORIENTATION
PREOPERATIVE MEDICAL CONSULTATION      DATE:  2/23/2018    NAME:  Mars Moseley    YOB: 1972        CHIEF COMPLAINT:  Preoperative Exam     HISTORY OF PRESENT ILLNESS:  The patient is a 45 year old male, who is being evaluated pre-operatively at the request of Dr. Sarkar.  Patient is scheduled for right ankle surgery,  Achilles, sharp pain on lateral aspect-with turning of ankle, ankle pain on inside as well. Bone spurs on 3/8/18.  Fitted for orthotic on 2/16/18.  Continues to experience pain of the right ankle.  Chronic health conditions include:    Type 2 diabetes: Currently on metformin 1000 g twice daily along with statin and ACE inhibitor therapy. Patient does also take a baby aspirin daily. He is aware to hold this all week before surgery. Last hemoglobin A1c December 1, 2017 at 7.2.    Asthma: Uses albuterol as needed for shortness of breath or wheezing. Tells me he does not use this very often and cannot remember the last time he needed it. Tells me his asthma is more induced by allergies. Denies shortness of breath, chest pain, wheezing.    MEDICATIONS   Current Outpatient Prescriptions   Medication Sig   • albuterol 108 (90 Base) MCG/ACT inhaler Inhale 2 puffs into the lungs every 4 hours as needed for Shortness of Breath or Wheezing.   • simvastatin (ZOCOR) 10 MG tablet Take 1 tablet by mouth nightly.   • lisinopril (ZESTRIL) 5 MG tablet Take 1 tablet by mouth daily.   • metFORMIN (GLUCOPHAGE) 500 MG tablet Take 2 tablets by mouth 2 times daily (with meals).   • aspirin 81 MG tablet Take 1 tablet by mouth daily.   • Loratadine (CLARITIN PO) Take by mouth as needed.   • trimethoprim-polymyxin b (POLYTRIM) ophthalmic solution Place 2 drops into both eyes every 4 hours for 7 days.   • amoxicillin-clavulanate (AUGMENTIN) 875-125 MG per tablet Take 1 tablet by mouth 2 times daily for 7 days.     No current facility-administered medications for this visit.        ALLERGIES  Allergies as  of 02/23/2018 - Reviewed 02/23/2018   Allergen Reaction Noted   • Dander Other (See Comments) 03/14/2016   • Seasonal Other (See Comments) 03/14/2016       HISTORIES  Past Medical History:   Diagnosis Date   • Diabetes mellitus (CMS/HCC)        Past Surgical History:   Procedure Laterality Date   • Knee surgery         Family History   Problem Relation Age of Onset   • Diabetes Mother        Social History     Occupational History   • Not on file.     Social History Main Topics   • Smoking status: Never Smoker   • Smokeless tobacco: Never Used   • Alcohol use No   • Drug use: No   • Sexual activity: Not on file       SURGICAL/ANESTHESIA HISTORY:    Personal History:  []  YES    [x]  NO     []  UNKNOWN   History of problematic/difficult intubations.    []  YES    [x]  NO     []  UNKNOWN   History of prior anesthesia reactions.    []  YES    [x]  NO     []  UNKNOWN   History of bleeding or clotting disorders.      []  YES    [x]  NO     []  UNKNOWN   History of kidney disease.      []  YES    [x]  NO     []  UNKNOWN  History of a heart attack.     []  YES    [x]  NO     []  UNKNOWN   History of irregular heartbeat.     []  YES    [x]  NO     []  UNKNOWN   History of a stroke.     []  YES    [x]  NO     []  UNKNOWN    History of epilepsy or seizures.     []  YES    [x]  NO     []  UNKNOWN  History of problems with pain,  stiffness or arthritis in your neck or jaw.     []  YES    [x]  NO     []  UNKNOWN   History of thyroid disease     []  YES    [x]  NO     []  UNKNOWN    History of angina     []  YES    [x]  NO     []  UNKNOWN   Chest pain or breathlessness on  climbing 2 flights of stairs at normal speed     []  YES    [x]  NO     []  UNKNOWN   History of liver disease     []  YES    [x]  NO     []  UNKNOWN   History of heart failure     [x]  YES    []  NO     []  UNKNOWN    History of asthma. Controlled, uses albuterol inhaler as needed.     []  YES    [x]  NO     []  UNKNOWN    Diabetes that requires  insulin     [x]  YES    []  NO     []  UNKNOWN    Diabetes that requires oral medications?      []  YES    [x]  NO     []  UNKNOWN    History of bronchitis    [] YES    [x]  NO     []  UNKNOWN   Past history of blood transfusions.    []  YES    [x]  NO     []  UNKNOWN   History of exposure/treatment for hepatitis.    []  YES    [x]  NO     []  UNKNOWN   History of exposure to or treatment for TB.     []  YES    [x]  NO     []  UNKNOWN   History of ARC or AIDS related  complex.     Family History:  []  YES    [x]  NO     []  UNKNOWN   Family history of bleeding/clotting disorders.      []  YES    [x]  NO     []  UNKNOWN    Anyone in your family (blood  relatives) had a problem with anesthetic?     REVIEW OF SYSTEMS    Constitutional: Denies weight loss, generalized fatigue, chills, night sweats.  Eyes:  Denies change in visual acuity, denies diplopia, denies photophobia.   HENT: Denies chronic nasal congestion, sore throat, change in voice.  Respiratory: Denies shortness of breath, cough, sputum production, hemoptysis.  Cardiovascular:  Denies chest pain, palpitations, dyspnea on exertion.  Gastrointestinal:  Denies abdominal pain, diarrhea, constipation, melena, changes in bowel habits.  Genitourinary:  Denies dysuria, hematuria, frequency, urinary incontinence.  Musculoskeletal:  Denies back pain or new or worsening joint pain.   Integument:  Denies rash, changes in moles, no lesions seen.  Neurologic:  Denies headache, focal weakness or sensory changes.   Endocrine:  Denies polyuria or polydipsia, denies temperature intolerance.   Lymphatic:  Denies swollen glands.   Psychiatric:  Denies changes in mood, anxiety, insomnia, concerns about excess alcohol consumption or illicit drug use.      PHYSICAL EXAM  Visit Vitals  /88   Pulse 72   Temp 98.8 °F (37.1 °C)   Ht 5' 10\" (1.778 m)   Wt 122.5 kg   BMI 38.74 kg/m²       GENERAL: Appears stated age.  EYES:  Pupils equally round, reactive to light.  ENT:  External  ears normal, tympanic membranes normal, no exudate of oropharynx, hearing normal, lips not dry or cracked.  NECK:  No jugular venous distention on inspection, and no thyromegaly on palpation.  HEART:  Regular rate and rhythm, without murmur, no edema.  LUNGS: Clear to auscultation bilaterally with normal respiratory effort.  ABDOMEN:  Soft, non-tender, non-distended, no hepatosplenomegaly.  EXTREMITIES: Without edema, clubbing or cyanosis.    LYMPHATIC: Anterior cervical and supraclavicular lymph nodes normal.  SKIN:  No rashes on inspection, no palpable lesions.  NEUROLOGIC: Cranial nerves 2-12 grossly intact, sensation normal.  PSYCHIATRIC: Affect is good, mood good, alert and orientated x3.      LABORATORY  Lab Services on 02/23/2018   Component Date Value Ref Range Status   • WBC 02/23/2018 9.9  4.2 - 11.0 K/mcL Final   • RBC 02/23/2018 5.01  4.50 - 5.90 mil/mcL Final   • HGB 02/23/2018 14.7  13.0 - 17.0 g/dL Final   • HCT 02/23/2018 43.9  39.0 - 51.0 % Final   • MCV 02/23/2018 87.6  78.0 - 100.0 fl Final   • MCH 02/23/2018 29.3  26.0 - 34.0 pg Final   • MCHC 02/23/2018 33.5  32.0 - 36.5 g/dL Final   • RDW-CV 02/23/2018 13.3  11.0 - 15.0 % Final   • PLT 02/23/2018 293  140 - 450 K/mcL Final   • DIFF TYPE 02/23/2018 AUTOMATED DIFFERENTIAL   Final   • Neutrophil 02/23/2018 65  % Final   • LYMPH 02/23/2018 22  % Final   • MONO 02/23/2018 6  % Final   • EOSIN 02/23/2018 7  % Final   • BASO 02/23/2018 0  % Final   • Absolute Neutrophil 02/23/2018 6.4  1.8 - 7.7 K/mcL Final   • Absolute Lymph 02/23/2018 2.2  1.0 - 4.8 K/mcL Final   • Absolute Mono 02/23/2018 0.6  0.3 - 0.9 K/mcL Final   • Absolute Eos 02/23/2018 0.7* 0.1 - 0.5 K/mcL Final   • Absolute Baso 02/23/2018 0.0  0.0 - 0.3 K/mcL Final   • Fasting Status 02/23/2018 15  hrs Final   • Sodium 02/23/2018 138  135 - 145 mmol/L Final   • Potassium 02/23/2018 4.5  3.4 - 5.1 mmol/L Final   • Chloride 02/23/2018 102  98 - 107 mmol/L Final   • Carbon Dioxide 02/23/2018  27  21 - 32 mmol/L Final   • Anion Gap 02/23/2018 14  10 - 20 mmol/L Final   • Glucose 02/23/2018 158* 65 - 99 mg/dL Final   • BUN 02/23/2018 16  6 - 20 mg/dL Final   • Creatinine 02/23/2018 0.78  0.67 - 1.17 mg/dL Final   • GFR Estimate,  02/23/2018 >90   Final   • GFR Estimate, Non  02/23/2018 >90   Final   • BUN/Creatinine Ratio 02/23/2018 21  7 - 25 Final   • CALCIUM 02/23/2018 9.0  8.4 - 10.2 mg/dL Final       Assessment AND Plan      Procedural Risk:  Low  Cardiac Risk:  Low  he was advised not to take nonsteroidal anti-inflammatory agents, aspirin, nutritional and herbal supplements and vitamins for 1 week prior to surgery.      (Z01.818) Preop examination  Plan: CBC & AUTO DIFFERENTIAL, BASIC METABOLIC PANEL        Labs show no significant findings today besides hyperglycemia. He is encouraged to continue with lifestyle modifications including healthy eating habits, regular physical activity as tolerated with his ankle surgery and pain, weight loss efforts. Patient will be called with results. Continue with metformin as prescribed.    (E11.65) Type 2 diabetes mellitus with hyperglycemia, without long-term current use of insulin (CMS/[facility])  Plan: Continue with metformin, ACE inhibitor, statin as prescribed. Follow-up in 4 months with fasting labs prior.    (J98.01) Bronchospasm  Plan: Bronchospasm induced by allergies. Continue with albuterol as needed. This is controlled at this time.    (Z23) Need for vaccination  Plan: INFLUENZA QUADRIVALENT SPLIT 0.5 ML VACC, IM,         TETANUS DIPHTHERIA ACELLULAR PERTUSSIS VACC,         10+ YRS (BOOSTRIX)        Tetanus and influenza vaccine administered today.    No obvious contraindication for surgery was found today.  he was advised to have all procedural questions answered by his surgeon prior to the procedure.  The patient was informed that this examination does not mean there is not a risk for complications his surgery.  he  verbalized understanding and will proceed.      Thank you to Dr. Sarkar for allowing me the opportunity to assist in the care of this patient.  If you have any questions please feel free to contact me.    A copy of this note was sent to Dr. Sarkar.     DONALDO Bryan     Oriented - self; Oriented - place; Oriented - time

## 2022-03-09 NOTE — ED CDU PROVIDER INITIAL DAY NOTE - OBJECTIVE STATEMENT
52 year old woman with PMHx of Retinal Vasculitis, Asthma, Hashimoto's disorder, UC, Bipolar presenting to the ED for evaluation  for left visual changes starting last night. reports central visual darkness/shade that seems to have improved somewhat. Pt previously diagnosed with retinal vasculitis in 2016 and follows with neuro-ophtho and retinal specialists. The patient was at one point on both cellcept and  plaquenil for possible autoimmune mediated process but was taken off of them. 52 year old woman with PMHx of Retinal Vasculitis, Asthma, Hashimoto's disorder, UC, Bipolar presenting to the ED for evaluation  for left visual changes starting last night. reports central visual darkness/shade that seems to have improved somewhat. Pt previously diagnosed with retinal vasculitis in 2016 and follows with neuro-ophtho and retinal specialists. The patient was at one point on both cellcept and  plaquenil for possible autoimmune mediated process but was taken off of them. patient also notes headaches that are bandlike in distribution for many months, no obvious triggers, was previously on imitrex many years ago but this was stopped. headache at this time not severe

## 2022-03-09 NOTE — ED CDU PROVIDER DISPOSITION NOTE - ATTENDING CONTRIBUTION TO CARE
I have personally seen and examined this patient. I have discussed the case with the ACP. I have reviewed all pertinent clinical information, including history, physical exam, plan and the ACP’s note and agree except as noted. See MDM   Patient's w/u nonactionable. Neuro exam normal. F/u neuro and rheum outpatient.

## 2022-03-09 NOTE — ED CDU PROVIDER DISPOSITION NOTE - NSFOLLOWUPINSTRUCTIONS_ED_ALL_ED_FT
Follow up with your Primary Care Physician within the next 2-3 days  Bring a copy of your test results with you to your appointment  Continue your current medication regimen   NEW MEDICATIONS:    Return to the Emergency Room if you experience new or worsening symptoms     follow up with your private neuro-ophthalmologist and retinal specialist as outpatient OR you can follow up with [address]  [place], [place] [id number] [phone]    Follow up with Rheumatology:      If imaging negative for acute pathology, no neurological contraindication to discharge w/ follow up with Dr. Nissenbaum:   [address] [place], [place] [id number] [phone] Follow up with your Primary Care Physician within the next 2-3 days  Bring a copy of your test results with you to your appointment  Continue your current medication regimen   NEW MEDICATIONS:    Return to the Emergency Room if you experience new or worsening symptoms     follow up with your private neuro-ophthalmologist and retinal specialist as outpatient OR you can follow up with [address]  [place], [place] [id number] [phone]    Follow up with Rheumatology:      follow up with Neurology Dr. Nissenbaum:   [address] [place], [place] [id number] [phone] 1) Follow-up with your primary care provider in 1-2 days.    Follow-up with your rheumatologist within 1 week.    Follow up with your private neuro-ophthalmologist and retinal specialist as outpatient OR you can follow up with [address]  [place], [place] [id number] [phone]    You may also follow up with Neurology Dr. Nissenbaum:   [address] [place] [address]  [phone]    2) Continue to take all medications as prescribed.    3) Rest and drink plenty of fluids. Pain can be managed with Acetaminophen (aka Tylenol) and Ibuprofen (aka Motrin or Advil) over the counter as directed. Take with food.    4) Return to the ER for any new or worsening symptoms. 1) Follow-up with your primary care provider in 1-2 days. Discuss your elevated cholesterol with your PCP.    Follow-up with your rheumatologist within 1 week.    Follow up with your private neuro-ophthalmologist and retinal specialist as outpatient OR you can follow up with [address]  [place], [place] [id number] [phone]    You may also follow up with Neurology Dr. Nissenbaum:   [address] [place] [address]  [phone]    2) Continue to take all medications as prescribed.    3) Rest and drink plenty of fluids. Pain can be managed with Acetaminophen (aka Tylenol) and Ibuprofen (aka Motrin or Advil) over the counter as directed. Take with food.    4) Return to the ER for any new or worsening symptoms.

## 2022-03-09 NOTE — CONSULT NOTE ADULT - ASSESSMENT
THESE ARE RECS ARE NOT FINAL UNTIL DISCUSSED WITH ATTENDING    53 yo F with PMHx of Retinal Vasculitis, Asthma, Hashimoto's disorder, UC, Bipolar presenting to the ED for evaluation for left acute on chronic left visual changes with predominant b/l temporal headache for the past few months. Denies jaw pain, cough, fevers. Pain with extraocular movement. Patient's ESR and CRP is normal. It's also an unusual age for GCA. GIven her previous medical hx, her pretest probability is low for GCA. Would not pursue treatment or temporal artery biopsy at this time.    Plan  - can consider Temporal Artery US outpatient   - agree with MRI brain and orbits (new headache at her age needs MRI)  - No need for autoimmune workup for SLE, RA (Was negative in 2017 with just a positive CALEB 1:80 low titer)  - concern for spondyloarthropathy with family hx chronic low back pain and finger swilling, CT (Xray of lumbar spine negative in 2022); please send out HLA B-27   - may have Fibromyalgia; outpatient workup    To be discussed with Dr. Holman, Attending    Osmel Almanza MD  Rheumatology Fellow, PGY-4  Pager: [phone] 51 yo F with PMHx of Retinal Vasculitis, Asthma, Hashimoto's disorder, UC, Bipolar presenting to the ED for evaluation for left acute on chronic left visual changes with predominant b/l temporal headache for the past few months. Denies jaw pain, cough, fevers. Pain with extraocular movement. Patient's ESR and CRP is normal. It's also an unusual age for GCA. GIven her previous medical hx, her pretest probability is low for GCA. Would not pursue treatment or temporal artery biopsy at this time. MRI brain and orbits without acute or new findings. Differential diagnosis includes retinal vasculitis, ocular migraine or other headache type; doubtful GCA.    Plan  - obtain Temporal Artery US b/l  - Opthalmology and Neurology input appreciated  - f/u with her outpatient Rheumatologist for concern for Fibromyalgia vs spondyloarthropathy (family hx, chronic low back pain, and hx of finger swilling, Though Xray of lumbar spine negative in 2012)    Discussed with Dr. Holman, Attending    Osmel Almanza MD  Rheumatology Fellow, PGY-4  Pager: [phone]

## 2022-03-09 NOTE — CONSULT NOTE ADULT - SUBJECTIVE AND OBJECTIVE BOX
[facility] DEPARTMENT OF OPHTHALMOLOGY - INITIAL ADULT CONSULT  ------------------------------------------------------------------------------------------------------------    HPI: 52y F w/ PMHx of Retinal Vasculitis, Asthma, Hashimoto's, UC, Bipolar, Hypothyroidism PSHx of 2 C-sections presents to the ED c/o intermittent HA [id number], visual changes x2-3days. Also endorses mild dizziness tonight. Vision changes described as a veil over eyes and L worse than R. Endorses pain with extraocular movement. HA is usually located frontally, temporally, or behind the eyes. HA is mildly alleviated w/ Tylenol. Reports similar HA in the past but worse this time. Had similar event in the past w/ black floaters where pt was found to have elevated CRP and was treated w/ Prednisone in .    Last saw eye doctor in Aug or 2021. Scheduled to see him on Thursday. Haven't gotten steroids due to them causing manic episodes. Pt is a former cigarette smoker, social drinker. Denies street drug use. Denies vision loss. Denies new numbness/tingling in extremities, weakness, gait abnormalities. Denies CP, SOB, N/V/D, abd pain,  symptoms. Allergic to Penicillin, unsure of reaction.    Interval History: Pt sees Dr. Holland, neuro-ophthalmologist, and Dr. Dacosta, retinal specialist, at [facility]. Was told she has a retinal vasculitis. Was trialed on PO steroids but could not tolerate as it caused a manic episode flare. Was also treated several years ago with Cellcept which was stopped by a rheumatologist. Complaining of seeing "haloes" in L eye and area of scotoma in superonasal field OS. Pt complaining of scalp tenderness and temporal headache but denies jaw claudication.     PAST MEDICAL & SURGICAL HISTORY:   delivery delivered    Schizo-affective schizophrenia    Ulcerative colitis with rectal bleeding, unspecified location    Hashimoto&#x27;s disease    Retinal vasculitis, unspecified laterality    No significant past surgical history      Past Ocular History: Retinal vasculitis followed at [facility]  Ophthalmic Medications: None  FAMILY HISTORY:  Family history of seizures (Sibling)    Family history of colitis (Sibling)      MEDICATIONS  (STANDING):    MEDICATIONS  (PRN):    Allergies & Intolerances:   penicillin (Unknown)    Review of Systems:  Constitutional: No fever, chills  Eyes: +eye pain, visual disturbance OS, flashes, floaters, FBS, erythema, discharge, double vision, OU  Neuro: No tremors  Cardiovascular: No chest pain, palpitations  Respiratory: No SOB, no cough  GI: No nausea, vomiting, abdominal pain  : No dysuria  Skin: no rash  Psych: no depression  Endocrine: no polyuria, polydipsia  Heme/lymph: no swelling    VITALS: T(C): 36.4 (22 @ 23:30)  T(F): 97.6 (22 @ 23:30), Max: 97.7 (22 @ 21:23)  HR: 75 (22 @ 23:30) (75 - 76)  BP: 137/86 (22 @ 23:30) (132/87 - 137/86)  RR:  (18 - 18)  SpO2:  (97% - 99%)  Wt(kg): --  General: AAO x 3, appropriate mood and affect    Ophthalmology Exam:  Visual acuity (sc): 20/20 OU  Pupils: PERRL, minimally reactive OU, no APD  Ttono: 18 OD, 16 OS  Extraocular movements (EOMs): Full OU, mild pain, no diplopia  Confrontational Visual Field (CVF): Full OU  Color Plates: 12/12 OU    Pen Light Exam (PLE)  External: Flat OU, good temporal artery pulses.   Lids/Lashes/Lacrimal Ducts: Flat OU    Sclera/Conjunctiva: W+Q OU  Cornea: Cl OU  Anterior Chamber: D+F OU    Iris: Flat OU  Lens: Cl OU    Fundus Exam: dilated with 1% tropicamide and 2.5% phenylephrine  Approval obtained from primary team for dilation  Patient aware that pupils can remained dilated for at least 4-6 hours  Exam performed with 20D lens    Vitreous: wnl OU  Disc, cup/disc: sharp and pink, 0.4 OU  Macula: wnl OU  Vessels: wnl OD, perivascular sheathing in fundus and periphery OS  Periphery: wnl OU    Labs/Imaging:    C-Reactive Protein, Serum: <3: TEST REPEATED. mg/L (22 @ 23:33)

## 2022-03-09 NOTE — ED ADULT NURSE NOTE - CAS EDN DISCHARGE ASSESSMENT
Alert and oriented to person, place and time Alert and oriented to person, place and time/Patient baseline mental status/Awake/Symptoms improved

## 2022-03-09 NOTE — CONSULT NOTE ADULT - ASSESSMENT
Assessment and Recommendations:  52y female w/ pmhx/ochx of Retinal Vasculitis, Asthma, Hashimoto's, UC, Bipolar, Hypothyroidism consulted for headache for 1 months and visual disturbance (haloes and superonasal scotoma) OS for approx 1 week. Pt previously diagnosed with retinal vasculitis and follows with Dr. Holland and Dr. Dacosta at [facility]. Pt found to have perivascular sheathing OS c/w retinal vasculitis.     1. Retinal vasculitis OS  - Visual acuity (sc): 20/20 OU, Pupils: PERRL, minimally reactive OU, no APD, Ttono: 18 OD, 16 OS, Extraocular movements (EOMs): Full OU, mild pain, no diplopia, Confrontational Visual Field (CVF): Full OU, Color Plates: 12/12 OU  - DFE showing perivascular sheathing OS, no vitritis.   - CRP <3, f/u ESR  - Obtain syphilis testing, TB, toxoplasma, Lyme, HIV, CALEB, ANCA, anti-dsDNA, RF, anti-phospholipid  - Pt unable to tolerate PO steroids due to exacerbation of bipolar symptoms  - Please do not discharge until pt has been discussed with retinal specialist this AM.     Outpatient follow-up: Patient should follow-up with his/her ophthalmologist or with [facility] Department of Ophthalmology at the address below     [address] [place] [address]. [address]  [place] [address]  [phone]    Case to be discussed with Dr. Allan, chief resident, and with vitreoretinal specialist in AM.     Char DAVALOS Rai, MD  PGY-2, Ophthalmology  [phone]    Also available on [employer] Teams. Assessment and Recommendations:  52y female w/ pmhx/ochx of Retinal Vasculitis, Asthma, Hashimoto's, UC, Bipolar, Hypothyroidism consulted for headache for 1 months and visual disturbance (haloes and superonasal scotoma) OS for approx 1 week. Pt also complaining of temporal headache and scalp tenderness, concerning for possible GCA. Pt previously diagnosed with retinal vasculitis and follows with Dr. Holland and Dr. Dacosta at [facility]. Pt found to have perivascular sheathing OS c/w retinal vasculitis.     1. Retinal vasculitis OS  - Visual acuity (sc): 20/20 OU, Pupils: PERRL, minimally reactive OU, no APD, Ttono: 18 OD, 16 OS, Extraocular movements (EOMs): Full OU, mild pain, no diplopia, Confrontational Visual Field (CVF): Full OU, Color Plates: 12/12 OU  - DFE showing perivascular sheathing OS, no vitritis indicative of active inflammation.   - CRP <3, f/u ESR  - Obtain syphilis testing, TB, toxoplasma, Lyme, HIV, CALEB, ANCA, anti-dsDNA, RF, anti-phospholipid  - Pt unable to tolerate PO steroids due to exacerbation of bipolar symptoms  - Will reach out to [facility] to obtain more information regarding prior workup.     2. R/o GCA  - Pt complaining of temporal headache and scalp tenderness for 1 month. Denies jaw claudication  - CRP <3, f/u ESR  - Obtain MRI brain/orbits w/wo IVC  - Please consult neurology.   - Will obtain records from Dr. Holland's office.     Outpatient follow-up: Patient should follow-up with his/her ophthalmologist or with [facility] Department of Ophthalmology at the address below     [address] [place] [address]. [address]  [place] [address]  [phone]    Case discussed with Dr. Allan, chief resident, and Dr. Prasad, neuro-ophthalmologist    Char DAVALOS Rai, MD  PGY-2, Ophthalmology  [phone]    Also available on Microsoft Teams. Assessment and Recommendations:  52y female w/ pmhx/ochx of Retinal Vasculitis, Asthma, Hashimoto's, UC, Bipolar, Hypothyroidism consulted for headache for 1 months and visual disturbance (haloes and superonasal scotoma) OS for approx 1 week. Pt also complaining of temporal headache and scalp tenderness, concerning for possible GCA. Pt previously diagnosed with retinal vasculitis and follows with Dr. Holland and Dr. Dacosta at [facility]. Pt found to have perivascular sheathing OS c/w retinal vasculitis.     1. Retinal vasculitis OS  - Visual acuity (sc): 20/20 OU, Pupils: PERRL, minimally reactive OU, no APD, Ttono: 18 OD, 16 OS, Extraocular movements (EOMs): Full OU, mild pain, no diplopia, Confrontational Visual Field (CVF): Full OU, Color Plates: 12/12 OU  - DFE showing perivascular sheathing OS, no vitritis indicative of active inflammation.   - CRP <3, f/u ESR  - Obtain syphilis testing, TB, toxoplasma, Lyme, HIV, CALEB, ANCA, anti-dsDNA, RF, anti-phospholipid  - Pt unable to tolerate PO steroids due to exacerbation of bipolar symptoms  - Per Dr. Dacosta's note, pt previously had periarteritis and cotton wool spots in R eye and normal L eye. Was concerned for possible venous stasis retinopathy vs impending CRVO and recommended hypercoagulable workup.      2. R/o GCA  - Pt complaining of temporal headache and scalp tenderness for 1 month. Denies jaw claudication  - CRP <3, f/u ESR  - Obtain MRI brain/orbits w/wo IVC  - Please consult neurology and rheumatology.   - Per Dr. Holland's note, recommended hypercoagulable workup and carotid dopplers as an outpatient for retinal findings noted previously in R eye.     Outpatient follow-up: Patient should follow-up with his/her ophthalmologist or with [facility] Department of Ophthalmology at the address below     [address] [place] [address]. [address]  [place] [address]  [phone]    Case discussed with Dr. Allan, chief resident, and Dr. Prasad, neuro-ophthalmologist    Char DAVALOS Rai, MD  PGY-2, Ophthalmology  [phone]    Also available on Microsoft Teams. Assessment and Recommendations:  52y female w/ pmhx/ochx of Retinal Vasculitis, Asthma, Hashimoto's, UC, Bipolar, Hypothyroidism consulted for headache for 1 months and visual disturbance (haloes and superonasal scotoma) OS for approx 1 week. Pt also complaining of temporal headache and scalp tenderness, concerning for possible GCA. Pt previously diagnosed with retinal vasculitis and follows with Dr. Holland and Dr. Dacosta at [facility]. Pt found to have perivascular sheathing OS c/w retinal vasculitis.     1. Retinal vasculitis OS  - Visual acuity (sc): 20/20 OU, Pupils: PERRL, minimally reactive OU, no APD, Ttono: 18 OD, 16 OS, Extraocular movements (EOMs): Full OU, mild pain, no diplopia, Confrontational Visual Field (CVF): Full OU, Color Plates: 12/12 OU  - DFE showing perivascular sheathing OS, no vitritis indicative of active inflammation.   - CRP <3, f/u ESR  - Obtain syphilis testing, TB, toxoplasma, Lyme, HIV, CALEB, ANCA, anti-dsDNA, RF, anti-phospholipid  - Pt unable to tolerate PO steroids due to exacerbation of bipolar symptoms  - Per Dr. Dacosta's note, pt previously had periarteritis and cotton wool spots in R eye and normal L eye. Was concerned for possible venous stasis retinopathy vs impending CRVO and recommended hypercoagulable workup.      2. R/o GCA  - Pt complaining of temporal headache and scalp tenderness for 1 month. Denies jaw claudication  - CRP <3, f/u ESR  - Obtain MRI brain/orbits w/wo IVC  - Please consult neurology and rheumatology.   - Per Dr. Holland's note, recommended hypercoagulable workup and carotid dopplers as an outpatient for retinal findings noted previously in R eye.     Outpatient follow-up: Patient should follow-up with his/her ophthalmologist or with [facility] Department of Ophthalmology at the address below     [address] [place] [address]. [address]  [place] [address]  [phone]    Case discussed with Dr. Allan, chief resident, and SDW Dr. Prasad, neuro-ophthalmologist    Char DAVALOS Rai, MD  PGY-2, Ophthalmology  [phone]    Also available on [employer] Teams.

## 2022-03-10 VITALS
HEART RATE: 74 BPM | TEMPERATURE: 98 F | OXYGEN SATURATION: 99 % | RESPIRATION RATE: 16 BRPM | DIASTOLIC BLOOD PRESSURE: 74 MMHG | SYSTOLIC BLOOD PRESSURE: 125 MMHG

## 2022-03-10 LAB
A1C WITH ESTIMATED AVERAGE GLUCOSE RESULT: 5.2 % — SIGNIFICANT CHANGE UP (ref 4–5.6)
B BURGDOR C6 AB SER-ACNC: NEGATIVE — SIGNIFICANT CHANGE UP
B BURGDOR IGG+IGM SER-ACNC: 0.09 INDEX — SIGNIFICANT CHANGE UP (ref 0.01–0.89)
B2 GLYCOPROT1 AB SER QL: NEGATIVE — SIGNIFICANT CHANGE UP
CARDIOLIPIN AB SER-ACNC: NEGATIVE — SIGNIFICANT CHANGE UP
CHOLEST SERPL-MCNC: 259 MG/DL — HIGH
DSDNA AB SER-ACNC: <12 IU/ML — SIGNIFICANT CHANGE UP
ESTIMATED AVERAGE GLUCOSE: 103 MG/DL — SIGNIFICANT CHANGE UP (ref 68–114)
HDLC SERPL-MCNC: 86 MG/DL — SIGNIFICANT CHANGE UP
LIPID PNL WITH DIRECT LDL SERPL: 151 MG/DL — HIGH
NON HDL CHOLESTEROL: 173 MG/DL — HIGH
T PALLIDUM AB TITR SER: NEGATIVE — SIGNIFICANT CHANGE UP
TRIGL SERPL-MCNC: 108 MG/DL — SIGNIFICANT CHANGE UP

## 2022-03-10 PROCEDURE — 86036 ANCA SCREEN EACH ANTIBODY: CPT

## 2022-03-10 PROCEDURE — 93888 INTRACRANIAL LIMITED STUDY: CPT

## 2022-03-10 PROCEDURE — 70553 MRI BRAIN STEM W/O & W/DYE: CPT | Mod: MA

## 2022-03-10 PROCEDURE — 86431 RHEUMATOID FACTOR QUANT: CPT

## 2022-03-10 PROCEDURE — 93888 INTRACRANIAL LIMITED STUDY: CPT | Mod: 26

## 2022-03-10 PROCEDURE — 36000 PLACE NEEDLE IN VEIN: CPT | Mod: XU

## 2022-03-10 PROCEDURE — 85025 COMPLETE CBC W/AUTO DIFF WBC: CPT

## 2022-03-10 PROCEDURE — 99284 EMERGENCY DEPT VISIT MOD MDM: CPT | Mod: 25

## 2022-03-10 PROCEDURE — 86146 BETA-2 GLYCOPROTEIN ANTIBODY: CPT

## 2022-03-10 PROCEDURE — 86147 CARDIOLIPIN ANTIBODY EA IG: CPT

## 2022-03-10 PROCEDURE — 86225 DNA ANTIBODY NATIVE: CPT

## 2022-03-10 PROCEDURE — 36415 COLL VENOUS BLD VENIPUNCTURE: CPT

## 2022-03-10 PROCEDURE — 83036 HEMOGLOBIN GLYCOSYLATED A1C: CPT

## 2022-03-10 PROCEDURE — 86703 HIV-1/HIV-2 1 RESULT ANTBDY: CPT

## 2022-03-10 PROCEDURE — 85652 RBC SED RATE AUTOMATED: CPT

## 2022-03-10 PROCEDURE — 86481 TB AG RESPONSE T-CELL SUSP: CPT

## 2022-03-10 PROCEDURE — 86140 C-REACTIVE PROTEIN: CPT

## 2022-03-10 PROCEDURE — 84702 CHORIONIC GONADOTROPIN TEST: CPT

## 2022-03-10 PROCEDURE — 80061 LIPID PANEL: CPT

## 2022-03-10 PROCEDURE — 86780 TREPONEMA PALLIDUM: CPT

## 2022-03-10 PROCEDURE — 80053 COMPREHEN METABOLIC PANEL: CPT

## 2022-03-10 PROCEDURE — 86618 LYME DISEASE ANTIBODY: CPT

## 2022-03-10 PROCEDURE — G0378: CPT

## 2022-03-10 PROCEDURE — 70543 MRI ORBT/FAC/NCK W/O &W/DYE: CPT | Mod: MA

## 2022-03-10 PROCEDURE — 99217: CPT | Mod: FS

## 2022-03-10 RX ORDER — IBUPROFEN 200 MG
600 TABLET ORAL EVERY 6 HOURS
Refills: 0 | Status: DISCONTINUED | OUTPATIENT
Start: 2022-03-10 | End: 2022-03-12

## 2022-03-10 RX ADMIN — Medication 600 MILLIGRAM(S): at 11:22

## 2022-03-10 RX ADMIN — Medication 975 MILLIGRAM(S): at 06:40

## 2022-03-10 RX ADMIN — Medication 975 MILLIGRAM(S): at 05:35

## 2022-03-10 RX ADMIN — Medication 600 MILLIGRAM(S): at 10:22

## 2022-03-10 NOTE — ED ADULT NURSE REASSESSMENT NOTE - NS ED NURSE REASSESS COMMENT FT1
Pt. symptoms improved, states she is feeling better. Pt. discharged, ambulatory with significant other at bedside. Discharge teaching and follow up care reviewed with patient. IV removed. All questions answered.

## 2022-03-10 NOTE — ED ADULT NURSE REASSESSMENT NOTE - TEMPERATURE IN CELSIUS (DEGREES C)
"EP- Cardiology Progress Note           Assessment and Plan:       60-yo M with Hd of  HTN, BRUCE,  GERD, cirrhosis (nonalcoholic) and persistent Afib, who underwent ablaiton (12/21) and post-op was complicated by pericarditis.    Yesterday, he progressed with respiratory distress and low grade fever.  He was tx with lasix. This AM, CP is resolved but he continues to be SOB.    Plan;  1. SOB.  HFpEF.  We will give an extra dose of lasix this AM.  Reevaluate symptoms in afternoon.  If he feels better, he may go home. If does not feel better, we may transfer to [facility]. Obtain a limited echo.  2. Afib. In NSR.  Continue metoprolol and coumadin.  3. Pericarditis. Improved. Continue colchicine and NSAIDs (as needed).  9. HTN. BP is well controlled.      Physical Exam:  Vitals: BP 91/57 (BP Location: Right arm)   Pulse 100   Temp 98.6  F (37  C) (Oral)   Resp 24   Ht 1.664 m (5' 5.5\")   Wt 116.3 kg (256 lb 8 oz)   SpO2 94%   BMI 42.03 kg/m        Intake/Output Summary (Last 24 hours) at 12/23/2018 1133  Last data filed at 12/23/2018 0504  Gross per 24 hour   Intake 959 ml   Output 250 ml   Net 709 ml     Vitals:    12/21/18 0943 12/21/18 2229 12/22/18 0515 12/23/18 0838   Weight: 110.5 kg (243 lb 9.6 oz) 114.5 kg (252 lb 8 oz) 112.2 kg (247 lb 4.8 oz) 116.3 kg (256 lb 8 oz)       Constitutional:  AAO x3.  Pt is in NAD.  HEAD: Normocephalic.  SKIN: Skin normal color, texture and turgor with no lesions or eruptions.  Eyes: PERRL, EOMI.  ENT:  Supple, normal JVP. No lymphadenopathy or thyroid enlargement.  Chest:  Rales in base L lung.   Cardiac:  RRR, normal  S1 and S2.  No murmurs rubs or gallop.    Abdomen:  Normal BS.  Soft, non-tender and non-distended.  Increased abdominal girth.     Extremities:  Pedious pulses palpable B/L.  No LE edema noticed.   Neurological: Strength and sensation grossly symmetric and intact throughout.                            Review of Systems:   As per subjective, otherwise 5 systems "
reviewed and negative.         Medications:          furosemide  20 mg Oral Daily     metoprolol succinate ER  100 mg Oral At Bedtime     pantoprazole  40 mg Oral Daily     sodium chloride (PF)  3 mL Intracatheter Q8H     spironolactone  25 mg Oral Daily     PRN Meds: acetaminophen, albuterol, lidocaine 4%, lidocaine (buffered or not buffered), naloxone, nitroGLYcerin, oxyCODONE, sodium chloride (PF), Warfarin Therapy Reminder             Data:     Recent Labs   Lab 12/23/18  0615 12/23/18  0115 12/22/18  2148 12/22/18  0621 12/21/18  1020   WBC 6.8 6.6  --   --  4.0   HGB  --  12.6*  --   --  13.0*   MCV  --  101*  --   --  100   PLT  --  65*  --   --  63*   INR 3.03*  --  3.10*  --  2.35*   NA  --   --   --   --  140   POTASSIUM  --   --   --   --  3.7   CHLORIDE  --   --   --   --  106   CO2  --   --   --   --  24   BUN  --   --   --   --  14   CR  --   --   --   --  0.73   ANIONGAP  --   --   --   --  10   HALEY  --   --   --   --  8.3*   GLC  --   --   --   --  87   ALBUMIN  --   --   --  2.9*  --    PROTTOTAL  --   --   --  5.7*  --    BILITOTAL  --   --   --  1.3  --    ALKPHOS  --   --   --  73  --    ALT  --   --   --  45  --    AST  --   --   --  70*  --              
36.3
36.6
36.4

## 2022-03-10 NOTE — ED CDU PROVIDER SUBSEQUENT DAY NOTE - NS ED MD CDU HISTORY DATE TIME DT
- Followed by CTS  - EP discussed with Interventional Cardiology- Ok with Eliquis + Plavix but w/o ASA (to decrease bleed risk) in setting of cardiac hx + new-onset AFib   10-Mar-2022 00:00

## 2022-03-10 NOTE — ED CDU PROVIDER SUBSEQUENT DAY NOTE - PHYSICAL EXAMINATION
GEN: Well Appearing, Nontoxic, NAD  HEENT: NC/AT, Symm Facies. PERRL, EOMI  CV: No JVD/Bruits or stridor;  +S1S2, RRR w/o m/g/r  RESP: CTAB w/o w/r/r  ABD: Soft, nt/nd  EXT/MSK: No lower extremity edema or calf tenderness.  FROMx4  SKIN: No visible erythema, lesions or rash  Neuro: Grossly intact, AOX3 with normal speech, Sensation grossly intact, strength equal b/l UE/LE 5/5, steady gait  Psych: Appropriate mood and affect

## 2022-03-10 NOTE — ED ADULT NURSE REASSESSMENT NOTE - NS ED NURSE REASSESS COMMENT FT1
Patient received this am in NAD, VSS. Patient awaiting transcranial doppler at this time. Patient c/o headache, denies any dizziness at this time. ACP Blaine aware, Ibuprofen to be given. Plan of care explained, call bell placed within reach . Will continue to monitor. Safety maintained.

## 2022-03-10 NOTE — ED CDU PROVIDER SUBSEQUENT DAY NOTE - PROGRESS NOTE DETAILS
Pt seen at bedside in NAD, resting comfortably w/o new or evolving complaints. Endorsing waxing and waning b/l frontal HAs responsive to tylenol and motrin. VSS. Neuro intact. MRI brain and orbits w/o acute findings - AVM unchanged from 2019 - pt aware. Cleared for dc by neuro. s/w ophtho resident who will call back with final recs. b/l temporal US results and final rheum recs pending. Called radiology at 6pm to f/u on read of temporal artery US which was preformed this AM. s/w after hours on call resident Alie who states radiology attending left at 5pm without officially reading US. Alie will contact on call attending to try and expedite read. I stress importance of expedited read given US was done this AM and pts dispo is pending results. Normal temporal artery US. VSS. Pt feeling better and eager to go home. Results reviewed with pt and friend at bedside. No contraindications for DC documented by neuro, ophtho, rheum. All questions answered. Stable for DC.

## 2022-03-10 NOTE — ED CDU PROVIDER SUBSEQUENT DAY NOTE - MEDICAL DECISION MAKING DETAILS
Attending MD Foss : Pt asymptomatic at this time. Normal neuro exam. Awaiting temporal artery US. F/u neuro and rheum recs.

## 2022-03-10 NOTE — ED CDU PROVIDER SUBSEQUENT DAY NOTE - NS ED ROS FT
Constitutional: No fever or chills  Eyes: + visual changes  HEENT: No throat pain, ear pain, nasal pain.  CV: No chest pain or lower extremity edema  Resp: No SOB no cough  GI: No abd pain. No nausea or vomiting. No diarrhea.  : No dysuria, hematuria.   MSK: No musculoskeletal pain  Skin: No rash  Psych: No complaints   Neuro: + headache. No numbness or tingling. No weakness.  Endo: No DM

## 2022-03-10 NOTE — ED ADULT NURSE REASSESSMENT NOTE - COMFORT CARE
plan of care explained
ambulated to bathroom/darkened lights/meal provided/plan of care explained/po fluids offered/repositioned/side rails up/warm blanket provided

## 2022-03-11 LAB
GAMMA INTERFERON BACKGROUND BLD IA-ACNC: 0.02 IU/ML — SIGNIFICANT CHANGE UP
M TB IFN-G BLD-IMP: NEGATIVE — SIGNIFICANT CHANGE UP
M TB IFN-G CD4+ BCKGRND COR BLD-ACNC: 0.04 IU/ML — SIGNIFICANT CHANGE UP
M TB IFN-G CD4+CD8+ BCKGRND COR BLD-ACNC: 0.04 IU/ML — SIGNIFICANT CHANGE UP
QUANT TB PLUS MITOGEN MINUS NIL: 9.98 IU/ML — SIGNIFICANT CHANGE UP

## 2022-03-13 LAB
AUTO DIFF PNL BLD: ABNORMAL
C-ANCA SER-ACNC: NEGATIVE — SIGNIFICANT CHANGE UP
P-ANCA SER-ACNC: NEGATIVE — SIGNIFICANT CHANGE UP

## 2022-04-13 ENCOUNTER — TRANSCRIPTION ENCOUNTER (OUTPATIENT)
Age: 52
End: 2022-04-13

## 2022-06-08 NOTE — ED PROVIDER NOTE - MEDICAL DECISION MAKING DETAILS
1-1, will obtain medical clearance for psych eval. Adbry Counseling: I discussed with the patient the risks of tralokinumab including but not limited to eye infection and irritation, cold sores, injection site reactions, worsening of asthma, allergic reactions and increased risk of parasitic infection.  Live vaccines should be avoided while taking tralokinumab. The patient understands that monitoring is required and they must alert us or the primary physician if symptoms of infection or other concerning signs are noted.

## 2022-06-10 NOTE — PROGRESS NOTE BEHAVIORAL HEALTH - NSBHLEGALSTATUS_PSY_A_CORE
----- Message from Ray iSngh Varun Saavedra of orthopedic
PT Cancellation:  PT orders received  Pt refusing OOB at this time  Discussed with MERRY Reeder  Will re-attempt PT eval at a later time       Jeremy Louis, PT
9.13 (Voluntary)

## 2022-06-25 NOTE — ED CDU PROVIDER SUBSEQUENT DAY NOTE - NSICDXPASTSURGICALHX_GEN_ALL_CORE_FT
Problem: Pressure Injury - Risk of  Goal: *Prevention of pressure injury  Description: Document Rick Scale and appropriate interventions in the flowsheet. Outcome: Progressing Towards Goal  Note: Pressure Injury Interventions:  Sensory Interventions: Keep linens dry and wrinkle-free,Minimize linen layers,Turn and reposition approx. every two hours (pillows and wedges if needed)    Moisture Interventions: Absorbent underpads,Internal/External urinary devices,Minimize layers    Activity Interventions: PT/OT evaluation    Mobility Interventions: Turn and reposition approx.  every two hours(pillow and wedges),PT/OT evaluation    Nutrition Interventions: Document food/fluid/supplement intake PAST SURGICAL HISTORY:  No significant past surgical history

## 2022-06-27 NOTE — ED BEHAVIORAL HEALTH ASSESSMENT NOTE - NS ED BHA MSE SPEECH ARTICULATION
- Pt would like a call back from a nurse with questions about covid. Pt tested +covid. Declined phone visit with pcp.     Please call [phone] Normal

## 2022-06-28 ENCOUNTER — NON-APPOINTMENT (OUTPATIENT)
Age: 52
End: 2022-06-28

## 2022-07-06 NOTE — ED BEHAVIORAL HEALTH ASSESSMENT NOTE - NS ED BHA MED ROS RESPIRATORY
Results for orders placed or performed in visit on 07/06/22   Cardiac EP device report    Narrative    MDT-DUAL CHAMBER PPM/ACTIVE SYSTEM IS MRI CONDITIONAL  CARELINK TRANSMISSION: BATTERY VOLTAGE ADEQUATE (8 4 YRS)  AP-4%, -100% (DEPENDENT/CHB)  ALL AVAILABLE LEAD PARAMETERS WITHIN NORMAL LIMITS  1 NSVT EPISODE ON 4/23/22 FOR 10 BEATS, AVG CL~380MS  EF-60% (ECHO 1/19/18)  PT IS NOT ON ANY BETA BLOCKER  NORMAL DEVICE FUNCTION   GV No complaints

## 2022-07-11 ENCOUNTER — OUTPATIENT (OUTPATIENT)
Dept: OUTPATIENT SERVICES | Facility: HOSPITAL | Age: 52
LOS: 1 days | Discharge: STILL ADMITTED OR ADMINISTRATIVE | End: 2022-07-11

## 2022-07-11 ENCOUNTER — INPATIENT (INPATIENT)
Facility: HOSPITAL | Age: 52
LOS: 6 days | Discharge: HOME | End: 2022-07-18
Attending: PSYCHIATRY & NEUROLOGY | Admitting: PSYCHIATRY & NEUROLOGY

## 2022-07-11 VITALS — WEIGHT: 166.01 LBS | TEMPERATURE: 98 F | HEIGHT: 63 IN

## 2022-07-11 DIAGNOSIS — F25.9 SCHIZOAFFECTIVE DISORDER, UNSPECIFIED: ICD-10-CM

## 2022-07-11 DIAGNOSIS — F43.10 POST-TRAUMATIC STRESS DISORDER, UNSPECIFIED: ICD-10-CM

## 2022-07-11 LAB
AMPHET UR-MCNC: NEGATIVE — SIGNIFICANT CHANGE UP
BARBITURATES UR SCN-MCNC: NEGATIVE — SIGNIFICANT CHANGE UP
BENZODIAZ UR-MCNC: NEGATIVE — SIGNIFICANT CHANGE UP
COCAINE METAB.OTHER UR-MCNC: NEGATIVE — SIGNIFICANT CHANGE UP
CREATININE URINE RESULT, DAU: 20 MG/DL — SIGNIFICANT CHANGE UP
FLUAV AG NPH QL: SIGNIFICANT CHANGE UP
FLUBV AG NPH QL: SIGNIFICANT CHANGE UP
METHADONE UR-MCNC: NEGATIVE — SIGNIFICANT CHANGE UP
OPIATES UR-MCNC: NEGATIVE — SIGNIFICANT CHANGE UP
OXYCODONE UR-MCNC: NEGATIVE — SIGNIFICANT CHANGE UP
PCP SPEC-MCNC: SIGNIFICANT CHANGE UP
PCP UR-MCNC: NEGATIVE — SIGNIFICANT CHANGE UP
RSV RNA NPH QL NAA+NON-PROBE: SIGNIFICANT CHANGE UP
SARS-COV-2 RNA SPEC QL NAA+PROBE: SIGNIFICANT CHANGE UP
THC UR QL: NEGATIVE — SIGNIFICANT CHANGE UP

## 2022-07-11 PROCEDURE — 90839 PSYTX CRISIS INITIAL 60 MIN: CPT | Mod: 95

## 2022-07-11 PROCEDURE — 99222 1ST HOSP IP/OBS MODERATE 55: CPT

## 2022-07-11 RX ORDER — LAMOTRIGINE 25 MG/1
100 TABLET, ORALLY DISINTEGRATING ORAL AT BEDTIME
Refills: 0 | Status: DISCONTINUED | OUTPATIENT
Start: 2022-07-11 | End: 2022-07-13

## 2022-07-11 RX ORDER — ARIPIPRAZOLE 15 MG/1
20 TABLET ORAL DAILY
Refills: 0 | Status: DISCONTINUED | OUTPATIENT
Start: 2022-07-12 | End: 2022-07-18

## 2022-07-11 RX ORDER — LEVOTHYROXINE SODIUM 125 MCG
75 TABLET ORAL DAILY
Refills: 0 | Status: DISCONTINUED | OUTPATIENT
Start: 2022-07-11 | End: 2022-07-18

## 2022-07-11 RX ORDER — TRAZODONE HCL 50 MG
50 TABLET ORAL AT BEDTIME
Refills: 0 | Status: DISCONTINUED | OUTPATIENT
Start: 2022-07-11 | End: 2022-07-18

## 2022-07-11 RX ORDER — LANOLIN ALCOHOL/MO/W.PET/CERES
3 CREAM (GRAM) TOPICAL AT BEDTIME
Refills: 0 | Status: DISCONTINUED | OUTPATIENT
Start: 2022-07-11 | End: 2022-07-18

## 2022-07-11 RX ADMIN — Medication 3 MILLIGRAM(S): at 21:13

## 2022-07-11 RX ADMIN — LAMOTRIGINE 100 MILLIGRAM(S): 25 TABLET, ORALLY DISINTEGRATING ORAL at 21:09
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
## 2022-07-11 NOTE — BH INPATIENT PSYCHIATRY ASSESSMENT NOTE - NSBHCHARTREVIEWVS_PSY_A_CORE FT
Vital Signs Last 24 Hrs  T(C): 36.7 (07-11-22 @ 15:22), Max: 36.7 (07-11-22 @ 15:22)  T(F): 98 (07-11-22 @ 15:22), Max: 98 (07-11-22 @ 15:22)  HR: --  BP: --  BP(mean): --  RR: --  SpO2: --    Orthostatic VS  07-11-22 @ 15:22  Lying BP: --/-- HR: --  Sitting BP: 139/85 HR: 78  Standing BP: 134/90 HR: 81  Site: --  Mode: --

## 2022-07-11 NOTE — BH INPATIENT PSYCHIATRY ASSESSMENT NOTE - CURRENT MEDICATION
MEDICATIONS  (STANDING):  lamoTRIgine 100 milliGRAM(s) Oral at bedtime  levothyroxine 75 MICROGram(s) Oral daily    MEDICATIONS  (PRN):  melatonin. 3 milliGRAM(s) Oral at bedtime PRN Insomnia  traZODone 50 milliGRAM(s) Oral at bedtime PRN insomnia

## 2022-07-11 NOTE — BH INPATIENT PSYCHIATRY ASSESSMENT NOTE - RISK ASSESSMENT
Risk factors for self-harm / harm to others: sex, psychiatric diagnosis, substance use hx, prior hx of SA   Protective factors: inpatient and in a controlled setting with limited access to lethal means. not endorsing harm to self or others, able to contract for safety.

## 2022-07-11 NOTE — BH INPATIENT PSYCHIATRY ASSESSMENT NOTE - VIOLENCE PROTECTIVE FACTORS:
Residential stability/Relationship stability/Employment stability/Sobriety/Engagement in treatment/Insight into violence risk and need for management/treatment

## 2022-07-11 NOTE — CHART NOTE - NSCHARTNOTEFT_GEN_A_CORE
Screening Medical Evaluation    Patient Admitted from: [facility] admitting diagnosis: Schizoaffective disorder      PAST MEDICAL & SURGICAL HISTORY:  Schizo-affective schizophrenia  Ulcerative colitis with rectal bleeding, unspecified location  Hashimoto's disease  Retinal vasculitis, unspecified laterality  No significant past surgical history    ALLERGIES:  penicillin (Unknown)    SOCIAL HISTORY:  Patient reports use of cannabis and alcohol.    FAMILY HISTORY:  Family history of seizures (Sibling)  Family history of colitis (Sibling)      MEDICATIONS  (STANDING):  ARIPiprazole 20 milliGRAM(s) Oral daily  lamoTRIgine 100 milliGRAM(s) Oral at bedtime  levothyroxine 75 MICROGram(s) Oral daily    MEDICATIONS  (PRN):  melatonin. 3 milliGRAM(s) Oral at bedtime PRN Insomnia  traZODone 50 milliGRAM(s) Oral at bedtime PRN insomnia      Vital Signs Last 24 Hrs  T(C): 36.7 (2022 15:22), Max: 36.7 (2022 15:22)  T(F): 98 (2022 15:22), Max: 98 (2022 15:22)  HR: 78 (2022 15:22)  BP: 139/85 (2022 15:22)  BP(mean): --  RR: --  SpO2: --      PHYSICAL EXAM:  GENERAL: NAD, well-developed  HEAD:  Atraumatic, Normocephalic  EYES: EOMI, PERRLA, conjunctiva and sclera clear  NECK: Supple, No JVD  CHEST/LUNG: Clear to auscultation bilaterally; No wheeze  HEART: Regular rate and rhythm; No murmurs, rubs, or gallops  ABDOMEN: Soft, Nontender, Nondistended; Bowel sounds present  EXTREMITIES:  2+ Peripheral Pulses, No clubbing, cyanosis, or edema  PSYCH: AAOx3  NEUROLOGY: non-focal  SKIN: No rashes or lesions      Urinalysis Basic - ( 2022 21:00 )  Color: Colorless / Appearance: Clear / S.005 / pH: x  Gluc: x / Ketone: Negative  / Bili: Negative / Urobili: <2 mg/dL   Blood: x / Protein: Negative / Nitrite: Negative   Leuk Esterase: Small / RBC: 1 /HPF / WBC 5 /HPF   Sq Epi: x / Non Sq Epi: 2 /HPF / Bacteria: Negative      Assessment and Plan:  52F admitted to [facility] for Schizoaffective disorder w/ PMHx of Hashimoto's, ulcerative colitis, retinal vasculitis seen at bedside for medical screening evaluation. Patient has no acute complaints at this time. Patient denies fever, chills, headache, dizziness, lightheadedness, N/V, SOB, cough, congestion, chest pain, abdominal pain, dysuria, hematuria, diarrhea, constipation. Physical exam unremarkable, VSS. Admission labs pending.    1.) Schizoaffective disorder: Refer to primary team documentation for recs  2.) Hashimoto's: Continue levothyroxine 75mcg QD

## 2022-07-11 NOTE — BH INPATIENT PSYCHIATRY ASSESSMENT NOTE - OTHER PAST PSYCHIATRIC HISTORY (INCLUDE DETAILS REGARDING ONSET, COURSE OF ILLNESS, INPATIENT/OUTPATIENT TREATMENT)
prior hospitalizations  prior suicide attempts  not currently in Psychiatric outpatient treatment

## 2022-07-11 NOTE — BH INPATIENT PSYCHIATRY ASSESSMENT NOTE - NSBHMETABOLIC_PSY_ALL_CORE_FT
BMI: BMI (kg/m2): 29.4 (07-11-22 @ 15:22)  HbA1c: A1C with Estimated Average Glucose Result: 5.2 % (03-10-22 @ 08:23)    Glucose:   BP: --  Lipid Panel: Date/Time: 03-10-22 @ 08:37  Cholesterol, Serum: 259  Direct LDL: --  HDL Cholesterol, Serum: 86  Total Cholesterol/HDL Ration Measurement: --  Triglycerides, Serum: 108

## 2022-07-11 NOTE — BH INPATIENT PSYCHIATRY ASSESSMENT NOTE - MSE UNSTRUCTURED FT
Appearance: Dressed appropriately in casual clothing. appropriate hygiene + grooming  Behavior: Cooperative.  Relatedness: fair  Eye contact: appropriate  Motor: No abnormal movements, no psychomotor slowing or activation.  Speech: Regular rate. spontaneous  Mood: "sad"  Affect: constricted  Thought Process: linear, organized, goal directed  Thought Content: endorses passive thoughts of self-harm, denies intent or plan. denies homicidal ideation.   Perceptual: endorses intermittent AH from a male voice, not CAH. denies VH. reports paranoia  Insight: fair  Judgment: appropriate to seek treatment  Impulse control:  appropriate thus far and in this setting  Attention: appropriate  Gait: Intact.

## 2022-07-11 NOTE — BH PATIENT PROFILE - HOME MEDICATIONS
Flagyl 500 mg oral tablet , 1 tab(s) orally 3 times a day   ciprofloxacin 500 mg oral tablet , 1 tab(s) orally 2 times a day   levothyroxine 75 mcg (0.075 mg) oral tablet , 1 tab(s) orally once a day x 30 days  for hypothyroid   polyethylene glycol 3350 oral powder for reconstitution , 17 gram(s) orally once a day to continue until seen by outpatient proivder  senna oral tablet , 2 tab(s) orally once a day (at bedtime)to continue until seen by outpatient provider  fluvoxaMINE 50 mg oral tablet , 1 tab(s) orally once a day x 30 days  for depression   traZODone 50 mg oral tablet , 1 tab(s) orally once a day (at bedtime) x 30 days  for depression/insomnia   lamoTRIgine 25 mg oral tablet , 2 tab(s) orally once a day x 30 days  for mood   lamoTRIgine 100 mg oral tablet , 1 tab(s) orally once a day (at bedtime) x 30 days  for mood   gabapentin 100 mg oral capsule , 1 cap(s) orally once a day (at bedtime) for mood
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
## 2022-07-11 NOTE — BH INPATIENT PSYCHIATRY ASSESSMENT NOTE - NSSUICPROTFACT_PSY_ALL_CORE
Responsibility to children, family, or others/Identifies reasons for living/Supportive social network of family or friends/Fear of death or the actual act of killing self/Engaged in work or school/Beloved pets

## 2022-07-12 DIAGNOSIS — F43.10 POST-TRAUMATIC STRESS DISORDER, UNSPECIFIED: ICD-10-CM

## 2022-07-12 DIAGNOSIS — F25.0 SCHIZOAFFECTIVE DISORDER, BIPOLAR TYPE: ICD-10-CM

## 2022-07-12 LAB
ALBUMIN SERPL ELPH-MCNC: 4.9 G/DL — SIGNIFICANT CHANGE UP (ref 3.3–5)
ALP SERPL-CCNC: 83 U/L — SIGNIFICANT CHANGE UP (ref 40–120)
ALT FLD-CCNC: 21 U/L — SIGNIFICANT CHANGE UP (ref 4–33)
ANION GAP SERPL CALC-SCNC: 16 MMOL/L — HIGH (ref 7–14)
APPEARANCE UR: CLEAR — SIGNIFICANT CHANGE UP
AST SERPL-CCNC: 26 U/L — SIGNIFICANT CHANGE UP (ref 4–32)
BACTERIA # UR AUTO: NEGATIVE — SIGNIFICANT CHANGE UP
BASOPHILS # BLD AUTO: 0.03 K/UL — SIGNIFICANT CHANGE UP (ref 0–0.2)
BASOPHILS NFR BLD AUTO: 0.5 % — SIGNIFICANT CHANGE UP (ref 0–2)
BILIRUB SERPL-MCNC: 0.5 MG/DL — SIGNIFICANT CHANGE UP (ref 0.2–1.2)
BILIRUB UR-MCNC: NEGATIVE — SIGNIFICANT CHANGE UP
BUN SERPL-MCNC: 13 MG/DL — SIGNIFICANT CHANGE UP (ref 7–23)
CALCIUM SERPL-MCNC: 9.4 MG/DL — SIGNIFICANT CHANGE UP (ref 8.4–10.5)
CHLORIDE SERPL-SCNC: 101 MMOL/L — SIGNIFICANT CHANGE UP (ref 98–107)
CO2 SERPL-SCNC: 23 MMOL/L — SIGNIFICANT CHANGE UP (ref 22–31)
COLOR SPEC: COLORLESS — SIGNIFICANT CHANGE UP
CREAT SERPL-MCNC: 0.81 MG/DL — SIGNIFICANT CHANGE UP (ref 0.5–1.3)
DIFF PNL FLD: NEGATIVE — SIGNIFICANT CHANGE UP
EGFR: 88 ML/MIN/1.73M2 — SIGNIFICANT CHANGE UP
EOSINOPHIL # BLD AUTO: 0.08 K/UL — SIGNIFICANT CHANGE UP (ref 0–0.5)
EOSINOPHIL NFR BLD AUTO: 1.2 % — SIGNIFICANT CHANGE UP (ref 0–6)
EPI CELLS # UR: 2 /HPF — SIGNIFICANT CHANGE UP (ref 0–5)
GLUCOSE SERPL-MCNC: 127 MG/DL — HIGH (ref 70–99)
GLUCOSE UR QL: NEGATIVE — SIGNIFICANT CHANGE UP
HCT VFR BLD CALC: 47.3 % — HIGH (ref 34.5–45)
HGB BLD-MCNC: 14.6 G/DL — SIGNIFICANT CHANGE UP (ref 11.5–15.5)
HYALINE CASTS # UR AUTO: 0 /LPF — SIGNIFICANT CHANGE UP (ref 0–7)
IANC: 3.96 K/UL — SIGNIFICANT CHANGE UP (ref 1.8–7.4)
IMM GRANULOCYTES NFR BLD AUTO: 0.2 % — SIGNIFICANT CHANGE UP (ref 0–1.5)
KETONES UR-MCNC: NEGATIVE — SIGNIFICANT CHANGE UP
LEUKOCYTE ESTERASE UR-ACNC: ABNORMAL
LITHIUM SERPL-MCNC: <0.1 MMOL/L — LOW (ref 0.6–1.2)
LYMPHOCYTES # BLD AUTO: 2.01 K/UL — SIGNIFICANT CHANGE UP (ref 1–3.3)
LYMPHOCYTES # BLD AUTO: 31.4 % — SIGNIFICANT CHANGE UP (ref 13–44)
MCHC RBC-ENTMCNC: 29.1 PG — SIGNIFICANT CHANGE UP (ref 27–34)
MCHC RBC-ENTMCNC: 30.9 GM/DL — LOW (ref 32–36)
MCV RBC AUTO: 94.2 FL — SIGNIFICANT CHANGE UP (ref 80–100)
MONOCYTES # BLD AUTO: 0.32 K/UL — SIGNIFICANT CHANGE UP (ref 0–0.9)
MONOCYTES NFR BLD AUTO: 5 % — SIGNIFICANT CHANGE UP (ref 2–14)
NEUTROPHILS # BLD AUTO: 3.96 K/UL — SIGNIFICANT CHANGE UP (ref 1.8–7.4)
NEUTROPHILS NFR BLD AUTO: 61.7 % — SIGNIFICANT CHANGE UP (ref 43–77)
NITRITE UR-MCNC: NEGATIVE — SIGNIFICANT CHANGE UP
NRBC # BLD: 0 /100 WBCS — SIGNIFICANT CHANGE UP
NRBC # FLD: 0 K/UL — SIGNIFICANT CHANGE UP
PH UR: 6 — SIGNIFICANT CHANGE UP (ref 5–8)
PLATELET # BLD AUTO: 281 K/UL — SIGNIFICANT CHANGE UP (ref 150–400)
POTASSIUM SERPL-MCNC: 4 MMOL/L — SIGNIFICANT CHANGE UP (ref 3.5–5.3)
POTASSIUM SERPL-SCNC: 4 MMOL/L — SIGNIFICANT CHANGE UP (ref 3.5–5.3)
PROT SERPL-MCNC: 7.6 G/DL — SIGNIFICANT CHANGE UP (ref 6–8.3)
PROT UR-MCNC: NEGATIVE — SIGNIFICANT CHANGE UP
RBC # BLD: 5.02 M/UL — SIGNIFICANT CHANGE UP (ref 3.8–5.2)
RBC # FLD: 12.8 % — SIGNIFICANT CHANGE UP (ref 10.3–14.5)
RBC CASTS # UR COMP ASSIST: 1 /HPF — SIGNIFICANT CHANGE UP (ref 0–4)
SODIUM SERPL-SCNC: 140 MMOL/L — SIGNIFICANT CHANGE UP (ref 135–145)
SP GR SPEC: 1 — SIGNIFICANT CHANGE UP (ref 1–1.05)
T4 FREE SERPL-MCNC: 1.6 NG/DL — SIGNIFICANT CHANGE UP (ref 0.9–1.8)
TSH SERPL-MCNC: 4.66 UIU/ML — HIGH (ref 0.27–4.2)
UROBILINOGEN FLD QL: SIGNIFICANT CHANGE UP
WBC # BLD: 6.41 K/UL — SIGNIFICANT CHANGE UP (ref 3.8–10.5)
WBC # FLD AUTO: 6.41 K/UL — SIGNIFICANT CHANGE UP (ref 3.8–10.5)
WBC UR QL: 5 /HPF — SIGNIFICANT CHANGE UP (ref 0–5)

## 2022-07-12 RX ADMIN — ARIPIPRAZOLE 20 MILLIGRAM(S): 15 TABLET ORAL at 08:52

## 2022-07-12 RX ADMIN — Medication 75 MICROGRAM(S): at 06:10

## 2022-07-12 RX ADMIN — LAMOTRIGINE 100 MILLIGRAM(S): 25 TABLET, ORALLY DISINTEGRATING ORAL at 20:08

## 2022-07-12 NOTE — BH INPATIENT PSYCHIATRY PROGRESS NOTE - NSBHFUPINTERVALHXFT_PSY_A_CORE
Chart reviewed, case discussed with nursing.    Patient dong well on the unit, eating well and reports fair sleep... No issues overnight on the unit, and no use of PNRs besides melatonin for sleep. Pt describes her current mood as "calm, comfortable, but not too comfortable". Pt states that moving to a safe environment usually leads to the cessation of AH sx. Pt is currently adherent to all medications, and taking 20mg abilify and 100mg lamictal, along with levothyroxine for hashimoto's. Currently not complaining of any side effects. Currently denies suicidal ideation/intent/plan, as well as intent/plan for self harm. Pt currently denies all AH, and has never experienced VH. She descried the AH as "intrusive thoughts" which are no longer present. Pt does not appear paranoid or internally preoccupied. Pt said that she attended group therapy sessions today which went well. Discussed reflections from those sessions with her children on the phone today.
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
## 2022-07-12 NOTE — PSYCHIATRIC REHAB INITIAL EVALUATION - NSBHALCSUBTREAT_PSY_ALL_CORE
Per chart, patient reported meeting with therapist, but dissatisfied with services and has not gone in 2-3 weeks.
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
## 2022-07-12 NOTE — PSYCHIATRIC REHAB INITIAL EVALUATION - NSBHEMPLOYED_PSY_ALL_CORE
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

## 2022-07-12 NOTE — BH INPATIENT PSYCHIATRY PROGRESS NOTE - CURRENT MEDICATION
MEDICATIONS  (STANDING):  ARIPiprazole 20 milliGRAM(s) Oral daily  lamoTRIgine 100 milliGRAM(s) Oral at bedtime  levothyroxine 75 MICROGram(s) Oral daily    MEDICATIONS  (PRN):  melatonin. 3 milliGRAM(s) Oral at bedtime PRN Insomnia  traZODone 50 milliGRAM(s) Oral at bedtime PRN insomnia

## 2022-07-12 NOTE — BH INPATIENT PSYCHIATRY PROGRESS NOTE - MSE UNSTRUCTURED FT
Appearance: Dressed appropriately, with appropriate hygiene + grooming  Behavior: Cooperative.  Relatedness: fair  Eye contact: appropriate  Motor: No abnormal movements, no psychomotor slowing or activation.  Speech: Regular rate. spontaneous  Mood: "sad"  Affect: mildy constricted to euthymic   Thought Process: linear, organized, goal directed  Thought Content: Currently denies suicidal, homicidal and self-harm ideation/intention/plan.   Perceptual: Currently experiencing no AH/VH, or paranoia. Previously endorsed intermittent AH from a male voice and paranoia.   Insight: fair  Judgment: appropriate to seek treatment  Impulse control:  appropriate thus far and in this setting  Attention: appropriate  Gait: Intact.  Appearance: Dressed appropriately, with appropriate hygiene + grooming  Behavior: Cooperative.  Relatedness: fair  Eye contact: appropriate  Motor: No abnormal movements, no psychomotor slowing or activation.  Speech: Regular rate. spontaneous  Mood: "a little better"  Affect: constricted   Thought Process: linear, organized, goal directed  Thought Content: Currently denies suicidal, homicidal and self-harm ideation/intention/plan.   Perceptual: Currently experiencing no AH/VH, or paranoia. Previously endorsed intermittent AH from a male voice and paranoia.   Insight: fair  Judgment: appropriate to seek treatment  Impulse control:  appropriate thus far and in this setting  Attention: appropriate  Gait: Intact.

## 2022-07-12 NOTE — BH SOCIAL WORK INITIAL PSYCHOSOCIAL EVALUATION - NSCMSPTSTRENGTHS_PSY_ALL_CORE
Expressive of emotions/Future/goal oriented/Highly motivated for treatment/Intelligence/Interpersonal skills/Physically healthy/Resourceful/Successful coping history
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
## 2022-07-13 RX ORDER — LAMOTRIGINE 25 MG/1
125 TABLET, ORALLY DISINTEGRATING ORAL AT BEDTIME
Refills: 0 | Status: DISCONTINUED | OUTPATIENT
Start: 2022-07-13 | End: 2022-07-18

## 2022-07-13 RX ORDER — ACETAMINOPHEN 500 MG
650 TABLET ORAL EVERY 6 HOURS
Refills: 0 | Status: DISCONTINUED | OUTPATIENT
Start: 2022-07-13 | End: 2022-07-18

## 2022-07-13 RX ADMIN — Medication 650 MILLIGRAM(S): at 21:42

## 2022-07-13 RX ADMIN — Medication 50 MILLIGRAM(S): at 23:53

## 2022-07-13 RX ADMIN — LAMOTRIGINE 125 MILLIGRAM(S): 25 TABLET, ORALLY DISINTEGRATING ORAL at 20:34

## 2022-07-13 RX ADMIN — Medication 3 MILLIGRAM(S): at 20:35

## 2022-07-13 RX ADMIN — ARIPIPRAZOLE 20 MILLIGRAM(S): 15 TABLET ORAL at 09:08

## 2022-07-13 RX ADMIN — Medication 650 MILLIGRAM(S): at 22:30
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
## 2022-07-13 NOTE — BH INPATIENT PSYCHIATRY PROGRESS NOTE - MSE UNSTRUCTURED FT
Appearance: Dressed appropriately, with appropriate hygiene + grooming  Behavior: Cooperative.  Relatedness: normal  Eye contact: appropriate  Motor: No abnormal movements, no psychomotor slowing or activation.  Speech: Regular rate. spontaneous  Mood: "okay, a little better"  Affect: mildly constricted  Thought Process: linear, organized, goal directed  Thought Content: Currently denies suicidal, homicidal and self-harm ideation/intention/plan.   Perceptual: Currently experiencing no AH/VH, or paranoia. Previously endorsed intermittent AH from a male voice and paranoia.   Insight: fair, with signs of improvement  Judgment: appropriate to seek treatment  Impulse control:  appropriate thus far and in this setting  Attention: appropriate  Gait: Intact.
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
## 2022-07-13 NOTE — BH INPATIENT PSYCHIATRY PROGRESS NOTE - CURRENT MEDICATION
MEDICATIONS  (STANDING):  ARIPiprazole 20 milliGRAM(s) Oral daily  lamoTRIgine 100 milliGRAM(s) Oral at bedtime  levothyroxine 75 MICROGram(s) Oral daily    MEDICATIONS  (PRN):  acetaminophen     Tablet .. 650 milliGRAM(s) Oral every 6 hours PRN Temp greater or equal to 38C (100.4F), Mild Pain (1 - 3), Moderate Pain (4 - 6)  melatonin. 3 milliGRAM(s) Oral at bedtime PRN Insomnia  traZODone 50 milliGRAM(s) Oral at bedtime PRN insomnia   MEDICATIONS  (STANDING):  ARIPiprazole 20 milliGRAM(s) Oral daily  lamoTRIgine 125 milliGRAM(s) Oral at bedtime  levothyroxine 75 MICROGram(s) Oral daily    MEDICATIONS  (PRN):  acetaminophen     Tablet .. 650 milliGRAM(s) Oral every 6 hours PRN Temp greater or equal to 38C (100.4F), Mild Pain (1 - 3), Moderate Pain (4 - 6)  melatonin. 3 milliGRAM(s) Oral at bedtime PRN Insomnia  traZODone 50 milliGRAM(s) Oral at bedtime PRN insomnia

## 2022-07-14 PROCEDURE — 99231 SBSQ HOSP IP/OBS SF/LOW 25: CPT

## 2022-07-14 RX ORDER — OLANZAPINE 15 MG/1
2.5 TABLET, FILM COATED ORAL ONCE
Refills: 0 | Status: DISCONTINUED | OUTPATIENT
Start: 2022-07-14 | End: 2022-07-18

## 2022-07-14 RX ORDER — OLANZAPINE 15 MG/1
2.5 TABLET, FILM COATED ORAL EVERY 6 HOURS
Refills: 0 | Status: DISCONTINUED | OUTPATIENT
Start: 2022-07-14 | End: 2022-07-18

## 2022-07-14 RX ADMIN — ARIPIPRAZOLE 20 MILLIGRAM(S): 15 TABLET ORAL at 08:22

## 2022-07-14 RX ADMIN — Medication 650 MILLIGRAM(S): at 20:28

## 2022-07-14 RX ADMIN — Medication 50 MILLIGRAM(S): at 23:24

## 2022-07-14 RX ADMIN — LAMOTRIGINE 125 MILLIGRAM(S): 25 TABLET, ORALLY DISINTEGRATING ORAL at 20:18

## 2022-07-14 RX ADMIN — Medication 650 MILLIGRAM(S): at 22:33

## 2022-07-14 RX ADMIN — Medication 75 MICROGRAM(S): at 06:16

## 2022-07-14 RX ADMIN — Medication 3 MILLIGRAM(S): at 20:27

## 2022-07-14 NOTE — BH INPATIENT PSYCHIATRY PROGRESS NOTE - MSE UNSTRUCTURED FT
Appearance: Dressed appropriately, with appropriate hygiene + grooming  Behavior: Cooperative.  Relatedness: normal  Eye contact: appropriate  Motor: No abnormal movements, no psychomotor slowing or activation.  Speech: Regular rate. spontaneous  Mood: "good"  Affect: more reactive.   Thought Process: linear, organized, goal directed  Thought Content: Currently denies suicidal, homicidal and self-harm ideation/intention/plan.   Perceptual: Currently experiencing no AH/VH, or paranoia.   Insight: bettert  Judgment: appropriate to seek treatment  Impulse control:  appropriate thus far and in this setting  Attention: appropriate  Gait: Intact.

## 2022-07-14 NOTE — BH INPATIENT PSYCHIATRY PROGRESS NOTE - CURRENT MEDICATION
MEDICATIONS  (STANDING):  ARIPiprazole 20 milliGRAM(s) Oral daily  lamoTRIgine 125 milliGRAM(s) Oral at bedtime  levothyroxine 75 MICROGram(s) Oral daily    MEDICATIONS  (PRN):  acetaminophen     Tablet .. 650 milliGRAM(s) Oral every 6 hours PRN Temp greater or equal to 38C (100.4F), Mild Pain (1 - 3), Moderate Pain (4 - 6)  melatonin. 3 milliGRAM(s) Oral at bedtime PRN Insomnia  traZODone 50 milliGRAM(s) Oral at bedtime PRN insomnia

## 2022-07-14 NOTE — BH INPATIENT PSYCHIATRY PROGRESS NOTE - NSBHFUPINTERVALHXFT_PSY_A_CORE
Chart reviewed including pertinent labs, imaging, ekg. case discussed with treatment team.  Over this interval: Lamotrigine increased last night; patient tolerating well. Patient reports sleep was better last night and slept 4-5 hours. Reports improving mood, denies SI. denies AVH at this time.
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
## 2022-07-15 PROCEDURE — 90834 PSYTX W PT 45 MINUTES: CPT

## 2022-07-15 RX ADMIN — LAMOTRIGINE 125 MILLIGRAM(S): 25 TABLET, ORALLY DISINTEGRATING ORAL at 21:26

## 2022-07-15 RX ADMIN — Medication 50 MILLIGRAM(S): at 21:56

## 2022-07-15 RX ADMIN — Medication 75 MICROGRAM(S): at 05:59

## 2022-07-15 RX ADMIN — ARIPIPRAZOLE 20 MILLIGRAM(S): 15 TABLET ORAL at 08:19

## 2022-07-15 NOTE — BH INPATIENT PSYCHIATRY PROGRESS NOTE - MSE UNSTRUCTURED FT
Appearance: Dressed appropriately, with appropriate hygiene + grooming  Behavior: Cooperative.  Relatedness: normal  Eye contact: appropriate  Motor: No abnormal movements, no psychomotor slowing or activation.  Speech: Regular rate. spontaneous  Mood: "reflective"  Affect: more reactive.   Thought Process: linear, organized, goal directed  Thought Content: Currently denies suicidal, homicidal and self-harm ideation/intention/plan.   Perceptual: Currently experiencing no AH/VH, or paranoia.   Insight: better  Judgment: appropriate to seek treatment  Impulse control:  appropriate thus far and in this setting  Attention: appropriate  Gait: Intact.

## 2022-07-15 NOTE — BH TREATMENT PLAN - NSTXDEPRESGOAL_PSY_ALL_CORE
Attend and participate in at least 2 groups daily despite low mood/energy
Will identify 2 coping skills that assist in improving mood

## 2022-07-15 NOTE — BH INPATIENT PSYCHIATRY PROGRESS NOTE - NSBHFUPINTERVALHXFT_PSY_A_CORE
Chart reviewed including pertinent labs, imaging, ekg. case discussed with treatment team.  Over this interval: No behavioral issues on the unit. Pt has been adherent with all medications, and does not complain of any side effects. Pt is continuing with increased 125mg dose of lamictal/ Lamotrigine & tolerating well. Patient reports sleep was better last night, but required PRN trazodone. No other PRN/STAT meds, pt wants to avoid benzos. Reports level mood, with a slight change due to her "reflective" state.  Pt denies SI, denies AVH at this time. Pt attends all group therapy sessions, and is interested in individual therapy if offered. Pt continues to receive support from system including her older sister and 12-step AA program via phone. States she is reflecting on past experiences including "childhood trauma, its connection to her co-dependent personality, and allowing her identity to dissolve into problematic partners/relationships".

## 2022-07-15 NOTE — BH INPATIENT PSYCHIATRY PROGRESS NOTE - CURRENT MEDICATION
MEDICATIONS  (STANDING):  ARIPiprazole 20 milliGRAM(s) Oral daily  lamoTRIgine 125 milliGRAM(s) Oral at bedtime  levothyroxine 75 MICROGram(s) Oral daily    MEDICATIONS  (PRN):  acetaminophen     Tablet .. 650 milliGRAM(s) Oral every 6 hours PRN Temp greater or equal to 38C (100.4F), Mild Pain (1 - 3), Moderate Pain (4 - 6)  melatonin. 3 milliGRAM(s) Oral at bedtime PRN Insomnia  OLANZapine 2.5 milliGRAM(s) Oral every 6 hours PRN agitation  OLANZapine Injectable 2.5 milliGRAM(s) IntraMuscular once PRN severe agitation  traZODone 50 milliGRAM(s) Oral at bedtime PRN insomnia

## 2022-07-15 NOTE — BH PSYCHOLOGY - CLINICIAN PSYCHOTHERAPY NOTE - TOKEN PULL-DIAGNOSIS
Primary Diagnosis:  Schizoaffective disorder [F25.9]        Problem Dx:   Post traumatic stress disorder (PTSD) [F43.10]
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
## 2022-07-15 NOTE — BH TREATMENT PLAN - NSTXDCOPLKINTERSW_PSY_ALL_CORE
SW spoke w/ pt about recent challenges she's been experiencing, reports she's unable to connect to an outside provider, couldn't afford last MD since she was paying pvt, would like assistance to connect to tx, SW to explore options w/ pt & tx team for appropriate care

## 2022-07-15 NOTE — BH TREATMENT PLAN - NSTXCOPEINTERPR_PSY_ALL_CORE
Psych rehab recommends patient engages in individual and group therapy sessions in order for patient to gain psychoeducation and support over the next seven days. Psych rehab will continuously work with patient to build therapeutic rapport.
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
## 2022-07-16 RX ADMIN — Medication 50 MILLIGRAM(S): at 21:06

## 2022-07-16 RX ADMIN — ARIPIPRAZOLE 20 MILLIGRAM(S): 15 TABLET ORAL at 09:06

## 2022-07-16 RX ADMIN — LAMOTRIGINE 125 MILLIGRAM(S): 25 TABLET, ORALLY DISINTEGRATING ORAL at 21:06

## 2022-07-16 RX ADMIN — Medication 75 MICROGRAM(S): at 05:41

## 2022-07-17 RX ADMIN — ARIPIPRAZOLE 20 MILLIGRAM(S): 15 TABLET ORAL at 08:46

## 2022-07-17 RX ADMIN — Medication 75 MICROGRAM(S): at 05:17

## 2022-07-17 RX ADMIN — LAMOTRIGINE 125 MILLIGRAM(S): 25 TABLET, ORALLY DISINTEGRATING ORAL at 21:05

## 2022-07-17 RX ADMIN — Medication 3 MILLIGRAM(S): at 21:42

## 2022-07-18 VITALS — TEMPERATURE: 98 F

## 2022-07-18 PROCEDURE — 99238 HOSP IP/OBS DSCHRG MGMT 30/<: CPT

## 2022-07-18 RX ORDER — LAMOTRIGINE 25 MG/1
1 TABLET, ORALLY DISINTEGRATING ORAL
Qty: 14 | Refills: 0
Start: 2022-07-18 | End: 2022-07-31

## 2022-07-18 RX ORDER — LANOLIN ALCOHOL/MO/W.PET/CERES
1 CREAM (GRAM) TOPICAL
Qty: 14 | Refills: 0
Start: 2022-07-18 | End: 2022-07-31

## 2022-07-18 RX ORDER — ARIPIPRAZOLE 15 MG/1
1 TABLET ORAL
Qty: 14 | Refills: 0
Start: 2022-07-18 | End: 2022-07-31

## 2022-07-18 RX ADMIN — Medication 75 MICROGRAM(S): at 05:37

## 2022-07-18 RX ADMIN — ARIPIPRAZOLE 20 MILLIGRAM(S): 15 TABLET ORAL at 08:43

## 2022-07-18 NOTE — BH DISCHARGE NOTE NURSING/SOCIAL WORK/PSYCH REHAB - NSDCVIVACCINE_GEN_ALL_CORE_FT
influenza, injectable, quadrivalent, preservative free; 22-Oct-2018 12:26; Sarai Naylor (RN); [employer]; [id number] (Exp. Date: 30-Jun-2019); IntraMuscular; Deltoid Left.; 0.5 milliLiter(s); VIS (VIS Published: 07-Aug-2015, VIS Presented: 22-Oct-2018);
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
## 2022-07-18 NOTE — BH INPATIENT PSYCHIATRY PROGRESS NOTE - NSICDXBHSECONDARYDX_PSY_ALL_CORE
Post traumatic stress disorder (PTSD)   F43.10  

## 2022-07-18 NOTE — BH INPATIENT PSYCHIATRY PROGRESS NOTE - NSBHCHARTREVIEWVS_PSY_A_CORE FT
Vital Signs Last 24 Hrs  T(C): 36.3 (07-15-22 @ 06:36), Max: 36.3 (07-15-22 @ 06:36)  T(F): 97.4 (07-15-22 @ 06:36), Max: 97.4 (07-15-22 @ 06:36)  HR: --  BP: --  BP(mean): --  RR: --  SpO2: --    Orthostatic VS  07-15-22 @ 06:36  Lying BP: --/-- HR: --  Sitting BP: 96/73 HR: 65  Standing BP: --/-- HR: --  Site: --  Mode: --  Orthostatic VS  07-14-22 @ 08:25  Lying BP: --/-- HR: --  Sitting BP: 118/81 HR: 73  Standing BP: --/-- HR: --  Site: --  Mode: --  
Vital Signs Last 24 Hrs  T(C): 36.4 (07-18-22 @ 08:34), Max: 36.4 (07-17-22 @ 16:02)  T(F): 97.6 (07-18-22 @ 08:34), Max: 97.6 (07-17-22 @ 16:02)  HR: --  BP: --  BP(mean): --  RR: --  SpO2: --    Orthostatic VS  07-18-22 @ 08:34  Lying BP: --/-- HR: --  Sitting BP: 117/84 HR: 71  Standing BP: --/-- HR: --  Site: --  Mode: --  Orthostatic VS  07-17-22 @ 16:02  Lying BP: --/-- HR: --  Sitting BP: 117/84 HR: 71  Standing BP: --/-- HR: --  Site: --  Mode: --  Orthostatic VS  07-17-22 @ 08:09  Lying BP: --/-- HR: --  Sitting BP: 114/86 HR: 73  Standing BP: --/-- HR: --  Site: --  Mode: --  
Vital Signs Last 24 Hrs  T(C): 36.8 (07-14-22 @ 08:25), Max: 36.8 (07-14-22 @ 08:25)  T(F): 98.3 (07-14-22 @ 08:25), Max: 98.3 (07-14-22 @ 08:25)  HR: --  BP: --  BP(mean): --  RR: --  SpO2: --    Orthostatic VS  07-14-22 @ 08:25  Lying BP: --/-- HR: --  Sitting BP: 118/81 HR: 73  Standing BP: --/-- HR: --  Site: --  Mode: --  Orthostatic VS  07-13-22 @ 08:39  Lying BP: --/-- HR: --  Sitting BP: 131/88 HR: 67  Standing BP: --/-- HR: --  Site: --  Mode: --  
Vital Signs Last 24 Hrs  T(C): 36.4 (07-12-22 @ 08:31), Max: 36.7 (07-11-22 @ 15:22)  T(F): 97.5 (07-12-22 @ 08:31), Max: 98 (07-11-22 @ 15:22)  HR: --  BP: --  BP(mean): --  RR: --  SpO2: --    Orthostatic VS  07-12-22 @ 08:31  Lying BP: --/-- HR: --  Sitting BP: 114/84 HR: 66  Standing BP: --/-- HR: --  Site: --  Mode: --  Orthostatic VS  07-11-22 @ 15:22  Lying BP: --/-- HR: --  Sitting BP: 139/85 HR: 78  Standing BP: 134/90 HR: 81  Site: --  Mode: --  
Vital Signs Last 24 Hrs  T(C): 36.3 (07-13-22 @ 08:39), Max: 36.9 (07-12-22 @ 17:33)  T(F): 97.4 (07-13-22 @ 08:39), Max: 98.4 (07-12-22 @ 17:33)  HR: --  BP: --  BP(mean): --  RR: --  SpO2: --    Orthostatic VS  07-13-22 @ 08:39  Lying BP: --/-- HR: --  Sitting BP: 131/88 HR: 67  Standing BP: --/-- HR: --  Site: --  Mode: --  Orthostatic VS  07-12-22 @ 08:31  Lying BP: --/-- HR: --  Sitting BP: 114/84 HR: 66  Standing BP: --/-- HR: --  Site: --  Mode: --  Orthostatic VS  07-11-22 @ 15:22  Lying BP: --/-- HR: --  Sitting BP: 139/85 HR: 78  Standing BP: 134/90 HR: 81  Site: --  Mode: --

## 2022-07-18 NOTE — BH INPATIENT PSYCHIATRY DISCHARGE NOTE - NSDCMRMEDTOKEN_GEN_ALL_CORE_FT
ARIPiprazole 20 mg oral tablet: 1 tab(s) orally once a day  LaMICtal 100 mg oral tablet: 1 tab(s) orally once a day (at bedtime). total 125mg at bedtime   LaMICtal 25 mg oral tablet: 1 tab(s) orally once a day (at bedtime). total 125mg at bedtime  melatonin 3 mg oral tablet: 1 tab(s) orally once a day (at bedtime), As needed, Insomnia

## 2022-07-18 NOTE — BH INPATIENT PSYCHIATRY PROGRESS NOTE - NSBHATTESTCOMMENTATTENDFT_PSY_A_CORE
Patient adjusting to milieu. Adherent to medications. Reports feeling better today without thoughts of self harm. Acknowledges recent breakup in contributing to her mood decompensated state. Her thoughts are linear and organized, she does not appear to be hallucinating and she denies AVH. Therapy referral made. Will continue meds as ordered. Patient not connected to outpatient treatment and team will help patient in establishing aftercare. 
pt without behavioral issues, reported having a bad nightmare last night, otherwise reports improving mood, currently denies SIIP, denies AVH, amenable to ongoing treatment. discussed increase in lamotrigine, the rare risk of serious rash; patient verbalizes understanding and amenable to increase. 
no behavioral issues; patient appears near her psychiatric baseline. has bright reactive affect. continues to endorse challenges with sleep though with relative improvements. adamantly denies SIIP. is future oriented. no a/e to meds. c/w meds as ordered. aftercare planning in progress 
Patient euthymic, reactive, no AVH, no overt manic or depressive symptoms, no paranoia, adherent to treatment, has attended groups and individual therapy. stable for ongoing care in a setting that is less restrictive.

## 2022-07-18 NOTE — BH INPATIENT PSYCHIATRY PROGRESS NOTE - NSDCCRITERIA_PSY_ALL_CORE
When pt is no longer an acute or imminent risk of harm to self or others, and is able to care for self safely, pt may then be discharged. 

## 2022-07-18 NOTE — BH DISCHARGE NOTE NURSING/SOCIAL WORK/PSYCH REHAB - DISCHARGE INSTRUCTIONS AFTERCARE APPOINTMENTS
In order to check the location, date, or time of your aftercare appointment, please refer to your Discharge Instructions Document given to you upon leaving the hospital.  If you have lost the instructions please call [phone]

## 2022-07-18 NOTE — BH INPATIENT PSYCHIATRY PROGRESS NOTE - NSICDXBHTERTIARYDX_PSY_ALL_CORE
R/O Complex posttraumatic stress disorder   F43.10  

## 2022-07-18 NOTE — BH INPATIENT PSYCHIATRY DISCHARGE NOTE - HOSPITAL COURSE
Patient presenting with symptoms of worsening low mood and intermittent AH. On initial exam,  patient presented with a "sad" mood, constricted affect, endorsing passive thoughts of self-harm, intermittent auditory hallucinations from a male voice, and paranoia. Denied VH, suicidal intent or plan, HIIP, or command auditory hallucination.  She previously took lamotrigine 100mg. Psychoeduation provided to the patient regarding Aripiprazole risks, benefits, and alternatives. Pt was started on aripiprazole 20 mg q.d. and has continued on it since without any issue. Pt ws previously on 100mg lamotrigine q.d., and titrated up to 125mg q.d. Over the course of hospitalization, patieht without behavioral issues, not experiencing any AH, and not requiring STAT/PRN medications. Took trazodone 50mg oral at bed time PRN for insomnia. Collaborated with family and outpatient providers in aftercare planning. On day of discharge, savannah reported subjective improvements in mood, going from feeling hopeless, helpless, and despairing upon intake to feeling hopeful, confident and forward-looking today. She adamantly denied suicidal and homicidal ideations, and had intact reality testing, exhibiting future oriented thinking. MSE on day of discharge: well-appearing, "calm, comfortable, and happy" mood, euthymic affect, denies AH/VH, no SIIP/HIIP. At present, patient is stable for ongoing care in a setting that is less restrictive.    low acute risk for harm to self. risk factors include psychiatric dx (schizoaffective d/o, PTSD, cluster B), prior hx of self-harm, hx of substance use. protective factors include patient currently not endorsing harm to self, has intact reality testing, cites family and spirituality as protective factors, has access to treatment and is help-seeking, is future oriented in wanting to resume treatment and go to their family retreat, no access to firearms. chronically elevated risk but low acute risk.     low acute risk for harm to others. Risk factors include substance use hx. protective factors include not currently endorsing harm to others, no known hx of aggression or violence, no known legal hx, no access to firearms, has intact reality testing, understands the consequences of harm to others, has high moral standards. low acute riskk.

## 2022-07-18 NOTE — BH INPATIENT PSYCHIATRY PROGRESS NOTE - MSE UNSTRUCTURED FT
Appearance: Dressed appropriately, with appropriate hygiene + grooming  Behavior: Cooperative.  Relatedness: normal  Eye contact: appropriate  Motor: No abnormal movements, no psychomotor slowing or activation.  Speech: Regular rate. spontaneous  Mood: "calm, comfortable, happy"  Affect: euthymic   Thought Process: linear, organized, goal directed  Thought Content: Currently denies suicidal, homicidal and self-harm ideation/intention/plan.   Perceptual: Currently experiencing no AH/VH, or paranoia.   Insight: better  Judgment: appropriate to seek treatment  Impulse control:  appropriate thus far and in this setting  Attention: appropriate  Gait: Intact.  Appearance: Dressed appropriately, with appropriate hygiene + grooming  Behavior: Cooperative.  Relatedness: normal  Eye contact: appropriate  Motor: No abnormal movements, no psychomotor slowing or activation.  Speech: Regular rate. spontaneous  Mood: "calm, comfortable, happy"  Affect: euthymic, reactive, mood congruent.   Thought Process: linear, organized, goal directed  Thought Content: Currently denies suicidal, homicidal and self-harm ideation/intention/plan.   Perceptual: Currently experiencing no AH/VH, or paranoia.   Insight: good  Judgment: appropriate to seek treatment  Impulse control:  appropriate thus far and in this setting  Attention: appropriate  Gait: Intact.

## 2022-07-18 NOTE — BH INPATIENT PSYCHIATRY PROGRESS NOTE - NSICDXBHPRIMARYDX_PSY_ALL_CORE
Schizoaffective disorder   F25.9  
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
## 2022-07-18 NOTE — BH INPATIENT PSYCHIATRY PROGRESS NOTE - NS ED BHA REVIEW OF ED CHART VITAL SIGNS REVIEWED
From: Mary Kate Chopra  To: IRASEMA Berman  Sent: 8/16/2017 10:08 AM CDT  Subject: Chanel Henry, it's chanel. I don't have an account and it says I need a code which I don't know so I'm messaging you off my moms. So I'm on my period this week on that new birth control you prescribed and I woke up this morning and had bled through my tampon all over. I haven't bled through a tampon in years and on the old stuff I could sleep through the night and be fine. So I'm not sure if this new stuff could make me bleed more or what because this hasn't happened to me at all when I was on the other stuff. Thanks, chanel  
Yes

## 2022-07-18 NOTE — BH DISCHARGE NOTE NURSING/SOCIAL WORK/PSYCH REHAB - NSCDUDCCRISIS_PSY_A_CORE
[facility] Well  1 [phone]) [facility]-WELL ([phone])  Text "WELL" to 61362  Website: www.Fligoo/.[employer] 1 (695) 621-FBMP (0658) Website: www.safehorizon.org/.National Suicide Prevention Lifeline 3 (785) 991-1484/.  [employer]  1 (765) LIFENET (390-2544)/.  [facility]  [phone] [place] [id number] [phone]   Hours:  Monday through Friday from 9 AM to 3 PM/.  [place] [employer] - [facility] Crisis Line  [phone], Option 1

## 2022-07-18 NOTE — BH DISCHARGE NOTE NURSING/SOCIAL WORK/PSYCH REHAB - PATIENT PORTAL LINK FT
You can access the FollowMyHealth Patient Portal offered by Health system by registering at the following website: [email or web address]. By joining [facility]’s FollowMyHealth portal, you will also be able to view your health information using other applications (apps) compatible with our system.

## 2022-07-18 NOTE — BH INPATIENT PSYCHIATRY PROGRESS NOTE - NSTXDEPRESGOAL_PSY_ALL_CORE
Will identify 2 coping skills that assist in improving mood
Attend and participate in at least 2 groups daily despite low mood/energy
Attend and participate in at least 2 groups daily despite low mood/energy
Will identify 2 coping skills that assist in improving mood
Will identify 2 coping skills that assist in improving mood

## 2022-07-18 NOTE — BH INPATIENT PSYCHIATRY PROGRESS NOTE - NSBHFUPINTERVALHXFT_PSY_A_CORE
Chart reviewed including pertinent labs, imaging, ekg. case discussed with treatment team.  Over this interval: No behavioral issues on the unit. Pt has been adherent with all medications, and does not complain of any side effects. Pt is continuing with increased 125mg dose of lamictal/ Lamotrigine & tolerating well. Over the weekend, slept well with trazodone friday/sat. Patient reports sleep was even better last night, no trazodone PRN, only melatonin. No other PRN/STAT meds, pt wants to avoid benzos. Reports "calm, comfortable, even happy" mood. Pt denies SI, denies AVH at this time. Pt attends all group therapy sessions and attended individual therapy on Friday. Pt continues to receive support from system including her older sister and 12-step AA program via phone. States she is ready for discharge, is very hopeful and is looking forward to the future now that she has a plan in place and good support system. Chart reviewed including pertinent labs, imaging, ekg. case discussed with treatment team.  Over this interval: No behavioral issues on the unit. Pt has been adherent with all medications, and does not complain of any side effects. adherent Lamotrigine & tolerating well. Over the weekend, slept well with trazodone friday/sat. Patient reports sleep was even better last night, no trazodone PRN, only melatonin. No other PRN/STAT meds, pt wants to avoid benzos. Reports "calm, comfortable, even happy" mood. Pt denies SI, denies AVH at this time. Pt attends all group therapy sessions and attended individual therapy on Friday. Pt continues to receive support from system including her older sister and 12-step AA program via phone. States she is ready for discharge, is very hopeful and is looking forward to the future now that she has a plan in place and good support system. Chart reviewed including pertinent labs, imaging, ekg. case discussed with treatment team.  Over this interval: No behavioral issues on the unit. Pt has been adherent with all medications, and does not complain of any side effects. adherent Lamotrigine & tolerating well. Over the weekend, slept well with trazodone friday/sat. Patient reports sleep was even better last night, no trazodone PRN, only melatonin. No other PRN/STAT meds. Reports "calm, comfortable, even happy" mood. Pt denies SI, denies AVH at this time. Pt attends all group therapy sessions and attended individual therapy on Friday. Pt continues to receive support from system including her older sister and 12-step AA program via phone. States she is ready for discharge, is very hopeful and is looking forward to the future now that she has a plan in place and good support system.

## 2022-07-18 NOTE — BH INPATIENT PSYCHIATRY DISCHARGE NOTE - NSDCCPCAREPLAN_GEN_ALL_CORE_FT
PRINCIPAL DISCHARGE DIAGNOSIS  Diagnosis: Schizoaffective disorder  Assessment and Plan of Treatment: abilify + lamotrigine

## 2022-07-18 NOTE — BH INPATIENT PSYCHIATRY PROGRESS NOTE - NSBHFUPINTERVALCCFT_PSY_A_CORE
follow up mood 
schizoaffective disorder, AH, follow-up worsening low mood
schizoaffective disorder, AH, follow-up worsening low mood

## 2022-07-18 NOTE — BH INPATIENT PSYCHIATRY DISCHARGE NOTE - NSBHDCRISKMITIGATE_PSY_ALL_CORE
Safety planning/Reduction in access to lethal methods (pills, firearms, etc)/Family/Other social support involvement/Medications targeting suicidality/non-suicidal self injurious behavior

## 2022-07-18 NOTE — BH INPATIENT PSYCHIATRY PROGRESS NOTE - NSTXDEPRESINTERMD_PSY_ALL_CORE
abilify + lamotrigine 

## 2022-07-18 NOTE — BH DISCHARGE NOTE NURSING/SOCIAL WORK/PSYCH REHAB - NSDCPRGOAL_PSY_ALL_CORE
Patient has demonstrated progress towards psychiatric rehabilitation goals during current hospitalization. Patient has demonstrated daily medication compliance and is tolerating medications well. Patient reports improvement in depression/anxiety compared to admission. Patient denies SI/HI/AVH. Patient was able to identify talking to supports, writing/reading, creative arts, mediation and spending time with pet dog as effective coping skills to manage symptoms. Patient reports feeling confident in ability to utilize coping skills post discharge. Patient has attended approximately 89 percent of psychiatric rehabilitation groups during current hospitalization. Patient was verbal and engaged in group discussions and activities. Patient was able to tolerate group structure and did not require redirection. Patient was visible in the milieu. Patient increasingly socialized with select peers appropriately. Patient was able to make needs known to staff. Patient has demonstrated improved insight into the current episode and was able to identify intrusive/racing thoughts, unstable mood, gambling, binge eating, substance use and sleeplessness as warning signs that a crisis may be developing. Patient expressed readiness for discharge. Patient was provided with a [employer] survey to complete prior to discharge.

## 2022-07-18 NOTE — BH DISCHARGE NOTE NURSING/SOCIAL WORK/PSYCH REHAB - NSDCPRRECOMMEND_PSY_ALL_CORE
Psychiatric rehabilitation staff recommends that patient continue to demonstrate daily medication compliance and utilize identified coping skills to manage symptoms. Patient would benefit from attending [facility] outpatient for ongoing medication management, support and psychotherapy.

## 2022-07-18 NOTE — BH INPATIENT PSYCHIATRY PROGRESS NOTE - NSBHATTESTTYPEVISIT_PSY_A_CORE
Attending Only
Attending with Resident/Fellow/Student

## 2022-07-26 ENCOUNTER — OUTPATIENT (OUTPATIENT)
Dept: OUTPATIENT SERVICES | Facility: HOSPITAL | Age: 52
LOS: 1 days | Discharge: HOME | End: 2022-07-26

## 2022-07-26 DIAGNOSIS — F25.0 SCHIZOAFFECTIVE DISORDER, BIPOLAR TYPE: ICD-10-CM

## 2022-07-26 DIAGNOSIS — F43.10 POST-TRAUMATIC STRESS DISORDER, UNSPECIFIED: ICD-10-CM

## 2022-07-27 PROCEDURE — 99214 OFFICE O/P EST MOD 30 MIN: CPT | Mod: 95

## 2022-08-04 NOTE — SOCIAL WORK POST DISCHARGE FOLLOW UP NOTE - NSBHSWFOLLOWUP_PSY_ALL_CORE_FT
SW advised pt didn't keep f/u appt w/ [facility] 8/2, lm to offer assistance w/ rescheduling, pt returned call, lm for SW reporting she kept [facility] crisis bridge appt 7/27 but didn't attend [facility] intake since it was too far and she started working, has linked to [facility] in [place], will be seen by MD & therapist. Case closed [place]

## 2022-08-19 ENCOUNTER — INPATIENT (INPATIENT)
Facility: HOSPITAL | Age: 52
LOS: 4 days | Discharge: HOME | DRG: 750 | End: 2022-08-24
Attending: PSYCHIATRY & NEUROLOGY | Admitting: PSYCHIATRY & NEUROLOGY
Payer: MEDICAID

## 2022-08-19 VITALS — HEIGHT: 63 IN | WEIGHT: 169.98 LBS

## 2022-08-19 DIAGNOSIS — R45.851 SUICIDAL IDEATIONS: ICD-10-CM

## 2022-08-19 DIAGNOSIS — F25.9 SCHIZOAFFECTIVE DISORDER, UNSPECIFIED: ICD-10-CM

## 2022-08-19 LAB
ANION GAP SERPL CALC-SCNC: 4 MMOL/L — LOW (ref 5–17)
APAP SERPL-MCNC: <2 UG/ML — LOW (ref 10–30)
APPEARANCE UR: CLEAR — SIGNIFICANT CHANGE UP
BASOPHILS # BLD AUTO: 0.02 K/UL — SIGNIFICANT CHANGE UP (ref 0–0.2)
BASOPHILS NFR BLD AUTO: 0.4 % — SIGNIFICANT CHANGE UP (ref 0–2)
BILIRUB UR-MCNC: NEGATIVE — SIGNIFICANT CHANGE UP
BUN SERPL-MCNC: 12 MG/DL — SIGNIFICANT CHANGE UP (ref 7–23)
CALCIUM SERPL-MCNC: 9 MG/DL — SIGNIFICANT CHANGE UP (ref 8.5–10.1)
CHLORIDE SERPL-SCNC: 113 MMOL/L — HIGH (ref 96–108)
CO2 SERPL-SCNC: 25 MMOL/L — SIGNIFICANT CHANGE UP (ref 22–31)
COLOR SPEC: YELLOW — SIGNIFICANT CHANGE UP
CREAT SERPL-MCNC: 0.87 MG/DL — SIGNIFICANT CHANGE UP (ref 0.5–1.3)
DIFF PNL FLD: NEGATIVE — SIGNIFICANT CHANGE UP
EGFR: 81 ML/MIN/1.73M2 — SIGNIFICANT CHANGE UP
EOSINOPHIL # BLD AUTO: 0.04 K/UL — SIGNIFICANT CHANGE UP (ref 0–0.5)
EOSINOPHIL NFR BLD AUTO: 0.8 % — SIGNIFICANT CHANGE UP (ref 0–6)
ETHANOL SERPL-MCNC: <10 MG/DL — SIGNIFICANT CHANGE UP (ref 0–10)
GLUCOSE SERPL-MCNC: 87 MG/DL — SIGNIFICANT CHANGE UP (ref 70–99)
GLUCOSE UR QL: NEGATIVE — SIGNIFICANT CHANGE UP
HCG SERPL-ACNC: <1 MIU/ML — SIGNIFICANT CHANGE UP
HCT VFR BLD CALC: 38.8 % — SIGNIFICANT CHANGE UP (ref 34.5–45)
HGB BLD-MCNC: 12.7 G/DL — SIGNIFICANT CHANGE UP (ref 11.5–15.5)
IMM GRANULOCYTES NFR BLD AUTO: 0.2 % — SIGNIFICANT CHANGE UP (ref 0–1.5)
KETONES UR-MCNC: NEGATIVE — SIGNIFICANT CHANGE UP
LEUKOCYTE ESTERASE UR-ACNC: NEGATIVE — SIGNIFICANT CHANGE UP
LYMPHOCYTES # BLD AUTO: 1.63 K/UL — SIGNIFICANT CHANGE UP (ref 1–3.3)
LYMPHOCYTES # BLD AUTO: 30.7 % — SIGNIFICANT CHANGE UP (ref 13–44)
MCHC RBC-ENTMCNC: 30.1 PG — SIGNIFICANT CHANGE UP (ref 27–34)
MCHC RBC-ENTMCNC: 32.7 GM/DL — SIGNIFICANT CHANGE UP (ref 32–36)
MCV RBC AUTO: 91.9 FL — SIGNIFICANT CHANGE UP (ref 80–100)
MONOCYTES # BLD AUTO: 0.31 K/UL — SIGNIFICANT CHANGE UP (ref 0–0.9)
MONOCYTES NFR BLD AUTO: 5.8 % — SIGNIFICANT CHANGE UP (ref 2–14)
NEUTROPHILS # BLD AUTO: 3.3 K/UL — SIGNIFICANT CHANGE UP (ref 1.8–7.4)
NEUTROPHILS NFR BLD AUTO: 62.1 % — SIGNIFICANT CHANGE UP (ref 43–77)
NITRITE UR-MCNC: NEGATIVE — SIGNIFICANT CHANGE UP
PCP SPEC-MCNC: SIGNIFICANT CHANGE UP
PH UR: 5 — SIGNIFICANT CHANGE UP (ref 5–8)
PLATELET # BLD AUTO: 287 K/UL — SIGNIFICANT CHANGE UP (ref 150–400)
POTASSIUM SERPL-MCNC: 4.2 MMOL/L — SIGNIFICANT CHANGE UP (ref 3.5–5.3)
POTASSIUM SERPL-SCNC: 4.2 MMOL/L — SIGNIFICANT CHANGE UP (ref 3.5–5.3)
PROT UR-MCNC: NEGATIVE — SIGNIFICANT CHANGE UP
RBC # BLD: 4.22 M/UL — SIGNIFICANT CHANGE UP (ref 3.8–5.2)
RBC # FLD: 14.1 % — SIGNIFICANT CHANGE UP (ref 10.3–14.5)
SALICYLATES SERPL-MCNC: <1.7 MG/DL — LOW (ref 2.8–20)
SODIUM SERPL-SCNC: 142 MMOL/L — SIGNIFICANT CHANGE UP (ref 135–145)
SP GR SPEC: 1 — LOW (ref 1.01–1.02)
UROBILINOGEN FLD QL: NEGATIVE — SIGNIFICANT CHANGE UP
WBC # BLD: 5.31 K/UL — SIGNIFICANT CHANGE UP (ref 3.8–10.5)
WBC # FLD AUTO: 5.31 K/UL — SIGNIFICANT CHANGE UP (ref 3.8–10.5)

## 2022-08-19 PROCEDURE — 84443 ASSAY THYROID STIM HORMONE: CPT

## 2022-08-19 PROCEDURE — 99285 EMERGENCY DEPT VISIT HI MDM: CPT

## 2022-08-19 PROCEDURE — 99221 1ST HOSP IP/OBS SF/LOW 40: CPT

## 2022-08-19 PROCEDURE — 93010 ELECTROCARDIOGRAM REPORT: CPT

## 2022-08-19 PROCEDURE — 83036 HEMOGLOBIN GLYCOSYLATED A1C: CPT

## 2022-08-19 PROCEDURE — 80061 LIPID PANEL: CPT

## 2022-08-19 PROCEDURE — 36415 COLL VENOUS BLD VENIPUNCTURE: CPT

## 2022-08-19 RX ORDER — OLANZAPINE 15 MG/1
10 TABLET, FILM COATED ORAL AT BEDTIME
Refills: 0 | Status: DISCONTINUED | OUTPATIENT
Start: 2022-08-20 | End: 2022-08-24

## 2022-08-19 RX ORDER — LANOLIN ALCOHOL/MO/W.PET/CERES
3 CREAM (GRAM) TOPICAL AT BEDTIME
Refills: 0 | Status: DISCONTINUED | OUTPATIENT
Start: 2022-08-19 | End: 2022-08-24

## 2022-08-19 RX ORDER — ALBUTEROL 90 UG/1
2 AEROSOL, METERED ORAL EVERY 6 HOURS
Refills: 0 | Status: DISCONTINUED | OUTPATIENT
Start: 2022-08-19 | End: 2022-08-24

## 2022-08-19 RX ORDER — TRAZODONE HCL 50 MG
50 TABLET ORAL AT BEDTIME
Refills: 0 | Status: DISCONTINUED | OUTPATIENT
Start: 2022-08-19 | End: 2022-08-19

## 2022-08-19 RX ORDER — LAMOTRIGINE 25 MG/1
150 TABLET, ORALLY DISINTEGRATING ORAL DAILY
Refills: 0 | Status: DISCONTINUED | OUTPATIENT
Start: 2022-08-19 | End: 2022-08-24

## 2022-08-19 RX ORDER — LEVOTHYROXINE SODIUM 125 MCG
75 TABLET ORAL DAILY
Refills: 0 | Status: DISCONTINUED | OUTPATIENT
Start: 2022-08-19 | End: 2022-08-24

## 2022-08-19 RX ORDER — OLANZAPINE 15 MG/1
5 TABLET, FILM COATED ORAL AT BEDTIME
Refills: 0 | Status: DISCONTINUED | OUTPATIENT
Start: 2022-08-19 | End: 2022-08-19

## 2022-08-19 RX ADMIN — Medication 50 MILLIGRAM(S): at 22:18

## 2022-08-19 RX ADMIN — Medication 3 MILLIGRAM(S): at 22:18

## 2022-08-19 RX ADMIN — OLANZAPINE 5 MILLIGRAM(S): 15 TABLET, FILM COATED ORAL at 22:18

## 2022-08-19 NOTE — BH SOCIAL WORK INITIAL PSYCHOSOCIAL EVALUATION - OTHER PAST PSYCHIATRIC HISTORY (INCLUDE DETAILS REGARDING ONSET, COURSE OF ILLNESS, INPATIENT/OUTPATIENT TREATMENT)
Patient has had multiple 5N admissions and was recently at [facility] from July 11-18  Patient was attending [facility] and sees a private psychiatrist that she describes as holistic and taking her off meds.

## 2022-08-19 NOTE — ED BEHAVIORAL HEALTH ASSESSMENT NOTE - HPI (INCLUDE ILLNESS QUALITY, SEVERITY, DURATION, TIMING, CONTEXT, MODIFYING FACTORS, ASSOCIATED SIGNS AND SYMPTOMS)
Patient a 53 y/o  female, unemployed, domiciled has 2 children, S-15/18 living with their father, hx of SAD since age 21, multiple past Psychiatric Hospitalization, most recent hospitalization in [facility] from July' 11-18, no prior SI/SA, endorses that she has SI with plans to kill herself.    Patient in bed, stressed, good intense eye contact, endorses that she takes her meds as prescribed, and is on Abilify 20 mg with Lamictal 125 mg daily which was increased to current dosages at [facility], she endorses that she feels restless and not able to sleep, she has not slept for past few days, she added that she also hears A/H of a  man telling her to kill herself. She scared and seeking help for stability. She denied drug abuse hx, denied cigarettes smoking and cannabis abuse etc. She added that she is not able to take Haldol/Risperdal and later on Zyprexa as well. She added that anything she used to take with SSRI would not be able to take as she feels different. She agreed to take Zyprexa 5 mg HS with Lamictal 125 mg daily. She is free from alcohol for 81 days now, stopped cannabis a year earlier. Earlier she went to [facility] and was advised to come here for help.    Medically has Thyroiditis Hashimoto's, U. Colitis;  Asthma and Vasculitis, she does not take any meds now.

## 2022-08-19 NOTE — BH SOCIAL WORK INITIAL PSYCHOSOCIAL EVALUATION - NSPTSTATEDGOAL_PSY_ALL_CORE
To stabilize on medication with possible med changes.  Feel less depressed without suicidal thoughts
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
## 2022-08-19 NOTE — ED ADULT NURSE NOTE - NS_BH TRG QUESTION7_ED_ALL_ED
89M with CAD s/p CABG x4 (1988) c/b HFrEF (5/2013 TTE EF 40-45%, mild MR, mod AS, mod LA, reversal of E-A, mod TR), HTN, HLD, hypothyroid, colon CA s/p resection, internal hemorrhoids who was BIB daughter for labored breathing, found to be in sepsis likely 2/2 viral infection despite RVP(-) c/b acute on chronic HFrEF exacerbation. Depression (without Suicidality or Psychosis)

## 2022-08-19 NOTE — BH INPATIENT PSYCHIATRY ASSESSMENT NOTE - NSBHCHARTREVIEWVS_PSY_A_CORE FT
Vital Signs Last 24 Hrs  T(C): 36.8 (08-19-22 @ 20:41), Max: 36.8 (08-19-22 @ 20:41)  T(F): 98.2 (08-19-22 @ 20:41), Max: 98.2 (08-19-22 @ 20:41)  HR: 69 (08-19-22 @ 17:11) (69 - 69)  BP: 117/72 (08-19-22 @ 17:11) (117/72 - 117/72)  BP(mean): 86 (08-19-22 @ 17:11) (86 - 86)  RR: 18 (08-19-22 @ 20:41) (17 - 18)  SpO2: 100% (08-19-22 @ 20:41) (96% - 100%)    Orthostatic VS  08-19-22 @ 20:41  Lying BP: --/-- HR: --  Sitting BP: 124/79 HR: 67  Standing BP: 142/117 HR: 72  Site: upper left arm  Mode: electronic
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
## 2022-08-19 NOTE — H&P ADULT - ASSESSMENT
53 yo F with PMH significant for  Hashimoto's Thyroiditis, Ulcerative colitis, Asthma ( Not on Oxygen at home or required  intubation in the past ) , Hx of Autoimmune disease admitted to 5N for Mx of Suicidal ideation with plan to kill herself.     # Depression/ Suicidal ideation/ Psych problems  - Manage as per primary psych team     # HX of Asthma  - Stable  - Nebs prn    # Hx of Hashimoto's Thyroiditis  - c/w Levothyroxine  - TSH check     # DVT Ppx  - Encourage Ambulation        IMPROVE VTE Individual Risk Assessment    RISK                                                                Points    [  ] Previous VTE                                                  3    [  ] Thrombophilia                                               2    [  ] Lower limb paralysis                                      2        (unable to hold up >15 seconds)      [  ] Current Cancer                                              2         (within 6 months)    [  ] Immobilization > 24 hrs                                1    [  ] ICU/CCU stay > 24 hours                              1    [  ] Age > 60                                                      1    IMPROVE VTE Score ______0___    IMPROVE Score 0-1: Low Risk, No VTE prophylaxis required for most patients, encourage ambulation.   IMPROVE Score 2-3: At risk, pharmacologic VTE prophylaxis is indicated for most patients (in the absence of a contraindication)  IMPROVE Score > or = 4: High Risk, pharmacologic VTE prophylaxis is indicated for most patients (in the absence of a contraindication)    Case d/w Dr. Edgar
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
## 2022-08-19 NOTE — ED PROVIDER NOTE - NS ED ROS FT
Constitutional: No fevers, chills, or sweats.  Cardiac: No chest pain, exertional dyspnea, orthopnea  Respiratory: No shortness of breath, no cough  GI: No abdominal pain, no N/V/D  Neuro: No headaches, no neck pain/stiffness, no numbness  Psych: +insomnia, +SI, +auditory hallucinations   All other systems reviewed and are negative unless otherwise stated in the HPI.

## 2022-08-19 NOTE — H&P ADULT - HEMATOLOGY/LYMPHATICS
Per last office visit on 5/20/2019 Dr Smith wanted to see patient in 3 months (8/20/2019). Please inform patient that we will authorize 1 refill but no additional  refills will be given until seen in office.     negative

## 2022-08-19 NOTE — BH INPATIENT PSYCHIATRY ASSESSMENT NOTE - NSBHMETABOLIC_PSY_ALL_CORE_FT
BMI: BMI (kg/m2): 30.1 (08-19-22 @ 20:41)  HbA1c: A1C with Estimated Average Glucose Result: 5.2 % (03-10-22 @ 08:23)    Glucose:   BP: 117/72 (08-19-22 @ 17:11) (117/72 - 117/72)  Lipid Panel: Date/Time: 03-10-22 @ 08:37  Cholesterol, Serum: 259  Direct LDL: --  HDL Cholesterol, Serum: 86  Total Cholesterol/HDL Ration Measurement: --  Triglycerides, Serum: 108

## 2022-08-19 NOTE — BH SOCIAL WORK INITIAL PSYCHOSOCIAL EVALUATION - DETAILS
Mother diagnosed with Bipolar  Father and brother alcohol abuse Patients father and brother h/o ETOH dependence and mother reportedly with bipolar

## 2022-08-19 NOTE — ED BEHAVIORAL HEALTH ASSESSMENT NOTE - NSBHATTESTBILLING_PSY_A_CORE
THANK YOU FROM YOUR CARE TEAM    Shadi Tobias Jr.     Our staff would like to THANK YOU for choosing [facility] at [facility]. Our goal is to always provide you with the best of care and we continue to look for better ways to improve the services we provide.     You may receive a survey in the mail with questions specific to your encounter with our clinic. Should you receive a survey, please take a few minutes to rate your experience.   Our providers and staff value your feedback.  Thank you in advance for your time and participation.     We appreciate the opportunity to partner with you to meet your health care needs. THANK YOU, again, for choosing us to be your care team.     Front Office Assistants: Jenn  Medical Assistant: Isaura  Provider: Brittany Rasmussen PA-C  Care Coordinator:  GUILHERME Montejo    [facility]  [address] [place] [address], [address]  [place] [id number]  Phone:  [phone]  Fax:  [phone]    Suzie Butterfield, Manager Clinic operations  [email or web address]     [phone]                                        ....    Shadi Tobias Jr.    DURING YOUR VISIT TODAY          MRI  MRI tests need to be pre-authorized by your insurance company.   The [facility] pre-service department will check on the authorization for you and they will call you to schedule the MRI after it is approved. It can take 2 weeks for the insurance company to give us an answer about authorization for your MRI.  · If you have not heard about scheduling within 2 weeks, please call them at [phone] to check on pre-authorization and scheduling of the MRI.   · A 3 week follow up visit was scheduled with your Back & Spine provider so that he or she can show you your MRI images and talk about the next steps in your care.    If your MRI is not done before your follow up visit in the Back & Spine program then please call [phone] to reschedule.        FOR MRI DONE OUTSIDE OF [place]:    If you choose to have your MRI done outside [place], it is your responsibility to schedule your appointment and check on insurance authorization.  • You will need to get the disc of images and the report and drop them off at our office 2-3 days after your MRI.  We will return these to you at your office visit.   • You can ask the imaging center to fax the report to us at [phone], but you will still need to drop off the disc of images.      MEDICATIONS    REFILLS:  Please allow 3 business days for processing of all requests.    Mobic (Meloxicam)  This is an NSAID, nonsteroidal anti-inflammatory medication.      · Take this once daily   · Take with food  · Do not take any other NSAIDs while taking this.  It could potentially lead to kidney damage.    Examples of other NSAIDS  · Ibuprofen: Advil, Motrin, Motrin IB, Nuprin  · Celecoxib: Celebrex  · Diclofenac sodium: Voltaren, Voltaren XR  · Naproxen: Naprosyn, Naprelan  · Naproxen sodium: Aleve, Anapro    You may take Tylenol (acetaminophin) while on this medication.      EDUCATIONAL MATERIALS:  For more information regarding your diagnosis please visit the following Web site: [email or web address]   Today you received the following educational materials that were reviewed with you by the Nurse Care Coordinator, Nikia PINEDO RN:   · \"How your Back Works\" (source: [employer], #[id number])  · \"Tips for Good Posture\" (source: [facility], #[id number])  · \"Tips for Proper Body Mechanics\" (source: [facility], #[id number])  · \"Complimentary Care for Pain\" (source: [employer], #[id number])           [id number] diagnostic evaluation with medical services

## 2022-08-19 NOTE — BH INPATIENT PSYCHIATRY ASSESSMENT NOTE - CURRENT MEDICATION
MEDICATIONS  (STANDING):  lamoTRIgine 150 milliGRAM(s) Oral daily  levothyroxine 75 MICROGram(s) Oral daily  melatonin 3 milliGRAM(s) Oral at bedtime    MEDICATIONS  (PRN):  ALBUTerol    90 MICROgram(s) HFA Inhaler 2 Puff(s) Inhalation every 6 hours PRN COPD

## 2022-08-19 NOTE — BH INPATIENT PSYCHIATRY ASSESSMENT NOTE - NSBHASSESSSUMMFT_PSY_ALL_CORE
Pt is a 52 yr old  female with schizoaffective disorder, multiple 5N admissions and was recently at [facility] from July 11-18was attending [facility] and sees a private psychiatrist that she describes as holistic and taking her off meds and she was not dong well.  Pt with  father and brother h/o ETOH dependence and mother reportedly with bipolar. Patient was clean and sober for 10 years.  Relapsed after break up with boyfriend in June.  Clean and sober again for 81 . Pt  hasn't worked in many months now with financial problems   patient facing bankruptcy

## 2022-08-19 NOTE — BH PATIENT PROFILE - HOME MEDICATIONS
melatonin 3 mg oral tablet , 1 tab(s) orally once a day (at bedtime), As needed, Insomnia  ARIPiprazole 20 mg oral tablet , 1 tab(s) orally once a day  LaMICtal 25 mg oral tablet , 1 tab(s) orally once a day (at bedtime). total 125mg at bedtime  LaMICtal 100 mg oral tablet , 1 tab(s) orally once a day (at bedtime). total 125mg at bedtime
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
## 2022-08-19 NOTE — BH INPATIENT PSYCHIATRY ASSESSMENT NOTE - HPI (INCLUDE ILLNESS QUALITY, SEVERITY, DURATION, TIMING, CONTEXT, MODIFYING FACTORS, ASSOCIATED SIGNS AND SYMPTOMS)
Patient a 51 y/o  female, unemployed, domiciled has 2 children, S-15/18 living with their father, hx of SAD since age 21, multiple past Psychiatric Hospitalization, most recent hospitalization in [facility] from July' 11-18, no prior SI/SA, endorses that she has SI with plans to kill herself.    Patient in bed, stressed, good intense eye contact, endorses that she takes her meds as prescribed, and is on Abilify 20 mg with Lamictal 125 mg daily which was increased to current dosages at [facility], she endorses that she feels restless and not able to sleep, she has not slept for past few days, she added that she also hears A/H of a  man telling her to kill herself. She scared and seeking help for stability. She denied drug abuse hx, denied cigarettes smoking and cannabis abuse etc. She added that she is not able to take Haldol/Risperdal and later on Zyprexa as well. She added that anything she used to take with SSRI would not be able to take as she feels different. She agreed to take Zyprexa 5 mg HS with Lamictal 125 mg daily. She is free from alcohol for 81 days now, stopped cannabis a year earlier. Earlier she went to [facility] and was advised to come here for help.    Medically has Thyroiditis Hashimoto's, U. Colitis;  Asthma and Vasculitis, she does not take any meds now. Pt with hx of frequen change in medication Pt has also claimed not to do well will with thorazine , seriquel  ( eps) abilify ( restless)

## 2022-08-19 NOTE — ED ADULT NURSE NOTE - CHIEF COMPLAINT QUOTE
patient sent from [facility] requesting psych eval.  hx schizo-affective disorder and recent psych admission in july at [facility].  patient c/o insomnia, "intrusive thoughts," and passive, intermittent suicidal thoughts.  patient reports feeling anxious now and "feels desperate."  patient has therapist, is on meds.  patient appears anxious but has good insight and is cooperative.  1:1 initiated.

## 2022-08-19 NOTE — BH SOCIAL WORK INITIAL PSYCHOSOCIAL EVALUATION - NSCMSPTSTRENGTHS_PSY_ALL_CORE
Compliance to treatment/Expressive of emotions/Highly motivated for treatment/Intelligence/Interpersonal skills/Physically healthy

## 2022-08-19 NOTE — H&P ADULT - HISTORY OF PRESENT ILLNESS
51 yo F with PMH significant for  Hashimoto's Thyroiditis, Ulcerative colitis, Asthma ( Not on Oxygen at home or required  intubation in the past ) , Hx of Autoimmune disease admitted to  for Mx of Suicidal ideation with plan to kill herself. Pt is calm but appears sad. Denies headache dizziness, chest pain, palpitations or SOB. Denies fever, chills, cough, dysuria or diarrhea. Medicine consult is called for medical Mx. Pt was seen and examined in presence of  Nursing Assistant.     PMH: As above  PSH:  C section x 2  Social Hx: Pt denies any smoking cigs or using any recreational  in the past 2 years.  Denies drinking alcohol.     Family Hx: Denies any family Hx of CAD, DM, HTN  or any psych  problems

## 2022-08-19 NOTE — BH PATIENT PROFILE - FALL HARM RISK - UNIVERSAL INTERVENTIONS
Bed in lowest position, wheels locked, appropriate side rails in place/Call bell, personal items and telephone in reach/Instruct patient to call for assistance before getting out of bed or chair/Non-slip footwear when patient is out of bed/[place] to call system/Physically safe environment - no spills, clutter or unnecessary equipment/Purposeful Proactive Rounding/Room/bathroom lighting operational, light cord in reach

## 2022-08-19 NOTE — H&P ADULT - NSHPPHYSICALEXAM_GEN_ALL_CORE
ICU Vital Signs Last 24 Hrs  T(C): 36.8 (19 Aug 2022 20:41), Max: 36.8 (19 Aug 2022 20:41)  T(F): 98.2 (19 Aug 2022 20:41), Max: 98.2 (19 Aug 2022 20:41)  HR: 69 (19 Aug 2022 17:11) (69 - 69)  BP: 117/72 (19 Aug 2022 17:11) (117/72 - 117/72)  BP(mean): 86 (19 Aug 2022 17:11) (86 - 86)  RR: 18 (19 Aug 2022 20:41) (17 - 18)  SpO2: 100% (19 Aug 2022 20:41) (96% - 100%)    O2 Parameters below as of 19 Aug 2022 20:41  Patient On (Oxygen Delivery Method): room air

## 2022-08-19 NOTE — BH SOCIAL WORK INITIAL PSYCHOSOCIAL EVALUATION - NSBHSAALC_PSY_A_CORE FT
Patient was clean and sober for 10 years.  Relapsed after break up with boyfriend in June.  Clean and sober again for 81 days

## 2022-08-19 NOTE — ED ADULT TRIAGE NOTE - CHIEF COMPLAINT QUOTE
patient sent from [facility] requesting psych eval.  hx schizo-affective disorder and recent psych admission in july at [facility].  patient c/o insomnia, "intrusive thoughts," and passive, intermittent suicidal thoughts.  patient reports feeling anxious now and "feels desperate."  patient has therapist, is on meds.  patient appears anxious but has good insight and is cooperative.  1:1 initiated.

## 2022-08-19 NOTE — H&P ADULT - NS ATTEND AMEND GEN_ALL_CORE FT
Patient is seen and examined with the PA. Agree with above assessment and plan. Patient admitted with Suicidal ideation. Patient will be primarily managed by the Psychiatry team.

## 2022-08-19 NOTE — BH SOCIAL WORK INITIAL PSYCHOSOCIAL EVALUATION - NSBHGOALSUCCESSFT_PSY_ALL_CORE
Patient is compliant with meds and treatment. She is very insightful into her condition and knows when she needs help.

## 2022-08-19 NOTE — ED PROVIDER NOTE - PHYSICAL EXAMINATION
General: AAOx3, NAD  HEENT: NCAT  Cardiac: Normal rate and rhythym, no murmurs, normal peripheral perfusion  Respiratory: Normal rate and effort. CTAB  GI: Soft, nondistended, nontender  Neuro: No focal deficits. CARR equally x4, sensation to light touch intact throughout  MSK: FROMx4, no focal bony tenderness, no peripheral edema  Skin: No rash   Psych: Normal affect. +AH, +SI

## 2022-08-19 NOTE — ED ADULT NURSE REASSESSMENT NOTE - NS ED NURSE REASSESS COMMENT FT1
Pt resting comfortably in bed with no acute distress noted. Pt on constant observation, updated on their status, the current plan of care, and available results no needs or requests at this time.

## 2022-08-19 NOTE — ED BEHAVIORAL HEALTH ASSESSMENT NOTE - VIOLENCE PROTECTIVE FACTORS:
CT CHEST WITH IV CONTRAST:



DATE: 11/14/2019.



COMPARISON: 

1/29/2019.



HISTORY: 

Reevaluate pulmonary nodule.



TECHNIQUE: 

Axial CT imaging at 3 mm intervals through the chest with IV contrast. Coronal and sagittal reformatt
ed imaging obtained.



FINDINGS: 

There is a small sliding-type hiatal hernia present.



There is a low-density lesion within the anterior aspect of the left hepatic lobe with Hounsfield uni
ts consistent with a cyst measuring 2.3 cm in AP dimension.



There is scattered atherosclerotic calcification of the proximal abdominal aorta and its branches.



No pleural, pericardial, or mediastinal fluid noted.



No axillary, mediastinal, or hilar lymphadenopathy. Calcified lymph nodes are noted in the AP window 
and subcarinal region, evidence of granulomatous disease. There is atherosclerotic calcification of

the aortic arch and coronary arteries.



No pneumothorax.



No significant pulmonary parenchymal nodule is noted within the right upper or right lower lobe. A ri
ght middle lobe granuloma is noted on image 61. There is a stable nodule within the superior

lateral anterior aspect of the right middle lobe measuring 7 mm, unchanged when compared to the 1/29/
2019 study.



There is a small nodule within the medial anterior aspect of the left upper lobe abutting the mediast
inum measuring approximately 6 mm, stable. There is a stable posterior nodule within the posterior

aspect of the left upper lobe on image 35 measuring approximately 6 mm. Small peripheral inferior lef
t upper lobe nodule noted on image 59 measuring 6 mm, stable as well. No new pulmonary nodule noted

on either side.



Review of the osseous structures demonstrates no acute findings.



IMPRESSION: 

Numerous stable pulmonary parenchymal nodules. CT examination in 6-12 months is advised to document s
tability.



Transcribed Date/Time: 11/14/2019 10:00 AM



Reported By: Jonnathan Neri 

Electronically Signed:  11/14/2019 4:46 PM
Residential stability/Engagement in treatment

## 2022-08-19 NOTE — ED BEHAVIORAL HEALTH ASSESSMENT NOTE - SUMMARY
balbir a 51 y/o  female, unemployed, domiciled has 2 children, S-15/18 living with their father, hx of SAD since age 21, multiple past Psychiatric Hospitalization, most recent hospitalization in [facility] from July' 11-18, no prior SI/SA, endorses that she has SI with plans to kill herself.    Patient in bed, stressed, good intense eye contact, endorses that she takes her meds as prescribed, and is on Abilify 20 mg with Lamictal 125 mg daily which was increased to current dosages at [facility], she endorses that she feels restless and not able to sleep, she has not slept for past few days, she added that she also hears A/H of a  man telling her to kill herself. She scared and seeking help for stability. She denied drug abuse hx, denied cigarettes smoking and cannabis abuse etc. She added that she is not able to take Haldol/Risperdal and later on Zyprexa as well. She added that anything she used to take with SSRI would not be able to take as she feels different. She agreed to take Zyprexa 5 mg HS with Lamictal 125 mg daily. She is free from alcohol for 81 days now, stopped cannabis a year earlier. Earlier she went to [facility] and was advised to come here for help.    Plan: Admission to [address] [place] [address] 9.13

## 2022-08-20 LAB
A1C WITH ESTIMATED AVERAGE GLUCOSE RESULT: 5.3 % — SIGNIFICANT CHANGE UP (ref 4–5.6)
CHOLEST SERPL-MCNC: 234 MG/DL — HIGH
COVID-19 SPIKE DOMAIN AB INTERP: POSITIVE
COVID-19 SPIKE DOMAIN ANTIBODY RESULT: >250 U/ML — HIGH
ESTIMATED AVERAGE GLUCOSE: 105 MG/DL — SIGNIFICANT CHANGE UP (ref 68–114)
HDLC SERPL-MCNC: 82 MG/DL — SIGNIFICANT CHANGE UP
LIPID PNL WITH DIRECT LDL SERPL: 126 MG/DL — HIGH
NON HDL CHOLESTEROL: 152 MG/DL — HIGH
SARS-COV-2 IGG+IGM SERPL QL IA: >250 U/ML — HIGH
SARS-COV-2 IGG+IGM SERPL QL IA: POSITIVE
TRIGL SERPL-MCNC: 128 MG/DL — SIGNIFICANT CHANGE UP
TSH SERPL-MCNC: 2.22 UU/ML — SIGNIFICANT CHANGE UP (ref 0.34–4.82)

## 2022-08-20 PROCEDURE — 99232 SBSQ HOSP IP/OBS MODERATE 35: CPT

## 2022-08-20 RX ORDER — TRAZODONE HCL 50 MG
100 TABLET ORAL AT BEDTIME
Refills: 0 | Status: DISCONTINUED | OUTPATIENT
Start: 2022-08-20 | End: 2022-08-24

## 2022-08-20 RX ORDER — LIDOCAINE 4 G/100G
2 CREAM TOPICAL DAILY
Refills: 0 | Status: DISCONTINUED | OUTPATIENT
Start: 2022-08-20 | End: 2022-08-24

## 2022-08-20 RX ORDER — GABAPENTIN 400 MG/1
300 CAPSULE ORAL THREE TIMES A DAY
Refills: 0 | Status: DISCONTINUED | OUTPATIENT
Start: 2022-08-20 | End: 2022-08-24

## 2022-08-20 RX ADMIN — Medication 100 MILLIGRAM(S): at 21:13

## 2022-08-20 RX ADMIN — Medication 3 MILLIGRAM(S): at 21:12

## 2022-08-20 RX ADMIN — LAMOTRIGINE 150 MILLIGRAM(S): 25 TABLET, ORALLY DISINTEGRATING ORAL at 09:25

## 2022-08-20 RX ADMIN — GABAPENTIN 300 MILLIGRAM(S): 400 CAPSULE ORAL at 21:12

## 2022-08-20 RX ADMIN — Medication 75 MICROGRAM(S): at 06:24

## 2022-08-20 RX ADMIN — OLANZAPINE 10 MILLIGRAM(S): 15 TABLET, FILM COATED ORAL at 21:13

## 2022-08-20 NOTE — PSYCHIATRIC REHAB INITIAL EVALUATION - NSBHPRRECOMMEND_PSY_ALL_CORE
[facility] will encourage patient to actively participate in 1-2 psych rehab groups daily to improve maladaptive coping skills.

## 2022-08-20 NOTE — BH SAFETY PLAN - LOCAL URGENT CARE ADDRESS
Go to the closest emergency room, urgent care facility or call [phone] if you are feeling suicidal, 911 for medical emergencies.

## 2022-08-20 NOTE — BH INPATIENT PSYCHIATRY PROGRESS NOTE - NSBHMETABOLIC_PSY_ALL_CORE_FT
BMI: BMI (kg/m2): 30.1 (08-19-22 @ 20:41)  HbA1c: A1C with Estimated Average Glucose Result: 5.3 % (08-20-22 @ 09:57)    Glucose:   BP: 117/72 (08-19-22 @ 17:11) (117/72 - 117/72)  Lipid Panel: Date/Time: 08-20-22 @ 09:57  Cholesterol, Serum: 234  Direct LDL: --  HDL Cholesterol, Serum: 82  Total Cholesterol/HDL Ration Measurement: --  Triglycerides, Serum: 128

## 2022-08-21 PROCEDURE — 99232 SBSQ HOSP IP/OBS MODERATE 35: CPT

## 2022-08-21 RX ORDER — TRAZODONE HCL 50 MG
50 TABLET ORAL AT BEDTIME
Refills: 0 | Status: DISCONTINUED | OUTPATIENT
Start: 2022-08-21 | End: 2022-08-24

## 2022-08-21 RX ADMIN — Medication 75 MICROGRAM(S): at 06:34

## 2022-08-21 RX ADMIN — LIDOCAINE 2 PATCH: 4 CREAM TOPICAL at 22:02

## 2022-08-21 RX ADMIN — GABAPENTIN 300 MILLIGRAM(S): 400 CAPSULE ORAL at 20:54

## 2022-08-21 RX ADMIN — LAMOTRIGINE 150 MILLIGRAM(S): 25 TABLET, ORALLY DISINTEGRATING ORAL at 09:12

## 2022-08-21 RX ADMIN — Medication 100 MILLIGRAM(S): at 20:53

## 2022-08-21 RX ADMIN — GABAPENTIN 300 MILLIGRAM(S): 400 CAPSULE ORAL at 09:12

## 2022-08-21 RX ADMIN — Medication 3 MILLIGRAM(S): at 20:53

## 2022-08-21 RX ADMIN — OLANZAPINE 10 MILLIGRAM(S): 15 TABLET, FILM COATED ORAL at 20:53

## 2022-08-21 RX ADMIN — Medication 50 MILLIGRAM(S): at 20:54

## 2022-08-21 RX ADMIN — LIDOCAINE 2 PATCH: 4 CREAM TOPICAL at 09:12

## 2022-08-21 RX ADMIN — GABAPENTIN 300 MILLIGRAM(S): 400 CAPSULE ORAL at 13:14

## 2022-08-22 PROCEDURE — 99232 SBSQ HOSP IP/OBS MODERATE 35: CPT

## 2022-08-22 RX ADMIN — LAMOTRIGINE 150 MILLIGRAM(S): 25 TABLET, ORALLY DISINTEGRATING ORAL at 09:39

## 2022-08-22 RX ADMIN — GABAPENTIN 300 MILLIGRAM(S): 400 CAPSULE ORAL at 09:39

## 2022-08-22 RX ADMIN — Medication 3 MILLIGRAM(S): at 20:34

## 2022-08-22 RX ADMIN — Medication 100 MILLIGRAM(S): at 20:34

## 2022-08-22 RX ADMIN — GABAPENTIN 300 MILLIGRAM(S): 400 CAPSULE ORAL at 13:06

## 2022-08-22 RX ADMIN — LIDOCAINE 2 PATCH: 4 CREAM TOPICAL at 09:38

## 2022-08-22 RX ADMIN — Medication 75 MICROGRAM(S): at 06:34

## 2022-08-22 RX ADMIN — GABAPENTIN 300 MILLIGRAM(S): 400 CAPSULE ORAL at 20:33

## 2022-08-22 RX ADMIN — LIDOCAINE 2 PATCH: 4 CREAM TOPICAL at 20:37

## 2022-08-22 RX ADMIN — OLANZAPINE 10 MILLIGRAM(S): 15 TABLET, FILM COATED ORAL at 20:33

## 2022-08-22 NOTE — BH INPATIENT PSYCHIATRY PROGRESS NOTE - NSBHMETABOLIC_PSY_ALL_CORE_FT
BMI: BMI (kg/m2): 30.1 (08-19-22 @ 20:41)  HbA1c: A1C with Estimated Average Glucose Result: 5.3 % (08-20-22 @ 09:57)    Glucose:   BP: 117/72 (08-19-22 @ 17:11) (117/72 - 117/72)  Lipid Panel: Date/Time: 08-20-22 @ 09:57  Cholesterol, Serum: 234  Direct LDL: --  HDL Cholesterol, Serum: 82  Total Cholesterol/HDL Ration Measurement: --  Triglycerides, Serum: 128   BMI: BMI (kg/m2): 30.1 (08-19-22 @ 20:41)  HbA1c: A1C with Estimated Average Glucose Result: 5.3 % (08-20-22 @ 09:57)    Glucose:   BP: --  Lipid Panel: Date/Time: 08-20-22 @ 09:57  Cholesterol, Serum: 234  Direct LDL: --  HDL Cholesterol, Serum: 82  Total Cholesterol/HDL Ration Measurement: --  Triglycerides, Serum: 128

## 2022-08-22 NOTE — BH INPATIENT PSYCHIATRY PROGRESS NOTE - NSBHADMITMEDEDUDETAILS_PSY_A_CORE FT
zyprexa used to decreased psychosis  Pt was having increased suicidal ideation in the context of active mood and affect lability and hallucination 
zyprexa with less potential for retinitis , and would assist in decrease of insomnia 
zyprexa able to return good sleep quality

## 2022-08-22 NOTE — BH INPATIENT PSYCHIATRY PROGRESS NOTE - NSBHADMITMEDEDUDETAILS_A_CORE FT
pt educated about the use of medication and of the potential side effects 
pt educated about use and potential side effects
Pt is educated on the use of medication  and of the potential side effects

## 2022-08-23 RX ADMIN — LIDOCAINE 2 PATCH: 4 CREAM TOPICAL at 21:08

## 2022-08-23 RX ADMIN — Medication 3 MILLIGRAM(S): at 21:01

## 2022-08-23 RX ADMIN — GABAPENTIN 300 MILLIGRAM(S): 400 CAPSULE ORAL at 21:01

## 2022-08-23 RX ADMIN — LIDOCAINE 2 PATCH: 4 CREAM TOPICAL at 09:23

## 2022-08-23 RX ADMIN — GABAPENTIN 300 MILLIGRAM(S): 400 CAPSULE ORAL at 09:22

## 2022-08-23 RX ADMIN — Medication 50 MILLIGRAM(S): at 23:49

## 2022-08-23 RX ADMIN — Medication 75 MICROGRAM(S): at 06:34

## 2022-08-23 RX ADMIN — OLANZAPINE 10 MILLIGRAM(S): 15 TABLET, FILM COATED ORAL at 21:01

## 2022-08-23 RX ADMIN — GABAPENTIN 300 MILLIGRAM(S): 400 CAPSULE ORAL at 13:13

## 2022-08-23 RX ADMIN — LAMOTRIGINE 150 MILLIGRAM(S): 25 TABLET, ORALLY DISINTEGRATING ORAL at 09:22

## 2022-08-23 NOTE — BH INPATIENT PSYCHIATRY DISCHARGE NOTE - NSDCCPCAREPLAN_GEN_ALL_CORE_FT
PRINCIPAL DISCHARGE DIAGNOSIS  Diagnosis: Schizoaffective disorder, unspecified type  Assessment and Plan of Treatment:

## 2022-08-23 NOTE — BH INPATIENT PSYCHIATRY DISCHARGE NOTE - REASON FOR ADMISSION
52 yr old  female with schizoaffective disorder, multiple 5N admissions and was recently at [facility] from July 11-18was attending [facility] and sees a private psychiatrist that she describes as holistic and taking her off meds and she was not doing well. Pt now reporting auditory hallucination , frequent suicidal ideation ,insomnia , increased anxiety and depression .

## 2022-08-23 NOTE — BH INPATIENT PSYCHIATRY DISCHARGE NOTE - NSBHMETABOLIC_PSY_ALL_CORE_FT
BMI: BMI (kg/m2): 30.1 (08-19-22 @ 20:41)  HbA1c: A1C with Estimated Average Glucose Result: 5.3 % (08-20-22 @ 09:57)    Glucose:   BP: --  Lipid Panel: Date/Time: 08-20-22 @ 09:57  Cholesterol, Serum: 234  Direct LDL: --  HDL Cholesterol, Serum: 82  Total Cholesterol/HDL Ration Measurement: --  Triglycerides, Serum: 128

## 2022-08-23 NOTE — BH INPATIENT PSYCHIATRY DISCHARGE NOTE - HOSPITAL COURSE
53 y/o  female, unemployed, domiciled has 2 children, S-15/18 living with their father, hx of SAD since age 21, multiple past Psychiatric Hospitalization, most recent hospitalization in [facility] from July' 11-18, no prior SI/SA, claimed she had SI hears A/H of a  man telling her to kill herself.  Pt treated with zyprexa as the mood stabilizing antipsychotic medication  , Pt denies any side effects from medication . Pt with good resoluton of hallucination  and delusions. Pt denies any suicidal ideation intent or plan . Pt verbalized willing to attend after care treatment

## 2022-08-23 NOTE — BH INPATIENT PSYCHIATRY DISCHARGE NOTE - NSDCMRMEDTOKEN_GEN_ALL_CORE_FT
LaMICtal 100 mg oral tablet: 1 tab(s) orally once a day (at bedtime). total 125mg at bedtime   LaMICtal 25 mg oral tablet: 1 tab(s) orally once a day (at bedtime). total 125mg at bedtime   albuterol 90 mcg/inh inhalation aerosol: 2 puff(s) inhaled every 6 hours, As needed, COPD  gabapentin 300 mg oral capsule: 1 cap(s) orally 3 times a day  lamoTRIgine 150 mg oral tablet: 1 tab(s) orally once a day  levothyroxine 75 mcg (0.075 mg) oral tablet: 1 tab(s) orally once a day  lidocaine 4% topical film: Apply topically to affected area once a day   melatonin 3 mg oral tablet: 1 tab(s) orally once a day (at bedtime)  OLANZapine 10 mg oral tablet: 1 tab(s) orally once a day (at bedtime)  traZODone 100 mg oral tablet: 1 tab(s) orally once a day (at bedtime)

## 2022-08-23 NOTE — BH INPATIENT PSYCHIATRY DISCHARGE NOTE - HPI (INCLUDE ILLNESS QUALITY, SEVERITY, DURATION, TIMING, CONTEXT, MODIFYING FACTORS, ASSOCIATED SIGNS AND SYMPTOMS)
Patient a 51 y/o  female, unemployed, domiciled has 2 children, S-15/18 living with their father, hx of SAD since age 21, multiple past Psychiatric Hospitalization, most recent hospitalization in [facility] from July' 11-18, no prior SI/SA, endorses that she has SI with plans to kill herself.    Patient in bed, stressed, good intense eye contact, endorses that she takes her meds as prescribed, and is on Abilify 20 mg with Lamictal 125 mg daily which was increased to current dosages at [facility], she endorses that she feels restless and not able to sleep, she has not slept for past few days, she added that she also hears A/H of a  man telling her to kill herself. She scared and seeking help for stability. She denied drug abuse hx, denied cigarettes smoking and cannabis abuse etc. She added that she is not able to take Haldol/Risperdal and later on Zyprexa as well. She added that anything she used to take with SSRI would not be able to take as she feels different. She agreed to take Zyprexa 5 mg HS with Lamictal 125 mg daily. She is free from alcohol for 81 days now, stopped cannabis a year earlier. Earlier she went to [facility] and was advised to come here for help.    Medically has Thyroiditis Hashimoto's, U. Colitis;  Asthma and Vasculitis, she does not take any meds now. Pt with hx of frequen change in medication Pt has also claimed not to do well will with thorazine , seriquel  ( eps) abilify ( restless)

## 2022-08-24 VITALS — RESPIRATION RATE: 16 BRPM | TEMPERATURE: 98 F | OXYGEN SATURATION: 98 %

## 2022-08-24 PROCEDURE — 99239 HOSP IP/OBS DSCHRG MGMT >30: CPT

## 2022-08-24 RX ORDER — LEVOTHYROXINE SODIUM 125 MCG
1 TABLET ORAL
Qty: 15 | Refills: 1
Start: 2022-08-24 | End: 2022-09-22

## 2022-08-24 RX ORDER — LAMOTRIGINE 25 MG/1
1 TABLET, ORALLY DISINTEGRATING ORAL
Qty: 15 | Refills: 1
Start: 2022-08-24 | End: 2022-09-22

## 2022-08-24 RX ORDER — LANOLIN ALCOHOL/MO/W.PET/CERES
1 CREAM (GRAM) TOPICAL
Qty: 15 | Refills: 1
Start: 2022-08-24 | End: 2022-09-22

## 2022-08-24 RX ORDER — TRAZODONE HCL 50 MG
1 TABLET ORAL
Qty: 15 | Refills: 1
Start: 2022-08-24 | End: 2022-09-22

## 2022-08-24 RX ORDER — ALBUTEROL 90 UG/1
2 AEROSOL, METERED ORAL
Qty: 120 | Refills: 1
Start: 2022-08-24 | End: 2022-09-22

## 2022-08-24 RX ORDER — LIDOCAINE 4 G/100G
2 CREAM TOPICAL
Qty: 30 | Refills: 1
Start: 2022-08-24 | End: 2022-09-22

## 2022-08-24 RX ORDER — GABAPENTIN 400 MG/1
1 CAPSULE ORAL
Qty: 45 | Refills: 1
Start: 2022-08-24 | End: 2022-09-22

## 2022-08-24 RX ORDER — OLANZAPINE 15 MG/1
1 TABLET, FILM COATED ORAL
Qty: 15 | Refills: 1
Start: 2022-08-24 | End: 2022-09-22

## 2022-08-24 RX ADMIN — GABAPENTIN 300 MILLIGRAM(S): 400 CAPSULE ORAL at 09:34

## 2022-08-24 RX ADMIN — LIDOCAINE 2 PATCH: 4 CREAM TOPICAL at 19:54

## 2022-08-24 RX ADMIN — Medication 75 MICROGRAM(S): at 06:25

## 2022-08-24 RX ADMIN — LIDOCAINE 2 PATCH: 4 CREAM TOPICAL at 09:35

## 2022-08-24 RX ADMIN — GABAPENTIN 300 MILLIGRAM(S): 400 CAPSULE ORAL at 13:05

## 2022-08-24 RX ADMIN — LAMOTRIGINE 150 MILLIGRAM(S): 25 TABLET, ORALLY DISINTEGRATING ORAL at 09:35

## 2022-08-24 NOTE — BH INPATIENT PSYCHIATRY PROGRESS NOTE - NSTXIMPULSGOAL_PSY_ALL_CORE
Will identify 1 strategy to interrupt impulsive thoughts

## 2022-08-24 NOTE — BH TREATMENT PLAN - NSTXDEPRESINTERMD_PSY_ALL_CORE
lamictal for depressive symptoms   encourage the pt to attend groups for insight and coping skills
lamictal for depressive symptoms   encourage the pt to attend groups for insight and coping skills

## 2022-08-24 NOTE — BH INPATIENT PSYCHIATRY PROGRESS NOTE - NSICDXBHPRIMARYDX_PSY_ALL_CORE
Schizoaffective disorder, unspecified type   F25.9  

## 2022-08-24 NOTE — BH INPATIENT PSYCHIATRY PROGRESS NOTE - NSTXDEPRESINTERMD_PSY_ALL_CORE
lamictal   encourage the pt to attend groups for insight and coping skills
lamictal for depressive symptoms   encourage the pt to attend groups for insight and coping skills

## 2022-08-24 NOTE — BH INPATIENT PSYCHIATRY PROGRESS NOTE - NSDCCRITERIA_PSY_ALL_CORE
pt with stable mood and affect 

## 2022-08-24 NOTE — BH DISCHARGE NOTE NURSING/SOCIAL WORK/PSYCH REHAB - NSDCADDINFO1FT_PSY_ALL_CORE
Patient has a telehealth appointment for intake on Thursday, 8/25/2022 at 4:00PM with therapist, Karma Quezada. You will receive a ZOOM invitation. Patient has a telehealth appointment for intake on Thursday, 8/25/2022 at 4:30 PM with therapist, Karma Quezada. You will receive a ZOOM invitation.

## 2022-08-24 NOTE — BH INPATIENT PSYCHIATRY PROGRESS NOTE - NSBHFUPINTERVALCCFT_PSY_A_CORE
"Still with some sleep problem, and can I be discharged by the middle of the week?"
"I still don't sleep well "
"I'm feeling much better"
"I feel ready to go home"

## 2022-08-24 NOTE — BH DISCHARGE NOTE NURSING/SOCIAL WORK/PSYCH REHAB - NSDCVIVACCINE_GEN_ALL_CORE_FT
influenza, injectable, quadrivalent, preservative free; 22-Oct-2018 12:26; Sarai Naylor (RN); [employer]; [id number] (Exp. Date: 30-Jun-2019); IntraMuscular; Deltoid Left.; 0.5 milliLiter(s); VIS (VIS Published: 07-Aug-2015, VIS Presented: 22-Oct-2018);

## 2022-08-24 NOTE — BH DISCHARGE NOTE NURSING/SOCIAL WORK/PSYCH REHAB - PATIENT PORTAL LINK FT
You can access the FollowMyHealth Patient Portal offered by [facility] by registering at the following website: [email or web address]. By joining [employer]’s FollowMyHealth portal, you will also be able to view your health information using other applications (apps) compatible with our system.

## 2022-08-24 NOTE — BH TREATMENT PLAN - NSCMSPTSTRENGTHS_PSY_ALL_CORE
Compliance to treatment/Expressive of emotions/Highly motivated for treatment/Intelligence/Interpersonal skills/Physically healthy
Compliance to treatment/Expressive of emotions/Highly motivated for treatment/Intelligence/Interpersonal skills/Physically healthy

## 2022-08-24 NOTE — BH INPATIENT PSYCHIATRY PROGRESS NOTE - NSTXPSYCHOGOAL_PSY_ALL_CORE
Will verbalize 1 strategy to successfully cope with psychotic symptoms
Will identify 2 coping skills that assist with focus on reality

## 2022-08-24 NOTE — BH TREATMENT PLAN - NSTXANXINTERMD_PSY_ALL_CORE
pt to continue with medications including lamictal, zyprexa
pt to continue with medications including lamictal, zyprexa

## 2022-08-24 NOTE — BH INPATIENT PSYCHIATRY PROGRESS NOTE - CURRENT MEDICATION
MEDICATIONS  (STANDING):  gabapentin 300 milliGRAM(s) Oral three times a day  lamoTRIgine 150 milliGRAM(s) Oral daily  levothyroxine 75 MICROGram(s) Oral daily  lidocaine   4% Patch 2 Patch Transdermal daily  melatonin 3 milliGRAM(s) Oral at bedtime  OLANZapine 10 milliGRAM(s) Oral at bedtime  traZODone 100 milliGRAM(s) Oral at bedtime    MEDICATIONS  (PRN):  ALBUTerol    90 MICROgram(s) HFA Inhaler 2 Puff(s) Inhalation every 6 hours PRN COPD  traZODone 50 milliGRAM(s) Oral at bedtime PRN insomnia  
MEDICATIONS  (STANDING):  gabapentin 300 milliGRAM(s) Oral three times a day  lamoTRIgine 150 milliGRAM(s) Oral daily  levothyroxine 75 MICROGram(s) Oral daily  lidocaine   4% Patch 2 Patch Transdermal daily  melatonin 3 milliGRAM(s) Oral at bedtime  OLANZapine 10 milliGRAM(s) Oral at bedtime  traZODone 100 milliGRAM(s) Oral at bedtime    MEDICATIONS  (PRN):  ALBUTerol    90 MICROgram(s) HFA Inhaler 2 Puff(s) Inhalation every 6 hours PRN COPD  traZODone 50 milliGRAM(s) Oral at bedtime PRN insomnia  
MEDICATIONS  (STANDING):  gabapentin 300 milliGRAM(s) Oral three times a day  lamoTRIgine 150 milliGRAM(s) Oral daily  levothyroxine 75 MICROGram(s) Oral daily  lidocaine   4% Patch 2 Patch Transdermal daily  melatonin 3 milliGRAM(s) Oral at bedtime  OLANZapine 10 milliGRAM(s) Oral at bedtime  traZODone 100 milliGRAM(s) Oral at bedtime    MEDICATIONS  (PRN):  ALBUTerol    90 MICROgram(s) HFA Inhaler 2 Puff(s) Inhalation every 6 hours PRN COPD  
MEDICATIONS  (STANDING):  gabapentin 300 milliGRAM(s) Oral three times a day  lamoTRIgine 150 milliGRAM(s) Oral daily  levothyroxine 75 MICROGram(s) Oral daily  lidocaine   4% Patch 2 Patch Transdermal daily  melatonin 3 milliGRAM(s) Oral at bedtime  OLANZapine 10 milliGRAM(s) Oral at bedtime  traZODone 100 milliGRAM(s) Oral at bedtime    MEDICATIONS  (PRN):  ALBUTerol    90 MICROgram(s) HFA Inhaler 2 Puff(s) Inhalation every 6 hours PRN COPD  traZODone 50 milliGRAM(s) Oral at bedtime PRN insomnia

## 2022-08-24 NOTE — BH TREATMENT PLAN - NSTXPLANTHERAPYSESSIONSFT_PSY_ALL_CORE
08-21-22  Type of therapy: Leisure development,Mindfulness  Type of session: Group  Level of patient participation: Attentive,Engaged,Participates  Duration of participation: 60 minutes  Therapy conducted by: Other (specify),Recreation Therapist  Therapy Summary: Patient attended all groups today where she participated in a variety of activities such as stretching exercises, guided meditation, and leisure/music. Patient reported good mood and interacted frequently with peers throughout the day. Expressed that she feels much better after coming to the hosptial and changing her medications.    08-22-22  Type of therapy: Stress management,Symptom management  Type of session: Group  Level of patient participation: Engaged,Participates  Duration of participation: 60 minutes  Therapy conducted by: Psych rehab  Therapy Summary: Patient attended group therapy and stress management. Engaged in interventions. Reporting improvements.    08-23-22  Type of therapy: Coping skills  Type of session: Group  Level of patient participation: Engaged,Participates  Duration of participation: 60 minutes  Therapy conducted by: Psych rehab  Therapy Summary: Patient attended group therapy. Vocal and engaged in discussion. Insightful sharing.  
  08-21-22  Type of therapy: Leisure development,Mindfulness  Type of session: Group  Level of patient participation: Attentive,Engaged,Participates  Duration of participation: 60 minutes  Therapy conducted by: Other (specify),Recreation Therapist  Therapy Summary: Patient attended all groups today where she participated in a variety of activities such as stretching exercises, guided meditation, and leisure/music. Patient reported good mood and interacted frequently with peers throughout the day. Expressed that she feels much better after coming to the hosptial and changing her medications.    08-22-22  Type of therapy: Stress management,Symptom management  Type of session: Group  Level of patient participation: Engaged,Participates  Duration of participation: 60 minutes  Therapy conducted by: Psych rehab  Therapy Summary: Patient attended group therapy and stress management. Engaged in interventions. Reporting improvements.    08-23-22  Type of therapy: Coping skills  Type of session: Group  Level of patient participation: Engaged,Participates  Duration of participation: 60 minutes  Therapy conducted by: Psych rehab  Therapy Summary: Patient attended group therapy. Vocal and engaged in discussion. Insightful sharing.

## 2022-08-24 NOTE — BH TREATMENT PLAN - NSTXIMPULSGOAL_PSY_ALL_CORE
Will identify 1 strategy to interrupt impulsive thoughts
Will identify 1 strategy to interrupt impulsive thoughts

## 2022-08-24 NOTE — BH INPATIENT PSYCHIATRY PROGRESS NOTE - NSBHFUPINTERVALHXFT_PSY_A_CORE
Pt continues with the Zyprexa and is feeling less anxiety .  Pt denies any suicidal ideation , intent or plan . She denies any side effects from medication . Will add prn of the trazodone 50mg Hs prn , and continue with the Neurontin for the fibromyalgia.   Pt denies any hallucination .  
Pt with cc of continue back pain and insomnia.  Pt was willing to continue with the Zyprexa as she was less anxious.  Will increase the trazodone to 100mg hs, Add the neurontin for pain control , continue with the zyprexa at 10mg   Pt willing to continue with the treatment . Pt denies any suicidal ideation intent 
Pt denies any hallucination , denies any suicidal ideation intent or plan 
Pt with euthymic mood and affect is full and mood congruent and full range. Pt denies any hallucination , no delusion elicited   Pt denies any suicidal ideation  intact and no plans.  Pt with denial of side effect from medication

## 2022-08-24 NOTE — BH INPATIENT PSYCHIATRY PROGRESS NOTE - PRN MEDS
MEDICATIONS  (PRN):  ALBUTerol    90 MICROgram(s) HFA Inhaler 2 Puff(s) Inhalation every 6 hours PRN COPD  traZODone 50 milliGRAM(s) Oral at bedtime PRN insomnia  
MEDICATIONS  (PRN):  ALBUTerol    90 MICROgram(s) HFA Inhaler 2 Puff(s) Inhalation every 6 hours PRN COPD  traZODone 50 milliGRAM(s) Oral at bedtime PRN insomnia  
MEDICATIONS  (PRN):  ALBUTerol    90 MICROgram(s) HFA Inhaler 2 Puff(s) Inhalation every 6 hours PRN COPD  
MEDICATIONS  (PRN):  ALBUTerol    90 MICROgram(s) HFA Inhaler 2 Puff(s) Inhalation every 6 hours PRN COPD  traZODone 50 milliGRAM(s) Oral at bedtime PRN insomnia

## 2022-08-24 NOTE — BH TREATMENT PLAN - NSTXPATIENTPARTICIPATE_PSY_ALL_CORE
Patient participated in defining interventions
Patient participated in identification of needs/problems/goals for treatment

## 2022-08-24 NOTE — BH TREATMENT PLAN - NSTXDCOTHRGOAL_PSY_ALL_CORE
Pt. will have appt. for ongoing mental health care within 5-7 days of discharge    Pt. will have safe housing upon discharge.    Social work will follow up to insure Pts. benefits are in place    SW to explore with pt. the interest and need for substance use referral for discharge
Pt. will have appt. for ongoing mental health care within 5-7 days of discharge    Pt. will have safe housing upon discharge.    Social work will follow up to insure Pts. benefits are in place    SW to explore with pt. the interest and need for substance use referral for discharge

## 2022-08-24 NOTE — BH TREATMENT PLAN - NSTXCOPEINTERMD_PSY_ALL_CORE
encourage the pt to attend groups for insight and coping skills
encourage the pt to attend groups for insight and coping skills

## 2022-08-24 NOTE — BH INPATIENT PSYCHIATRY PROGRESS NOTE - NSBHATTESTBILLINGAW_PSY_A_CORE
Billing in another system

## 2022-08-24 NOTE — BH INPATIENT PSYCHIATRY PROGRESS NOTE - NSTXDCOTHRGOAL_PSY_ALL_CORE
Pt. will have appt. for ongoing mental health care within 5-7 days of discharge    Pt. will have safe housing upon discharge.    Social work will follow up to insure Pts. benefits are in place    SW to explore with pt. the interest and need for substance use referral for discharge

## 2022-08-24 NOTE — BH INPATIENT PSYCHIATRY PROGRESS NOTE - NSTXIMPULSINTERMD_PSY_ALL_CORE
zyprexa for mood and affect lability 

## 2022-08-24 NOTE — BH INPATIENT PSYCHIATRY PROGRESS NOTE - NSBHATTESTTYPEVISIT_PSY_A_CORE
Gallbladder stones, follow-up with general surgeon he will evaluate you and let you know if you need surgery for this  Message to staff: Please place order for general surgeon with diagnosis of cholelithiasis and right upper quadrant abdominal pain, thank you
Attending Only

## 2022-08-24 NOTE — BH INPATIENT PSYCHIATRY PROGRESS NOTE - NSBHADMITCOUNSEL_PSY_A_CORE
importance of adherence to chosen treatment/client/family/caregiver education
importance of adherence to chosen treatment/client/family/caregiver education
risks and benefits of treatment options/importance of adherence to chosen treatment/client/family/caregiver education

## 2022-08-24 NOTE — BH INPATIENT PSYCHIATRY PROGRESS NOTE - NSBHASSESSSUMMFT_PSY_ALL_CORE
Pt is a 52 yr old  female with schizoaffective disorder, multiple 5N admissions and was recently at [facility] from July 11-18was attending [facility] and sees a private psychiatrist that she describes as holistic and taking her off meds and she was not dong well.  Pt with  father and brother h/o ETOH dependence and mother reportedly with bipolar. Patient was clean and sober for 10 years.  Relapsed after break up with boyfriend in June.  Clean and sober again for 81 . Pt  hasn't worked in many months now with financial problems   patient facing bankruptcy
Pt with gradual decreasing of psychosis .  Pt denies any suicidal ideation intent or plan   Pt denies any side effect from medication 
52 yr old  female with schizoaffective disorder, multiple 5N admissions and was recently at [facility] from July 11-18was attending [facility] [employer] and sees a private psychiatrist that she describes as holistic and taking her off meds and she was not doing well. Pt was reporting auditory hallucination , frequent suicidal ideation ,insomnia , increased anxiety and depression .  Pt did well with the zyprexa and had resolution of hallucination and delusional thoughts.  Pt with return of good sleep quality and pt denies any suicidal ideation or plan Pt with euthymic mood and with full range of affect   
Pt with improved mood and affect . Pt denies hallucination . Currently pt denies any side effects from medication

## 2022-08-24 NOTE — BH DISCHARGE NOTE NURSING/SOCIAL WORK/PSYCH REHAB - NSCDUDCCRISIS_PSY_A_CORE
[facility] Well  1 (858) [facility]-WELL ([phone])  Text "WELL" to 68567  Website: www.Optimitive/.[facility] 1 (491) 831-KLNW (9571) Website: www.safehorizon.org/.National Suicide Prevention Lifeline 4 (919) 806-8106/.  [facility]  1 (967) LIFENET (269-6917)/.  [facility]  [address] [place] [address] [phone]   Hours:  Monday through Friday from 9 AM to 3 PM/.  [facility] [facility] Crisis Line  8 (342) 334-8841, Option 1 .[facility] 1 (626) 210-DOHT (9330) Website: www.safehorizon.org/.National Suicide Prevention Lifeline 1 (027) 469-4734/.  [facility]  1 (259) LIFENET (074-8869)/.  [facility]  (449) 310-4973/.  [facility] Response Crisis Hotline  (258) 616-3364  24 hour telephone crisis intervention and suicide prevention hotline concerned with all mental health issues/.  [facility]  [address] [place] [address] [phone]   Hours:  Monday through Friday from 9 AM to 3 PM/Other... [facility] Well  1 (503) [facility]-WELL ([phone])  Text "WELL" to 27917  Website: www.nChannel/.[facility] 1 (397) 501-JNPY (1253) Website: www.safehorizon.org/.National Suicide Prevention Lifeline 3 (649) 471-8079/.  [facility]  1 (189) LIFENET (813-6493)/.  [facility]  [address] [place] [address] [phone]   Hours:  Monday through Friday from 9 AM to 3 PM/.  [facility] - [facility] Crisis Line  6 (748) 662-0979, Option 1

## 2022-08-24 NOTE — BH TREATMENT PLAN - NSTXSUICIDINTERRN_PSY_ALL_CORE
Patient will be able to state 3 reasons for living.
Patient will be able to state 3 reasons for living.

## 2022-08-24 NOTE — BH TREATMENT PLAN - NSTXCOPEINTERPR_PSY_ALL_CORE
[facility] will encourage patient to actively participate in 1-2 psych rehab groups daily to improve maladaptive coping skills.
Patient wll be dicharged today.

## 2022-08-24 NOTE — BH TREATMENT PLAN - NSPTSTATEDGOAL_PSY_ALL_CORE
To stabilize on medication with possible med changes.  Feel less depressed without suicidal thoughts
To stabilize on medication with possible med changes.  Feel less depressed without suicidal thoughts

## 2022-08-24 NOTE — BH TREATMENT PLAN - NSTXDCOTHRINTERSW_PSY_ALL_CORE
SW to explore with pt. d/c needs including housing, aftercare, and benefits.
SW to explore with pt. d/c needs including housing, aftercare, and benefits.

## 2022-08-25 NOTE — CHART NOTE - NSCHARTNOTEFT_GEN_A_CORE
Writer contacted patient to follow up after discharge. Spoke to patient's sister who reported she made it home safely. Denies SI/HI, in no acute distress.

## 2022-08-29 DIAGNOSIS — M19.90 UNSPECIFIED OSTEOARTHRITIS, UNSPECIFIED SITE: ICD-10-CM

## 2022-08-29 DIAGNOSIS — F41.9 ANXIETY DISORDER, UNSPECIFIED: ICD-10-CM

## 2022-08-29 DIAGNOSIS — F32.A DEPRESSION, UNSPECIFIED: ICD-10-CM

## 2022-08-29 DIAGNOSIS — R45.851 SUICIDAL IDEATIONS: ICD-10-CM

## 2022-08-29 DIAGNOSIS — M79.7 FIBROMYALGIA: ICD-10-CM

## 2022-08-29 DIAGNOSIS — Z56.0 UNEMPLOYMENT, UNSPECIFIED: ICD-10-CM

## 2022-08-29 DIAGNOSIS — H35.069 RETINAL VASCULITIS, UNSPECIFIED EYE: ICD-10-CM

## 2022-08-29 DIAGNOSIS — J45.909 UNSPECIFIED ASTHMA, UNCOMPLICATED: ICD-10-CM

## 2022-08-29 DIAGNOSIS — Z88.0 ALLERGY STATUS TO PENICILLIN: ICD-10-CM

## 2022-08-29 DIAGNOSIS — F25.9 SCHIZOAFFECTIVE DISORDER, UNSPECIFIED: ICD-10-CM

## 2022-08-29 DIAGNOSIS — E06.3 AUTOIMMUNE THYROIDITIS: ICD-10-CM

## 2022-08-29 DIAGNOSIS — F29 UNSPECIFIED PSYCHOSIS NOT DUE TO A SUBSTANCE OR KNOWN PHYSIOLOGICAL CONDITION: ICD-10-CM

## 2022-08-29 DIAGNOSIS — K51.90 ULCERATIVE COLITIS, UNSPECIFIED, WITHOUT COMPLICATIONS: ICD-10-CM

## 2022-08-29 DIAGNOSIS — G47.00 INSOMNIA, UNSPECIFIED: ICD-10-CM

## 2022-08-29 SDOH — ECONOMIC STABILITY - INCOME SECURITY: UNEMPLOYMENT, UNSPECIFIED: Z56.0

## 2022-08-30 RX ORDER — BENZTROPINE MESYLATE 1 MG
1 TABLET ORAL
Qty: 45 | Refills: 1
Start: 2022-08-30 | End: 2022-09-28

## 2022-08-30 NOTE — BH CHART NOTE - NSEVENTNOTEFT_PSY_ALL_CORE
Pt called today and claimed that she was having" sometimes tremors in the hand " and was told by her aftercare that she should call here for medication. Spoke with the pt will prescribe cogentin 1mg po tid

## 2022-09-18 ENCOUNTER — EMERGENCY (EMERGENCY)
Facility: HOSPITAL | Age: 52
LOS: 0 days | Discharge: HOME | End: 2022-09-18
Attending: EMERGENCY MEDICINE
Payer: MEDICAID

## 2022-09-18 VITALS
SYSTOLIC BLOOD PRESSURE: 126 MMHG | RESPIRATION RATE: 18 BRPM | TEMPERATURE: 97 F | HEART RATE: 65 BPM | DIASTOLIC BLOOD PRESSURE: 89 MMHG | OXYGEN SATURATION: 100 %

## 2022-09-18 VITALS — WEIGHT: 164.91 LBS | HEIGHT: 63 IN
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
## 2022-09-18 DIAGNOSIS — L93.0 DISCOID LUPUS ERYTHEMATOSUS: ICD-10-CM

## 2022-09-18 DIAGNOSIS — J45.909 UNSPECIFIED ASTHMA, UNCOMPLICATED: ICD-10-CM
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
## 2022-09-18 DIAGNOSIS — M25.551 PAIN IN RIGHT HIP: ICD-10-CM
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
## 2022-09-18 DIAGNOSIS — Z88.0 ALLERGY STATUS TO PENICILLIN: ICD-10-CM

## 2022-09-18 DIAGNOSIS — M79.7 FIBROMYALGIA: ICD-10-CM
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
## 2022-09-18 DIAGNOSIS — E06.3 AUTOIMMUNE THYROIDITIS: ICD-10-CM

## 2022-09-18 DIAGNOSIS — F25.9 SCHIZOAFFECTIVE DISORDER, UNSPECIFIED: ICD-10-CM

## 2022-09-18 DIAGNOSIS — S73.101A UNSPECIFIED SPRAIN OF RIGHT HIP, INITIAL ENCOUNTER: ICD-10-CM

## 2022-09-18 PROBLEM — H35.069: Chronic | Status: ACTIVE | Noted: 2022-08-19

## 2022-09-18 PROCEDURE — 99284 EMERGENCY DEPT VISIT MOD MDM: CPT

## 2022-09-18 PROCEDURE — 99283 EMERGENCY DEPT VISIT LOW MDM: CPT | Mod: 25

## 2022-09-18 PROCEDURE — 73502 X-RAY EXAM HIP UNI 2-3 VIEWS: CPT | Mod: RT

## 2022-09-18 PROCEDURE — 73502 X-RAY EXAM HIP UNI 2-3 VIEWS: CPT | Mod: 26,RT

## 2022-09-18 RX ADMIN — Medication 500 MILLIGRAM(S): at 12:41

## 2022-09-18 NOTE — ED STATDOCS - NS_ ATTENDINGSCRIBEDETAILS _ED_A_ED_FT
I, Nguyễn Irving MD,  performed the initial face to face bedside interview with this patient regarding history of present illness, review of symptoms and relevant past medical, social and family history.  I completed an independent physical examination.  I was the initial provider who evaluated this patient. I have signed out the follow up of any pending tests (i.e. labs, radiological studies) to the HILDA.  I have communicated the patient’s plan of care and disposition with the HILDA.  The history, relevant review of systems, past medical and surgical history, medical decision making, and physical examination was documented by the scribe in my presence and I attest to the accuracy of the documentation.

## 2022-09-18 NOTE — ED STATDOCS - PHYSICAL EXAMINATION
General: AAOx3, NAD  HEENT: NCAT  Cardiac: Normal rate, normal peripheral perfusion  Respiratory: Normal rate and effort  GI: Soft, nondistended  Neuro: No focal deficits. CARR equally x4  MSK: FROMx4, no peripheral edema +mild anterior lateral TTP, no gross deformity , NVI distally normal, passive SLR   Skin: No rash Attending: General: AAOx3, NAD  HEENT: NCAT  Cardiac: Normal rate, normal peripheral perfusion  Respiratory: Normal rate and effort  GI: Soft, nondistended  Neuro: No focal deficits. CARR equally x4  MSK: FROMx4, no peripheral edema +mild anterior lateral TTP, no gross deformity , NVI distally normal, passive SLR   Skin: No rash

## 2022-09-18 NOTE — ED STATDOCS - OBJECTIVE STATEMENT
51 y/o female with a PMhx of asthma, UC, LE, hashimoto's disease, fibromyalgia, retinal vasculitis, schizo-affective schizophrenia  presents to the ED c/o hip pain. states she was throwing side kick at martial arts this morning and felt a sudden pop in her R hip. Notes she has been having pain since and difficulty bearing weight on hip.  Denies fall or LOC. No numbness, tingling.

## 2022-09-18 NOTE — ED STATDOCS - MUSCULOSKELETAL, MLM
range of motion is not limited and there is no muscle tenderness. RIGHT HIP: +Mild tenderness right hip. FROM. NVI. FROM.

## 2022-09-18 NOTE — ED STATDOCS - PROGRESS NOTE DETAILS
PA: Patient is a 51 y/o female with PMHx of asthma, UC, LE, hashimoto's disease, fibromyalgia, retinal vasculitis, schizo-affective schizophrenia who presents to [facility] c/o RIGHT hip pain. Patient was throwing side kick at martial arts this morning and felt a sudden pop in her R hip. Patient c/o pain since and difficulty bearing weight on hip. Denies fall or LOC. No numbness, tingling, weakness. ~Jeremy Call PA-C PA note: No obv fracture. All radiology results discussed in detail with patient. Patient re-examined and re-evaluated. Patient feels much better at this time. ED evaluation, Diagnosis and management discussed with the patient in detail. Workup results discussed with ED attending, OK to NE home. Close ORTHO follow up encouraged, aftercare to assist with scheduling appointment ASAP. Strict ED return instructions discussed in detail and patient given the opportunity to ask any questions about their discharge diagnosis and instructions. Patient verbalized understanding. ~ Jeremy Call PA-C

## 2022-09-18 NOTE — ED STATDOCS - CARE PROVIDER_API CALL
Corky Benoit)  Orthopaedic Surgery  [address] [place] [address], [address]  [place] [address]  Phone: [phone]  Fax: [phone]  Follow Up Time: Urgent

## 2022-09-18 NOTE — ED STATDOCS - CLINICAL SUMMARY MEDICAL DECISION MAKING FREE TEXT BOX
Will pain control and get hip  XR. Will pain control and get hip  XR.    PA note: No obv fracture. All radiology results discussed in detail with patient. Patient re-examined and re-evaluated. Patient feels much better at this time. ED evaluation, Diagnosis and management discussed with the patient in detail. Workup results discussed with ED attending, OK to dc home. Close ORTHO follow up encouraged, aftercare to assist with scheduling appointment ASAP. Strict ED return instructions discussed in detail and patient given the opportunity to ask any questions about their discharge diagnosis and instructions. Patient verbalized understanding. ~ Jeremy Call PA-C

## 2022-09-18 NOTE — ED STATDOCS - NS ED ROS FT
Constitutional: No fevers, chills, or sweats.  Cardiac: No chest pain, exertional dyspnea, orthopnea  Respiratory: No shortness of breath, no cough  GI: No abdominal pain, no N/V/D  Neuro: No headaches, no neck pain/stiffness, no numbness  Msk: +R hip pain  All other systems reviewed and are negative unless otherwise stated in the HPI.

## 2022-09-18 NOTE — ED STATDOCS - PATIENT PORTAL LINK FT
You can access the FollowMyHealth Patient Portal offered by [facility] by registering at the following website: [email or web address]. By joining [facility]’s FollowMyHealth portal, you will also be able to view your health information using other applications (apps) compatible with our system.

## 2022-09-18 NOTE — ED STATDOCS - NSFOLLOWUPINSTRUCTIONS_ED_ALL_ED_FT
Hip Sprain    The skeleton inside a body with a close-up of the ligaments of the pelvis.   A hip sprain is a stretch or tear in one of the strong bands of tissue (ligaments) that connect the hip bone to the hip joint and pelvis. There are three types of hip sprains:  •A grade 1 sprain is a mildly stretched ligament. This injury causes pain and swelling, but the joint remains stable.      •A grade 2 sprain is a partial ligament tear. This injury may cause the hip joint to become looser (joint laxity).      •A grade 3 sprain is a complete ligament tear. This can make the hip joint unmovable and unstable.        What are the causes?    This condition may be caused by:  •A sudden injury (acute sprain). This can result from falling or severely twisting your hip joint. These injuries usually involve a large force placed on the joint.      •An overuse injury. This type of injury occurs gradually over time from repeatedly doing activities that put stress on your hip ligaments and muscles. This injury usually involves small amounts of stress repeatedly placed on the joint.        What increases the risk?    You are more likely to develop this condition if:  •You had a previous hip injury.      •Your hip joint is weak or stiff or you lack flexibility.      •You play contact sports.      •You are at risk for falling.      •You are overweight or do not get regular exercise.      •You try to do too much too quickly.      •You do not warm up properly before activity.        What are the signs or symptoms?    Symptoms of this condition include:  •Hip pain.      •Pain that gets worse with movement or weight bearing.      •Swelling or bruising at the hip joint.      •Difficulty moving the hip.      •Instability of the hip.      •Tingling or numbness in the leg.      •Muscle weakness in the hip or leg.      •Hearing a noise like a pop or feeling a tear at the time of the injury.        How is this diagnosed?    This condition may be diagnosed based on:  •Your symptoms and history of an injury or activity that puts stress on the hip.      •A physical exam. The health care provider will check the movement, muscle strength, and stability of your hip.      •Imaging tests such as an X-ray or MRI. These tests can show how severe the sprain is and can be used to rule out a broken bone.        How is this treated?    At first, this condition may be treated by resting and icing the hip. Your health care provider may also recommend taking a nonsteroidal anti-inflammatory drug (NSAID) to reduce pain and swelling. Additional treatment depends on the severity of the sprain.  •A grade 1 or 2 sprain may only need treatment with rest, ice, and medicine.      •A grade 3 sprain may require surgery to repair the ligament.      You may also need to do certain exercises to strengthen muscles and maintain full movement (physical therapy).      Follow these instructions at home:      Managing pain, stiffness, and swelling   A bag of ice on a towel on the skin. •If directed, put ice on your hip:  •Put ice in a plastic bag.      •Place a towel between your skin and the bag.      •Leave the ice on for 20 minutes, 2–3 times a day.        •Move your toes and bend your knee often to reduce stiffness and swelling.      Activity     •Avoid activities that cause hip pain.       •Return to your normal activities as told by your health care provider. Ask your health care provider what activities are safe for you.      •Do physical therapy exercises as told by your health care provider.      General instructions     •Take over-the-counter and prescription medicines only as told by your health care provider.    •Ask your health care provider if the medicine prescribed to you:  •Requires you to avoid driving or using heavy machinery.    •Can cause constipation. You may need to take actions to prevent or treat constipation, such as:  •Drink enough fluid to keep your urine pale yellow.      •Take over-the-counter or prescription medicines.      •Eat foods that are high in fiber, such as beans, whole grains, and fresh fruits and vegetables.      •Limit foods that are high in fat and processed sugars, such as fried or sweet foods.          • Do not use any products that contain nicotine or tobacco, such as cigarettes, e-cigarettes, and chewing tobacco. These can delay healing. If you need help quitting, ask your health care provider.      •Keep all follow-up visits as told by your health care provider. This is important.        Contact a health care provider if:    •Your pain, bruising, or swelling gets worse.      •You develop any new symptoms.      •Your symptoms are not improving at home.        Get help right away if:    •You have excessive swelling.      •Your hip feels very unstable.      •You have a loss of feeling in the hip or leg.      •Your skin changes color in the affected area.        Summary    •A hip sprain is an injury to a ligament in your hip. It can range from a mild stretch to a complete tear.      •A hip sprain can be caused by an acute injury or repetitive stress.      •Symptoms include pain, swelling, and bruising.      •Rest and icing are the first treatments for a hip sprain. Other treatments depend on the severity of the sprain.      This information is not intended to replace advice given to you by your health care provider. Make sure you discuss any questions you have with your health care provider.

## 2022-09-18 NOTE — ED STATDOCS - ATTENDING APP SHARED VISIT CONTRIBUTION OF CARE
I, Nguyễn Irving MD, personally saw the patient with HILDA.  I have personally performed a face to face diagnostic evaluation on this patient.  I have reviewed the HILDA note and agree with the history, exam, and plan of care, except as noted.

## 2022-09-18 NOTE — ED ADULT TRIAGE NOTE - CHIEF COMPLAINT QUOTE
Pt presents to ER c/o right hip pain. Pt reports she threw a kick at 0930 this morning at a martial arts class and began to have pain.

## 2022-10-05 ENCOUNTER — NON-APPOINTMENT (OUTPATIENT)
Age: 52
End: 2022-10-05

## 2022-10-14 NOTE — ED PROVIDER NOTE - NS_EDPROVIDERDISPOUSERTYPE_ED_A_ED
Abdominal Pain, N/V/D Scribe Attestation (For Scribes USE Only)... Scribe Attestation (For Scribes USE Only).../Attending Attestation (For Attendings USE Only)...

## 2022-10-16 ENCOUNTER — NON-APPOINTMENT (OUTPATIENT)
Age: 52
End: 2022-10-16

## 2022-10-24 ENCOUNTER — EMERGENCY (EMERGENCY)
Facility: HOSPITAL | Age: 52
LOS: 0 days | Discharge: HOME | End: 2022-10-24
Attending: EMERGENCY MEDICINE
Payer: MEDICAID

## 2022-10-24 VITALS
OXYGEN SATURATION: 98 % | TEMPERATURE: 98 F | RESPIRATION RATE: 18 BRPM | SYSTOLIC BLOOD PRESSURE: 127 MMHG | HEART RATE: 66 BPM | DIASTOLIC BLOOD PRESSURE: 76 MMHG

## 2022-10-24 VITALS — WEIGHT: 169.98 LBS | HEIGHT: 63 IN

## 2022-10-24 DIAGNOSIS — R51.9 HEADACHE, UNSPECIFIED: ICD-10-CM

## 2022-10-24 DIAGNOSIS — E06.3 AUTOIMMUNE THYROIDITIS: ICD-10-CM

## 2022-10-24 DIAGNOSIS — Q27.30 ARTERIOVENOUS MALFORMATION, SITE UNSPECIFIED: ICD-10-CM

## 2022-10-24 DIAGNOSIS — J45.909 UNSPECIFIED ASTHMA, UNCOMPLICATED: ICD-10-CM

## 2022-10-24 DIAGNOSIS — M79.7 FIBROMYALGIA: ICD-10-CM

## 2022-10-24 DIAGNOSIS — G43.909 MIGRAINE, UNSPECIFIED, NOT INTRACTABLE, WITHOUT STATUS MIGRAINOSUS: ICD-10-CM

## 2022-10-24 DIAGNOSIS — M32.9 SYSTEMIC LUPUS ERYTHEMATOSUS, UNSPECIFIED: ICD-10-CM

## 2022-10-24 DIAGNOSIS — Z88.0 ALLERGY STATUS TO PENICILLIN: ICD-10-CM

## 2022-10-24 DIAGNOSIS — K51.90 ULCERATIVE COLITIS, UNSPECIFIED, WITHOUT COMPLICATIONS: ICD-10-CM

## 2022-10-24 DIAGNOSIS — F25.9 SCHIZOAFFECTIVE DISORDER, UNSPECIFIED: ICD-10-CM

## 2022-10-24 LAB
ALBUMIN SERPL ELPH-MCNC: 3.6 G/DL — SIGNIFICANT CHANGE UP (ref 3.3–5)
ALP SERPL-CCNC: 63 U/L — SIGNIFICANT CHANGE UP (ref 40–120)
ALT FLD-CCNC: 22 U/L — SIGNIFICANT CHANGE UP (ref 12–78)
ANION GAP SERPL CALC-SCNC: 4 MMOL/L — LOW (ref 5–17)
AST SERPL-CCNC: 16 U/L — SIGNIFICANT CHANGE UP (ref 15–37)
BASOPHILS # BLD AUTO: 0.05 K/UL — SIGNIFICANT CHANGE UP (ref 0–0.2)
BASOPHILS NFR BLD AUTO: 0.6 % — SIGNIFICANT CHANGE UP (ref 0–2)
BILIRUB SERPL-MCNC: 0.2 MG/DL — SIGNIFICANT CHANGE UP (ref 0.2–1.2)
BUN SERPL-MCNC: 11 MG/DL — SIGNIFICANT CHANGE UP (ref 7–23)
CALCIUM SERPL-MCNC: 8.9 MG/DL — SIGNIFICANT CHANGE UP (ref 8.5–10.1)
CHLORIDE SERPL-SCNC: 108 MMOL/L — SIGNIFICANT CHANGE UP (ref 96–108)
CO2 SERPL-SCNC: 29 MMOL/L — SIGNIFICANT CHANGE UP (ref 22–31)
CREAT SERPL-MCNC: 0.87 MG/DL — SIGNIFICANT CHANGE UP (ref 0.5–1.3)
EGFR: 81 ML/MIN/1.73M2 — SIGNIFICANT CHANGE UP
EOSINOPHIL # BLD AUTO: 0.05 K/UL — SIGNIFICANT CHANGE UP (ref 0–0.5)
EOSINOPHIL NFR BLD AUTO: 0.6 % — SIGNIFICANT CHANGE UP (ref 0–6)
GLUCOSE SERPL-MCNC: 106 MG/DL — HIGH (ref 70–99)
HCT VFR BLD CALC: 44.6 % — SIGNIFICANT CHANGE UP (ref 34.5–45)
HGB BLD-MCNC: 14.5 G/DL — SIGNIFICANT CHANGE UP (ref 11.5–15.5)
IMM GRANULOCYTES NFR BLD AUTO: 0.2 % — SIGNIFICANT CHANGE UP (ref 0–0.9)
LYMPHOCYTES # BLD AUTO: 2.85 K/UL — SIGNIFICANT CHANGE UP (ref 1–3.3)
LYMPHOCYTES # BLD AUTO: 35.5 % — SIGNIFICANT CHANGE UP (ref 13–44)
MCHC RBC-ENTMCNC: 30.2 PG — SIGNIFICANT CHANGE UP (ref 27–34)
MCHC RBC-ENTMCNC: 32.5 GM/DL — SIGNIFICANT CHANGE UP (ref 32–36)
MCV RBC AUTO: 92.9 FL — SIGNIFICANT CHANGE UP (ref 80–100)
MONOCYTES # BLD AUTO: 0.44 K/UL — SIGNIFICANT CHANGE UP (ref 0–0.9)
MONOCYTES NFR BLD AUTO: 5.5 % — SIGNIFICANT CHANGE UP (ref 2–14)
NEUTROPHILS # BLD AUTO: 4.62 K/UL — SIGNIFICANT CHANGE UP (ref 1.8–7.4)
NEUTROPHILS NFR BLD AUTO: 57.6 % — SIGNIFICANT CHANGE UP (ref 43–77)
PLATELET # BLD AUTO: 353 K/UL — SIGNIFICANT CHANGE UP (ref 150–400)
POTASSIUM SERPL-MCNC: 4.2 MMOL/L — SIGNIFICANT CHANGE UP (ref 3.5–5.3)
POTASSIUM SERPL-SCNC: 4.2 MMOL/L — SIGNIFICANT CHANGE UP (ref 3.5–5.3)
PROT SERPL-MCNC: 7.3 GM/DL — SIGNIFICANT CHANGE UP (ref 6–8.3)
RBC # BLD: 4.8 M/UL — SIGNIFICANT CHANGE UP (ref 3.8–5.2)
RBC # FLD: 12.9 % — SIGNIFICANT CHANGE UP (ref 10.3–14.5)
SODIUM SERPL-SCNC: 141 MMOL/L — SIGNIFICANT CHANGE UP (ref 135–145)
WBC # BLD: 8.03 K/UL — SIGNIFICANT CHANGE UP (ref 3.8–10.5)
WBC # FLD AUTO: 8.03 K/UL — SIGNIFICANT CHANGE UP (ref 3.8–10.5)

## 2022-10-24 PROCEDURE — 96374 THER/PROPH/DIAG INJ IV PUSH: CPT

## 2022-10-24 PROCEDURE — 99285 EMERGENCY DEPT VISIT HI MDM: CPT

## 2022-10-24 PROCEDURE — 85025 COMPLETE CBC W/AUTO DIFF WBC: CPT

## 2022-10-24 PROCEDURE — 80053 COMPREHEN METABOLIC PANEL: CPT

## 2022-10-24 PROCEDURE — 70450 CT HEAD/BRAIN W/O DYE: CPT | Mod: MA

## 2022-10-24 PROCEDURE — 36415 COLL VENOUS BLD VENIPUNCTURE: CPT

## 2022-10-24 PROCEDURE — 99284 EMERGENCY DEPT VISIT MOD MDM: CPT | Mod: 25

## 2022-10-24 PROCEDURE — 96375 TX/PRO/DX INJ NEW DRUG ADDON: CPT

## 2022-10-24 PROCEDURE — 70450 CT HEAD/BRAIN W/O DYE: CPT | Mod: 26,MA

## 2022-10-24 RX ORDER — SODIUM CHLORIDE 9 MG/ML
1000 INJECTION INTRAMUSCULAR; INTRAVENOUS; SUBCUTANEOUS ONCE
Refills: 0 | Status: COMPLETED | OUTPATIENT
Start: 2022-10-24 | End: 2022-10-24

## 2022-10-24 RX ORDER — DEXAMETHASONE 0.5 MG/5ML
10 ELIXIR ORAL ONCE
Refills: 0 | Status: COMPLETED | OUTPATIENT
Start: 2022-10-24 | End: 2022-10-24

## 2022-10-24 RX ORDER — METOCLOPRAMIDE HCL 10 MG
10 TABLET ORAL ONCE
Refills: 0 | Status: COMPLETED | OUTPATIENT
Start: 2022-10-24 | End: 2022-10-24

## 2022-10-24 RX ORDER — DIPHENHYDRAMINE HCL 50 MG
25 CAPSULE ORAL ONCE
Refills: 0 | Status: COMPLETED | OUTPATIENT
Start: 2022-10-24 | End: 2022-10-24

## 2022-10-24 RX ADMIN — SODIUM CHLORIDE 1000 MILLILITER(S): 9 INJECTION INTRAMUSCULAR; INTRAVENOUS; SUBCUTANEOUS at 15:06

## 2022-10-24 RX ADMIN — Medication 25 MILLIGRAM(S): at 15:06

## 2022-10-24 RX ADMIN — Medication 102 MILLIGRAM(S): at 15:36

## 2022-10-24 RX ADMIN — Medication 10 MILLIGRAM(S): at 15:19

## 2022-10-24 NOTE — ED ADULT NURSE NOTE - OBJECTIVE STATEMENT
Patient is alert and oriented X3, presenting to the ER with c/o headache. Patient reports "the headache began about 1 month when I had a cold. The headache is in the back of my eyes which made me nervous because I have vasculitis. I went to the doctor on Friday where he prescribed me antibiotics and steroids. I had the worst headache of my life either last night or the night before, I took a dose of Imitrex which did not help. Today the headache was worse and no medication was helping so I came to get seen." Denies CP, SOB, fevers, vomiting, diarrhea, sick contact.
negative...

## 2022-10-24 NOTE — ED STATDOCS - NS ED ATTENDING STATEMENT MOD
I have seen and examined this patient and fully participated in the care of this patient as the teaching attending.  The service was shared with the HILDA.  I reviewed and verified the documentation and independently performed the documented:

## 2022-10-24 NOTE — ED STATDOCS - NS ED ROS FT
Constitutional: No fever or chills. +cold sx  Eyes: No visual changes  HEENT: No throat pain  CV: No chest pain  Resp: No SOB no cough  GI: No abd pain, nausea or vomiting  : No dysuria  MSK: No musculoskeletal pain  Skin: No rash  Neuro: +HA

## 2022-10-24 NOTE — ED STATDOCS - NSICDXPASTMEDICALHX_GEN_ALL_CORE_FT
PAST MEDICAL HISTORY:   delivery delivered     Fibromyalgia     Hashimoto's disease     Hashimoto's thyroiditis     LE (lupus erythematosus)     Retinal vasculitis     Retinal vasculitis, unspecified laterality     Schizo-affective schizophrenia     Ulcerative colitis with rectal bleeding, unspecified location

## 2022-10-24 NOTE — ED STATDOCS - NSFOLLOWUPINSTRUCTIONS_ED_ALL_ED_FT
Acute Headache    WHAT YOU NEED TO KNOW:    An acute headache is pain or discomfort that may start suddenly and get worse quickly. You may have an acute headache only when you feel stress or eat certain foods. Other acute headache pain can happen every day, and sometimes several times a day.     DISCHARGE INSTRUCTIONS:    Return to the emergency department if:   •You have severe pain.      •You have numbness or weakness on one side of your face or body.      •You have a headache that occurs after a blow to the head, a fall, or other trauma.       •You have a headache, are forgetful or confused, or have trouble speaking.      •You have a headache, stiff neck, and a fever.      Call your doctor if:   •You have a constant headache and are vomiting.      •You have a headache each day that does not get better, even after treatment.      •You have changes in your headaches, or new symptoms that occur when you have a headache.      •You have questions or concerns about your condition or care.      Medicines: You may need any of the following:   •Prescription pain medicine may be given. The medicine your healthcare provider recommends will depend on the kind of headaches you have. You will need to take prescription headache medicines as directed to prevent a problem called rebound headache. These headaches happen with regular use of pain relievers for headache disorders.      •NSAIDs, such as ibuprofen, help decrease swelling, pain, and fever. This medicine is available with or without a doctor's order. NSAIDs can cause stomach bleeding or kidney problems in certain people. If you take blood thinner medicine, always ask your healthcare provider if NSAIDs are safe for you. Always read the medicine label and follow directions.      •Acetaminophen decreases pain and fever. It is available without a doctor's order. Ask how much to take and how often to take it. Follow directions. Read the labels of all other medicines you are using to see if they also contain acetaminophen, or ask your doctor or pharmacist. Acetaminophen can cause liver damage if not taken correctly.      •Antidepressants may be given for some kinds of headaches.      •Take your medicine as directed. Contact your healthcare provider if you think your medicine is not helping or if you have side effects. Tell your provider if you are allergic to any medicine. Keep a list of the medicines, vitamins, and herbs you take. Include the amounts, and when and why you take them. Bring the list or the pill bottles to follow-up visits. Carry your medicine list with you in case of an emergency.      Manage your symptoms:   •Apply heat or ice on the headache area. Use a heat or ice pack. For an ice pack, you can also put crushed ice in a plastic bag. Cover the pack or bag with a towel before you apply it to your skin. Ice and heat both help decrease pain, and heat also helps decrease muscle spasms. Apply heat for 20 to 30 minutes every 2 hours. Apply ice for 15 to 20 minutes every hour. Apply heat or ice for as long and for as many days as directed. You may alternate heat and ice.      •Relax your muscles. Lie down in a comfortable position and close your eyes. Relax your muscles slowly. Start at your toes and work your way up your body.      •Keep a record of your headaches. Write down when your headaches start and stop. Include your symptoms and what you were doing when the headache began. Record what you ate or drank for 24 hours before the headache started. Describe the pain and where it hurts. Keep track of what you did to treat your headache and if it worked.       Prevent an acute headache:   •Avoid anything that triggers an acute headache. Examples include exposure to chemicals, going to high altitude, or not getting enough sleep. Create a regular sleep routine. Go to sleep at the same time and wake up at the same time each day. Do not use electronic devices before bedtime. These may trigger a headache or prevent you from sleeping well.      •Do not smoke. Nicotine and other chemicals in cigarettes and cigars can trigger an acute headache or make it worse. Ask your healthcare provider for information if you currently smoke and need help to quit. E-cigarettes or smokeless tobacco still contain nicotine. Talk to your healthcare provider before you use these products.       •Limit alcohol as directed. Alcohol can trigger an acute headache or make it worse. If you have cluster headaches, do not drink alcohol during an episode. For other types of headaches, ask your healthcare provider if it is safe for you to drink alcohol. Ask how much is safe for you to drink, and how often.      •Exercise as directed. Exercise can reduce tension and help with headache pain. Aim for 30 minutes of physical activity on most days of the week. Your healthcare provider can help you create an exercise plan.      •Eat a variety of healthy foods. Healthy foods include fruits, vegetables, low-fat dairy products, lean meats, fish, whole grains, and cooked beans. Your healthcare provider or dietitian can help you create meals plans if you need to avoid foods that trigger headaches.      Follow up with your healthcare provider as directed: Bring your headache record with you when you see your healthcare provider. Write down your questions so you remember to ask them during your visits.            Arteriovenous Malformation    AMBULATORY CARE:    An arteriovenous malformation (AVM) is an abnormal connection between arteries and veins. The connection becomes tangled. Blood flows too quickly from the arteries and pushes on the walls of the veins. The walls weaken and become narrow. The artery walls also become weak. They begin to bulge from blood that is not able to go into the narrow veins. An AVM that has not burst usually causes no symptoms, or may cause headaches or seizures. A burst AVM may cause blood to leak into surrounding tissue, and may lead to a stroke. The leaked blood can also cause your brain to swell.   Arteriovenous Malformation          Call your local emergency number (911 in the ) or have someone else call if:   •You have any of the following signs of a stroke: ?Numbness or drooping on one side of your face       ?Weakness in an arm or leg      ?Confusion or difficulty speaking      ?Dizziness, a severe headache, or vision loss    BE FAST SIGNS OF A STROKE         •You have a seizure.      •You have the worst headache of your life.      Call your doctor or neurologist if:   •You have more than 1 migraine headache.      •You have questions or concerns about your condition or care.      Warning signs of a stroke: The word F.A.S.T. can help you remember and recognize warning signs of a stroke.   •F = Face: One side of the face droops.      •A = Arms: One arm starts to drop when both arms are raised.      •S = Speech: Speech is slurred or sounds different than usual.      •T = Time: A person who is having a stroke needs to be seen immediately. A stroke is a medical emergency that needs immediate treatment. Some medicines and treatments work best if given within a few hours of a stroke.  BE FAST SIGNS OF A STROKE           Treatment may include any of the following:   •Surgery may be needed to repair or remove the AVM. If the AVM has already bled, surgery may be needed to repair burst blood vessels, or remove blood from your brain. Your healthcare provider will use the size, location, and depth of the AVM to decide if surgery is right for you. Surgery can also be used to treat the abnormal blood vessels to prevent them from rupturing or bleeding.      •Endovascular embolization is sometimes done before surgery or radiation to make the AVM smaller and easier to treat. A catheter (tube) is put into a large blood vessel in your groin, and guided up to the AVM in your brain. Dye and an x-ray machine may be used to locate the AVM. Healthcare providers use the catheter to put chemicals, metal coils, or plastic beads in the AVM to stop the blood flow to it.      Prevent a stroke:   •Manage health conditions. A condition such as diabetes can increase your risk for a stroke. Control your blood sugar level if you have hyperglycemia or diabetes. Take your prescribed medicines and check your blood sugar level as directed.  How to check your blood sugar           •Check your blood pressure as directed. High blood pressure can increase your risk for a stroke. Follow your healthcare provider’s directions for controlling your blood pressure.   How to take a Blood Pressure           •Do not use nicotine products or illegal drugs. Nicotine and other chemicals in cigarettes and cigars can cause blood vessel damage. Nicotine and illegal drugs both increase your risk for a stroke. Ask your healthcare provider for information if you currently smoke or use drugs and need help to quit. E-cigarettes or smokeless tobacco still contain nicotine. Talk to your healthcare provider before you use these products.      •Do not drink alcohol. Alcohol increases your risk for a stroke. Alcohol may also raise your blood pressure or thin your blood. Blood thinning can cause a hemorrhagic stroke.      •Eat a variety of healthy foods. Healthy foods include whole-grain breads, low-fat dairy products, beans, lean meats, and fish. Eat at least 5 servings of fruits and vegetables each day. Choose foods that are low in fat, cholesterol, salt, and sugar. Eat foods that are high in potassium, such as potatoes and bananas.      •Exercise as directed. Exercise can lower your blood pressure, cholesterol, weight, and blood sugar levels. Healthcare providers will help you create exercise goals. They can also help you make a plan to reach your goals. For example, you can break exercise into 10 minute periods, 3 times in a day. Find an exercise that you enjoy. This will make it easier for you to reach your exercise goals.      •Maintain a healthy weight. Ask your healthcare provider how much you should weigh. Ask him or her to help you create a weight loss plan if you are overweight.      •Manage stress. Stress can raise your blood pressure. Find new ways to relax, such as deep breathing or listening to music.       For support and more information:   •American Heart Association and American Stroke Association  [address] [place] [address]  Phone: 1-429.311.9662  Web Address: http://www.heart.org        Follow up with your doctor or neurologist as directed: Write down your questions so you remember to ask them during your visits.

## 2022-10-24 NOTE — ED STATDOCS - PHYSICAL EXAMINATION
Constitutional: NAD AOx3  Eyes: PERRL EOMI  Head: Normocephalic atraumatic  Mouth: MMM  Cardiac: regular rate and rhythm  Resp: Lungs CTAB  GI: Abd s/nd/nt  Neuro: CN2-12 grossly intact, CARR x 4  Skin: No visible rashes Constitutional: NAD AOx3  Eyes: PERRL EOMI  Head: Normocephalic atraumatic  Mouth: MMM  Cardiac: regular rate and rhythm  Resp: Lungs CTAB  GI: Abd s/nd/nt  Neuro: CN2-12 grossly intact, CARR x 4, no focal deficits  Skin: No visible rashes

## 2022-10-24 NOTE — ED STATDOCS - PROGRESS NOTE DETAILS
Plan for migraine cocktail, CT brain.  Maria D Reyes PA-C Pt. is a 52 year old  PMHx of retinal vasculitis, asthma, Hashimoto's, fibromyalgia migraines on Imitrex, LE, ulcerative colitis presents with HA radiating from eyes to back of head.  Pt. took Imitrex without relief.  Pt. with history of migraine HA, has not seen a neurologist in a number of years.  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 PCP Dr. Larson in [facility]. CT brain demonstrating left basel ganglia AV malformation.  Pt. has not followed up with physician for this since diagnosis 2019.  Maria D Reyes PA-C Pt. is a 52 year old  PMHx of retinal vasculitis, asthma, Hashimoto's, fibromyalgia migraines on Imitrex, LE, ulcerative colitis presents with HA radiating from eyes to back of head.  Pt. took Imitrex without relief.  Pt. with history of migraine HA, has not seen a neurologist in a number of years.  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 PCP Dr. Larson in [facility]. CT brain demonstrating left basal ganglia AV malformation.  Pt. has not followed up with physician for this since diagnosis 2019.  Maria D Reyes PA-C Pain improved.  Pt. has follow up with Dr. Guzman tomorrow for her AV malformation.  Maria D Reyes PA-C Pt. labs unremarkable.  Pain improved after medications.  Pt. has appointment with Dr. Guzman tomorrow.  Return precautions discussed.  Maria D Reyes PA-C

## 2022-10-24 NOTE — ED STATDOCS - OBJECTIVE STATEMENT
53 y/o female with a PMHx of retinal vasculitis, asthma, Hashimoto's, fibromyalgia migraines on Imitrex, LE, ulcerative colitis presents to the ED c/o cold and HA radiating to eyes and back of head. Pt took ibuprofen and Tylenol with little to no relief. Pt states she has developed a migraine which has lasted a week, no recent trauma, difficulty getting out of bed, has been using heating pad for pain. 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 PCP Dr. Berry in [facility]. No numbness, no tingling in legs. 51 y/o female with a PMHx of retinal vasculitis, asthma, Hashimoto's, fibromyalgia migraines on Imitrex, LE, ulcerative colitis presents to the ED c/o  HA radiating to eyes and back of head on right. Pt took ibuprofen and Tylenol with little to no relief. Also took her imitrex w/o relief.  Feels like prior migraines but this one is lasting longer. No recent trauma, no fever, no neck pain.  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 She was inpatient at [facility] for bipolar d/o and being screened for ect therapy. Brain mri showed the congenital DVA and ECT was not done.  No numbness, no tingling in arms or legs. +nausea no vomit. PCP Dr Larson

## 2022-10-24 NOTE — ED STATDOCS - CARE PROVIDER_API CALL
Gregory Guzman; PhD)  Neurosurgery  [address] [place] [address]  Phone: [phone]  Fax: [phone]  Established Patient  Follow Up Time:

## 2022-10-24 NOTE — ED STATDOCS - CLINICAL SUMMARY MEDICAL DECISION MAKING FREE TEXT BOX
CT head, labs, IV medications, reeval. CT head, labs, IV medications, reeval.              Pt. labs unremarkable.  Pain improved after medications.  Pt. has appointment with Dr. Guzman tomorrow.  Return precautions discussed.  Maria D Reyes PA-C CT head as pt has not been imaged in over 5 years, labs, IV medications for migraine, reeval.  no neuro deficits, no sxs of meningitis. suspect intractable migraine.               Pt. labs unremarkable.  Pain improved after medications.  Pt. has appointment with Dr. Guzman tomorrow.  Return precautions discussed.  Maria D Reyes PA-C

## 2022-10-24 NOTE — ED ADULT TRIAGE NOTE - CHIEF COMPLAINT QUOTE
Pt presents to ER c/o HA radiating from eyes to the back of her head. Onset of symptoms began one week ago with cold like symptoms. Pt took Imitrex PTA

## 2022-10-24 NOTE — ED ADULT NURSE NOTE - NS_ED_NURSE_TEACHING_TOPIC_ED_A_ED
Pt educated on all d/c instructions at this time with return verbal understanding of all d/c instructions to myself.

## 2022-10-24 NOTE — ED STATDOCS - PATIENT PORTAL LINK FT
You can access the FollowMyHealth Patient Portal offered by [facility] by registering at the following website: [email or web address]. By joining [employer]’s FollowMyHealth portal, you will also be able to view your health information using other applications (apps) compatible with our system.

## 2022-10-25 ENCOUNTER — APPOINTMENT (OUTPATIENT)
Dept: NEUROSURGERY | Facility: CLINIC | Age: 52
End: 2022-10-25

## 2022-10-25 VITALS
WEIGHT: 168 LBS | BODY MASS INDEX: 29.77 KG/M2 | SYSTOLIC BLOOD PRESSURE: 130 MMHG | TEMPERATURE: 98.3 F | OXYGEN SATURATION: 97 % | DIASTOLIC BLOOD PRESSURE: 87 MMHG | HEART RATE: 70 BPM | HEIGHT: 63 IN

## 2022-10-25 DIAGNOSIS — F31.9 BIPOLAR DISORDER, UNSPECIFIED: ICD-10-CM

## 2022-10-25 DIAGNOSIS — G47.00 INSOMNIA, UNSPECIFIED: ICD-10-CM

## 2022-10-25 DIAGNOSIS — F43.20 ADJUSTMENT DISORDER, UNSPECIFIED: ICD-10-CM

## 2022-10-25 DIAGNOSIS — R56.9 UNSPECIFIED CONVULSIONS: ICD-10-CM

## 2022-10-25 DIAGNOSIS — R51.9 HEADACHE, UNSPECIFIED: ICD-10-CM

## 2022-10-25 PROCEDURE — 99203 OFFICE O/P NEW LOW 30 MIN: CPT

## 2022-10-25 RX ORDER — LAMOTRIGINE 150 MG/1
150 TABLET ORAL
Qty: 15 | Refills: 0 | Status: ACTIVE | COMMUNITY
Start: 2022-08-24

## 2022-10-25 RX ORDER — FLUTICASONE PROPIONATE AND SALMETEROL 100; 50 UG/1; UG/1
100-50 POWDER RESPIRATORY (INHALATION)
Qty: 60 | Refills: 0 | Status: ACTIVE | COMMUNITY
Start: 2022-04-13

## 2022-10-25 RX ORDER — TRAZODONE HYDROCHLORIDE 50 MG/1
50 TABLET ORAL
Qty: 30 | Refills: 0 | Status: ACTIVE | COMMUNITY
Start: 2022-05-28

## 2022-10-30 PROBLEM — R51.9 FREQUENT HEADACHES: Status: ACTIVE | Noted: 2022-10-30

## 2022-10-30 PROBLEM — R56.9 SEIZURES: Status: ACTIVE | Noted: 2022-10-30

## 2022-10-30 PROBLEM — F31.9 BIPOLAR 1 DISORDER: Status: ACTIVE | Noted: 2018-02-01

## 2022-10-30 PROBLEM — G47.00 INSOMNIA: Status: ACTIVE | Noted: 2018-08-02

## 2022-10-30 PROBLEM — F43.20 ADULT SITUATIONAL STRESS DISORDER: Status: ACTIVE | Noted: 2018-08-02

## 2022-10-30 NOTE — REVIEW OF SYSTEMS
[Migraine Headache] : migraine headaches [Negative] : Heme/Lymph [Feeling Tired] : feeling tired [Vertigo] : vertigo [Anxiety] : anxiety [Depression] : depression [Eye Pain] : eye pain [Palpitations] : palpitations [Cough] : cough [Itching] : itching [de-identified] : Headaches [FreeTextEntry3] : Intolerance to light [FreeTextEntry4] : Ear Pain, Ringing in Ears

## 2022-10-30 NOTE — REASON FOR VISIT
[New Patient Visit] : a new patient visit [FreeTextEntry1] : Brain CT scan finding - left basal ganglia abnormality, history of seizures

## 2022-10-30 NOTE — CONSULT LETTER
[Dear  ___] : Dear  [unfilled], [Courtesy Letter:] : I had the pleasure of seeing your patient, [unfilled], in my office today. [Sincerely,] : Sincerely, [FreeTextEntry2] : Kang Larson MD\par [address]\[place] [address]\par  [FreeTextEntry1] : This is a pleasant 52 year old female who presents with an abnormal Brain MRI suggestive of a vascular malformation.  The patient reports having  a sudden onset of HA about two days ago and she presented to the ED at [facility] on 10/24/2022 showing an area of high attenuation in the left basal ganglia.  The patient has no other neurologic symptoms besides the HA.  The HA has improved and she no longer has any HA.  No visual changes and no dizziness.  The patient denies any tinnitus.   Her past medical history is rich for bipolar disorder, depression, PTSD and seizure disorder.   Since 2008, the patient has had a seizure disorder and her last seizure activity was in 2015.  The patient does take Gabapentin once a day for seizure control.  In 2019 the patient also had sudden on set of HA and dizziness and a CT of the head was performed showing the same lesion in question.  The images are stable and there is calcification of the lesion.  \par \par The patient and I reviewed the images from 10/24/2022 and I explained the lesion in question and suspicion for a vascular malformation.   I have used the patients own images showing characteristics of a left basal ganglia telangiectasia vs. a developmental AVM.  There are no areas of hemorrhage.   No hydrocephalus.  \par \par On neurologic examination, the patient is alert and oriented.  Appears well and in no distress.  Speech clear and and fluent.  CN intact.  Pupils equal and reactive.  No saccades and no nystagmus.  Visual fields full to confrontation.  No ptosis.  Face sensation intact and symmetrical.  Hearing normal.  Tongue is midline and moves in all directions and does not deviate.  No vibration, temperature, or sensory loss through out and pin prick normal.  No pronator drift.  No dysmetria of UE and LE.  Full strength in all muscle groups.  Reflexes are 2+ in all extremities and equal and symmetric through out.  Plantar reflexes are flexor.  NAZ are intact.  No abnormal extraneous movements.  No clonus.  Romberg is absent.   Gait is steady. Tandem gait normal. Able to walk on heels and toes with out difficulty.  \par \par The patient and I had a discussion about the lesion which indicates a possible AVM.  I have taken the liberty in ordering a CTA of the brain w/wo contrast and she will see a neurovascular specialist.   A CT venogram will be performed as well.  It is possible that her HA are related to a high flow AVM.   To determine the flow and cerebral vascular tree, a cerebral catheter angiogram may be necessary to map out the anatomy and drainage pattern.  The patient will be referred to Dr Moshe Hughes once the image is ready for review.    The patient will contact our office for review of the images and further recommendations.    \par \par Thank you for very kindly including me in the evaluation and treatment of your patient.  Please do not hesitate to contact me should you have any concerns or questions regarding this evaluation or proposed follow-up treatment and evaluation plan. [FreeTextEntry3] : Gregory Guzman MD, PhD, FRCPSC \par Attending Neurosurgeon \par  of Neurosurgery \par [facility] \par [address], [address]nd floor \[place] [address] \par Office: [phone] \par Fax: [phone]\par \par

## 2022-10-31 ENCOUNTER — NON-APPOINTMENT (OUTPATIENT)
Age: 52
End: 2022-10-31

## 2022-11-02 ENCOUNTER — OUTPATIENT (OUTPATIENT)
Dept: OUTPATIENT SERVICES | Facility: HOSPITAL | Age: 52
LOS: 1 days | End: 2022-11-02
Payer: MEDICAID

## 2022-11-02 ENCOUNTER — APPOINTMENT (OUTPATIENT)
Dept: CT IMAGING | Facility: CLINIC | Age: 52
End: 2022-11-02

## 2022-11-02 DIAGNOSIS — Q28.3 OTHER MALFORMATIONS OF CEREBRAL VESSELS: ICD-10-CM

## 2022-11-02 DIAGNOSIS — Z00.8 ENCOUNTER FOR OTHER GENERAL EXAMINATION: ICD-10-CM

## 2022-11-02 PROCEDURE — 70496 CT ANGIOGRAPHY HEAD: CPT | Mod: 26

## 2022-11-02 PROCEDURE — 70496 CT ANGIOGRAPHY HEAD: CPT

## 2022-11-04 ENCOUNTER — NON-APPOINTMENT (OUTPATIENT)
Age: 52
End: 2022-11-04

## 2022-11-07 ENCOUNTER — EMERGENCY (EMERGENCY)
Facility: HOSPITAL | Age: 52
LOS: 0 days | Discharge: HOME | End: 2022-11-07
Attending: EMERGENCY MEDICINE
Payer: MEDICAID

## 2022-11-07 VITALS
DIASTOLIC BLOOD PRESSURE: 87 MMHG | HEIGHT: 63 IN | RESPIRATION RATE: 16 BRPM | SYSTOLIC BLOOD PRESSURE: 156 MMHG | HEART RATE: 80 BPM | WEIGHT: 169.98 LBS | TEMPERATURE: 98 F | OXYGEN SATURATION: 100 %

## 2022-11-07 DIAGNOSIS — Z88.0 ALLERGY STATUS TO PENICILLIN: ICD-10-CM

## 2022-11-07 DIAGNOSIS — M79.7 FIBROMYALGIA: ICD-10-CM

## 2022-11-07 DIAGNOSIS — K51.90 ULCERATIVE COLITIS, UNSPECIFIED, WITHOUT COMPLICATIONS: ICD-10-CM

## 2022-11-07 DIAGNOSIS — J45.909 UNSPECIFIED ASTHMA, UNCOMPLICATED: ICD-10-CM

## 2022-11-07 DIAGNOSIS — R51.9 HEADACHE, UNSPECIFIED: ICD-10-CM

## 2022-11-07 DIAGNOSIS — F25.9 SCHIZOAFFECTIVE DISORDER, UNSPECIFIED: ICD-10-CM

## 2022-11-07 DIAGNOSIS — M32.9 SYSTEMIC LUPUS ERYTHEMATOSUS, UNSPECIFIED: ICD-10-CM

## 2022-11-07 DIAGNOSIS — G43.909 MIGRAINE, UNSPECIFIED, NOT INTRACTABLE, WITHOUT STATUS MIGRAINOSUS: ICD-10-CM

## 2022-11-07 DIAGNOSIS — R11.0 NAUSEA: ICD-10-CM

## 2022-11-07 DIAGNOSIS — E06.3 AUTOIMMUNE THYROIDITIS: ICD-10-CM

## 2022-11-07 DIAGNOSIS — R42 DIZZINESS AND GIDDINESS: ICD-10-CM

## 2022-11-07 LAB
ALBUMIN SERPL ELPH-MCNC: 3.5 G/DL — SIGNIFICANT CHANGE UP (ref 3.3–5)
ALP SERPL-CCNC: 62 U/L — SIGNIFICANT CHANGE UP (ref 40–120)
ALT FLD-CCNC: 27 U/L — SIGNIFICANT CHANGE UP (ref 12–78)
ANION GAP SERPL CALC-SCNC: 5 MMOL/L — SIGNIFICANT CHANGE UP (ref 5–17)
APTT BLD: 30 SEC — SIGNIFICANT CHANGE UP (ref 27.5–35.5)
AST SERPL-CCNC: 17 U/L — SIGNIFICANT CHANGE UP (ref 15–37)
BASOPHILS # BLD AUTO: 0.05 K/UL — SIGNIFICANT CHANGE UP (ref 0–0.2)
BASOPHILS NFR BLD AUTO: 0.7 % — SIGNIFICANT CHANGE UP (ref 0–2)
BILIRUB SERPL-MCNC: 0.3 MG/DL — SIGNIFICANT CHANGE UP (ref 0.2–1.2)
BUN SERPL-MCNC: 9 MG/DL — SIGNIFICANT CHANGE UP (ref 7–23)
CALCIUM SERPL-MCNC: 8.7 MG/DL — SIGNIFICANT CHANGE UP (ref 8.5–10.1)
CHLORIDE SERPL-SCNC: 111 MMOL/L — HIGH (ref 96–108)
CO2 SERPL-SCNC: 25 MMOL/L — SIGNIFICANT CHANGE UP (ref 22–31)
CREAT SERPL-MCNC: 0.93 MG/DL — SIGNIFICANT CHANGE UP (ref 0.5–1.3)
EGFR: 75 ML/MIN/1.73M2 — SIGNIFICANT CHANGE UP
EOSINOPHIL # BLD AUTO: 0.09 K/UL — SIGNIFICANT CHANGE UP (ref 0–0.5)
EOSINOPHIL NFR BLD AUTO: 1.3 % — SIGNIFICANT CHANGE UP (ref 0–6)
GLUCOSE SERPL-MCNC: 85 MG/DL — SIGNIFICANT CHANGE UP (ref 70–99)
HCT VFR BLD CALC: 42.9 % — SIGNIFICANT CHANGE UP (ref 34.5–45)
HGB BLD-MCNC: 13.9 G/DL — SIGNIFICANT CHANGE UP (ref 11.5–15.5)
IMM GRANULOCYTES NFR BLD AUTO: 0.3 % — SIGNIFICANT CHANGE UP (ref 0–0.9)
INR BLD: 0.95 RATIO — SIGNIFICANT CHANGE UP (ref 0.88–1.16)
LYMPHOCYTES # BLD AUTO: 1.98 K/UL — SIGNIFICANT CHANGE UP (ref 1–3.3)
LYMPHOCYTES # BLD AUTO: 28.2 % — SIGNIFICANT CHANGE UP (ref 13–44)
MCHC RBC-ENTMCNC: 30 PG — SIGNIFICANT CHANGE UP (ref 27–34)
MCHC RBC-ENTMCNC: 32.4 GM/DL — SIGNIFICANT CHANGE UP (ref 32–36)
MCV RBC AUTO: 92.7 FL — SIGNIFICANT CHANGE UP (ref 80–100)
MONOCYTES # BLD AUTO: 0.31 K/UL — SIGNIFICANT CHANGE UP (ref 0–0.9)
MONOCYTES NFR BLD AUTO: 4.4 % — SIGNIFICANT CHANGE UP (ref 2–14)
NEUTROPHILS # BLD AUTO: 4.57 K/UL — SIGNIFICANT CHANGE UP (ref 1.8–7.4)
NEUTROPHILS NFR BLD AUTO: 65.1 % — SIGNIFICANT CHANGE UP (ref 43–77)
PLATELET # BLD AUTO: 254 K/UL — SIGNIFICANT CHANGE UP (ref 150–400)
POTASSIUM SERPL-MCNC: 4.1 MMOL/L — SIGNIFICANT CHANGE UP (ref 3.5–5.3)
POTASSIUM SERPL-SCNC: 4.1 MMOL/L — SIGNIFICANT CHANGE UP (ref 3.5–5.3)
PROT SERPL-MCNC: 6.7 GM/DL — SIGNIFICANT CHANGE UP (ref 6–8.3)
PROTHROM AB SERPL-ACNC: 11 SEC — SIGNIFICANT CHANGE UP (ref 10.5–13.4)
RBC # BLD: 4.63 M/UL — SIGNIFICANT CHANGE UP (ref 3.8–5.2)
RBC # FLD: 13.2 % — SIGNIFICANT CHANGE UP (ref 10.3–14.5)
SODIUM SERPL-SCNC: 141 MMOL/L — SIGNIFICANT CHANGE UP (ref 135–145)
WBC # BLD: 7.02 K/UL — SIGNIFICANT CHANGE UP (ref 3.8–10.5)
WBC # FLD AUTO: 7.02 K/UL — SIGNIFICANT CHANGE UP (ref 3.8–10.5)

## 2022-11-07 PROCEDURE — 85730 THROMBOPLASTIN TIME PARTIAL: CPT

## 2022-11-07 PROCEDURE — 36415 COLL VENOUS BLD VENIPUNCTURE: CPT

## 2022-11-07 PROCEDURE — 96374 THER/PROPH/DIAG INJ IV PUSH: CPT

## 2022-11-07 PROCEDURE — 86901 BLOOD TYPING SEROLOGIC RH(D): CPT

## 2022-11-07 PROCEDURE — 99284 EMERGENCY DEPT VISIT MOD MDM: CPT

## 2022-11-07 PROCEDURE — 85610 PROTHROMBIN TIME: CPT

## 2022-11-07 PROCEDURE — 80053 COMPREHEN METABOLIC PANEL: CPT

## 2022-11-07 PROCEDURE — 96375 TX/PRO/DX INJ NEW DRUG ADDON: CPT

## 2022-11-07 PROCEDURE — 86850 RBC ANTIBODY SCREEN: CPT

## 2022-11-07 PROCEDURE — 85025 COMPLETE CBC W/AUTO DIFF WBC: CPT

## 2022-11-07 PROCEDURE — 86900 BLOOD TYPING SEROLOGIC ABO: CPT

## 2022-11-07 PROCEDURE — 99284 EMERGENCY DEPT VISIT MOD MDM: CPT | Mod: 25

## 2022-11-07 RX ORDER — SODIUM CHLORIDE 9 MG/ML
1000 INJECTION INTRAMUSCULAR; INTRAVENOUS; SUBCUTANEOUS ONCE
Refills: 0 | Status: COMPLETED | OUTPATIENT
Start: 2022-11-07 | End: 2022-11-07

## 2022-11-07 RX ORDER — METOCLOPRAMIDE HCL 10 MG
10 TABLET ORAL ONCE
Refills: 0 | Status: COMPLETED | OUTPATIENT
Start: 2022-11-07 | End: 2022-11-07

## 2022-11-07 RX ORDER — DIPHENHYDRAMINE HCL 50 MG
25 CAPSULE ORAL ONCE
Refills: 0 | Status: COMPLETED | OUTPATIENT
Start: 2022-11-07 | End: 2022-11-07

## 2022-11-07 RX ADMIN — Medication 25 MILLIGRAM(S): at 08:41

## 2022-11-07 RX ADMIN — SODIUM CHLORIDE 1000 MILLILITER(S): 9 INJECTION INTRAMUSCULAR; INTRAVENOUS; SUBCUTANEOUS at 08:41

## 2022-11-07 RX ADMIN — Medication 10 MILLIGRAM(S): at 08:41

## 2022-11-07 NOTE — ED PROVIDER NOTE - NEURO NEGATIVE STATEMENT, MLM
headache x 6 weeks. no loss of consciousness, no gait abnormality, no sensory deficits, and no weakness.

## 2022-11-07 NOTE — ED PROVIDER NOTE - PROGRESS NOTE DETAILS
pt feeling better, d/w dr saunders service, outpt f/ with kenney mirza [facility]    ED evaluation and management discussed with the patient and family (if available) in detail.  Close PMD follow up encouraged.  Strict ED return instructions discussed in detail and patient given the opportunity to ask any questions about their discharge diagnosis and instructions. Patient verbalized understanding.

## 2022-11-07 NOTE — ED ADULT NURSE NOTE - OBJECTIVE STATEMENT
c/o HA, nausea. Denies vomiting. Pt currently be followed by neurology, pending results/discussion with Neurologist. Pain was more intense this AM, pt took Excedrin prior to arrival. Ongoing assessment

## 2022-11-07 NOTE — ED PROVIDER NOTE - OBJECTIVE STATEMENT
52y Female with PMH of Hashimoto's, ulcerative colitis, asthma, migraines, retinal vasculitis, and schizoaffective disorder BIBA for 6 weeks of headache associated with nausea and dizziness. Per patient, headache started 6 weeks prior and is located in the back of the head on the right side. The pain sometimes spreads down both sides of the neck and she reports pressure behind the right eye. The headache is constant and worsens at night. No vomiting or photophobia. No recent medication changes. Reports some difficulty with memory at baseline and feels like she is forgetting some words. Took Excedrin and ibuprofen at home with minimal relief. Of note, was seen in ED 1.5 weeks prior, CT head revealed DVA vs. AVM, known to pt since 2019. Followed up with Dr. Guzman for outpatient neurosurgery w/u including CT angio, which showed likely DVA (left anterior basal ganglia).

## 2022-11-07 NOTE — ED PROVIDER NOTE - CLINICAL SUMMARY MEDICAL DECISION MAKING FREE TEXT BOX
young f with hx od basal ganglia aneurysm? that had outpt cta, presents with migraine. will medicate, discuss w/ her neurosurgeon dr reid and reassess

## 2022-11-07 NOTE — ED PROVIDER NOTE - PATIENT PORTAL LINK FT
You can access the FollowMyHealth Patient Portal offered by [facility] by registering at the following website: [email or web address]. By joining [employer]’s FollowMyHealth portal, you will also be able to view your health information using other applications (apps) compatible with our system.

## 2022-11-07 NOTE — ED PROVIDER NOTE - CARE PROVIDER_API CALL
Moshe Hughes)  Neurology; Vascular Neurology  [address] [place] [address]  Phone: [phone]  Fax: [phone]  Follow Up Time: 1-3 Days

## 2022-11-07 NOTE — ED PROVIDER NOTE - CONSTITUTIONAL, MLM
normal... Plan: Increase frequency to 4-5 nights weekly Detail Level: Zone Render In Strict Bullet Format?: No Continue Regimen: Tretinoin Resting in stretcher. Awake, alert, oriented to person, place, time/situation and in mild distress.

## 2022-11-07 NOTE — ED ADULT NURSE REASSESSMENT NOTE - NS ED NURSE REASSESS COMMENT FT1
Received report from KAMALA Vazquez. pt resting in stretcher, respirations equal and unlabored at this time. family present at pt bedside, pt safety maintained.

## 2022-11-07 NOTE — ED ADULT TRIAGE NOTE - CHIEF COMPLAINT QUOTE
pt c/o headache x 6 weeks. pt c/o increased vertigo since 10/31. denies blurred vision; neg BEFAST. pt states pain starts in her eyes & radiates down her neck.

## 2022-11-15 ENCOUNTER — NON-APPOINTMENT (OUTPATIENT)
Age: 52
End: 2022-11-15

## 2022-11-16 ENCOUNTER — NON-APPOINTMENT (OUTPATIENT)
Age: 52
End: 2022-11-16

## 2022-11-16 ENCOUNTER — APPOINTMENT (OUTPATIENT)
Dept: NEUROLOGY | Facility: CLINIC | Age: 52
End: 2022-11-16

## 2022-11-16 VITALS
HEIGHT: 63 IN | WEIGHT: 172 LBS | HEART RATE: 75 BPM | BODY MASS INDEX: 30.48 KG/M2 | DIASTOLIC BLOOD PRESSURE: 80 MMHG | SYSTOLIC BLOOD PRESSURE: 125 MMHG | OXYGEN SATURATION: 98 % | TEMPERATURE: 97.9 F

## 2022-11-16 DIAGNOSIS — Q28.3 OTHER MALFORMATIONS OF CEREBRAL VESSELS: ICD-10-CM

## 2022-11-16 PROCEDURE — 99215 OFFICE O/P EST HI 40 MIN: CPT

## 2022-11-16 NOTE — REVIEW OF SYSTEMS
[Depression] : depression [Palpitations] : palpitations [Fever] : no fever [Chills] : no chills [Confused or Disoriented] : no confusion [Memory Lapses or Loss] : no memory loss [Decr. Concentrating Ability] : no decrease in concentrating ability [Difficulty with Language] : no ~M difficulty with language [Facial Weakness] : no facial weakness [Arm Weakness] : no arm weakness [Hand Weakness] : no hand weakness [Leg Weakness] : no leg weakness [Poor Coordination] : good coordination [Numbness] : no numbness [Tingling] : no tingling [Seizures] : no convulsions [Lightheadedness] : no lightheadedness [Difficulty Walking] : no difficulty walking [Frequent Falls] : not falling [Anxiety] : no anxiety [Eye Pain] : no eye pain [Eyesight Problems] : no eyesight problems [Earache] : no earache [Loss Of Hearing] : no hearing loss [Chest Pain] : no chest pain [Shortness Of Breath] : no shortness of breath [Cough] : no cough [de-identified] : Occasional word-finding difficulty, forgetfulness [de-identified] : Dissociation, PTSD [FreeTextEntry4] : Tinnitus in left year, high pitched

## 2022-11-16 NOTE — DISCUSSION/SUMMARY
[FreeTextEntry1] : Ms. Ervin is a 52 year woman with PMH bipolar disorder, OCD/ADHD, who was referred by Dr. Guzman for evaluation of abnormal venous structure in the left anterior basal ganglia on CTA. 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 I recommend performing a cerebral angiogram to make certain that the vascular abnormality is a DVA and not an AVM. I reviewed the goals, alternatives, benefits and risks, including but not limited to stroke, bleeding, and dissection, of performing a cerebral angiogram and she agrees to proceed. Plan to perform cerebral angiogram. All of her questions and concerns were addressed. \par \par \par

## 2022-11-16 NOTE — CONSULT LETTER
[Dear  ___] : Dear  [unfilled], [Consult Letter:] : I had the pleasure of evaluating your patient, [unfilled]. [Please see my note below.] : Please see my note below. [Consult Closing:] : Thank you very much for allowing me to participate in the care of this patient.  If you have any questions, please do not hesitate to contact me. [Sincerely,] : Sincerely, [FreeTextEntry3] : Moshe Hughes MD\par Chief, Vascular Neurology and Neurology Service , NeuroEndovascular Surgery\par  of Neurology and Radiology\par [facility]\par Director, Comprehensive Stroke Center and Stroke Unit\par [facility]\par Director, NeuroEndovascular Surgery\par [facility]\par

## 2022-11-16 NOTE — HISTORY OF PRESENT ILLNESS
[FreeTextEntry1] : Ms. Ervin is a 52 year woman with PMH bipolar disorder and OCD/ADHD, who was referred by Dr. Guzman for evaluation of abnormal venous structure in the left anterior basal ganglia with associated calcifications and associated small calcifications which may represent small cavernomas. She was told in the past that she had a DVA, which was discovered during medical clearance for ECT. She reports intermittent full body shaking/twitching w/o LOC or alteration of consciousness. I personally reviewed her neuro imaging. This looks like a DVA, but nthere is some hypervascularity adjacent to it and it is unclear whether she has a small AVM draining into a DVA, or is this just a simple DVA.\par \par

## 2022-12-05 NOTE — ED ADULT NURSE NOTE - NSSISCREENINGQ3_ED_A_ED
December 5, 2022     Patient: Ashtyn Salmeron   YOB: 2007   Date of Visit: 12/5/2022       To Whom it May Concern:    Ashtyn Salmeron was seen in my clinic on 12/5/2022 at 3:00 pm as well as 11/30 last week.    Please excuse Ashtyn for her absence from school on the date listed above to be able to make her appointment as well as November 28 through December 5th due to illness.    Sincerely,         Dane Pickard MD    Medical information is confidential and cannot be disclosed without the written consent of the patient or her representative.      
Yes

## 2022-12-14 ENCOUNTER — APPOINTMENT (OUTPATIENT)
Dept: NEUROLOGY | Facility: HOSPITAL | Age: 52
End: 2022-12-14

## 2022-12-16 ENCOUNTER — EMERGENCY (EMERGENCY)
Facility: HOSPITAL | Age: 52
LOS: 0 days | Discharge: HOME | End: 2022-12-16
Attending: EMERGENCY MEDICINE
Payer: MEDICAID

## 2022-12-16 VITALS
HEART RATE: 101 BPM | OXYGEN SATURATION: 98 % | SYSTOLIC BLOOD PRESSURE: 139 MMHG | TEMPERATURE: 99 F | DIASTOLIC BLOOD PRESSURE: 91 MMHG | RESPIRATION RATE: 18 BRPM

## 2022-12-16 VITALS — HEIGHT: 63 IN

## 2022-12-16 DIAGNOSIS — F39 UNSPECIFIED MOOD [AFFECTIVE] DISORDER: ICD-10-CM

## 2022-12-16 DIAGNOSIS — J45.909 UNSPECIFIED ASTHMA, UNCOMPLICATED: ICD-10-CM

## 2022-12-16 DIAGNOSIS — R44.0 AUDITORY HALLUCINATIONS: ICD-10-CM

## 2022-12-16 DIAGNOSIS — M79.7 FIBROMYALGIA: ICD-10-CM

## 2022-12-16 DIAGNOSIS — K51.90 ULCERATIVE COLITIS, UNSPECIFIED, WITHOUT COMPLICATIONS: ICD-10-CM

## 2022-12-16 DIAGNOSIS — H35.069 RETINAL VASCULITIS, UNSPECIFIED EYE: ICD-10-CM

## 2022-12-16 DIAGNOSIS — G43.909 MIGRAINE, UNSPECIFIED, NOT INTRACTABLE, WITHOUT STATUS MIGRAINOSUS: ICD-10-CM

## 2022-12-16 DIAGNOSIS — Z56.0 UNEMPLOYMENT, UNSPECIFIED: ICD-10-CM

## 2022-12-16 DIAGNOSIS — Z88.0 ALLERGY STATUS TO PENICILLIN: ICD-10-CM

## 2022-12-16 DIAGNOSIS — E06.3 AUTOIMMUNE THYROIDITIS: ICD-10-CM

## 2022-12-16 DIAGNOSIS — E11.9 TYPE 2 DIABETES MELLITUS WITHOUT COMPLICATIONS: ICD-10-CM

## 2022-12-16 DIAGNOSIS — M32.9 SYSTEMIC LUPUS ERYTHEMATOSUS, UNSPECIFIED: ICD-10-CM

## 2022-12-16 DIAGNOSIS — Z20.822 CONTACT WITH AND (SUSPECTED) EXPOSURE TO COVID-19: ICD-10-CM

## 2022-12-16 LAB
ALBUMIN SERPL ELPH-MCNC: 4.1 G/DL — SIGNIFICANT CHANGE UP (ref 3.3–5)
ALP SERPL-CCNC: 84 U/L — SIGNIFICANT CHANGE UP (ref 40–120)
ALT FLD-CCNC: 30 U/L — SIGNIFICANT CHANGE UP (ref 12–78)
ANION GAP SERPL CALC-SCNC: 8 MMOL/L — SIGNIFICANT CHANGE UP (ref 5–17)
APAP SERPL-MCNC: <2 UG/ML — LOW (ref 10–30)
APPEARANCE UR: CLEAR — SIGNIFICANT CHANGE UP
AST SERPL-CCNC: 19 U/L — SIGNIFICANT CHANGE UP (ref 15–37)
BASOPHILS # BLD AUTO: 0.05 K/UL — SIGNIFICANT CHANGE UP (ref 0–0.2)
BASOPHILS NFR BLD AUTO: 0.8 % — SIGNIFICANT CHANGE UP (ref 0–2)
BILIRUB SERPL-MCNC: 0.5 MG/DL — SIGNIFICANT CHANGE UP (ref 0.2–1.2)
BILIRUB UR-MCNC: NEGATIVE — SIGNIFICANT CHANGE UP
BUN SERPL-MCNC: 14 MG/DL — SIGNIFICANT CHANGE UP (ref 7–23)
CALCIUM SERPL-MCNC: 9.6 MG/DL — SIGNIFICANT CHANGE UP (ref 8.5–10.1)
CHLORIDE SERPL-SCNC: 109 MMOL/L — HIGH (ref 96–108)
CO2 SERPL-SCNC: 22 MMOL/L — SIGNIFICANT CHANGE UP (ref 22–31)
COLOR SPEC: YELLOW — SIGNIFICANT CHANGE UP
CREAT SERPL-MCNC: 0.89 MG/DL — SIGNIFICANT CHANGE UP (ref 0.5–1.3)
DIFF PNL FLD: ABNORMAL
EGFR: 79 ML/MIN/1.73M2 — SIGNIFICANT CHANGE UP
EOSINOPHIL # BLD AUTO: 0.16 K/UL — SIGNIFICANT CHANGE UP (ref 0–0.5)
EOSINOPHIL NFR BLD AUTO: 2.6 % — SIGNIFICANT CHANGE UP (ref 0–6)
ETHANOL SERPL-MCNC: <10 MG/DL — SIGNIFICANT CHANGE UP (ref 0–10)
FLUAV AG NPH QL: SIGNIFICANT CHANGE UP
FLUBV AG NPH QL: SIGNIFICANT CHANGE UP
GLUCOSE SERPL-MCNC: 97 MG/DL — SIGNIFICANT CHANGE UP (ref 70–99)
GLUCOSE UR QL: NEGATIVE — SIGNIFICANT CHANGE UP
HCT VFR BLD CALC: 40.6 % — SIGNIFICANT CHANGE UP (ref 34.5–45)
HGB BLD-MCNC: 13.9 G/DL — SIGNIFICANT CHANGE UP (ref 11.5–15.5)
IMM GRANULOCYTES NFR BLD AUTO: 0.2 % — SIGNIFICANT CHANGE UP (ref 0–0.9)
KETONES UR-MCNC: NEGATIVE — SIGNIFICANT CHANGE UP
LEUKOCYTE ESTERASE UR-ACNC: ABNORMAL
LYMPHOCYTES # BLD AUTO: 1.93 K/UL — SIGNIFICANT CHANGE UP (ref 1–3.3)
LYMPHOCYTES # BLD AUTO: 31.8 % — SIGNIFICANT CHANGE UP (ref 13–44)
MCHC RBC-ENTMCNC: 30.5 PG — SIGNIFICANT CHANGE UP (ref 27–34)
MCHC RBC-ENTMCNC: 34.2 GM/DL — SIGNIFICANT CHANGE UP (ref 32–36)
MCV RBC AUTO: 89 FL — SIGNIFICANT CHANGE UP (ref 80–100)
MONOCYTES # BLD AUTO: 0.4 K/UL — SIGNIFICANT CHANGE UP (ref 0–0.9)
MONOCYTES NFR BLD AUTO: 6.6 % — SIGNIFICANT CHANGE UP (ref 2–14)
NEUTROPHILS # BLD AUTO: 3.52 K/UL — SIGNIFICANT CHANGE UP (ref 1.8–7.4)
NEUTROPHILS NFR BLD AUTO: 58 % — SIGNIFICANT CHANGE UP (ref 43–77)
NITRITE UR-MCNC: NEGATIVE — SIGNIFICANT CHANGE UP
PCP SPEC-MCNC: SIGNIFICANT CHANGE UP
PH UR: 7 — SIGNIFICANT CHANGE UP (ref 5–8)
PLATELET # BLD AUTO: 340 K/UL — SIGNIFICANT CHANGE UP (ref 150–400)
POTASSIUM SERPL-MCNC: 3.8 MMOL/L — SIGNIFICANT CHANGE UP (ref 3.5–5.3)
POTASSIUM SERPL-SCNC: 3.8 MMOL/L — SIGNIFICANT CHANGE UP (ref 3.5–5.3)
PROT SERPL-MCNC: 7.6 GM/DL — SIGNIFICANT CHANGE UP (ref 6–8.3)
PROT UR-MCNC: NEGATIVE — SIGNIFICANT CHANGE UP
RBC # BLD: 4.56 M/UL — SIGNIFICANT CHANGE UP (ref 3.8–5.2)
RBC # FLD: 13.4 % — SIGNIFICANT CHANGE UP (ref 10.3–14.5)
RSV RNA NPH QL NAA+NON-PROBE: SIGNIFICANT CHANGE UP
SALICYLATES SERPL-MCNC: <1.7 MG/DL — LOW (ref 2.8–20)
SARS-COV-2 RNA SPEC QL NAA+PROBE: SIGNIFICANT CHANGE UP
SODIUM SERPL-SCNC: 139 MMOL/L — SIGNIFICANT CHANGE UP (ref 135–145)
SP GR SPEC: 1 — LOW (ref 1.01–1.02)
UROBILINOGEN FLD QL: NEGATIVE — SIGNIFICANT CHANGE UP
WBC # BLD: 6.07 K/UL — SIGNIFICANT CHANGE UP (ref 3.8–10.5)
WBC # FLD AUTO: 6.07 K/UL — SIGNIFICANT CHANGE UP (ref 3.8–10.5)

## 2022-12-16 PROCEDURE — 80053 COMPREHEN METABOLIC PANEL: CPT

## 2022-12-16 PROCEDURE — 90792 PSYCH DIAG EVAL W/MED SRVCS: CPT | Mod: 95,GC

## 2022-12-16 PROCEDURE — 36415 COLL VENOUS BLD VENIPUNCTURE: CPT

## 2022-12-16 PROCEDURE — 99284 EMERGENCY DEPT VISIT MOD MDM: CPT

## 2022-12-16 PROCEDURE — 81001 URINALYSIS AUTO W/SCOPE: CPT

## 2022-12-16 PROCEDURE — 80307 DRUG TEST PRSMV CHEM ANLYZR: CPT

## 2022-12-16 PROCEDURE — 85025 COMPLETE CBC W/AUTO DIFF WBC: CPT

## 2022-12-16 PROCEDURE — 0241U: CPT

## 2022-12-16 SDOH — ECONOMIC STABILITY - INCOME SECURITY: UNEMPLOYMENT, UNSPECIFIED: Z56.0

## 2022-12-16 NOTE — ED BEHAVIORAL HEALTH NOTE - BEHAVIORAL HEALTH NOTE
==================     PRE-HOSPITAL COURSE     ===================     SOURCE:  RN and secondhand ED documentation.    DETAILS:  [facility] spoke to RN - patient remains calm and cooperative in ED. Patient  has 2 children, currently has no custody, endorsing visual hallucinations when she was at home reporting she saw her parents.     =========     ED COURSE     =========     SOURCE:  RN and secondhand ED documentation.     ARRIVAL:  Per chart and RN, patient arrived via walk-in. Per RN, patient was calm upon arrival, and cooperative with triage process.     BELONGINGS:  Per RN, patient arrived with no significant belongings. All belongings were provided to hospital security, and patient currently in a gown with a 1:1 staff member.     BEHAVIOR: RN described patient to be calm and cooperative_, presenting with logical thought process, (AAOx4), presenting with depressed mood and congruent affect, remains in good behavioral and impulse control, is not violent/aggressive. RN stated that the patient is denies SI/HI, but endorsed VH. RN stated that there are no visible marks, bruises, or lacerations on the body. RN stated that the patient appears to be well-groomed, maintains good hygiene, and reports good ADLs, ambulates without assistance.    TREATMENT:  Per chart and RN, patient did not require PRN medications.     VISITORS:  Per RN, 1:1 is at bedside and patient does not have any visitors at bedside.

## 2022-12-16 NOTE — ED BEHAVIORAL HEALTH ASSESSMENT NOTE - RISK ASSESSMENT
Ms. Ervin is at low imminent risk of harm to self or others at this time, though she has elevated chronic risk of harm to self and suicide due to chronic risk factors. These chronic risk factors include history of depression, history of psych hospitalizations, possible history of suicide attempts, history of NSSIB, history of derogatory auditory hallucinations, with prominent cluster B  pathology/impulsivity. She also has risk factors that are acute, including depressed mood, psychosocial stressers. However, she has MANY protective factors that mitigate her risk factors. She identifies reasons to live including her family, beloved pets, and close friends, is very adherent to treatment plans, is highly help seeking, has no access to firearms, she has good therapeutic supports (psychologist, psychiatrist), she is future oriented and wants to get better, identifies her dog and children as very important, she denies passive or active suicidal ideation, she is not acutely manic or psychotic on evaluation, she is caring for her ADLS. She also has no known history of violence and denies homicidal ideation. She does not want voluntary admission to psychiatry and she does not meet criteria for involuntary psychiatric hospitalization as she is not acute risk of harm to self. Furthermore, collateral reports that she often presents to the ED in acute stress and wants to leave the next day. Ms. Ervin is at low imminent risk of harm to self or others at this time, though she has elevated chronic risk of harm to self and suicide due to chronic risk factors. These chronic risk factors include history of depression, history of psych hospitalizations, possible history of suicide attempts, history of NSSIB, history of derogatory auditory hallucinations, with prominent cluster B  pathology/impulsivity. She also has risk factors that are acute, including depressed mood, psychosocial stressors. However, she has a myriad of protective factors that mitigate her risk factors. She identifies reasons to live including her family (2 children and siblings), beloved pets, and close friends, is very adherent to treatment plans, is highly help seeking, has no access to firearms, she has good therapeutic supports (psychologist, psychiatrist), she is future oriented and wants to get better, identifies her dog and children as very important, she denies passive or active suicidal ideation, she is not acutely manic or psychotic on evaluation, she is caring for her ADLS. She also has no known history of violence and denies homicidal ideation. No access to guns. She does not want voluntary admission to psychiatry and she does not meet criteria for involuntary psychiatric hospitalization as she is not acute risk of harm to self. Furthermore, collateral reports that she often presents to the ED in acute stress and wants to leave the next day.

## 2022-12-16 NOTE — ED PROVIDER NOTE - NSFOLLOWUPINSTRUCTIONS_ED_ALL_ED_FT
Depression    WHAT YOU NEED TO KNOW:    Depression is a medical condition that causes feelings of sadness or hopelessness that do not go away. Depression may cause you to lose interest in things you used to enjoy. These feelings may interfere with your daily life.    DISCHARGE INSTRUCTIONS:    Call your local emergency number (911 in the ) if:     You think about harming yourself or someone else.      You have done something on purpose to hurt yourself.    Call your therapist or doctor if:     Your symptoms do not improve.      You cannot make it to your next appointment.       You have new symptoms.      You have questions or concerns about your condition or care.    The following resources are available at any time to help you, if needed:     National Suicide Prevention Lifeline: 1-903.630.1408 (8-342-388-TALK)      Suicide Hotline: 1-986.170.2294 (7-262-BZRQXMJ)      For a list of international numbers: https://[employer].org/find-help/international-resources/    Medicines:     Antidepressants may be given to improve or balance your mood. You may need to take this medicine for several weeks before you begin to feel better.      Take your medicine as directed. Contact your healthcare provider if you think your medicine is not helping or if you have side effects. Tell him of her if you are allergic to any medicine. Keep a list of the medicines, vitamins, and herbs you take. Include the amounts, and when and why you take them. Bring the list or the pill bottles to follow-up visits. Carry your medicine list with you in case of an emergency.    Therapy is often used together with medicine to relieve depression. Therapy is a way for you to talk about your feelings and anything that may be causing depression. Therapy can be done alone or in a group. It may also be done with family members or a significant other.    Self-care:     Get regular physical activity. Try to be active for 30 minutes, 3 to 5 days a week. Physical activity can help relieve depression. Work with your healthcare provider to develop a plan that you enjoy. It may help to ask someone to be active with you.      Create a regular sleep schedule. A routine can help you relax before bed. Listen to music, read, or do yoga. Try to go to bed and wake up at the same time every day. Sleep is important for emotional health.      Eat a variety of healthy foods. Healthy foods include fruits, vegetables, whole-grain breads, low-fat dairy products, lean meats, fish, and cooked beans. A healthy meal plan is low in fat, salt, and added sugar.      Do not drink alcohol or use drugs. Alcohol and drugs can make depression worse. Talk to your therapist or doctor if you need help quitting.    Follow up with your healthcare provider as directed: Your healthcare provider will monitor your progress at follow-up visits. He or she will also monitor your medicine if you take antidepressants. Your healthcare provider will ask if the medicine is helping. Tell him or her about any side effects or problems you may have with your medicine. The type or amount of medicine may need to be changed. Write down your questions so you remember to ask them during your visits.    Anxiety    WHAT YOU NEED TO KNOW:    Anxiety is a condition that causes you to feel extremely worried or nervous. The feelings are so strong that they can cause problems with your daily activities or sleep. Anxiety may be triggered by something you fear, or it may happen without a cause. Family or work stress, smoking, caffeine, and alcohol can increase your risk for anxiety. Certain medicines or health conditions can also increase your risk. Anxiety can become a long-term condition if it is not managed or treated.     DISCHARGE INSTRUCTIONS:    Call 911 if:     You have chest pain, tightness, or heaviness that may spread to your shoulders, arms, jaw, neck, or back.      You feel like hurting yourself or someone else.     Contact your healthcare provider if:     Your symptoms get worse or do not get better with treatment.      Your anxiety keeps you from doing your regular daily activities.      You have new symptoms since your last visit.      You have questions or concerns about your condition or care.    Medicines:     Medicines may be given to help you feel more calm and relaxed, and decrease your symptoms.      Take your medicine as directed. Contact your healthcare provider if you think your medicine is not helping or if you have side effects. Tell him of her if you are allergic to any medicine. Keep a list of the medicines, vitamins, and herbs you take. Include the amounts, and when and why you take them. Bring the list or the pill bottles to follow-up visits. Carry your medicine list with you in case of an emergency.    Follow up with your healthcare provider within 2 weeks or as directed: Write down your questions so you remember to ask them during your visits.    Manage anxiety:     Talk to someone about your anxiety. Your healthcare provider may suggest counseling. Cognitive behavioral therapy can help you understand and change how you react to events that trigger your symptoms. You might feel more comfortable talking with a friend or family member about your anxiety. Choose someone you know will be supportive and encouraging.      Find ways to relax. Activities such as exercise, meditation, or listening to music can help you relax. Spend time with friends, or do things you enjoy.      Practice deep breathing. Deep breathing can help you relax when you feel anxious. Focus on taking slow, deep breaths several times a day, or during an anxiety attack. Breathe in through your nose and out through your mouth.       Create a regular sleep routine. Regular sleep can help you feel calmer during the day. Go to sleep and wake up at the same times every day. Do not watch television or use the computer right before bed. Your room should be comfortable, dark, and quiet.       Eat a variety of healthy foods. Healthy foods include fruits, vegetables, low-fat dairy products, lean meats, fish, whole-grain breads, and cooked beans. Healthy foods can help you feel less anxious and have more energy.      Exercise regularly. Exercise can increase your energy level. Exercise may also lift your mood and help you sleep better. Your healthcare provider can help you create an exercise plan.      Do not smoke. Nicotine and other chemicals in cigarettes and cigars can increase anxiety. Ask your healthcare provider for information if you currently smoke and need help to quit. E-cigarettes or smokeless tobacco still contain nicotine. Talk to your healthcare provider before you use these products.       Do not have caffeine. Caffeine can make your symptoms worse. Do not have foods or drinks that are meant to increase your energy level.      Limit or do not drink alcohol. Ask your healthcare provider if alcohol is safe for you. You may not be able to drink alcohol if you take certain anxiety or depression medicines. Limit alcohol to 1 drink per day if you are a woman. Limit alcohol to 2 drinks per day if you are a man. A drink of alcohol is 12 ounces of beer, 5 ounces of wine, or 1½ ounces of liquor.       Do not use drugs. Drugs can make your anxiety worse. It can also make anxiety hard to manage. Talk to your healthcare provider if you use drugs and want help to quit.
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
## 2022-12-16 NOTE — ED BEHAVIORAL HEALTH ASSESSMENT NOTE - ADDITIONAL DETAILS ALL
Reports cutting in her 30s; last incident more than 15 years ago; reports previously reported SA were acts of anger toward

## 2022-12-16 NOTE — ED PROVIDER NOTE - PROGRESS NOTE DETAILS
Received call back from telemetry psych who evaluated the patient and states that she is safe for discharge home.  They are going to send over a safety plan and we need to contact her Sister Sarah at [phone].    Darrell Ragland, DO

## 2022-12-16 NOTE — ED BEHAVIORAL HEALTH ASSESSMENT NOTE - DESCRIPTION
See [facility] note    Vital Signs Last 24 Hrs  T(C): 37.2 (16 Dec 2022 16:58), Max: 37.2 (16 Dec 2022 16:58)  T(F): 99 (16 Dec 2022 16:58), Max: 99 (16 Dec 2022 16:58)  HR: 101 (16 Dec 2022 16:58) (101 - 101)  BP: 139/91 (16 Dec 2022 16:58) (139/91 - 139/91)  BP(mean): 107 (16 Dec 2022 16:58) (107 - 107)  RR: 18 (16 Dec 2022 16:58) (18 - 18)  SpO2: 98% (16 Dec 2022 16:58) (98% - 98%)  Parameters below as of 16 Dec 2022 16:58  Patient On (Oxygen Delivery Method): room air                       13.9   6.07  )-----------( 340      ( 16 Dec 2022 19:01 )             40.6 Lupus, DMII, 

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
## 2022-12-16 NOTE — ED BEHAVIORAL HEALTH NOTE - BEHAVIORAL HEALTH NOTE
I spoke with patient's sister Sarah . Per sister, patient often goes to hospital as escape. Has poor coping skills. Often gets wound up, wants to go to hospital and then a day or two later says why am I here. Sister has no acute safety concerns. Safety planning was discussed with sister, informed if patient's condition worsens or if she is a danger to self or others to call 911. Sister will pick patient up.

## 2022-12-16 NOTE — ED PROVIDER NOTE - PATIENT PORTAL LINK FT
You can access the FollowMyHealth Patient Portal offered by [facility] by registering at the following website: [email or web address]. By joining [facility]’s FollowMyHealth portal, you will also be able to view your health information using other applications (apps) compatible with our system.

## 2022-12-16 NOTE — ED BEHAVIORAL HEALTH ASSESSMENT NOTE - REFERRAL / APPOINTMENT DETAILS
Seeing Dr. Salvador Escalante at [facility] Monday December 19 at 5 PM Seeing Dr. Salvador Escalante at [place] [facility] Monday December 19 at 5 PM

## 2022-12-16 NOTE — ED ADULT TRIAGE NOTE - CHIEF COMPLAINT QUOTE
pt present to ED due to hallucinations hearing voices that telling her to hurt people hx of schizophrenia

## 2022-12-16 NOTE — ED ADULT NURSE NOTE - OBJECTIVE STATEMENT
pt present to ED due to hallucinations hearing voices that telling her to hurt people hx of schizophrenia.  Pt emotional with RN stating her brother passed away from colon cancer 1 week ago after being diagnosed the week before.

## 2022-12-16 NOTE — ED BEHAVIORAL HEALTH ASSESSMENT NOTE - CURRENT MEDICATION
Lamictal 200 mg PO once a day  Geodon 20 mg PO BID  Citalopram 10 mg PO once a day  Gabapentin 300 mg PO qHS  Levothyroxine 75 mcg PO once a day   Topamax 75 mg once a day

## 2022-12-16 NOTE — ED BEHAVIORAL HEALTH ASSESSMENT NOTE - OTHER
Reports recent auditory hallucinations this morning Has two tenants Reports recent auditory hallucinations this morning, though unclear if AH is psychotic in nature Has two tenants and 18 year old child lives there PT

## 2022-12-16 NOTE — ED BEHAVIORAL HEALTH ASSESSMENT NOTE - NSSUICPROTFACT_PSY_ALL_CORE
Responsibility to children, family, or others/Identifies reasons for living/Supportive social network of family or friends/Fear of death or the actual act of killing self Responsibility to children, family, or others/Identifies reasons for living/Supportive social network of family or friends/Fear of death or the actual act of killing self/Beloved pets
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
## 2022-12-16 NOTE — ED ADULT NURSE REASSESSMENT NOTE - NS ED NURSE REASSESS COMMENT FT1
Assumed care of pt at 19:30 as stated in report from KAMALA kay. Charting as noted. Patient A&O x3, no pain/discomfort, denies CP/SOB. pt states she recently lost her brother and recently when stress levels are high she begins to have bad thoughts. pt states she saw both her parents who are no longer alive and she knew she had to come to Emergency Department to be seen. pt  Updated on the plan of care. Call bell within reach, bed locked in lowest position. No signs of acute distress noted, safety maintained. pt remains in  with1:1 at bedside awaiting lab results

## 2022-12-16 NOTE — ED PROVIDER NOTE - OBJECTIVE STATEMENT
52y Female with PMH of Hashimoto's, ulcerative colitis, asthma, migraines, retinal vasculitis, and schizoaffective disorder presents to the ED due to hallucinations hearing voices that tell her to hurt people and feels paranoia. Pt states this has happened before and is usually related with stress. Pt states that her brother passed away last week and pt states she feels a lot of pressure from the people around her recently. Pt is compliant with her medications with no medication changes recently. Psychiatrist is Dr. Salvador Escalante.

## 2022-12-16 NOTE — ED BEHAVIORAL HEALTH ASSESSMENT NOTE - PAST PSYCHOTROPIC MEDICATION
Ketamine, stelazine, Seroquel, Ketamine, stelazine, Seroquel, Prolixin, Abilify, olanzapine, haldol, thorazine, Rexulti, lithium, Depakote, ativan, Prozac, Lexapro Ketamine, stelazine, Seroquel, Prolixin, Abilify, olanzapine, haldol, Thorazine, Rexulti, lithium, Depakote, ativan, Prozac, Lexapro
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
## 2022-12-17 NOTE — ED BEHAVIORAL HEALTH NOTE - BEHAVIORAL HEALTH NOTE
========================     FOR EACH COLLATERAL     ========================     Collateral below has requested that the information provided remain confidential: Yes [  ] No [ X ]     Collateral below has provided information that patient is/may be unaware of: Yes [  ] No [ X ]     Patient gives permission to obtain collateral from _____:     ( X ) Yes     (  )  No     Rationale for overriding objection               ( X ) Lack of capacity. Details: Collateral refused to provide information about pt.               (  ) Assessing risk of danger to self/others. Details: ________     Rationale for selecting specific collateral source               (  ) Potential to impact risk of danger to self/others and no alternative equivalent. Details: _____     NAME: Sarah Ervin     NUMBER: [phone]     RELATIONSHIP: Sister      RELIABILITY: Unreliable      COMMENTS: Collateral told BTCM that she does not feel comfortable sharing information about pt and that the MD should ask pt these questions. Collateral also told BTCM to check pt’s medial record. BTCM explained to collateral the purpose of the call is to get additional information to provide to the psychiatrist and she provided minimal information. Patient is , lives with two children aged 18 and 15 years old. Patient has been diagnosed with Schizophrenia or bipolar disorder at 20 years old. Patient allegedly has been hearing voices over the weekend and asked her sister to drive her to the hospital today. Patient has no recent hx of SI, HI, VH, or Paranoia. Collateral was unable to provide information regarding substance misuse.

## 2022-12-28 ENCOUNTER — EMERGENCY (EMERGENCY)
Facility: HOSPITAL | Age: 52
LOS: 0 days | Discharge: HOME | End: 2022-12-28
Attending: EMERGENCY MEDICINE
Payer: MEDICAID

## 2022-12-28 VITALS
DIASTOLIC BLOOD PRESSURE: 90 MMHG | HEART RATE: 74 BPM | OXYGEN SATURATION: 100 % | RESPIRATION RATE: 18 BRPM | SYSTOLIC BLOOD PRESSURE: 143 MMHG | TEMPERATURE: 98 F

## 2022-12-28 VITALS
WEIGHT: 169.09 LBS | HEART RATE: 80 BPM | OXYGEN SATURATION: 100 % | RESPIRATION RATE: 16 BRPM | TEMPERATURE: 98 F | DIASTOLIC BLOOD PRESSURE: 82 MMHG | SYSTOLIC BLOOD PRESSURE: 132 MMHG | HEIGHT: 63 IN

## 2022-12-28 DIAGNOSIS — L93.0 DISCOID LUPUS ERYTHEMATOSUS: ICD-10-CM

## 2022-12-28 DIAGNOSIS — R07.9 CHEST PAIN, UNSPECIFIED: ICD-10-CM

## 2022-12-28 DIAGNOSIS — F31.9 BIPOLAR DISORDER, UNSPECIFIED: ICD-10-CM

## 2022-12-28 DIAGNOSIS — R06.02 SHORTNESS OF BREATH: ICD-10-CM

## 2022-12-28 DIAGNOSIS — Z87.59 PERSONAL HISTORY OF OTHER COMPLICATIONS OF PREGNANCY, CHILDBIRTH AND THE PUERPERIUM: ICD-10-CM

## 2022-12-28 DIAGNOSIS — M79.7 FIBROMYALGIA: ICD-10-CM

## 2022-12-28 DIAGNOSIS — Z88.0 ALLERGY STATUS TO PENICILLIN: ICD-10-CM

## 2022-12-28 DIAGNOSIS — F41.9 ANXIETY DISORDER, UNSPECIFIED: ICD-10-CM

## 2022-12-28 DIAGNOSIS — K51.90 ULCERATIVE COLITIS, UNSPECIFIED, WITHOUT COMPLICATIONS: ICD-10-CM

## 2022-12-28 DIAGNOSIS — J45.909 UNSPECIFIED ASTHMA, UNCOMPLICATED: ICD-10-CM

## 2022-12-28 DIAGNOSIS — Z20.822 CONTACT WITH AND (SUSPECTED) EXPOSURE TO COVID-19: ICD-10-CM

## 2022-12-28 DIAGNOSIS — R07.89 OTHER CHEST PAIN: ICD-10-CM

## 2022-12-28 DIAGNOSIS — G43.909 MIGRAINE, UNSPECIFIED, NOT INTRACTABLE, WITHOUT STATUS MIGRAINOSUS: ICD-10-CM

## 2022-12-28 DIAGNOSIS — E06.3 AUTOIMMUNE THYROIDITIS: ICD-10-CM

## 2022-12-28 DIAGNOSIS — R05.9 COUGH, UNSPECIFIED: ICD-10-CM

## 2022-12-28 LAB
ADD ON TEST-SPECIMEN IN LAB: SIGNIFICANT CHANGE UP
ALBUMIN SERPL ELPH-MCNC: 3.8 G/DL — SIGNIFICANT CHANGE UP (ref 3.3–5)
ALP SERPL-CCNC: 80 U/L — SIGNIFICANT CHANGE UP (ref 40–120)
ALT FLD-CCNC: 25 U/L — SIGNIFICANT CHANGE UP (ref 12–78)
AMPHET UR-MCNC: NEGATIVE — SIGNIFICANT CHANGE UP
ANION GAP SERPL CALC-SCNC: 7 MMOL/L — SIGNIFICANT CHANGE UP (ref 5–17)
APAP SERPL-MCNC: <2 UG/ML — LOW (ref 10–30)
APPEARANCE UR: CLEAR — SIGNIFICANT CHANGE UP
AST SERPL-CCNC: 22 U/L — SIGNIFICANT CHANGE UP (ref 15–37)
BACTERIA # UR AUTO: ABNORMAL
BARBITURATES UR SCN-MCNC: NEGATIVE — SIGNIFICANT CHANGE UP
BASOPHILS # BLD AUTO: 0.05 K/UL — SIGNIFICANT CHANGE UP (ref 0–0.2)
BASOPHILS NFR BLD AUTO: 0.9 % — SIGNIFICANT CHANGE UP (ref 0–2)
BENZODIAZ UR-MCNC: NEGATIVE — SIGNIFICANT CHANGE UP
BILIRUB SERPL-MCNC: 0.5 MG/DL — SIGNIFICANT CHANGE UP (ref 0.2–1.2)
BILIRUB UR-MCNC: NEGATIVE — SIGNIFICANT CHANGE UP
BUN SERPL-MCNC: 15 MG/DL — SIGNIFICANT CHANGE UP (ref 7–23)
CALCIUM SERPL-MCNC: 8.9 MG/DL — SIGNIFICANT CHANGE UP (ref 8.5–10.1)
CHLORIDE SERPL-SCNC: 108 MMOL/L — SIGNIFICANT CHANGE UP (ref 96–108)
CO2 SERPL-SCNC: 25 MMOL/L — SIGNIFICANT CHANGE UP (ref 22–31)
COCAINE METAB.OTHER UR-MCNC: NEGATIVE — SIGNIFICANT CHANGE UP
COLOR SPEC: YELLOW — SIGNIFICANT CHANGE UP
CREAT SERPL-MCNC: 0.92 MG/DL — SIGNIFICANT CHANGE UP (ref 0.5–1.3)
DIFF PNL FLD: ABNORMAL
EGFR: 76 ML/MIN/1.73M2 — SIGNIFICANT CHANGE UP
EOSINOPHIL # BLD AUTO: 0.23 K/UL — SIGNIFICANT CHANGE UP (ref 0–0.5)
EOSINOPHIL NFR BLD AUTO: 4.1 % — SIGNIFICANT CHANGE UP (ref 0–6)
EPI CELLS # UR: SIGNIFICANT CHANGE UP
ETHANOL SERPL-MCNC: <10 MG/DL — SIGNIFICANT CHANGE UP (ref 0–10)
FLUAV AG NPH QL: SIGNIFICANT CHANGE UP
FLUBV AG NPH QL: SIGNIFICANT CHANGE UP
GLUCOSE SERPL-MCNC: 92 MG/DL — SIGNIFICANT CHANGE UP (ref 70–99)
GLUCOSE UR QL: NEGATIVE — SIGNIFICANT CHANGE UP
HCG SERPL-ACNC: <1 MIU/ML — SIGNIFICANT CHANGE UP
HCT VFR BLD CALC: 40.3 % — SIGNIFICANT CHANGE UP (ref 34.5–45)
HGB BLD-MCNC: 13.5 G/DL — SIGNIFICANT CHANGE UP (ref 11.5–15.5)
IMM GRANULOCYTES NFR BLD AUTO: 0.2 % — SIGNIFICANT CHANGE UP (ref 0–0.9)
KETONES UR-MCNC: NEGATIVE — SIGNIFICANT CHANGE UP
LEUKOCYTE ESTERASE UR-ACNC: ABNORMAL
LYMPHOCYTES # BLD AUTO: 1.71 K/UL — SIGNIFICANT CHANGE UP (ref 1–3.3)
LYMPHOCYTES # BLD AUTO: 30.6 % — SIGNIFICANT CHANGE UP (ref 13–44)
MCHC RBC-ENTMCNC: 30 PG — SIGNIFICANT CHANGE UP (ref 27–34)
MCHC RBC-ENTMCNC: 33.5 GM/DL — SIGNIFICANT CHANGE UP (ref 32–36)
MCV RBC AUTO: 89.6 FL — SIGNIFICANT CHANGE UP (ref 80–100)
METHADONE UR-MCNC: NEGATIVE — SIGNIFICANT CHANGE UP
MONOCYTES # BLD AUTO: 0.36 K/UL — SIGNIFICANT CHANGE UP (ref 0–0.9)
MONOCYTES NFR BLD AUTO: 6.5 % — SIGNIFICANT CHANGE UP (ref 2–14)
NEUTROPHILS # BLD AUTO: 3.22 K/UL — SIGNIFICANT CHANGE UP (ref 1.8–7.4)
NEUTROPHILS NFR BLD AUTO: 57.7 % — SIGNIFICANT CHANGE UP (ref 43–77)
NITRITE UR-MCNC: NEGATIVE — SIGNIFICANT CHANGE UP
NT-PROBNP SERPL-SCNC: 17 PG/ML — SIGNIFICANT CHANGE UP (ref 0–125)
OPIATES UR-MCNC: NEGATIVE — SIGNIFICANT CHANGE UP
PCP SPEC-MCNC: SIGNIFICANT CHANGE UP
PCP UR-MCNC: NEGATIVE — SIGNIFICANT CHANGE UP
PH UR: 6 — SIGNIFICANT CHANGE UP (ref 5–8)
PLATELET # BLD AUTO: 318 K/UL — SIGNIFICANT CHANGE UP (ref 150–400)
POTASSIUM SERPL-MCNC: 3.7 MMOL/L — SIGNIFICANT CHANGE UP (ref 3.5–5.3)
POTASSIUM SERPL-SCNC: 3.7 MMOL/L — SIGNIFICANT CHANGE UP (ref 3.5–5.3)
PROT SERPL-MCNC: 7.2 GM/DL — SIGNIFICANT CHANGE UP (ref 6–8.3)
PROT UR-MCNC: NEGATIVE — SIGNIFICANT CHANGE UP
RBC # BLD: 4.5 M/UL — SIGNIFICANT CHANGE UP (ref 3.8–5.2)
RBC # FLD: 13.3 % — SIGNIFICANT CHANGE UP (ref 10.3–14.5)
RBC CASTS # UR COMP ASSIST: ABNORMAL /HPF (ref 0–4)
RSV RNA NPH QL NAA+NON-PROBE: SIGNIFICANT CHANGE UP
SALICYLATES SERPL-MCNC: <1.7 MG/DL — LOW (ref 2.8–20)
SARS-COV-2 RNA SPEC QL NAA+PROBE: SIGNIFICANT CHANGE UP
SODIUM SERPL-SCNC: 140 MMOL/L — SIGNIFICANT CHANGE UP (ref 135–145)
SP GR SPEC: 1.01 — SIGNIFICANT CHANGE UP (ref 1.01–1.02)
THC UR QL: NEGATIVE — SIGNIFICANT CHANGE UP
TROPONIN I, HIGH SENSITIVITY RESULT: 5.43 NG/L — SIGNIFICANT CHANGE UP
UROBILINOGEN FLD QL: NEGATIVE — SIGNIFICANT CHANGE UP
WBC # BLD: 5.58 K/UL — SIGNIFICANT CHANGE UP (ref 3.8–10.5)
WBC # FLD AUTO: 5.58 K/UL — SIGNIFICANT CHANGE UP (ref 3.8–10.5)
WBC UR QL: SIGNIFICANT CHANGE UP /HPF (ref 0–5)

## 2022-12-28 PROCEDURE — 84702 CHORIONIC GONADOTROPIN TEST: CPT

## 2022-12-28 PROCEDURE — 80053 COMPREHEN METABOLIC PANEL: CPT

## 2022-12-28 PROCEDURE — 99285 EMERGENCY DEPT VISIT HI MDM: CPT

## 2022-12-28 PROCEDURE — 71045 X-RAY EXAM CHEST 1 VIEW: CPT | Mod: 26

## 2022-12-28 PROCEDURE — 85025 COMPLETE CBC W/AUTO DIFF WBC: CPT

## 2022-12-28 PROCEDURE — 83880 ASSAY OF NATRIURETIC PEPTIDE: CPT

## 2022-12-28 PROCEDURE — 0241U: CPT

## 2022-12-28 PROCEDURE — 99285 EMERGENCY DEPT VISIT HI MDM: CPT | Mod: 25

## 2022-12-28 PROCEDURE — 81001 URINALYSIS AUTO W/SCOPE: CPT

## 2022-12-28 PROCEDURE — 36415 COLL VENOUS BLD VENIPUNCTURE: CPT

## 2022-12-28 PROCEDURE — 93005 ELECTROCARDIOGRAM TRACING: CPT

## 2022-12-28 PROCEDURE — 93010 ELECTROCARDIOGRAM REPORT: CPT

## 2022-12-28 PROCEDURE — 80307 DRUG TEST PRSMV CHEM ANLYZR: CPT

## 2022-12-28 PROCEDURE — 84484 ASSAY OF TROPONIN QUANT: CPT

## 2022-12-28 PROCEDURE — 71045 X-RAY EXAM CHEST 1 VIEW: CPT

## 2022-12-28 RX ORDER — SODIUM CHLORIDE 9 MG/ML
3 INJECTION INTRAMUSCULAR; INTRAVENOUS; SUBCUTANEOUS ONCE
Refills: 0 | Status: COMPLETED | OUTPATIENT
Start: 2022-12-28 | End: 2022-12-28

## 2022-12-28 RX ADMIN — SODIUM CHLORIDE 3 MILLILITER(S): 9 INJECTION INTRAMUSCULAR; INTRAVENOUS; SUBCUTANEOUS at 16:54

## 2022-12-28 NOTE — ED PROVIDER NOTE - PROGRESS NOTE DETAILS
Gina Dunbar for attending Dr. Olivarez repeat troponin. serum testing. pt declines. pt is experiencing chronic sx Gina Dunbar for attending Dr. Olivarez:  Repeat troponin serum testing pt declines. pt is experiencing chronic sx.

## 2022-12-28 NOTE — ED PROVIDER NOTE - CLINICAL SUMMARY MEDICAL DECISION MAKING FREE TEXT BOX
53 y/o F PMHx schizophrenia, bipolar, asthma BIBA from home c/o x 2 months cp, [place] w/o cough, fever. pt has pcp, neurologist, cardiologist. has unspecified rheumatologic condition. request rheumatology referral. plan: ekg , CXR, cardiac/psych lab including troponin, BNP, alcohol, UTOX, flu/covid swab. monitor, observe, reassess 51 y/o F PMHx schizophrenia, bipolar, asthma BIBA from home c/o x 2 months cp, [place] w/o cough, fever. pt has pcp, neurologist, cardiologist. has unspecified rheumatologic condition. request rheumatology referral.  Plan: ekg , CXR, cardiac/psych lab including troponin, BNP, alcohol, UTOX, flu/covid swab. monitor, observe, reassess  Addendum: Initial ED w/u negative.  Repeat troponin serum testing pt declines. pt is experiencing chronic sx.  Pt stable for DC.

## 2022-12-28 NOTE — ED ADULT NURSE NOTE - CHIEF COMPLAINT QUOTE
Patient comes in with chest pain, sob, and lethargy that started 2 weeks ago. Patient states its worsening every day. Hx asthma, bipolar, schizophrenia.

## 2022-12-28 NOTE — ED ADULT NURSE NOTE - OBJECTIVE STATEMENT
Patient comes in with chest pain, sob, and lethargy that started 2 weeks ago. Patient states its worsening every day. Hx asthma, bipolar, schizophrenia. pt denies any distress during assessment

## 2022-12-28 NOTE — ED PROVIDER NOTE - CARE PLAN
1 Principal Discharge DX:	Chest pain with low risk of acute coronary syndrome  Secondary Diagnosis:	Anxiety

## 2022-12-28 NOTE — ED PROVIDER NOTE - NSFOLLOWUPINSTRUCTIONS_ED_ALL_ED_FT
Continue your regular medications as per routine.  Avoid physical overexertion.  Expect call from ED manager regarding referral to Rheumatologist.  Follow up Steffany Nevarez & own psychiatrist.      Chest Pain    Chest pain can be caused by many different conditions which may or may not be dangerous. Causes include heartburn, lung infections, heart attack, blood clot in lungs, skin infections, strain or damage to muscle, cartilage, or bones, etc. In addition to a history and physical examination, an electrocardiogram (ECG) or other lab tests may have been performed to determine the cause of your chest pain. Follow up with your primary care provider or with a cardiologist as instructed.     SEEK IMMEDIATE MEDICAL CARE IF YOU HAVE ANY OF THE FOLLOWING SYMPTOMS: worsening chest pain, coughing up blood, unexplained back/neck/jaw pain, severe abdominal pain, dizziness or lightheadedness, fainting, shortness of breath, sweaty or clammy skin, vomiting, or racing heart beat. These symptoms may represent a serious problem that is an emergency. Do not wait to see if the symptoms will go away. Get medical help right away. Call 911 and do not drive yourself to the hospital.              Shortness of breath    Shortness of breath (dyspnea) means you have trouble breathing and could indicate a medical problem. Causes include lung disease, heart disease, low amount of red blood cells (anemia), poor physical fitness, being overweight, smoking, etc. Your health care provider today may not be able to find a cause for your shortness of breath after your exam. In this case, it is important to have a follow-up exam with your primary care physician as instructed. If medicines were prescribed, take them as directed for the full length of time directed. Refrain from tobacco products.    SEEK IMMEDIATE MEDICAL CARE IF YOU HAVE ANY OF THE FOLLOWING SYMPTOMS: worsening shortness of breath, chest pain, back pain, abdominal pain, fever, coughing up blood, lightheadedness/dizziness.            Generalized Anxiety Disorder, Adult      Generalized anxiety disorder (LASHA) is a mental health condition. Unlike normal worries, anxiety related to LASHA is not triggered by a specific event. These worries do not fade or get better with time. LASHA interferes with relationships, work, and school.    LASHA symptoms can vary from mild to severe. People with severe LASHA can have intense waves of anxiety with physical symptoms that are similar to panic attacks.      What are the causes?    The exact cause of LASHA is not known, but the following are believed to have an impact:  •Differences in natural brain chemicals.      •Genes passed down from parents to children.      •Differences in the way threats are perceived.      •Development and stress during childhood.      •Personality.        What increases the risk?    The following factors may make you more likely to develop this condition:  •Being female.      •Having a family history of anxiety disorders.      •Being very shy.      •Experiencing very stressful life events, such as the death of a loved one.      •Having a very stressful family environment.        What are the signs or symptoms?    People with LASHA often worry excessively about many things in their lives, such as their health and family. Symptoms may also include:•Mental and emotional symptoms:  •Worrying excessively about natural disasters.      •Fear of being late.      •Difficulty concentrating.      •Fears that others are judging your performance.      •Physical symptoms:  •Fatigue.      •Headaches, muscle tension, muscle twitches, trembling, or feeling shaky.      •Feeling like your heart is pounding or beating very fast.      •Feeling out of breath or like you cannot take a deep breath.      •Having trouble falling asleep or staying asleep, or experiencing restlessness.      •Sweating.      •Nausea, diarrhea, or irritable bowel syndrome (IBS).      •Behavioral symptoms:  •Experiencing erratic moods or irritability.      •Avoidance of new situations.      •Avoidance of people.      •Extreme difficulty making decisions.          How is this diagnosed?    This condition is diagnosed based on your symptoms and medical history. You will also have a physical exam. Your health care provider may perform tests to rule out other possible causes of your symptoms.    To be diagnosed with LASHA, a person must have anxiety that:  •Is out of his or her control.      •Affects several different aspects of his or her life, such as work and relationships.      •Causes distress that makes him or her unable to take part in normal activities.      •Includes at least three symptoms of LASHA, such as restlessness, fatigue, trouble concentrating, irritability, muscle tension, or sleep problems.      Before your health care provider can confirm a diagnosis of LASHA, these symptoms must be present more days than they are not, and they must last for 6 months or longer.      How is this treated?  A person talking with a mental health specialist.    This condition may be treated with:  •Medicine. Antidepressant medicine is usually prescribed for long-term daily control. Anti-anxiety medicines may be added in severe cases, especially when panic attacks occur.    •Talk therapy (psychotherapy). Certain types of talk therapy can be helpful in treating LASHA by providing support, education, and guidance. Options include:  •Cognitive behavioral therapy (CBT). People learn coping skills and self-calming techniques to ease their physical symptoms. They learn to identify unrealistic thoughts and behaviors and to replace them with more appropriate thoughts and behaviors.      •Acceptance and commitment therapy (ACT). This treatment teaches people how to be mindful as a way to cope with unwanted thoughts and feelings.      •Biofeedback. This process trains you to manage your body's response (physiological response) through breathing techniques and relaxation methods. You will work with a therapist while machines are used to monitor your physical symptoms.        •Stress management techniques. These include yoga, meditation, and exercise.      A mental health specialist can help determine which treatment is best for you. Some people see improvement with one type of therapy. However, other people require a combination of therapies.      Follow these instructions at home:    Lifestyle     •Maintain a consistent routine and schedule.      •Anticipate stressful situations. Create a plan and allow extra time to work with your plan.      •Practice stress management or self-calming techniques that you have learned from your therapist or your health care provider.      •Exercise regularly and spend time outdoors.    •Eat a healthy diet that includes plenty of vegetables, fruits, whole grains, low-fat dairy products, and lean protein.  •Do not eat a lot of foods that are high in fat, added sugar, or salt (sodium).      •Drink plenty of water.        •Avoid alcohol. Alcohol can increase anxiety.      •Avoid caffeine and certain over-the-counter cold medicines. These may make you feel worse. Ask your pharmacist which medicines to avoid.      General instructions     •Take over-the-counter and prescription medicines only as told by your health care provider.      •Understand that you are likely to have setbacks. Accept this and be kind to yourself as you persist to take better care of yourself.      •Anticipate stressful situations. Create a plan and allow extra time to work with your plan.      •Recognize and accept your accomplishments, even if you  them as small.      •Spend time with people who care about you.      •Keep all follow-up visits. This is important.        Where to find more information    National [place] of Mental Health: www.[facility].nih.gov    Substance Abuse and Mental Health Services: www.[facility].gov      Contact a health care provider if:    •Your symptoms do not get better.      •Your symptoms get worse.    •You have signs of depression, such as:  •A persistently sad or irritable mood.      •Loss of enjoyment in activities that used to bring you cora.      •Change in weight or eating.      •Changes in sleeping habits.          Get help right away if:    •You have thoughts about hurting yourself or others.      If you ever feel like you may hurt yourself or others, or have thoughts about taking your own life, get help right away. Go to your nearest emergency department or:   • Call your local emergency services (483 in the [place]       • Call a suicide crisis helpline, such as the National Suicide Prevention Lifeline at 1-466.240.8194 or 111 in the [place] This is open 24 hours a day in the [place]       • Text the Crisis Text Line at 450006 (in the [place]         Summary    •Generalized anxiety disorder (LASHA) is a mental health condition that involves worry that is not triggered by a specific event.      •People with LASHA often worry excessively about many things in their lives, such as their health and family.      •LASHA may cause symptoms such as restlessness, trouble concentrating, sleep problems, frequent sweating, nausea, diarrhea, headaches, and trembling or muscle twitching.      •A mental health specialist can help determine which treatment is best for you. Some people see improvement with one type of therapy. However, other people require a combination of therapies.      This information is not intended to replace advice given to you by your health care provider. Make sure you discuss any questions you have with your health care provider.

## 2022-12-28 NOTE — ED PROVIDER NOTE - ENMT, MLM
Airway patent, Nasal mucosa clear. Mouth with mild to moderately mucosa membrane. Throat has no vesicles, no oropharyngeal exudates and uvula is midline.

## 2022-12-28 NOTE — ED PROVIDER NOTE - NS ED MD DISPO DISCHARGE
Objective  T(C): 36.7 (10-05-21 @ 14:33), Max: 36.7 (10-05-21 @ 12:52)  HR: 100 (10-05-21 @ 14:57) (97 - 119)  BP: 114/75 (10-05-21 @ 14:33) (114/75 - 114/75)  RR: 20 (10-05-21 @ 14:33) (18 - 20)  SpO2: 100% (10-05-21 @ 15:02) (93% - 100%)    PE:   CV: RRR  Pulm: breathing comfortably on RA  Abd: gravid, nontender  Extr: moving all extremities with ease  VE: 0/0/-3  FHT: baseline 145, mod variability, +accels, -decels  [place]: uterine irritability Home

## 2022-12-28 NOTE — ED PROVIDER NOTE - PATIENT PORTAL LINK FT
You can access the FollowMyHealth Patient Portal offered by [facility] by registering at the following website: [email or web address]. By joining [facility]’s FollowMyHealth portal, you will also be able to view your health information using other applications (apps) compatible with our system.

## 2022-12-28 NOTE — ED PROVIDER NOTE - OBJECTIVE STATEMENT
53 y/o F w/ PMHx of Hashimoto's, ulcerative colitis, asthma, migraines, retinal vasculitis, bipolar, and schizoaffective disorder presents to the ED c/o cp, sob x 2 weeks ago. Patient states its worsening every day. Pt feels sob walking up stairs x 2-3 months. Pos for left anterior chest pain x today lasting for 20-30 seconds. Den n/v/d. positive for coughing. covid iud. Pt is on penicillin.   Neurologist: Dr. Connelly  PCP: Dr. Stewart 53 y/o F w/ PMHx of Hashimoto's thyroiditis, ulcerative colitis, asthma, migraines, retinal vasculitis, bipolar, and schizoaffective disorder presents to the ED c/o cp, sob x 2 weeks. Patient states its worsening every day. Pt feels sob walking up stairs x 2-3 months.  Left anterior chest pain x today lasting for 20-30 seconds. Den n/v/d. positive for coughing. covid iud. Pt is on penicillin.   Neurologist: Dr. Hughes  PCP: Dr. Larson

## 2022-12-30 ENCOUNTER — RESULT REVIEW (OUTPATIENT)
Age: 52
End: 2022-12-30

## 2022-12-30 ENCOUNTER — OUTPATIENT (OUTPATIENT)
Dept: OUTPATIENT SERVICES | Facility: HOSPITAL | Age: 52
LOS: 1 days | End: 2022-12-30
Payer: COMMERCIAL

## 2022-12-30 VITALS
WEIGHT: 162.7 LBS | TEMPERATURE: 98 F | RESPIRATION RATE: 18 BRPM | OXYGEN SATURATION: 97 % | DIASTOLIC BLOOD PRESSURE: 70 MMHG | HEART RATE: 84 BPM | HEIGHT: 63 IN | SYSTOLIC BLOOD PRESSURE: 103 MMHG

## 2022-12-30 DIAGNOSIS — Z91.89 OTHER SPECIFIED PERSONAL RISK FACTORS, NOT ELSEWHERE CLASSIFIED: ICD-10-CM

## 2022-12-30 DIAGNOSIS — G43.909 MIGRAINE, UNSPECIFIED, NOT INTRACTABLE, WITHOUT STATUS MIGRAINOSUS: ICD-10-CM

## 2022-12-30 DIAGNOSIS — Z01.818 ENCOUNTER FOR OTHER PREPROCEDURAL EXAMINATION: ICD-10-CM

## 2022-12-30 LAB
A1C WITH ESTIMATED AVERAGE GLUCOSE RESULT: 5.1 % — SIGNIFICANT CHANGE UP (ref 4–5.6)
ALBUMIN SERPL ELPH-MCNC: 4.6 G/DL — SIGNIFICANT CHANGE UP (ref 3.3–5.2)
ALP SERPL-CCNC: 83 U/L — SIGNIFICANT CHANGE UP (ref 40–120)
ALT FLD-CCNC: 16 U/L — SIGNIFICANT CHANGE UP
ANION GAP SERPL CALC-SCNC: 13 MMOL/L — SIGNIFICANT CHANGE UP (ref 5–17)
APPEARANCE UR: CLEAR — SIGNIFICANT CHANGE UP
APTT BLD: 33.4 SEC — SIGNIFICANT CHANGE UP (ref 27.5–35.5)
AST SERPL-CCNC: 23 U/L — SIGNIFICANT CHANGE UP
BASOPHILS # BLD AUTO: 0.03 K/UL — SIGNIFICANT CHANGE UP (ref 0–0.2)
BASOPHILS NFR BLD AUTO: 0.6 % — SIGNIFICANT CHANGE UP (ref 0–2)
BILIRUB SERPL-MCNC: 0.5 MG/DL — SIGNIFICANT CHANGE UP (ref 0.4–2)
BILIRUB UR-MCNC: NEGATIVE — SIGNIFICANT CHANGE UP
BLD GP AB SCN SERPL QL: SIGNIFICANT CHANGE UP
BUN SERPL-MCNC: 13 MG/DL — SIGNIFICANT CHANGE UP (ref 8–20)
CALCIUM SERPL-MCNC: 9.8 MG/DL — SIGNIFICANT CHANGE UP (ref 8.4–10.5)
CHLORIDE SERPL-SCNC: 107 MMOL/L — SIGNIFICANT CHANGE UP (ref 96–108)
CO2 SERPL-SCNC: 22 MMOL/L — SIGNIFICANT CHANGE UP (ref 22–29)
COLOR SPEC: YELLOW — SIGNIFICANT CHANGE UP
CREAT SERPL-MCNC: 0.85 MG/DL — SIGNIFICANT CHANGE UP (ref 0.5–1.3)
DIFF PNL FLD: NEGATIVE — SIGNIFICANT CHANGE UP
EGFR: 83 ML/MIN/1.73M2 — SIGNIFICANT CHANGE UP
EOSINOPHIL # BLD AUTO: 0.1 K/UL — SIGNIFICANT CHANGE UP (ref 0–0.5)
EOSINOPHIL NFR BLD AUTO: 1.9 % — SIGNIFICANT CHANGE UP (ref 0–6)
ESTIMATED AVERAGE GLUCOSE: 100 MG/DL — SIGNIFICANT CHANGE UP (ref 68–114)
GLUCOSE SERPL-MCNC: 97 MG/DL — SIGNIFICANT CHANGE UP (ref 70–99)
GLUCOSE UR QL: NEGATIVE MG/DL — SIGNIFICANT CHANGE UP
HCG SERPL-ACNC: <4 MIU/ML — SIGNIFICANT CHANGE UP
HCT VFR BLD CALC: 41.1 % — SIGNIFICANT CHANGE UP (ref 34.5–45)
HGB BLD-MCNC: 13.6 G/DL — SIGNIFICANT CHANGE UP (ref 11.5–15.5)
IMM GRANULOCYTES NFR BLD AUTO: 0.2 % — SIGNIFICANT CHANGE UP (ref 0–0.9)
INR BLD: 1.06 RATIO — SIGNIFICANT CHANGE UP (ref 0.88–1.16)
KETONES UR-MCNC: NEGATIVE — SIGNIFICANT CHANGE UP
LEUKOCYTE ESTERASE UR-ACNC: NEGATIVE — SIGNIFICANT CHANGE UP
LYMPHOCYTES # BLD AUTO: 1.34 K/UL — SIGNIFICANT CHANGE UP (ref 1–3.3)
LYMPHOCYTES # BLD AUTO: 25.9 % — SIGNIFICANT CHANGE UP (ref 13–44)
MCHC RBC-ENTMCNC: 29.7 PG — SIGNIFICANT CHANGE UP (ref 27–34)
MCHC RBC-ENTMCNC: 33.1 GM/DL — SIGNIFICANT CHANGE UP (ref 32–36)
MCV RBC AUTO: 89.7 FL — SIGNIFICANT CHANGE UP (ref 80–100)
MONOCYTES # BLD AUTO: 0.34 K/UL — SIGNIFICANT CHANGE UP (ref 0–0.9)
MONOCYTES NFR BLD AUTO: 6.6 % — SIGNIFICANT CHANGE UP (ref 2–14)
MRSA PCR RESULT.: SIGNIFICANT CHANGE UP
NEUTROPHILS # BLD AUTO: 3.36 K/UL — SIGNIFICANT CHANGE UP (ref 1.8–7.4)
NEUTROPHILS NFR BLD AUTO: 64.8 % — SIGNIFICANT CHANGE UP (ref 43–77)
NITRITE UR-MCNC: NEGATIVE — SIGNIFICANT CHANGE UP
PH UR: 6 — SIGNIFICANT CHANGE UP (ref 5–8)
PLATELET # BLD AUTO: 338 K/UL — SIGNIFICANT CHANGE UP (ref 150–400)
POTASSIUM SERPL-MCNC: 4.2 MMOL/L — SIGNIFICANT CHANGE UP (ref 3.5–5.3)
POTASSIUM SERPL-SCNC: 4.2 MMOL/L — SIGNIFICANT CHANGE UP (ref 3.5–5.3)
PROT SERPL-MCNC: 7 G/DL — SIGNIFICANT CHANGE UP (ref 6.6–8.7)
PROT UR-MCNC: NEGATIVE — SIGNIFICANT CHANGE UP
PROTHROM AB SERPL-ACNC: 12.3 SEC — SIGNIFICANT CHANGE UP (ref 10.5–13.4)
RBC # BLD: 4.58 M/UL — SIGNIFICANT CHANGE UP (ref 3.8–5.2)
RBC # FLD: 13.2 % — SIGNIFICANT CHANGE UP (ref 10.3–14.5)
S AUREUS DNA NOSE QL NAA+PROBE: SIGNIFICANT CHANGE UP
SARS-COV-2 RNA SPEC QL NAA+PROBE: SIGNIFICANT CHANGE UP
SODIUM SERPL-SCNC: 142 MMOL/L — SIGNIFICANT CHANGE UP (ref 135–145)
SP GR SPEC: 1.01 — SIGNIFICANT CHANGE UP (ref 1.01–1.02)
UROBILINOGEN FLD QL: NEGATIVE MG/DL — SIGNIFICANT CHANGE UP
WBC # BLD: 5.18 K/UL — SIGNIFICANT CHANGE UP (ref 3.8–10.5)
WBC # FLD AUTO: 5.18 K/UL — SIGNIFICANT CHANGE UP (ref 3.8–10.5)

## 2022-12-30 PROCEDURE — 71046 X-RAY EXAM CHEST 2 VIEWS: CPT

## 2022-12-30 PROCEDURE — 71046 X-RAY EXAM CHEST 2 VIEWS: CPT | Mod: 26

## 2022-12-30 PROCEDURE — 93010 ELECTROCARDIOGRAM REPORT: CPT

## 2022-12-30 PROCEDURE — 93005 ELECTROCARDIOGRAM TRACING: CPT

## 2022-12-30 RX ORDER — SODIUM CHLORIDE 9 MG/ML
3 INJECTION INTRAMUSCULAR; INTRAVENOUS; SUBCUTANEOUS ONCE
Refills: 0 | Status: DISCONTINUED | OUTPATIENT
Start: 2023-01-04 | End: 2023-01-18

## 2022-12-30 NOTE — H&P PST ADULT - NSANTHOSAYNRD_GEN_A_CORE
No. ASHLEY screening performed.  STOP BANG Legend: 0-2 = LOW Risk; 3-4 = INTERMEDIATE Risk; 5-8 = HIGH Risk

## 2022-12-30 NOTE — H&P PST ADULT - HISTORY OF PRESENT ILLNESS
53 yo female who stated that she had a cold in september which then caused a "massive headache" which was relieved my Topamax, Excedrin migraine seems to be helping. Patient was diagnosed with "depression with psychotic features". because of the headaches they are ruling out the AVM. Patient states she does get auras and nausea with the severe headache. denies any complaints at present   She does state she get numbness and tinging in her hand and feet although she states she has raynards   Patient educated on surgical scrub, COVID testing, preadmission instructions,  and day of procedure medications, verbalizes understanding..

## 2022-12-30 NOTE — H&P PST ADULT - ASSESSMENT
51 yo female who stated that she had a cold in september which then caused a "massive headache" which was relieved my Topamax, Excedrin migraine seems to be helping. Patient was diagnosed with "depression with psychotic features". because of the headaches they are ruling out the AVM. Patient states she does get auras and nausea with the severe headache. denies any complaints at present   She does state she get numbness and tinging in her hand and feet although she states she has raynards   Patient educated on surgical scrub, COVID testing, preadmission instructions,  and day of procedure medications, verbalizes understanding..

## 2022-12-30 NOTE — H&P PST ADULT - MUSCULOSKELETAL
negative normal/ROM intact/normal gait/strength 5/5 bilateral upper extremities/strength 5/5 bilateral lower extremities/back exam

## 2023-01-01 LAB
CULTURE RESULTS: SIGNIFICANT CHANGE UP
SPECIMEN SOURCE: SIGNIFICANT CHANGE UP

## 2023-01-04 ENCOUNTER — RESULT REVIEW (OUTPATIENT)
Age: 53
End: 2023-01-04

## 2023-01-04 ENCOUNTER — TRANSCRIPTION ENCOUNTER (OUTPATIENT)
Age: 53
End: 2023-01-04

## 2023-01-04 ENCOUNTER — OUTPATIENT (OUTPATIENT)
Dept: OUTPATIENT SERVICES | Facility: HOSPITAL | Age: 53
LOS: 1 days | End: 2023-01-04
Payer: COMMERCIAL

## 2023-01-04 ENCOUNTER — APPOINTMENT (OUTPATIENT)
Dept: NEUROLOGY | Facility: HOSPITAL | Age: 53
End: 2023-01-04

## 2023-01-04 VITALS
SYSTOLIC BLOOD PRESSURE: 123 MMHG | OXYGEN SATURATION: 100 % | RESPIRATION RATE: 17 BRPM | DIASTOLIC BLOOD PRESSURE: 71 MMHG | HEART RATE: 83 BPM

## 2023-01-04 VITALS
OXYGEN SATURATION: 98 % | WEIGHT: 163.14 LBS | HEIGHT: 63 IN | SYSTOLIC BLOOD PRESSURE: 136 MMHG | HEART RATE: 79 BPM | RESPIRATION RATE: 17 BRPM | DIASTOLIC BLOOD PRESSURE: 80 MMHG | TEMPERATURE: 98 F

## 2023-01-04 DIAGNOSIS — Q28.3 OTHER MALFORMATIONS OF CEREBRAL VESSELS: ICD-10-CM

## 2023-01-04 DIAGNOSIS — Z01.818 ENCOUNTER FOR OTHER PREPROCEDURAL EXAMINATION: ICD-10-CM

## 2023-01-04 DIAGNOSIS — R55 SYNCOPE AND COLLAPSE: ICD-10-CM

## 2023-01-04 LAB — HCG SERPL-ACNC: <4 MIU/ML — SIGNIFICANT CHANGE UP

## 2023-01-04 PROCEDURE — 36226 PLACE CATH VERTEBRAL ART: CPT | Mod: 50

## 2023-01-04 PROCEDURE — 36224 PLACE CATH CAROTD ART: CPT | Mod: 50

## 2023-01-04 PROCEDURE — 36226 PLACE CATH VERTEBRAL ART: CPT

## 2023-01-04 PROCEDURE — C1769: CPT

## 2023-01-04 PROCEDURE — 84702 CHORIONIC GONADOTROPIN TEST: CPT

## 2023-01-04 PROCEDURE — C1894: CPT

## 2023-01-04 PROCEDURE — 36227 PLACE CATH XTRNL CAROTID: CPT

## 2023-01-04 PROCEDURE — 36227 PLACE CATH XTRNL CAROTID: CPT | Mod: 50

## 2023-01-04 PROCEDURE — 36224 PLACE CATH CAROTD ART: CPT

## 2023-01-04 PROCEDURE — 36415 COLL VENOUS BLD VENIPUNCTURE: CPT

## 2023-01-04 PROCEDURE — C1887: CPT

## 2023-01-04 RX ORDER — SODIUM CHLORIDE 9 MG/ML
1000 INJECTION INTRAMUSCULAR; INTRAVENOUS; SUBCUTANEOUS
Refills: 0 | Status: DISCONTINUED | OUTPATIENT
Start: 2023-01-04 | End: 2023-01-18

## 2023-01-04 RX ORDER — LAMOTRIGINE 25 MG/1
1 TABLET, ORALLY DISINTEGRATING ORAL
Qty: 0 | Refills: 0 | DISCHARGE

## 2023-01-04 RX ORDER — TOPIRAMATE 25 MG
1 TABLET ORAL
Qty: 0 | Refills: 0 | DISCHARGE

## 2023-01-04 RX ADMIN — SODIUM CHLORIDE 75 MILLILITER(S): 9 INJECTION INTRAMUSCULAR; INTRAVENOUS; SUBCUTANEOUS at 15:08

## 2023-01-04 NOTE — DISCHARGE NOTE NURSING/CASE MANAGEMENT/SOCIAL WORK - NSDCVIVACCINE_GEN_ALL_CORE_FT
influenza, injectable, quadrivalent, preservative free; 22-Oct-2018 12:26; Sarai Naylor (RN); [employer]; [id number] (Exp. Date: 30-Jun-2019); IntraMuscular; Deltoid Left.; 0.5 milliLiter(s); VIS (VIS Published: 07-Aug-2015, VIS Presented: 22-Oct-2018);

## 2023-01-04 NOTE — DISCHARGE NOTE NURSING/CASE MANAGEMENT/SOCIAL WORK - NSDCPEFALRISK_GEN_ALL_CORE
For information on Fall & Injury Prevention, visit: [email or web address] OR  [email or web address] OR  https://www.cdc.gov/steadi/patient.html

## 2023-01-04 NOTE — ASU DISCHARGE PLAN (ADULT/PEDIATRIC) - NS MD DC FALL RISK RISK
For information on Fall & Injury Prevention, visit: [email or web address] OR  [email or web address] OR  https://www.cdc.gov/steadi/patient.html

## 2023-01-04 NOTE — CHART NOTE - NSCHARTNOTESELECT_GEN_ALL_CORE
Neurointerventional Surgery Post Procedure Note/Event Note
Neurointerventional Surgery Pre-Procedure Note/Event Note

## 2023-01-04 NOTE — DISCHARGE NOTE NURSING/CASE MANAGEMENT/SOCIAL WORK - PATIENT PORTAL LINK FT
You can access the FollowMyHealth Patient Portal offered by [facility] by registering at the following website: [email or web address]. By joining [facility]’s FollowMyHealth portal, you will also be able to view your health information using other applications (apps) compatible with our system.

## 2023-01-04 NOTE — CHART NOTE - NSCHARTNOTEFT_GEN_A_CORE
Neurointerventional Surgery Pre-Procedure Note       HPI:  53 yo female who stated that she had a cold in september which then caused a "massive headache" which was relieved my Topamax, Excedrin migraine seems to be helping. Patient was diagnosed with "depression with psychotic features". because of the headaches they are ruling out the AVM. Patient states she does get auras and nausea with the severe headache. denies any complaints at present   She does state she get numbness and tinging in her hand and feet although she states she has raynards   Patient educated on surgical scrub, COVID testing, preadmission instructions,  and day of procedure medications, verbalizes understanding..      Allergies: penicillin (Unknown)      PMH/PSH:  PAST MEDICAL & SURGICAL HISTORY:   delivery delivered      Schizo-affective schizophrenia      Ulcerative colitis with rectal bleeding, unspecified location      Hashimoto&#x27;s disease      Retinal vasculitis, unspecified laterality      LE (lupus erythematosus)      Hashimoto&#x27;s thyroiditis      Fibromyalgia      Retinal vasculitis      No significant past surgical history          Social History:   Social History:    FAMILY HISTORY:  Family history of seizures (Sibling)    Family history of colitis (Sibling)        Current Medications:   sodium chloride 0.9% lock flush 3 milliLiter(s) IV Push Once      Physical Exam:  Constitutional: NAD    Neuro  * Mental Status:  GCS 15:  E(4), V(5), M(6).  Awake, alert, oriented to conversation.  * Cranial Nerves: Cnii-Cnxii grossly intact. PERRL, EOMI, tongue midline, no gaze deviation  * Motor: RUE 5/5, LUE 5/5, RLE 5/5, LLE 5/5  * Sensory: Sensation intact to light touch  * Reflexes: Not assessed  * Gait: Not assessed    Cardiovascular:  S1, S2 no murmurs appreciated.  Regular rate and rhythm.  Eyes: See neurologic examination with detailed examination of eyes.  ENT: No JVD, Trachea Midline.  Respiratory: Clear to auscultation.  Gastrointestinal: Soft, nontender, nondistended.  Genitourinary: [ ] Vernon, [ x ] No Vernon.   Musculoskeletal: No muscle wasting noted, (See neuorlogic assessment for full muscle strength assessment) No pretibial edema appreciated, no appreciable calf tenderness.  Skin:  Wound inspected, no redness, bleeding or drainage noted.    Hematologic / Lymph / Immunologic: No bleeding from IV sites or wounds, No lymphadenopathy, No Hives or allergic type skin lesions      NIH SS:  DATE: 23  TIME: 930  1A: Level of consciousness (0-3): 0  1B: Questions (0-2): 0    1C: Commands (0-2): 0  2: Gaze (0-2): 0  3: Visual fields (0-3): 0  4: Facial palsy (0-3): 0  MOTOR:  5A: Left arm motor drift (0-4): 0  5B: Right arm motor drift (0-4): 0  6A: Left leg motor drift (0-4): 0  6B: Right leg motor drift (0-4): 0  7: Limb ataxia (0-2): 0  SENSORY:  8: Sensation (0-2): 0  SPEECH:  9: Language (0-3): 0  10: Dysarthria (0-2): 0  EXTINCTION:  11: Extinction/inattention (0-2): 0    TOTAL SCORE: 0    Labs:       HCG Quantitative, Serum: <4.0 mIU/mL ( @ 07:20)      Blood Bank:     Assessment/Plan:   This is a 52y  year old right / left hand dominant Female  presents with headache and her work up revealed possible left basal ganglia vascular malformation.  Patient presents to neuro-IR for selective cerebral angiography.     Procedure, goals, risks, benefits and alternatives  were discussed with patient and (patient's family).  All questions were answered.  Risks include but are not limited to stroke, vessel injury, hemorrhage, and or groin hematoma.  Patient demonstrates understanding  of all risks involved with this procedure and wishes to continue.   Appropriate  consent was obtained from patient and consent is in the patient's chart.
Neurointerventional Surgery Post Procedure Note    Procedure: Selective Cerebral Angiography       Pre- Procedure Diagnosis: Arteriovenous malformation  Post- Procedure Diagnosis: No evidence of Arteriovenous malformation. There is a left basal ganglia DVA that drains into the Vein of Adrianna.      : Dr. Moshe Hughes MD  First Assistant: Yolis Ramires MD  Nurse: Angeline WRAY  Anesthesiologist: Dr Olga Simmons.                                          Radiology Tech: [place]    Sheath:  - 4 [place] Short Sheath        Vitals:  /68   HR   72 Spo2 99 %    Preliminary Report:  Under a 4 [place] short sheath via the right groin under MAC sedation via left vertebral artery, left internal carotid artery, left external carotid artery, right vertebral artery, right internal carotid artery, right external carotid artery, a selective cerebral angiography  was performed and reveals left DVA draining into the Vein of Adrianna  . ( Official note to follow).    Patient tolerated procedure well.  Patient remains hemodynamically stable, no change in neurological status compared to baseline.  Results were discussed with patient, patient's family and Neurosurgery.  Right groin sheath  was discontinued. Hemostasis was obtained with approximately 15 minutes of manual compression.     No active bleeding, no hematoma, no ecchymosis.   Quick clot and safeguard balloon dressing applied at   Patient transferred to recovery room in stable condition.

## 2023-01-07 ENCOUNTER — NON-APPOINTMENT (OUTPATIENT)
Age: 53
End: 2023-01-07

## 2023-01-08 ENCOUNTER — EMERGENCY (EMERGENCY)
Facility: HOSPITAL | Age: 53
LOS: 1 days | Discharge: HOME | End: 2023-01-08
Attending: EMERGENCY MEDICINE
Payer: COMMERCIAL

## 2023-01-08 VITALS
DIASTOLIC BLOOD PRESSURE: 81 MMHG | TEMPERATURE: 98 F | SYSTOLIC BLOOD PRESSURE: 128 MMHG | OXYGEN SATURATION: 100 % | HEART RATE: 75 BPM | RESPIRATION RATE: 18 BRPM

## 2023-01-08 VITALS
WEIGHT: 160.06 LBS | RESPIRATION RATE: 16 BRPM | TEMPERATURE: 98 F | DIASTOLIC BLOOD PRESSURE: 95 MMHG | SYSTOLIC BLOOD PRESSURE: 149 MMHG | OXYGEN SATURATION: 97 % | HEART RATE: 94 BPM | HEIGHT: 63 IN

## 2023-01-08 LAB
ALBUMIN SERPL ELPH-MCNC: 4.5 G/DL — SIGNIFICANT CHANGE UP (ref 3.3–5.2)
ALP SERPL-CCNC: 83 U/L — SIGNIFICANT CHANGE UP (ref 40–120)
ALT FLD-CCNC: 25 U/L — SIGNIFICANT CHANGE UP
ANION GAP SERPL CALC-SCNC: 13 MMOL/L — SIGNIFICANT CHANGE UP (ref 5–17)
APPEARANCE UR: CLEAR — SIGNIFICANT CHANGE UP
APTT BLD: 36.3 SEC — HIGH (ref 27.5–35.5)
AST SERPL-CCNC: 28 U/L — SIGNIFICANT CHANGE UP
BASOPHILS # BLD AUTO: 0.04 K/UL — SIGNIFICANT CHANGE UP (ref 0–0.2)
BASOPHILS NFR BLD AUTO: 0.6 % — SIGNIFICANT CHANGE UP (ref 0–2)
BILIRUB SERPL-MCNC: 0.4 MG/DL — SIGNIFICANT CHANGE UP (ref 0.4–2)
BILIRUB UR-MCNC: NEGATIVE — SIGNIFICANT CHANGE UP
BUN SERPL-MCNC: 14 MG/DL — SIGNIFICANT CHANGE UP (ref 8–20)
CALCIUM SERPL-MCNC: 9.8 MG/DL — SIGNIFICANT CHANGE UP (ref 8.4–10.5)
CHLORIDE SERPL-SCNC: 101 MMOL/L — SIGNIFICANT CHANGE UP (ref 96–108)
CO2 SERPL-SCNC: 21 MMOL/L — LOW (ref 22–29)
COLOR SPEC: YELLOW — SIGNIFICANT CHANGE UP
CREAT SERPL-MCNC: 0.91 MG/DL — SIGNIFICANT CHANGE UP (ref 0.5–1.3)
DIFF PNL FLD: NEGATIVE — SIGNIFICANT CHANGE UP
EGFR: 77 ML/MIN/1.73M2 — SIGNIFICANT CHANGE UP
EOSINOPHIL # BLD AUTO: 0.15 K/UL — SIGNIFICANT CHANGE UP (ref 0–0.5)
EOSINOPHIL NFR BLD AUTO: 2.4 % — SIGNIFICANT CHANGE UP (ref 0–6)
EPI CELLS # UR: SIGNIFICANT CHANGE UP
GLUCOSE SERPL-MCNC: 87 MG/DL — SIGNIFICANT CHANGE UP (ref 70–99)
GLUCOSE UR QL: NEGATIVE MG/DL — SIGNIFICANT CHANGE UP
HCT VFR BLD CALC: 40.9 % — SIGNIFICANT CHANGE UP (ref 34.5–45)
HGB BLD-MCNC: 13.7 G/DL — SIGNIFICANT CHANGE UP (ref 11.5–15.5)
IMM GRANULOCYTES NFR BLD AUTO: 0.3 % — SIGNIFICANT CHANGE UP (ref 0–0.9)
INR BLD: 1.08 RATIO — SIGNIFICANT CHANGE UP (ref 0.88–1.16)
KETONES UR-MCNC: NEGATIVE — SIGNIFICANT CHANGE UP
LEUKOCYTE ESTERASE UR-ACNC: ABNORMAL
LYMPHOCYTES # BLD AUTO: 1.71 K/UL — SIGNIFICANT CHANGE UP (ref 1–3.3)
LYMPHOCYTES # BLD AUTO: 27.5 % — SIGNIFICANT CHANGE UP (ref 13–44)
MCHC RBC-ENTMCNC: 29.6 PG — SIGNIFICANT CHANGE UP (ref 27–34)
MCHC RBC-ENTMCNC: 33.5 GM/DL — SIGNIFICANT CHANGE UP (ref 32–36)
MCV RBC AUTO: 88.3 FL — SIGNIFICANT CHANGE UP (ref 80–100)
MONOCYTES # BLD AUTO: 0.32 K/UL — SIGNIFICANT CHANGE UP (ref 0–0.9)
MONOCYTES NFR BLD AUTO: 5.2 % — SIGNIFICANT CHANGE UP (ref 2–14)
NEUTROPHILS # BLD AUTO: 3.97 K/UL — SIGNIFICANT CHANGE UP (ref 1.8–7.4)
NEUTROPHILS NFR BLD AUTO: 64 % — SIGNIFICANT CHANGE UP (ref 43–77)
NITRITE UR-MCNC: NEGATIVE — SIGNIFICANT CHANGE UP
NT-PROBNP SERPL-SCNC: 19 PG/ML — SIGNIFICANT CHANGE UP (ref 0–300)
PH UR: 7 — SIGNIFICANT CHANGE UP (ref 5–8)
PLATELET # BLD AUTO: 325 K/UL — SIGNIFICANT CHANGE UP (ref 150–400)
POTASSIUM SERPL-MCNC: 4 MMOL/L — SIGNIFICANT CHANGE UP (ref 3.5–5.3)
POTASSIUM SERPL-SCNC: 4 MMOL/L — SIGNIFICANT CHANGE UP (ref 3.5–5.3)
PROT SERPL-MCNC: 7.2 G/DL — SIGNIFICANT CHANGE UP (ref 6.6–8.7)
PROT UR-MCNC: NEGATIVE — SIGNIFICANT CHANGE UP
PROTHROM AB SERPL-ACNC: 12.5 SEC — SIGNIFICANT CHANGE UP (ref 10.5–13.4)
RBC # BLD: 4.63 M/UL — SIGNIFICANT CHANGE UP (ref 3.8–5.2)
RBC # FLD: 13.3 % — SIGNIFICANT CHANGE UP (ref 10.3–14.5)
RBC CASTS # UR COMP ASSIST: NEGATIVE /HPF — SIGNIFICANT CHANGE UP (ref 0–4)
SODIUM SERPL-SCNC: 135 MMOL/L — SIGNIFICANT CHANGE UP (ref 135–145)
SP GR SPEC: 1 — LOW (ref 1.01–1.02)
TROPONIN T SERPL-MCNC: <0.01 NG/ML — SIGNIFICANT CHANGE UP (ref 0–0.06)
UROBILINOGEN FLD QL: NEGATIVE MG/DL — SIGNIFICANT CHANGE UP
WBC # BLD: 6.21 K/UL — SIGNIFICANT CHANGE UP (ref 3.8–10.5)
WBC # FLD AUTO: 6.21 K/UL — SIGNIFICANT CHANGE UP (ref 3.8–10.5)
WBC UR QL: SIGNIFICANT CHANGE UP /HPF (ref 0–5)

## 2023-01-08 PROCEDURE — 70496 CT ANGIOGRAPHY HEAD: CPT | Mod: 26,MA

## 2023-01-08 PROCEDURE — 93005 ELECTROCARDIOGRAM TRACING: CPT

## 2023-01-08 PROCEDURE — 74176 CT ABD & PELVIS W/O CONTRAST: CPT | Mod: 26,MA

## 2023-01-08 PROCEDURE — 99284 EMERGENCY DEPT VISIT MOD MDM: CPT

## 2023-01-08 PROCEDURE — 74176 CT ABD & PELVIS W/O CONTRAST: CPT | Mod: MA

## 2023-01-08 PROCEDURE — 96375 TX/PRO/DX INJ NEW DRUG ADDON: CPT | Mod: XU

## 2023-01-08 PROCEDURE — 93010 ELECTROCARDIOGRAM REPORT: CPT

## 2023-01-08 PROCEDURE — 85730 THROMBOPLASTIN TIME PARTIAL: CPT

## 2023-01-08 PROCEDURE — 81001 URINALYSIS AUTO W/SCOPE: CPT

## 2023-01-08 PROCEDURE — 85610 PROTHROMBIN TIME: CPT

## 2023-01-08 PROCEDURE — 36415 COLL VENOUS BLD VENIPUNCTURE: CPT

## 2023-01-08 PROCEDURE — 70498 CT ANGIOGRAPHY NECK: CPT | Mod: 26,MA

## 2023-01-08 PROCEDURE — 84702 CHORIONIC GONADOTROPIN TEST: CPT

## 2023-01-08 PROCEDURE — 71045 X-RAY EXAM CHEST 1 VIEW: CPT

## 2023-01-08 PROCEDURE — 83880 ASSAY OF NATRIURETIC PEPTIDE: CPT

## 2023-01-08 PROCEDURE — 96372 THER/PROPH/DIAG INJ SC/IM: CPT | Mod: XU

## 2023-01-08 PROCEDURE — 84484 ASSAY OF TROPONIN QUANT: CPT

## 2023-01-08 PROCEDURE — 85025 COMPLETE CBC W/AUTO DIFF WBC: CPT

## 2023-01-08 PROCEDURE — 71045 X-RAY EXAM CHEST 1 VIEW: CPT | Mod: 26

## 2023-01-08 PROCEDURE — 99285 EMERGENCY DEPT VISIT HI MDM: CPT | Mod: 25

## 2023-01-08 PROCEDURE — 96374 THER/PROPH/DIAG INJ IV PUSH: CPT | Mod: XU

## 2023-01-08 PROCEDURE — 80053 COMPREHEN METABOLIC PANEL: CPT

## 2023-01-08 PROCEDURE — 70496 CT ANGIOGRAPHY HEAD: CPT | Mod: MA

## 2023-01-08 PROCEDURE — 70498 CT ANGIOGRAPHY NECK: CPT | Mod: MA

## 2023-01-08 RX ORDER — SUMATRIPTAN SUCCINATE 4 MG/.5ML
6 INJECTION, SOLUTION SUBCUTANEOUS ONCE
Refills: 0 | Status: COMPLETED | OUTPATIENT
Start: 2023-01-08 | End: 2023-01-08

## 2023-01-08 RX ORDER — METOCLOPRAMIDE HCL 10 MG
10 TABLET ORAL ONCE
Refills: 0 | Status: COMPLETED | OUTPATIENT
Start: 2023-01-08 | End: 2023-01-08

## 2023-01-08 RX ORDER — MAGNESIUM SULFATE 500 MG/ML
2 VIAL (ML) INJECTION ONCE
Refills: 0 | Status: COMPLETED | OUTPATIENT
Start: 2023-01-08 | End: 2023-01-08

## 2023-01-08 RX ADMIN — Medication 25 GRAM(S): at 16:46

## 2023-01-08 RX ADMIN — Medication 2 TABLET(S): at 16:46

## 2023-01-08 RX ADMIN — Medication 10 MILLIGRAM(S): at 16:46

## 2023-01-08 RX ADMIN — SUMATRIPTAN SUCCINATE 6 MILLIGRAM(S): 4 INJECTION, SOLUTION SUBCUTANEOUS at 17:46

## 2023-01-08 NOTE — ED PROVIDER NOTE - CARE PROVIDER_API CALL
Moshe Hughes)  Neurology; Vascular Neurology  [address] [place] [address]  Phone: [phone]  Fax: [phone]  Follow Up Time:

## 2023-01-08 NOTE — ED ADULT NURSE NOTE - OBJECTIVE STATEMENT
patient claims she had an angiogram a few days ago and complains of right abdominal pain, neck pain and nausea.

## 2023-01-08 NOTE — ED ADULT TRIAGE NOTE - CHIEF COMPLAINT QUOTE
Patient had an angiogram on Wednesday. Patient has had sharp pain in the injection site, right lower groin, and right lower abdomen. Patient also complaining of neck pain and nausea. Patient had angiogram to rule out an AVM, states that it was negative.

## 2023-01-08 NOTE — ED PROVIDER NOTE - CLINICAL SUMMARY MEDICAL DECISION MAKING FREE TEXT BOX
The pt states that her headache and arm paresthesias have resolved. The pt's history, description of the pain and its mitigating/exacerbating factors, as well as its resolution with migraine treatment, normal neuro exam and the imaging findings all suggest that this is likely migraine and not other neuro pathology. the abd is soft and non-tender, imaging neg. there is no edema at the cath puncture site and trivial ecchymosis, not clinically consistent with RP bleed. Pt is well appearing and stable for dc

## 2023-01-08 NOTE — ED ADULT NURSE NOTE - NSIMPLEMENTINTERV_GEN_ALL_ED
[place] AMBULATORY ENCOUNTER(APSO):    [facility]  [address] [address]  [phone]    ASSESSMENT & PLAN:  1. Medicare annual wellness visit, subsequent   completed   2. Benign essential HTN   Controlled, same medications    3. Essential hypertension    4. Hypercholesterolemia   Repeat six months.     5. Nonrheumatic aortic valve stenosis   ECHO next year   6. Malignant neoplasm of urinary bladder neck (CMS/HCC)      7. Infective aortitis (CMS/HCC)    8. Ureterostomy status (CMS/[facility])         Orders Placed This Encounter   • CBC with Automated Differential   • Basic Metabolic Panel   • Lipid Panel With Reflex   • Hepatic Function Panel   • moxifloxacin (AVELOX) 400 MG tablet   • rifAMPin (RIFADIN) 300 MG capsule     Return in about 6 months (around 5/29/2023).      The patient indicates understanding of these issues and agrees with the plan.       CHIEF COMPLAINT:   Chief Complaint   Patient presents with   • Medicare Wellness Visit            HISTORY OF PRESENT ILLNESS:    He is complaining of/requesting evaluation for Medicare Wellness Visit subsequent.    Saw Dr Romero yesterday has moderate aortic stenosis plan is for ECHO in September of 2023.      Saw Dr Salas from infectious disease for his infectious aortitis, on his Rifampin and Avelox indefinitely.      Care Team:  Wolf Otero MD   Family Practice  Patient Care Team:  Wolf Otero MD as PCP - General (Family Practice)  Kaleb Cuevas MD (Urology)  Nick Rivers MD as Cardiologist (Cardiovascular Disease)  NATHANIEL Wylie as Nurse Practitioner (Nurse Practitioner)  Magdaleno Salas MD as Referring Provider (Infectious Diseases)  Selam eMndez RN as Registered Nurse (RN Cancer Nurse Navigator)    Tobacco use: no  Alcohol use:  One [place] a night  Illicit drug use: no  Do you take prescription narcotics? no   If yes, Opioid Risk Tool Score:  na    Family History   Problem Relation Age of Onset   • Heart disease Father    • Cancer Father         Lung cancer   • Patient is unaware of any medical problems Mother    • Cancer Sister         Lung; Colon   • Early death Brother    • Heart disease Brother    • Patient is unaware of any medical problems Daughter    • Patient is unaware of any medical problems Son    • Patient is unaware of any medical problems Sister    • Patient is unaware of any medical problems Sister    • Cancer Sister         Breast Cancer   • Patient is unaware of any medical problems Sister    • Cancer Brother         Prostate Cancer   • Patient is unaware of any medical problems Brother    • Patient is unaware of any medical problems Son    • Patient is unaware of any medical problems Maternal Grandmother    • Patient is unaware of any medical problems Maternal Grandfather    • Patient is unaware of any medical problems Paternal Grandmother    • Patient is unaware of any medical problems Paternal Grandfather    • Patient is unaware of any medical problems Other      Family history was reviewed.    ACTIVITIES OF DAILY LIVING    ADL Independent Partial Assist Full Assist   Dressing [x] [] []   Eating [x] [] []   Ambulating [x] [] []   Toileting [x] [] []   Hygiene [x] [] []   IADL      Shopping [x] [] []   Housekeeping [x] [] []   Accounting [x] [] []   Food Prep [x] [] []   Transportation [x] [] []     DEPRESSION SCREEN:  Scored 1 on the PHQ-2 (patient health questionnaire).    Patient is Alert and Oriented X 3  Hearing: Whisper Test Right  fail Left fail  Memory: Remembers 3 / 3 objects at one minute   CLOCK DRAW TEST (0 or 2) 2  MINI COG SCORE: 5        PAST HISTORIES:  I have reviewed the past medical history,  social history, medications and allergies listed in the medical record as obtained by my nursing staff and support staff and agree with their documentation.  ALLERGIES:   Allergen Reactions   • Lisinopril Cough     Current  Outpatient Medications   Medication Sig Dispense Refill   • moxifloxacin (AVELOX) 400 MG tablet Take 1 tablet by mouth daily. 90 tablet 3   • rifAMPin (RIFADIN) 300 MG capsule Take 2 capsules by mouth daily. 180 capsule 3   • allopurinol (ZYLOPRIM) 100 MG tablet Take 1 tablet by mouth daily. 90 tablet 0   • carvedilol (COREG) 6.25 MG tablet TAKE 1 TABLET BY MOUTH TWICE DAILY WITH MEALS 180 tablet 0   • warfarin (COUMADIN) 5 MG tablet Take 3 tablets (15 mg) by mouth Sundays, Tuesdays, and Fridays. Take 2.5 tablets (12.5 mg) by mouth all other days. Or take as directed by [facility]. 247 tablet 1   • rosuvastatin (CRESTOR) 20 MG tablet Take 1 tablet by mouth once daily 90 tablet 1   • losartan (COZAAR) 100 MG tablet Take 1 tablet by mouth once daily 90 tablet 1   • calcitRIOL (ROCALTROL) 0.25 MCG capsule Take 1 capsule by mouth 1 day a week. Take once a week on Mondays at bedtime. 30 capsule 3   • Ferrous Sulfate (IRON) 325 (65 Fe) MG Tab Take 1 tablet by mouth 2 times daily.      • Cholecalciferol (VITAMIN D) 2000 units tablet Take 2,000 Units by mouth daily. (Patient taking differently: Take 5,000 Units by mouth daily.) 30 tablet 11   • aspirin 81 MG tablet Take 81 mg by mouth daily.        No current facility-administered medications for this visit.     Past Medical History:   Diagnosis Date   • AAA (abdominal aortic aneurysm) 01/07/2016    Per CT scan   • Allergy     see chart notes   • Arthritis    • Bladder cancer (CMS/HCC) 05/2014   • Blood clot associated with vein wall inflammation    • Cardiomyopathy (CMS/HCC) 09/19/2016    Echo 5/23/2016 shows an ejection fraction of 35% reduced from 57%   • Colon polyps     COLONOSCOPY 2/2001   • Diverticulosis    • DVT (deep venous thrombosis) (CMS/HCC)    • ED (erectile dysfunction)    • Fracture     Nose   • Gout    • Hypercholesterolemia    • Infection, atypical mycobacterium     Dr Salas to have patient on Rifampin for life    • Internal hemorrhoids    •  LBBB (left bundle branch block)    • Pneumonia    • Pulmonary emboli (CMS/HCC)    • Pulmonary nodule    • S/P ileal conduit (CMS/HCC) 10/2014   • Wears glasses     rading glasses   • White coat hypertension    • White coat hypertension      Past Surgical History:   Procedure Laterality Date   • Aneurysm surg/vert-basilar circ     • Dexa bone density axial skeleton  01/23/2009   • Echocardiogram  10/24/2018   • Service to gastroenterology  2009    colonoscopy     • Service to gastroenterology  02/12/2013    Colonoscopy with polypectomy, diverticulosis (mild) internal hemorrhoids   • Service to urology  05/21/2014    Cystoscopy , Bladder Biopsy - Dr. GISELE Hall   • Service to urology  08/28/2014    Cystoscopy -  papillary lesion noted -  Dr. DAVID Hall   • Service to urology  09/17/2014    Cystoscopy, Bladder Biopsy - Dr. DAVID Hall   • Service to urology  10/27/2014    Cystoprostatectomy, lymph node dissection, ileal loop diversion creation   • Tonsillectomy and adenoidectomy  as a child       REVIEW OF SYSTEMS:  Except as stated above, the patient denies:  Headaches, diplopia, field cut, or swallowing difficulty. No chest pain, chest  pressure, shortness of breath, PND, orthopnea or hemoptysis. No nausea,  vomiting, diarrhea, constipation, melena, hematochezia or hematemesis. No  change in urination. No dysuria, frequency, hematuria, or urgency. No  joint swelling. No skin rash.     PHYSICAL EXAM:    Vitals:    11/29/22 1425 11/29/22 1435   BP: (!) 168/88 (!) 148/88   BP Location: RUE - Right upper extremity LUE - Left upper extremity   Patient Position: Sitting Sitting   Cuff Size: Regular Regular   Pulse: 74    Temp: 98.1 °F (36.7 °C)    TempSrc: Temporal    SpO2: 98%    Weight: 100.1 kg (220 lb 10.9 oz)    Height: 5' 11.25\" (1.81 m)      GENERAL:  Pleasant, Cooperative in no distress.  Color is good.  Sad discussing his wife.    HEENT: Head normocephalic, atraumatic. Eyes: Extraocular movements  intact.  Pupils are equally responsive and reactive to light. Conjunctivae  pink. Sclerae anicteric. Ears: External ears are normal. Tympanic  membranes and external auditory canals are normal. Nasopharynx: no drainage or lesions noted.  Oropharynx: midline uvula, no oral lesions or erythema.    NECK: Supple without mass. No thyromegaly or adenopathy.   LUNGS: Lung fields are clear to auscultation and percussion.  HEART: Regular rate and rhythm. Normal S1 and S2. 3/6 aortic stenosis murmur, no rub,  gallop, or click.   ABDOMEN: Soft and nontender. ureterostomy right lower quadrant.  No mass. No hepatosplenomegaly. No flank percussion tenderness.  LOWER EXTREMITIES: 2 plus ankle edema. Dorsalis pedis and posterior tibial pulses  are normal.  LYMPH: No axillary, supraclavicular, or inguinal adenopathy.        LAB RESULTS:  Office Visit on 11/28/2022   Component Date Value Ref Range Status   • Ventricular Rate EKG/Min (BPM) 11/28/2022 65   Preliminary   • Atrial Rate (BPM) 11/28/2022 65   Preliminary   • VA-Interval (MSEC) 11/28/2022 210   Preliminary   • QRS-Interval (MSEC) 11/28/2022 146   Preliminary   • QT-Interval (MSEC) 11/28/2022 468   Preliminary   • QTc 11/28/2022 487   Preliminary   • P Axis (Degrees) 11/28/2022 77   Preliminary   • R Axis (Degrees) 11/28/2022 36   Preliminary   • T Axis (Degrees) 11/28/2022 167   Preliminary   • REPORT TEXT 11/28/2022    Preliminary                    Value:Sinus rhythm  with 1st degree AV block  Left bundle branch block  Abnormal ECG  When compared with ECG of  22-NOV-2021 13:53,  No significant change was found                     Implemented All Universal Safety Interventions:  [place] to call system. Call bell, personal items and telephone within reach. Instruct patient to call for assistance. Room bathroom lighting operational. Non-slip footwear when patient is off stretcher. Physically safe environment: no spills, clutter or unnecessary equipment. Stretcher in lowest position, wheels locked, appropriate side rails in place.

## 2023-01-08 NOTE — ED ADULT NURSE REASSESSMENT NOTE - NS ED NURSE REASSESS COMMENT FT1
Assumed care of pt from previous RN. A/O x4. Respirations are even and unlabored on room air. Pt awaiting CT scan at this time. Educated on plan of care and expresses understanding.

## 2023-01-08 NOTE — ED STATDOCS - PROGRESS NOTE DETAILS
Gina Alfonso for ED attending, Dr. Long     52 year old female PMHx of Asthma, Colitis, Lupus, Hashimoto's presents s/p angiogram Wednesday to rule out brain AVM here at [facility]. Pt reporting head pain radiating down her neck into b/l extremities. Reports feet feel cold, numb but has not noticed any discoloration. Advised by Dr. Hughes, neurology to come to the ED. Pt will be placed in the main ED for complete evaluation by another provider.

## 2023-01-08 NOTE — ED PROVIDER NOTE - OBJECTIVE STATEMENT
52 year old female with PMH schizoaffective disorder, SLE, hashimoto's, fibromyalgia, and recent cerebral angiogram to r/o AVM presents with multiple complaints. Firstly, the pt states she has been having headaches since the angio. She states that she has a long standing Hx of migraines, for which she is currently on prophylactic topamax. However, she states that since the angio the headache radiates down into the neck and arms, which is new. She describes the headache as a posterior throbbing that is worsened by both bright lights and loud sounds. She called her neurosurgeon for these Sx and was instructed ot come to the ED.  in addition, pt reports RLQ abd pain that radiates to her R flank. She states that this pain PREDATES the angio, and denies pain or swelling at the puncture site. No diarrhea, melena, dysuria, hematuria.  Finally, pt also reports the sensation of WORLEY, no chest pain or SOB at rest, no LE edema, hemoptysis, cough, fevers, chills.

## 2023-01-08 NOTE — ED STATDOCS - NS_ ATTENDINGSCRIBEDETAILS _ED_A_ED_FT
I, Gerson Long, performed the initial face to face bedside interview with this patient regarding history of present illness, review of symptoms and relevant past medical, social and family history.  I completed an independent physical examination.  I was the initial provider who evaluated this patient. I have signed out the follow up of any pending tests (i.e. labs, radiological studies) to the ACP.  I have communicated the patient’s plan of care and disposition with the ACP.  The history, relevant review of systems, past medical and surgical history, medical decision making, and physical examination was documented by the scribe in my presence and I attest to the accuracy of the documentation.

## 2023-01-08 NOTE — ED PROVIDER NOTE - GASTROINTESTINAL, MLM
Abdomen soft, non-tender, no guarding. R inguinal puncture site with 1.5 cm area of ecchymosis, no tenderness or swelling

## 2023-01-08 NOTE — ED PROVIDER NOTE - PATIENT PORTAL LINK FT
You can access the FollowMyHealth Patient Portal offered by [facility] by registering at the following website: [email or web address]. By joining [facility]’s FollowMyHealth portal, you will also be able to view your health information using other applications (apps) compatible with our system.

## 2023-01-09 ENCOUNTER — APPOINTMENT (OUTPATIENT)
Dept: RHEUMATOLOGY | Facility: CLINIC | Age: 53
End: 2023-01-09
Payer: MEDICAID

## 2023-01-09 VITALS
DIASTOLIC BLOOD PRESSURE: 68 MMHG | TEMPERATURE: 97.6 F | HEART RATE: 86 BPM | WEIGHT: 162 LBS | HEIGHT: 63 IN | BODY MASS INDEX: 28.7 KG/M2 | OXYGEN SATURATION: 96 % | SYSTOLIC BLOOD PRESSURE: 110 MMHG

## 2023-01-09 DIAGNOSIS — M54.9 DORSALGIA, UNSPECIFIED: ICD-10-CM

## 2023-01-09 PROCEDURE — 99204 OFFICE O/P NEW MOD 45 MIN: CPT

## 2023-01-09 NOTE — HISTORY OF PRESENT ILLNESS
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 \par \par Last saw Dr. Goldberg (rheum) 2017. Had been treated with flexeril and gabapentin (cant take gabapentin because falls asleep on that medication. Still takes flexeril for neck/upper back MSK pain. \par \par Reports rashes on face and chest, skin photosensitivity. Gets canker sores in mouth. Has joint pain in fingers, toes, neck. pain in PIP joints. Ache in b/l hips, occasionally in knees. If gets up from seated position, hips and knees hurt. Pain in ankles/feet. \par \par Pain is worst in mornings and at night. \par Morning stiffness lasts just a few minutes. \par +Headaches often, had recent CTA head/neck, sees neurology. \par Lost 20 lbs intentionally due to eating better. No unexplained weight loss. \par +Hair loss. \par No hematuria or foamy urine. \par No hx of DVT/PE. 2 pregnancies, no miscarriages. No preeclampsia. \par +Dry eyes (uses eye drops); +Dry mouth, reports some dental loss. \par Reports +Raynauds for years. Wears gloves. Never had digital ulcerations. \par No skin thickening/tightening. No dysphagia. \par Sleeping better now on melatonin, gets 8 hrs sleep, restorative sleep. If she gets poor sleep she gets auditory hallucinations/symptoms of her schizoaffective disorder worsen. \par \par Reports family hx of autoimmune disease: psoriatic arthritis in brother, brother passed away from colon cancer recently also, Sjogrens in sister.  [Fever] : no fever [Chills] : no chills [Dysphagia] : no dysphagia [Joint Swelling] : no joint swelling [Difficulty Standing] : no difficulty standing [Difficulty Walking] : no difficulty walking [Muscle Weakness] : no muscle weakness

## 2023-01-09 NOTE — PHYSICAL EXAM
[General Appearance - Alert] : alert [General Appearance - In No Acute Distress] : in no acute distress [General Appearance - Well Developed] : well developed [General Appearance - Well-Appearing] : healthy appearing [Sclera] : the sclera and conjunctiva were normal [Extraocular Movements] : extraocular movements were intact [Neck Appearance] : the appearance of the neck was normal [] : no respiratory distress [Respiration, Rhythm And Depth] : normal respiratory rhythm and effort [Exaggerated Use Of Accessory Muscles For Inspiration] : no accessory muscle use [Auscultation Breath Sounds / Voice Sounds] : lungs were clear to auscultation bilaterally [Heart Rate And Rhythm] : heart rate was normal and rhythm regular [Heart Sounds] : normal S1 and S2 [Heart Sounds Gallop] : no gallops [Murmurs] : no murmurs [Heart Sounds Pericardial Friction Rub] : no pericardial rub [Edema] : there was no peripheral edema [FreeTextEntry1] : faint erythema on face and anterior chest; unclear if rash [No Focal Deficits] : no focal deficits [Oriented To Time, Place, And Person] : oriented to person, place, and time

## 2023-01-09 NOTE — ASSESSMENT
[FreeTextEntry1] : 52F with reported schizoaffective disorder, ulcerative colitis (remote hx, under control), past diagnoses of SLE and fibromyalgia (previously on HCQ, cellcept, prednisone, also treated for retinal vasculitis in past, off treatment in past several years with stable eye exams reported), here for further evaluation.\par \par Differential diagnosis at this time includes fibromyalgia (likely, given widespread generalized tenderness not limited to joints but also bones/muscles) and underlying psychiatric diagnosis, however this is a diagnosis of exclusion and other connective tissue diseases including SLE, Sjogrens must be ruled out. Patient reports joint pains, rashes, oral ulcers, hair loss and all can be secondary to SLE.\par \par Therefore will check labs for SLE, Sjogrens, also ANCA vasculitis given past diagnosis of retinal vasculitis. \par Will also check TSH and urine tests. \par Will obtain Xrays of b/l hands- from hand exam it appears to be hand OA, but Xrays may identify additional joint space changes. \par \par If testing for the above conditions is negative, patient likely has FMS- currently on flexeril but may benefit from cymbalta (has never tried). \par \par \par Further plan to follow pending results of the above.

## 2023-01-09 NOTE — REVIEW OF SYSTEMS
[Feeling Tired] : feeling tired [Eye Pain] : eye pain [Dry Eyes] : dryness of the eyes [Arthralgias] : arthralgias [As Noted in HPI] : as noted in HPI [Negative] : Heme/Lymph [Fever] : no fever [Chills] : no chills [Chest Pain] : no chest pain [Lower Ext Edema] : no lower extremity edema [Shortness Of Breath] : no shortness of breath [Skin Lesions] : no skin lesions [Limb Weakness] : no limb weakness [Difficulty Walking] : no difficulty walking [Muscle Weakness] : no muscle weakness [FreeTextEntry5] : MSK chest pain [FreeTextEntry8] : no hematuria or foamy urine  [FreeTextEntry9] : joint pain mainly in fingers of b/l hands, and upper back (muscular) [de-identified] : frequent headaches  [de-identified] : schizoaffective disorder

## 2023-01-09 NOTE — DATA REVIEWED
[FreeTextEntry1] : Recent CBC done 1/8/22 unremarkable. 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

## 2023-01-26 ENCOUNTER — LABORATORY RESULT (OUTPATIENT)
Age: 53
End: 2023-01-26

## 2023-02-06 ENCOUNTER — NON-APPOINTMENT (OUTPATIENT)
Age: 53
End: 2023-02-06

## 2023-02-06 LAB
ALBUMIN MFR SERPL ELPH: 65.6 %
ALBUMIN SERPL ELPH-MCNC: 4.6 G/DL
ALBUMIN SERPL-MCNC: 4.4 G/DL
ALBUMIN/GLOB SERPL: 1.9 RATIO
ALP BLD-CCNC: 72 U/L
ALPHA1 GLOB MFR SERPL ELPH: 4.6 %
ALPHA1 GLOB SERPL ELPH-MCNC: 0.3 G/DL
ALPHA2 GLOB MFR SERPL ELPH: 9.4 %
ALPHA2 GLOB SERPL ELPH-MCNC: 0.6 G/DL
ALT SERPL-CCNC: 17 U/L
ANA PAT FLD IF-IMP: NORMAL
ANA SER IF-ACNC: ABNORMAL
ANCA AB SER-IMP: ABNORMAL
ANION GAP SERPL CALC-SCNC: 11 MMOL/L
APPEARANCE: ABNORMAL
AST SERPL-CCNC: 20 U/L
B-GLOBULIN MFR SERPL ELPH: 10.3 %
B-GLOBULIN SERPL ELPH-MCNC: 0.7 G/DL
BASOPHILS # BLD AUTO: 0.02 K/UL
BASOPHILS NFR BLD AUTO: 0.4 %
BILIRUB SERPL-MCNC: 0.3 MG/DL
BILIRUBIN URINE: NEGATIVE
BLOOD URINE: NEGATIVE
BUN SERPL-MCNC: 10 MG/DL
C-ANCA SER-ACNC: NEGATIVE
C3 SERPL-MCNC: 119 MG/DL
C4 SERPL-MCNC: 31 MG/DL
CALCIUM SERPL-MCNC: 9.7 MG/DL
CCP AB SER IA-ACNC: <8 UNITS
CHLORIDE SERPL-SCNC: 103 MMOL/L
CO2 SERPL-SCNC: 25 MMOL/L
COLOR: YELLOW
CREAT SERPL-MCNC: 0.88 MG/DL
CRP SERPL-MCNC: <3 MG/L
DSDNA AB SER-ACNC: <12 IU/ML
EGFR: 80 ML/MIN/1.73M2
ENA RNP AB SER IA-ACNC: 0.2 AL
ENA SM AB SER IA-ACNC: <0.2 AL
ENA SS-A AB SER IA-ACNC: <0.2 AL
ENA SS-B AB SER IA-ACNC: 0.4 AL
EOSINOPHIL # BLD AUTO: 0.17 K/UL
EOSINOPHIL NFR BLD AUTO: 3.7 %
ERYTHROCYTE [SEDIMENTATION RATE] IN BLOOD BY WESTERGREN METHOD: 24 MM/HR
GAMMA GLOB FLD ELPH-MCNC: 0.7 G/DL
GAMMA GLOB MFR SERPL ELPH: 10.1 %
GLUCOSE QUALITATIVE U: NEGATIVE
GLUCOSE SERPL-MCNC: 132 MG/DL
HCT VFR BLD CALC: 42.2 %
HGB BLD-MCNC: 13.7 G/DL
IMM GRANULOCYTES NFR BLD AUTO: 0 %
INTERPRETATION SERPL IEP-IMP: NORMAL
KETONES URINE: NEGATIVE
LEUKOCYTE ESTERASE URINE: ABNORMAL
LYMPHOCYTES # BLD AUTO: 1.43 K/UL
LYMPHOCYTES NFR BLD AUTO: 31.1 %
M PROTEIN MFR SERPL ELPH: NORMAL
MAN DIFF?: NORMAL
MCHC RBC-ENTMCNC: 30.3 PG
MCHC RBC-ENTMCNC: 32.5 GM/DL
MCV RBC AUTO: 93.4 FL
MONOCLON BAND OBS SERPL: NORMAL
MONOCYTES # BLD AUTO: 0.32 K/UL
MONOCYTES NFR BLD AUTO: 7 %
NEUTROPHILS # BLD AUTO: 2.66 K/UL
NEUTROPHILS NFR BLD AUTO: 57.8 %
NITRITE URINE: NEGATIVE
P-ANCA TITR SER IF: NEGATIVE
PH URINE: 5.5
PLATELET # BLD AUTO: 327 K/UL
POTASSIUM SERPL-SCNC: 4.4 MMOL/L
PROT SERPL-MCNC: 6.7 G/DL
PROTEIN URINE: NORMAL
RBC # BLD: 4.52 M/UL
RBC # FLD: 14 %
RF+CCP IGG SER-IMP: NEGATIVE
RHEUMATOID FACT SER QL: <10 IU/ML
SODIUM SERPL-SCNC: 139 MMOL/L
SPECIFIC GRAVITY URINE: 1.02
TSH SERPL-ACNC: 3.16 UIU/ML
UROBILINOGEN URINE: NORMAL
WBC # FLD AUTO: 4.6 K/UL

## 2023-02-07 ENCOUNTER — NON-APPOINTMENT (OUTPATIENT)
Age: 53
End: 2023-02-07

## 2023-02-13 ENCOUNTER — OUTPATIENT (OUTPATIENT)
Dept: OUTPATIENT SERVICES | Facility: HOSPITAL | Age: 53
LOS: 1 days | End: 2023-02-13

## 2023-02-13 DIAGNOSIS — Z00.8 ENCOUNTER FOR OTHER GENERAL EXAMINATION: ICD-10-CM

## 2023-02-21 NOTE — ED PROVIDER NOTE - NS ED ATTENDING STATEMENT MOD
[Patient reported PAP Smear was normal] : Patient reported PAP Smear was normal [Approximately ___ (Month)] : LMP was approximately [unfilled] month(s) ago [Frequency: Q ___ days] : menstrual periods occur approximately every [unfilled] days [Currently Active] : currently active [Women] : women [No] : No [Patient refuses STI testing] : Patient refuses STI testing [PapSmeardate] : 2019 [FreeTextEntry1] : 02/13/2023 Attending Only

## 2023-02-24 ENCOUNTER — APPOINTMENT (OUTPATIENT)
Dept: RADIOLOGY | Facility: CLINIC | Age: 53
End: 2023-02-24
Payer: MEDICAID

## 2023-02-24 ENCOUNTER — OUTPATIENT (OUTPATIENT)
Dept: OUTPATIENT SERVICES | Facility: HOSPITAL | Age: 53
LOS: 1 days | End: 2023-02-24
Payer: MEDICAID

## 2023-02-24 ENCOUNTER — APPOINTMENT (OUTPATIENT)
Dept: RADIOLOGY | Facility: CLINIC | Age: 53
End: 2023-02-24

## 2023-02-24 DIAGNOSIS — M25.541 PAIN IN JOINTS OF RIGHT HAND: ICD-10-CM

## 2023-02-24 PROCEDURE — 73130 X-RAY EXAM OF HAND: CPT

## 2023-02-24 PROCEDURE — 73130 X-RAY EXAM OF HAND: CPT | Mod: 26,LT,RT

## 2023-02-28 ENCOUNTER — NON-APPOINTMENT (OUTPATIENT)
Age: 53
End: 2023-02-28

## 2023-03-02 ENCOUNTER — APPOINTMENT (OUTPATIENT)
Dept: RHEUMATOLOGY | Facility: CLINIC | Age: 53
End: 2023-03-02
Payer: MEDICAID

## 2023-03-02 VITALS
HEIGHT: 63 IN | HEART RATE: 75 BPM | DIASTOLIC BLOOD PRESSURE: 70 MMHG | OXYGEN SATURATION: 97 % | BODY MASS INDEX: 26.22 KG/M2 | WEIGHT: 148 LBS | TEMPERATURE: 97.9 F | SYSTOLIC BLOOD PRESSURE: 110 MMHG

## 2023-03-02 DIAGNOSIS — M54.2 CERVICALGIA: ICD-10-CM

## 2023-03-02 PROCEDURE — 99214 OFFICE O/P EST MOD 30 MIN: CPT

## 2023-03-02 RX ORDER — NAPROXEN 500 MG/1
500 TABLET ORAL
Qty: 60 | Refills: 2 | Status: ACTIVE | COMMUNITY
Start: 2022-09-18 | End: 1900-01-01

## 2023-03-02 RX ORDER — LAMOTRIGINE 100 MG/1
100 TABLET ORAL DAILY
Qty: 30 | Refills: 2 | Status: DISCONTINUED | COMMUNITY
Start: 2018-02-15 | End: 2023-03-02

## 2023-03-02 RX ORDER — OLANZAPINE 10 MG/1
10 TABLET, FILM COATED ORAL
Qty: 15 | Refills: 0 | Status: DISCONTINUED | COMMUNITY
Start: 2022-08-24 | End: 2023-03-02

## 2023-03-02 RX ORDER — OLANZAPINE 5 MG/1
5 TABLET, FILM COATED ORAL
Qty: 10 | Refills: 0 | Status: DISCONTINUED | COMMUNITY
Start: 2022-09-08 | End: 2023-03-02

## 2023-03-02 NOTE — HISTORY OF PRESENT ILLNESS
[___ Month(s) Ago] : [unfilled] month(s) ago [FreeTextEntry1] : 3/2/23: primary concerns at this time are neck and paraspinal neck muscle pain, which she feels contributes to her headaches. for this reason she has difficulty sleeping.\par Overall, her sleep has improved however.\par \par She also has ongoing pains in b/l hands with some difficulty unscrewing jars/related objects. Her Xray hands revealed erosive OA changes in the DIP joints. \par \par Denies significant tenderness elsewhere, no generalized tenderness today.

## 2023-03-02 NOTE — ASSESSMENT
[FreeTextEntry1] : 53F  here for followup of joint pains, prior  autoimmune workup was negative but Xray hands revealed erosive type osteoarthritis. Now with neck and upper back pain, and ongoing bilateral hand OA related pain. \par \par Plan:\par cyclobenzapine TID PRN prescribed for neck muscle spasm- pt reports improvement on this medication in the past.\par Will also order Xray c-spine to assess for OA of the spine given she also has tenderness overlying the spine.\par \par Naproxen 500 mg BID was ordered PRN for OA related hand pains and neck pain; pt was advised to minimize use of oral NSAIDs when possible though, due to associated risks. She was advised she can try tylenol BID and/or voltaren gel PRN to minimize oral NSAID use.\par \par PT referral was also given for the hands.

## 2023-03-02 NOTE — DATA REVIEWED
[FreeTextEntry1] : CCP, Sjogrens, CHRISTAL, ANCA within normal limits 1/2023\par ESR age appropriate in 1/2023

## 2023-03-02 NOTE — PHYSICAL EXAM
[Skin Color & Pigmentation] : normal skin color and pigmentation [Skin Lesions] : no skin lesions [No Focal Deficits] : no focal deficits [Oriented To Time, Place, And Person] : oriented to person, place, and time [Affect] : the affect was normal [Mood] : the mood was normal [General Appearance - In No Acute Distress] : in no acute distress [General Appearance - Alert] : alert [General Appearance - Well Developed] : well developed [General Appearance - Well-Appearing] : healthy appearing [Sclera] : the sclera and conjunctiva were normal [Extraocular Movements] : extraocular movements were intact [] : no respiratory distress [Exaggerated Use Of Accessory Muscles For Inspiration] : no accessory muscle use [Heart Sounds] : normal S1 and S2 [Edema] : there was no peripheral edema [FreeTextEntry1] : Heberdens and bouchards nodes, OA type changes in b/l hands, with tenderness; tenderness also on c-spine (neck) and paraspinal muscles with spasm

## 2023-03-02 NOTE — REVIEW OF SYSTEMS
[As Noted in HPI] : as noted in HPI [Arthralgias] : arthralgias [Skin Lesions] : no skin lesions [Negative] : Heme/Lymph [FreeTextEntry9] : neck pain with muscle spasm

## 2023-04-06 ENCOUNTER — APPOINTMENT (OUTPATIENT)
Dept: RHEUMATOLOGY | Facility: CLINIC | Age: 53
End: 2023-04-06

## 2023-05-15 NOTE — PROGRESS NOTE BEHAVIORAL HEALTH - MOOD
Price (Do Not Change): 0.00 Detail Level: Generalized Instructions: This plan will send the code FBSE to the PM system.  DO NOT or CHANGE the price. Anxious/Depressed

## 2023-06-19 NOTE — ED ADULT NURSE NOTE - NSICDXPASTSURGICALHX_GEN_ALL_CORE_FT
Patient awake and alert, rr unlabored, skin warm and dry. VSS. Discharge instructions provided written and verbal. Tylenol/motrin dosing pamphlet provided and explained - went over frequency and dosing. Script for medication sent to preferred pharmacy and dosing and frequency explained. Parent verbalized understanding of all instructions. Parent aware to follow up with PCP as instructed. Return criteria discussed. All questions were answered. Patient discharged home in stable condition. Pt ambulatory with a steady gait.     PAST SURGICAL HISTORY:  No significant past surgical history

## 2023-06-20 NOTE — BH PATIENT PROFILE - NSBHPSYTREAT_PSY_A_CORE
Problem: Chronic Conditions and Co-morbidities  Goal: Patient's chronic conditions and co-morbidity symptoms are monitored and maintained or improved  Outcome: Progressing     Problem: Discharge Planning  Goal: Discharge to home or other facility with appropriate resources  Outcome: Progressing     Problem: Pain  Goal: Verbalizes/displays adequate comfort level or baseline comfort level  Outcome: Progressing     Problem: Skin/Tissue Integrity  Goal: Absence of new skin breakdown  Description: 1. Monitor for areas of redness and/or skin breakdown  2. Assess vascular access sites hourly  3. Every 4-6 hours minimum:  Change oxygen saturation probe site  4. Every 4-6 hours:  If on nasal continuous positive airway pressure, respiratory therapy assess nares and determine need for appliance change or resting period.   Outcome: Progressing     Problem: Safety - Adult  Goal: Free from fall injury  Outcome: Progressing     Problem: ABCDS Injury Assessment  Goal: Absence of physical injury  Outcome: Progressing
Problem: Chronic Conditions and Co-morbidities  Goal: Patient's chronic conditions and co-morbidity symptoms are monitored and maintained or improved  Outcome: Progressing     Problem: Discharge Planning  Goal: Discharge to home or other facility with appropriate resources  Outcome: Progressing     Problem: Pain  Goal: Verbalizes/displays adequate comfort level or baseline comfort level  Outcome: Progressing     Problem: Skin/Tissue Integrity  Goal: Absence of new skin breakdown  Description: 1. Monitor for areas of redness and/or skin breakdown  2. Assess vascular access sites hourly  3. Every 4-6 hours minimum:  Change oxygen saturation probe site  4. Every 4-6 hours:  If on nasal continuous positive airway pressure, respiratory therapy assess nares and determine need for appliance change or resting period.   Outcome: Progressing     Problem: Safety - Adult  Goal: Free from fall injury  Outcome: Progressing     Problem: ABCDS Injury Assessment  Goal: Absence of physical injury  Outcome: Progressing
Problem: Chronic Conditions and Co-morbidities  Goal: Patient's chronic conditions and co-morbidity symptoms are monitored and maintained or improved  Outcome: Progressing     Problem: Discharge Planning  Goal: Discharge to home or other facility with appropriate resources  Outcome: Progressing  Flowsheets (Taken 6/15/2023 [id number] by Allison Camarillo RN)  Discharge to home or other facility with appropriate resources: Identify discharge learning needs (meds, wound care, etc)     Problem: Pain  Goal: Verbalizes/displays adequate comfort level or baseline comfort level  Outcome: Progressing     Problem: Skin/Tissue Integrity  Goal: Absence of new skin breakdown  Description: 1. Monitor for areas of redness and/or skin breakdown  2. Assess vascular access sites hourly  3. Every 4-6 hours minimum:  Change oxygen saturation probe site  4. Every 4-6 hours:  If on nasal continuous positive airway pressure, respiratory therapy assess nares and determine need for appliance change or resting period.   6/16/2023 0030 by Riddhi Cummings RN  Outcome: Progressing     Problem: Safety - Adult  Goal: Free from fall injury  Outcome: Progressing     Problem: ABCDS Injury Assessment  Goal: Absence of physical injury  Outcome: Progressing
partial hospitalization

## 2023-07-31 NOTE — ED PROVIDER NOTE - CPE EDP SKIN NORM
normal... 60 yo F scheduled for right  shoulder arthroscopy-possible rotator cuff repair or repair patch debridement/decompression on 8/9/23

## 2023-09-13 NOTE — H&P ADULT - PATIENT'S PREFERRED PRONOUN
Patient : Eva Guan Age: 67 year old Sex: female   MRN: [id number] Encounter Date: 9/13/2023    History     Chief Complaint   Patient presents with   • Fall       HPI    Eva Guan is a 67 year old presenting to the emergency department with fall that occurred 1 hr PTA. Patient was at [employer] when she tripped on a rug and fell, hitting her face on the rug that was covering hard tile. Denies LOC. Denies headache. Reports having a nose bleed after the fall which has now resolved. Is complaining of pressure to nose. Denies nausea or vomiting. Reports mild neck pain. Denies abdominal pain. Does not take anticoagulants.       No Known Allergies    No current facility-administered medications for this encounter.     Current Outpatient Medications   Medication Sig   • metoPROLOL succinate (TOPROL-XL) 50 MG 24 hr tablet TAKE ONE TABLET BY MOUTH ONCE NIGHTLY   • ondansetron (ZOFRAN) 4 MG tablet TAKE ONE TABLET BY MOUTH EVERY 8 HOURS AS NEEDED FOR NAUSEA   • metoPROLOL succinate (TOPROL-XL) 25 MG 24 hr tablet TAKE ONE TABLET BY MOUTH EVERY MORNING   • lisinopril (ZESTRIL) 5 MG tablet TAKE ONE TABLET BY MOUTH DAILY   • traMADol (ULTRAM) 50 MG tablet TAKE ONE TABLET BY MOUTH EVERY 6 HOURS AS NEEDED FOR PAIN   • furosemide (LASIX) 40 MG tablet Take 1 tablet by mouth daily.   • Klor-Con M 20 MEQ CR tablet TAKE 1/2 TABLET BY MOUTH TWICE A DAY   • magnesium oxide 400 (241.3 Mg) MG Tab Take 1 tablet by mouth 2 times daily.   • acetaminophen (TYLENOL) 325 MG tablet Take 2 tablets by mouth every 4 hours as needed for Pain or Fever.   • ferrous sulfate 325 (65 FE) MG tablet Take 1 tablet by mouth 2 times daily (with meals).   • Melatonin 1 MG Tab Take 1 mg by mouth at bedtime.   • famotidine (PEPCID) 20 MG tablet Take 20 mg by mouth daily.     • aspirin 81 MG chewable tablet Chew 81 mg by mouth daily.    • Cholecalciferol (VITAMIN D) 2000 UNIT CAPS Take 1 capsule by mouth daily.       Past Medical History:   Diagnosis  Shaquille Rossi was scheduled with Dr. Raygoza.  Due to his longterm, appointment needed to be rescheduled with another provider.  Attempted to reach patient by phone twice and Unable to Reach letter was sent to patient.      Date   • Anemia    • GERD (gastroesophageal reflux disease)    • Hypertension    • Obesity    • Renal insufficiency     Stage 3   • Retention of urine     resolved       Past Surgical History:   Procedure Laterality Date   • Appendectomy     • Colonoscopy diagnostic thru stoma include specimens  09/2016    repeat in 10 years   • Colostomy     • Exc excess skin abd inframbical panniculec  12/2/2005    Dr. Ford - after primary repair   • Hernia repair  11/2004    Dr. Ford - strangulated ventral hernia, segmental small bowel resection, repaired with mesh (Dualmesh)   • Orif tibia fracture Left    • Remove prostetic mesh abd wall for infect  11/29/2005    Dr. Ford - mesh removal with primary closure, complicated by seroma   • Tonsillectomy and adenoidectomy         Family History   Problem Relation Age of Onset   • Genitourinary Neg Hx         neg gu cancer       Social History     Tobacco Use   • Smoking status: Never   • Smokeless tobacco: Never   Substance Use Topics   • Alcohol use: No   • Drug use: No       Review of Systems     Review of Systems    See HPI    Physical Exam     ED Triage Vitals [09/13/23 1617]   ED Triage Vitals Group      Temp 97.8 °F (36.6 °C)      Heart Rate 92      Resp (!) 22      BP (!) 126/95      SpO2 93 %      EtCO2 mmHg       Height 5' 2\" (1.575 m)      Weight 291 lb 7.2 oz (132.2 kg)      Weight Scale Used Scale in bed      BMI (Calculated) 53.31      IBW/kg (Calculated) 50.1       Physical Exam  Vitals and nursing note reviewed.   Constitutional:       General: She is not in acute distress.     Appearance: She is well-developed. She is not diaphoretic.   HENT:      Head: Normocephalic.      Right Ear: Tympanic membrane, ear canal and external ear normal.      Left Ear: Tympanic membrane, ear canal and external ear normal.      Nose: Nasal deformity, signs of injury, nasal tenderness and congestion present. No septal deviation, laceration or rhinorrhea.      Right Nostril: No  septal hematoma.      Left Nostril: No septal hematoma.      Comments: Moderate nasal swelling and tenderness to palpation. Dried blood present in both nares. No active bleeding noted. No septal hematoma present.      Mouth/Throat:      Mouth: No injury or lacerations.      Dentition: Normal dentition.      Pharynx: Oropharynx is clear.      Comments: Mild mid upper lip swelling without laceration. No teeth malocclusion. Airway patent, no bleeding.   Cardiovascular:      Rate and Rhythm: Normal rate. Rhythm irregularly irregular.   Pulmonary:      Effort: Pulmonary effort is normal.   Chest:      Chest wall: No tenderness.   Abdominal:      General: Bowel sounds are normal.      Palpations: Abdomen is soft.      Tenderness: There is no abdominal tenderness.   Musculoskeletal:      Cervical back: No tenderness or bony tenderness. No pain with movement.      Thoracic back: Normal.      Lumbar back: Normal.   Skin:     General: Skin is warm and dry.   Neurological:      Mental Status: She is alert and oriented to person, place, and time.      GCS: GCS eye subscore is 4. GCS verbal subscore is 5. GCS motor subscore is 6.      Cranial Nerves: Cranial nerves 2-12 are intact.      Sensory: Sensation is intact.      Motor: Motor function is intact.   Psychiatric:         Behavior: Behavior normal.           Procedures     Procedures    Lab Results     No results found for this visit on 09/13/23.      Radiology Results     Imaging Results          CT HEAD WO CONTRAST (Final result)  Result time 09/13/23 17:20:29    Final result                 Impression:    IMPRESSION:     No acute intracranial pathology. Atrophy. Old small vessel disease    Nondisplaced bilateral nasal bone fractures. No additional facial bone  fractures.    Chronic bilateral maxillary and ethmoid sinusitis changes as discussed.        Electronically Signed by: Johnathan Esposito MD   Signed on: 9/13/2023 5:20 PM   Workstation ID: [id number]              Narrative:    EXAM: CT HEAD WO CONTRAST, CT FACIAL BONES WO CONTRAST    HISTORY: Facial trauma    COMPARISON:  None.    TECHNIQUE: Multidetector spiral imaging performed in the oblique axial  plane from the skull base to the skull vertex. Soft tissue and bone  photography. No IV contrast.    CT HEAD WO CONTRAST,     FINDINGS:   No midline shift or mass effect.   No acute infarcts or bleeds.  Areas of decreased attenuation are present within the periventricular and  subcortical white matter. Findings are consistent with old small vessel  disease.   Moderate cerebral cortical atrophy is present.  No abnormal extraaxial fluid collections or masses.  Bone window images show no evidence of bone pathology.    CT FACIAL BONES WO CONTRAST    TECHNIQUE: Multidetector spiral imaging through the facial bones performed  in the axial plane without IV contrast. From this data, sagittal and  coronal reconstructed images are also generated.    FINDINGS: Bilateral nondisplaced nasal fractures. No additional facial bone  fractures. Arthritic changes of each temporomandibular joint. Mucous  retention cysts involving the left greater than right maxillary sinuses.  Mild mucosal thickening of the ethmoid sinuses.                               CT FACIAL BONES WO CONTRAST (Final result)  Result time 09/13/23 17:20:29    Final result                 Impression:    IMPRESSION:     No acute intracranial pathology. Atrophy. Old small vessel disease    Nondisplaced bilateral nasal bone fractures. No additional facial bone  fractures.    Chronic bilateral maxillary and ethmoid sinusitis changes as discussed.        Electronically Signed by: Johnathan Esposito MD   Signed on: 9/13/2023 5:20 PM   Workstation ID: [id number]             Narrative:    EXAM: CT HEAD WO CONTRAST, CT FACIAL BONES WO CONTRAST    HISTORY: Facial trauma    COMPARISON:  None.    TECHNIQUE: Multidetector spiral imaging performed in the oblique axial  plane from the skull base to  the skull vertex. Soft tissue and bone  photography. No IV contrast.    CT HEAD WO CONTRAST,     FINDINGS:   No midline shift or mass effect.   No acute infarcts or bleeds.  Areas of decreased attenuation are present within the periventricular and  subcortical white matter. Findings are consistent with old small vessel  disease.   Moderate cerebral cortical atrophy is present.  No abnormal extraaxial fluid collections or masses.  Bone window images show no evidence of bone pathology.    CT FACIAL BONES WO CONTRAST    TECHNIQUE: Multidetector spiral imaging through the facial bones performed  in the axial plane without IV contrast. From this data, sagittal and  coronal reconstructed images are also generated.    FINDINGS: Bilateral nondisplaced nasal fractures. No additional facial bone  fractures. Arthritic changes of each temporomandibular joint. Mucous  retention cysts involving the left greater than right maxillary sinuses.  Mild mucosal thickening of the ethmoid sinuses.                                ED Medications     ED Medication Orders (From admission, onward)    None          ED Course     Vitals:    09/13/23 1617 09/13/23 1741   BP: (!) 126/95 (!) 155/75   Patient Position: Sitting/High-Schulte's    Pulse: 92 77   Resp: (!) 22 20   Temp: 97.8 °F (36.6 °C)    TempSrc: Temporal    SpO2: 93%    Weight: 132.2 kg (291 lb 7.2 oz)    Height: 5' 2\" (1.575 m)                     Consults                      Disposition   Patient presenting with mechanical fall 1 hr PTA, falling on her face with no LOC. On exam patient has nasal swelling and congestion, dried blood present to nares without active bleeding or septal hematoma present. Mild mid upper lip swelling without laceration present. Neuro exam WNL. No c-spine tenderness or other injuries noted during head to toe exam. Head CT and facial bone CT ordered. Patient does have hx CHF with EF 15%, afib without anticoagulation.    CTs show no new intracranial  pathology but does reveal nondisplaced bilateral nasal bone fractures. Patient updated on results. ENT referral placed. Head injury precautions and return to ED precautions discussed. Patient discharged in stable condition.       Clinical Impression and Diagnosis         ED Diagnoses     Diagnosis Comment Associated Orders       Final diagnoses    Closed fracture of nasal bone, initial encounter --  SERVICE TO ENT      Fall, initial encounter -- --          Follow Up:  Georgette Michaud DO  [address]  [facility] [id number] [phone]      As needed, if not improving    [facility]  [address] [facility] [address] [phone]    As needed, If symptoms worsen          Summary of your Discharge Medications      You have not been prescribed any medications.         Pt is discharged to home/self care in stable condition.      The 21st Century Cures Act makes medical notes like these available to patients in the interest of transparency. However, be advised this is a medical document. It is intended as peer to peer communication. It is written in medical language and may contain abbreviations or verbiage that are unfamiliar to the general public. It may appear blunt or direct. Medical documents are intended to carry relevant information, facts as evident, and the clinical opinion of the practitioner.              Discharge 9/13/2023  5:38 PM  Eva Guan discharge to home/self care.                       Lotus Esqueda, APNP  09/13/23 0536      I have reviewed the notes, assessments, and/or procedures performed by the resident/SHABANA and agree with her/his documentation of Ms. Guan with exceptions, corrections, and additions as noted below      67-year-old woman not on blood thinners presents to the emergency department after mechanical trip fall and head strike.  She has edema over the nose and some ecchymosis with a small amount of epistaxis but nothing active.  There  is no nasal septal hematoma on my exam.  She has an intact cranial nerve exam intact strength and sensation in the upper and lower extremities.  Performed CT head and CT face with demonstrated nasal bone fractures and discussed ENT/plastic surgery follow-up.  No other injuries on exam including no CT or L-spine tenderness full neck range of motion and normal movement of the upper and lower extremities with no chest wall or abdominal tenderness nor tenderness of the upper and lower extremities.     Kannan Peacock MD  09/13/23 1958     Her/She

## 2023-09-18 NOTE — BEHAVIORAL HEALTH ASSESSMENT NOTE - NSBHCHARTREVIEWVS_PSY_A_CORE FT
none
Vital Signs Last 24 Hrs  T(C): 36.8 (08 Dec 2019 06:30), Max: 36.9 (07 Dec 2019 20:05)  T(F): 98.3 (08 Dec 2019 06:30), Max: 98.5 (07 Dec 2019 20:05)  HR: 71 (08 Dec 2019 06:30) (61 - 73)  BP: 114/76 (08 Dec 2019 06:30) (114/76 - 143/76)  BP(mean): --  RR: 16 (08 Dec 2019 04:33) (14 - 16)  SpO2: 98% (08 Dec 2019 04:33) (95% - 99%)

## 2023-09-20 NOTE — BH INPATIENT PSYCHIATRY PROGRESS NOTE - PRN MEDS
Refill okay as per medication tab.    
See patient note. Changed to taking 3 per day. Ok? New Rx pending.  Medication(s) Requested: gabapentin 100 mg  Last office visit:5/31/23  Pending appt. on 12/5/23.  Last refill: 8/14/23 for #120 per PDMP  Is the patient due for refill of this medication(s): Yes  PDMP review: Criteria met. Forwarded to Physician/ZORAIDA for signature.       
MEDICATIONS  (PRN):  acetaminophen     Tablet .. 650 milliGRAM(s) Oral every 6 hours PRN Temp greater or equal to 38C (100.4F), Mild Pain (1 - 3), Moderate Pain (4 - 6)  melatonin. 3 milliGRAM(s) Oral at bedtime PRN Insomnia  traZODone 50 milliGRAM(s) Oral at bedtime PRN insomnia  
MEDICATIONS  (PRN):  acetaminophen     Tablet .. 650 milliGRAM(s) Oral every 6 hours PRN Temp greater or equal to 38C (100.4F), Mild Pain (1 - 3), Moderate Pain (4 - 6)  melatonin. 3 milliGRAM(s) Oral at bedtime PRN Insomnia  traZODone 50 milliGRAM(s) Oral at bedtime PRN insomnia  
MEDICATIONS  (PRN):  melatonin. 3 milliGRAM(s) Oral at bedtime PRN Insomnia  traZODone 50 milliGRAM(s) Oral at bedtime PRN insomnia  
MEDICATIONS  (PRN):  acetaminophen     Tablet .. 650 milliGRAM(s) Oral every 6 hours PRN Temp greater or equal to 38C (100.4F), Mild Pain (1 - 3), Moderate Pain (4 - 6)  melatonin. 3 milliGRAM(s) Oral at bedtime PRN Insomnia  OLANZapine 2.5 milliGRAM(s) Oral every 6 hours PRN agitation  OLANZapine Injectable 2.5 milliGRAM(s) IntraMuscular once PRN severe agitation  traZODone 50 milliGRAM(s) Oral at bedtime PRN insomnia  
MEDICATIONS  (PRN):  acetaminophen     Tablet .. 650 milliGRAM(s) Oral every 6 hours PRN Temp greater or equal to 38C (100.4F), Mild Pain (1 - 3), Moderate Pain (4 - 6)  melatonin. 3 milliGRAM(s) Oral at bedtime PRN Insomnia  OLANZapine 2.5 milliGRAM(s) Oral every 6 hours PRN agitation  OLANZapine Injectable 2.5 milliGRAM(s) IntraMuscular once PRN severe agitation  traZODone 50 milliGRAM(s) Oral at bedtime PRN insomnia

## 2023-09-24 NOTE — BH INPATIENT PSYCHIATRY ASSESSMENT NOTE - NSCURPASTPSYDX_PSY_ALL_CORE
PAST MEDICAL HISTORY:  No pertinent past medical history      Mood disorder/Psychotic disorder/PTSD/Alcohol/Substance Use disorders/Cluster B Personality disorder/traits

## 2023-09-28 NOTE — ED ADULT NURSE NOTE - NS ED PATIENT SAFETY CONCERN
----- Message from Alaina Serrano sent at 9/28/2023  8:14 AM CDT -----  Type:  Needs Medical Advice    Who Called:  Pt    Symptoms (please be specific):  uti     How long has patient had these symptoms:  several days    Pharmacy name and phone #:     [id number] - [place] [address] AT [address] [address]  Phone: [phone] Fax: [phone]    Would the patient rather a call back or a response via [facility]?  Call back    Best Call Back Number:  [phone]    Additional Information:  Pt called the other day trying to get medication for uti. No response and nothing sent into pharmacy. She called 9/26. Need to call asap.  Please call back to advise. Thanks!           No

## 2023-11-06 NOTE — ED PROVIDER NOTE - NS ED MD TWO NIGHTS YN
Genetic testing: NT wnl, NIPT done today.  Carrier testing: Done today  AFP: At next visit   Miralax for constipation, reviewed meds in pregnancy  RTC in 4wk   Yes

## 2023-11-10 NOTE — ED PROVIDER NOTE - AGGRAVATING FACTORS
Male MPT range: 25-35 seconds  Average Adult Female MPT range: 15-25 seconds  Resonance: Unable to  resonance, d/t whispered voicing  Pitch: >300 Hz d/t whisper voicing  Average fundamental frequency Norms:   M norm = 100-150 Hz  F norm = 180-250 Hz  Loudness: Too soft       Conversational Speech/Reading Passage: 55-68 dB, below functional limits (occasions of 72 dB d/t pt pushing effort although remains breathy, whispered)  Norm = 75-80 dB in conversation  Rate: Appropriate  GRBAS:   Grade (overall degree of hoarseness): 3 = high degree  Rough (impression of the irregularity of the vocal fold vibrations): 2 = moderate degree  Breathy (impression of air loss through the glottis): 3 = high degree  Aesthenic (weakness or lack of power in the voice): 2 = moderate degree  Strained (perceptual impression of vocal hyperfunction): 3 = high degree  TOTAL SCORE (Normal=0): 13 severe  0-1=normal; 2-4=mild; 5-6=mild-mod; 7-9=mod-severe; 10-14=severe; 15=profound/aphonic    Vocally Abusive Behaviors:  Decreased water intake: ~20 ounces a day  Caffeine: 2 cups decaf coffee/1-2 caffeinated tea, occasional soda  Excessive coughing/throat clearing  Tension in throat while singing/speaking  Environmental irritants:  Denies previous seasonal allergies, now having a lot of post-nasal drip and congestion  Reflux History: Denies reflux   Typical Voice Use: Whisper voice, Average use of voice, endorses frequent cough/throat clearing    ASSESSMENT    Initial Assessment:    Patient presents with severe dysphonia with left TVC paralysis, characterized by whispered, breathy vocal quality, reduced vocal endurance, reduced volume, restricted pitch, negative vocal behaviors, and poor coordination of respiration and phonation. Provided verbal and written handouts on vocal hygiene protocol. Initiated vocal relaxation exercises.  Patient demonstrates stimulability for vocal exercises with intermittent voicing present for short bursts during
none

## 2023-12-07 NOTE — PATIENT PROFILE BEHAVIORAL HEALTH - LIMITATIONS ON VISITORS/PHONE CALLS
HPI: Edouard Riggins is a 12 year old male presenting with sore throat that started on Monday.  Patient had 2 episodes of vomiting on Monday, followed by few episodes of diarrhea per patient and dad.  Mom is a nurse.  Patient would like to be tested for flu, COVID, strep.  Patient is also had fevers for the past 4 days.  Patient states that he has a sore throat and has a history of strep.  Patient was given ibuprofen at 7 AM this morning.  Patient is afebrile in urgent care center.      Patient was seen and examined wearing a mask.    REVIEW OF SYSTEMS:   Constitutional: Positive fever, fatigue or weight loss.    Skin:  No rash.    ENT:  Positive for sore throat  No congestion  No ear pain  Cardiovascular:  No chest pain.    Respiratory:  No cough, shortness of breath  or wheezing.    Gastrointestinal: No abdominal pain, positive nausea, vomiting, diarrhea  Genitourinary: No dysuria.    Musculoskeletal:  No joint swelling.    Neurologic:  No headache.    Hematologic:  No unusual bruising or bleeding.    Otherwise 10 point review of systems reviewed and otherwise negative.    PAST MEDICAL HX:    No known health problems                                      No known problems                                             PAST SURGICAL HX:  There is no previous surgical history on file.    No family history on file.  Review of patient's family status indicates:  No family status on file          PHYSICAL EXAMINATION:   Pulse 103   Temp 98.1 °F (36.7 °C) (Tympanic)   Resp 22   Wt 33 kg (72 lb 12 oz)   GENERAL:  In no apparent distress.    HEAD:  No signs of head trauma.  EYES:  Pupils are equal.  Extraocular motions intact.No Scleral Icterus, Conjunctivae normal    EARS:  Hearing grossly intact.  MOUTH:  Oropharynx is erythematous with tonsillar hypertrophy 2+ bilat positive exudates  NECK:  No adenopathy, supple.    CHEST:  Chest with clear breath sounds bilaterally.  No wheezes, rales, or rhonchi.   CARDIAC:  Regular  rate and rhythm.    VASCULAR:  No Edema.  Peripheral pulses normal and equal in all extremities.  ABDOMEN:  soft, non tender. No masses palpable.Normoactive bowel sounds x 4  MUSCULOSKELETAL: Good range of motion of all major joints. Extremities without clubbing, cyanosis or edema.    NEUROLOGIC EXAM:  Alert and oriented x 3.    PSYCHIATRIC:  Mood normal.  SKIN:  No rash or lesions.    MDM: Based on my history, physical exam and diagnostic evaluation, the patient appears to have symptoms consistent with acute pharyngitis will treat for strep due to symptoms and clinical exam.  Center criteria 4/4. there is no obvious swelling of the peritonsillar area or abscess. The airway appears patent and respiratory pattern is normal. The pt has no trismus and is tolerating oral food and fluid. The patient is to follow up and have repeat exam with their primary care physician in the next 48 hours.     Differential Diagnosis: Streptococcal Pharyngitis, URI, COVID, tonsillar abscess    Old Records Reviewed: Previous strep culture and rapid reviewed from September 2023 indicating a negative rapid and positive culture.    Studies Independently Reviewed: Negative Rapid Strep reviewed by me    Assessment:   ED Diagnosis       Diagnosis Comment Associated Orders       Final diagnosis    Fever, unspecified fever cause -- POCT INFLUENZA A/B  POCT SARS-COV-2 ANTIGEN  POCT RAPID STREP A  STREPTOCOCCUS GROUP A (STREPTOCOCCUS PYOGENES), BACTERIAL CULTURE  DEXAMETHASONE SOD PHOSPHATE PF 10 MG/ML IJ SOLN              SUMMARY:    Rapid strep is negative.  Culture is pending.  Decadron given in urgent care center due to tonsillar hypertrophy  Will treat patient with amoxicillin due to history and clinical exam.    Centor criteria 4 out of 4.  COVID neg  Flu neg  Take Tylenol or Motrin as needed for pain.    Drink plenty of fluids and rest.    Return to clinic or follow-up with primary care provider with worsening of symptoms.    Go to the  emergency room with worsening of symptoms, difficulty swallowing, drooling, or uncontrolled fever.    Follow-up Referral  Follow up with your PCP in 2-3 days. Go to ED if symptoms worsen.        none

## 2024-01-01 NOTE — ED BEHAVIORAL HEALTH ASSESSMENT NOTE - NS ED BHA MED ROS ENT MOUTH
No complaints
Screen#: [id number]  Screen Date: 2024  Screen Comment: N/A    Screen#: [id number]  Screen Date: 2024  Screen Comment: N/A

## 2024-01-02 ENCOUNTER — EMERGENCY (EMERGENCY)
Facility: HOSPITAL | Age: 54
LOS: 0 days | Discharge: HOME | End: 2024-01-03
Attending: STUDENT IN AN ORGANIZED HEALTH CARE EDUCATION/TRAINING PROGRAM
Payer: MEDICAID

## 2024-01-02 VITALS — WEIGHT: 154.98 LBS | HEIGHT: 63 IN

## 2024-01-02 DIAGNOSIS — M79.7 FIBROMYALGIA: ICD-10-CM

## 2024-01-02 DIAGNOSIS — F32.A DEPRESSION, UNSPECIFIED: ICD-10-CM

## 2024-01-02 DIAGNOSIS — Z88.0 ALLERGY STATUS TO PENICILLIN: ICD-10-CM

## 2024-01-02 DIAGNOSIS — R45.851 SUICIDAL IDEATIONS: ICD-10-CM

## 2024-01-02 DIAGNOSIS — Z56.0 UNEMPLOYMENT, UNSPECIFIED: ICD-10-CM

## 2024-01-02 DIAGNOSIS — M32.9 SYSTEMIC LUPUS ERYTHEMATOSUS, UNSPECIFIED: ICD-10-CM

## 2024-01-02 DIAGNOSIS — Z20.822 CONTACT WITH AND (SUSPECTED) EXPOSURE TO COVID-19: ICD-10-CM

## 2024-01-02 DIAGNOSIS — M54.2 CERVICALGIA: ICD-10-CM

## 2024-01-02 DIAGNOSIS — E06.3 AUTOIMMUNE THYROIDITIS: ICD-10-CM
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
## 2024-01-02 DIAGNOSIS — F25.9 SCHIZOAFFECTIVE DISORDER, UNSPECIFIED: ICD-10-CM
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
## 2024-01-02 LAB
ALBUMIN SERPL ELPH-MCNC: 4 G/DL — SIGNIFICANT CHANGE UP (ref 3.3–5)
ALBUMIN SERPL ELPH-MCNC: 4 G/DL — SIGNIFICANT CHANGE UP (ref 3.3–5)
ALP SERPL-CCNC: 77 U/L — SIGNIFICANT CHANGE UP (ref 40–120)
ALP SERPL-CCNC: 77 U/L — SIGNIFICANT CHANGE UP (ref 40–120)
ALT FLD-CCNC: 29 U/L — SIGNIFICANT CHANGE UP (ref 12–78)
ALT FLD-CCNC: 29 U/L — SIGNIFICANT CHANGE UP (ref 12–78)
ANION GAP SERPL CALC-SCNC: 0 MMOL/L — LOW (ref 5–17)
ANION GAP SERPL CALC-SCNC: 0 MMOL/L — LOW (ref 5–17)
APAP SERPL-MCNC: < 2 UG/ML (ref 10–30)
APAP SERPL-MCNC: < 2 UG/ML (ref 10–30)
APPEARANCE UR: CLEAR — SIGNIFICANT CHANGE UP
APPEARANCE UR: CLEAR — SIGNIFICANT CHANGE UP
AST SERPL-CCNC: 17 U/L — SIGNIFICANT CHANGE UP (ref 15–37)
AST SERPL-CCNC: 17 U/L — SIGNIFICANT CHANGE UP (ref 15–37)
BACTERIA # UR AUTO: ABNORMAL /HPF
BACTERIA # UR AUTO: ABNORMAL /HPF
BASOPHILS # BLD AUTO: 0.04 K/UL — SIGNIFICANT CHANGE UP (ref 0–0.2)
BASOPHILS # BLD AUTO: 0.04 K/UL — SIGNIFICANT CHANGE UP (ref 0–0.2)
BASOPHILS NFR BLD AUTO: 0.7 % — SIGNIFICANT CHANGE UP (ref 0–2)
BASOPHILS NFR BLD AUTO: 0.7 % — SIGNIFICANT CHANGE UP (ref 0–2)
BILIRUB SERPL-MCNC: 0.3 MG/DL — SIGNIFICANT CHANGE UP (ref 0.2–1.2)
BILIRUB SERPL-MCNC: 0.3 MG/DL — SIGNIFICANT CHANGE UP (ref 0.2–1.2)
BILIRUB UR-MCNC: NEGATIVE — SIGNIFICANT CHANGE UP
BILIRUB UR-MCNC: NEGATIVE — SIGNIFICANT CHANGE UP
BUN SERPL-MCNC: 16 MG/DL — SIGNIFICANT CHANGE UP (ref 7–23)
BUN SERPL-MCNC: 16 MG/DL — SIGNIFICANT CHANGE UP (ref 7–23)
CALCIUM SERPL-MCNC: 8.4 MG/DL — LOW (ref 8.5–10.1)
CALCIUM SERPL-MCNC: 8.4 MG/DL — LOW (ref 8.5–10.1)
CHLORIDE SERPL-SCNC: 106 MMOL/L — SIGNIFICANT CHANGE UP (ref 96–108)
CHLORIDE SERPL-SCNC: 106 MMOL/L — SIGNIFICANT CHANGE UP (ref 96–108)
CO2 SERPL-SCNC: 33 MMOL/L — HIGH (ref 22–31)
CO2 SERPL-SCNC: 33 MMOL/L — HIGH (ref 22–31)
COLOR SPEC: YELLOW — SIGNIFICANT CHANGE UP
COLOR SPEC: YELLOW — SIGNIFICANT CHANGE UP
CREAT SERPL-MCNC: 0.83 MG/DL — SIGNIFICANT CHANGE UP (ref 0.5–1.3)
CREAT SERPL-MCNC: 0.83 MG/DL — SIGNIFICANT CHANGE UP (ref 0.5–1.3)
DIFF PNL FLD: ABNORMAL
DIFF PNL FLD: ABNORMAL
EGFR: 85 ML/MIN/1.73M2 — SIGNIFICANT CHANGE UP
EGFR: 85 ML/MIN/1.73M2 — SIGNIFICANT CHANGE UP
EOSINOPHIL # BLD AUTO: 0.03 K/UL — SIGNIFICANT CHANGE UP (ref 0–0.5)
EOSINOPHIL # BLD AUTO: 0.03 K/UL — SIGNIFICANT CHANGE UP (ref 0–0.5)
EOSINOPHIL NFR BLD AUTO: 0.5 % — SIGNIFICANT CHANGE UP (ref 0–6)
EOSINOPHIL NFR BLD AUTO: 0.5 % — SIGNIFICANT CHANGE UP (ref 0–6)
ETHANOL SERPL-MCNC: <10 MG/DL — SIGNIFICANT CHANGE UP (ref 0–10)
ETHANOL SERPL-MCNC: <10 MG/DL — SIGNIFICANT CHANGE UP (ref 0–10)
GLUCOSE SERPL-MCNC: 115 MG/DL — HIGH (ref 70–99)
GLUCOSE SERPL-MCNC: 115 MG/DL — HIGH (ref 70–99)
GLUCOSE UR QL: NEGATIVE MG/DL — SIGNIFICANT CHANGE UP
GLUCOSE UR QL: NEGATIVE MG/DL — SIGNIFICANT CHANGE UP
HCT VFR BLD CALC: 45 % — SIGNIFICANT CHANGE UP (ref 34.5–45)
HCT VFR BLD CALC: 45 % — SIGNIFICANT CHANGE UP (ref 34.5–45)
HGB BLD-MCNC: 14.7 G/DL — SIGNIFICANT CHANGE UP (ref 11.5–15.5)
HGB BLD-MCNC: 14.7 G/DL — SIGNIFICANT CHANGE UP (ref 11.5–15.5)
IMM GRANULOCYTES NFR BLD AUTO: 0.2 % — SIGNIFICANT CHANGE UP (ref 0–0.9)
IMM GRANULOCYTES NFR BLD AUTO: 0.2 % — SIGNIFICANT CHANGE UP (ref 0–0.9)
KETONES UR-MCNC: NEGATIVE MG/DL — SIGNIFICANT CHANGE UP
KETONES UR-MCNC: NEGATIVE MG/DL — SIGNIFICANT CHANGE UP
LEUKOCYTE ESTERASE UR-ACNC: ABNORMAL
LEUKOCYTE ESTERASE UR-ACNC: ABNORMAL
LYMPHOCYTES # BLD AUTO: 1.75 K/UL — SIGNIFICANT CHANGE UP (ref 1–3.3)
LYMPHOCYTES # BLD AUTO: 1.75 K/UL — SIGNIFICANT CHANGE UP (ref 1–3.3)
LYMPHOCYTES # BLD AUTO: 30.6 % — SIGNIFICANT CHANGE UP (ref 13–44)
LYMPHOCYTES # BLD AUTO: 30.6 % — SIGNIFICANT CHANGE UP (ref 13–44)
MCHC RBC-ENTMCNC: 30.9 PG — SIGNIFICANT CHANGE UP (ref 27–34)
MCHC RBC-ENTMCNC: 30.9 PG — SIGNIFICANT CHANGE UP (ref 27–34)
MCHC RBC-ENTMCNC: 32.7 GM/DL — SIGNIFICANT CHANGE UP (ref 32–36)
MCHC RBC-ENTMCNC: 32.7 GM/DL — SIGNIFICANT CHANGE UP (ref 32–36)
MCV RBC AUTO: 94.5 FL — SIGNIFICANT CHANGE UP (ref 80–100)
MCV RBC AUTO: 94.5 FL — SIGNIFICANT CHANGE UP (ref 80–100)
MONOCYTES # BLD AUTO: 0.3 K/UL — SIGNIFICANT CHANGE UP (ref 0–0.9)
MONOCYTES # BLD AUTO: 0.3 K/UL — SIGNIFICANT CHANGE UP (ref 0–0.9)
MONOCYTES NFR BLD AUTO: 5.3 % — SIGNIFICANT CHANGE UP (ref 2–14)
MONOCYTES NFR BLD AUTO: 5.3 % — SIGNIFICANT CHANGE UP (ref 2–14)
NEUTROPHILS # BLD AUTO: 3.58 K/UL — SIGNIFICANT CHANGE UP (ref 1.8–7.4)
NEUTROPHILS # BLD AUTO: 3.58 K/UL — SIGNIFICANT CHANGE UP (ref 1.8–7.4)
NEUTROPHILS NFR BLD AUTO: 62.7 % — SIGNIFICANT CHANGE UP (ref 43–77)
NEUTROPHILS NFR BLD AUTO: 62.7 % — SIGNIFICANT CHANGE UP (ref 43–77)
NITRITE UR-MCNC: NEGATIVE — SIGNIFICANT CHANGE UP
NITRITE UR-MCNC: NEGATIVE — SIGNIFICANT CHANGE UP
PH UR: 5.5 — SIGNIFICANT CHANGE UP (ref 5–8)
PH UR: 5.5 — SIGNIFICANT CHANGE UP (ref 5–8)
PLATELET # BLD AUTO: 292 K/UL — SIGNIFICANT CHANGE UP (ref 150–400)
PLATELET # BLD AUTO: 292 K/UL — SIGNIFICANT CHANGE UP (ref 150–400)
POTASSIUM SERPL-MCNC: 4.1 MMOL/L — SIGNIFICANT CHANGE UP (ref 3.5–5.3)
POTASSIUM SERPL-MCNC: 4.1 MMOL/L — SIGNIFICANT CHANGE UP (ref 3.5–5.3)
POTASSIUM SERPL-SCNC: 4.1 MMOL/L — SIGNIFICANT CHANGE UP (ref 3.5–5.3)
POTASSIUM SERPL-SCNC: 4.1 MMOL/L — SIGNIFICANT CHANGE UP (ref 3.5–5.3)
PROT SERPL-MCNC: 7.5 GM/DL — SIGNIFICANT CHANGE UP (ref 6–8.3)
PROT SERPL-MCNC: 7.5 GM/DL — SIGNIFICANT CHANGE UP (ref 6–8.3)
PROT UR-MCNC: NEGATIVE MG/DL — SIGNIFICANT CHANGE UP
PROT UR-MCNC: NEGATIVE MG/DL — SIGNIFICANT CHANGE UP
RBC # BLD: 4.76 M/UL — SIGNIFICANT CHANGE UP (ref 3.8–5.2)
RBC # BLD: 4.76 M/UL — SIGNIFICANT CHANGE UP (ref 3.8–5.2)
RBC # FLD: 13.2 % — SIGNIFICANT CHANGE UP (ref 10.3–14.5)
RBC # FLD: 13.2 % — SIGNIFICANT CHANGE UP (ref 10.3–14.5)
RBC CASTS # UR COMP ASSIST: 1 /HPF — SIGNIFICANT CHANGE UP (ref 0–4)
RBC CASTS # UR COMP ASSIST: 1 /HPF — SIGNIFICANT CHANGE UP (ref 0–4)
SALICYLATES SERPL-MCNC: <1.7 MG/DL (ref 2.8–20)
SALICYLATES SERPL-MCNC: <1.7 MG/DL (ref 2.8–20)
SARS-COV-2 RNA SPEC QL NAA+PROBE: SIGNIFICANT CHANGE UP
SARS-COV-2 RNA SPEC QL NAA+PROBE: SIGNIFICANT CHANGE UP
SODIUM SERPL-SCNC: 139 MMOL/L — SIGNIFICANT CHANGE UP (ref 135–145)
SODIUM SERPL-SCNC: 139 MMOL/L — SIGNIFICANT CHANGE UP (ref 135–145)
SP GR SPEC: 1.01 — SIGNIFICANT CHANGE UP (ref 1–1.03)
SP GR SPEC: 1.01 — SIGNIFICANT CHANGE UP (ref 1–1.03)
SQUAMOUS # UR AUTO: 5 /HPF — SIGNIFICANT CHANGE UP (ref 0–5)
SQUAMOUS # UR AUTO: 5 /HPF — SIGNIFICANT CHANGE UP (ref 0–5)
UROBILINOGEN FLD QL: 0.2 MG/DL — SIGNIFICANT CHANGE UP (ref 0.2–1)
UROBILINOGEN FLD QL: 0.2 MG/DL — SIGNIFICANT CHANGE UP (ref 0.2–1)
WBC # BLD: 5.71 K/UL — SIGNIFICANT CHANGE UP (ref 3.8–10.5)
WBC # BLD: 5.71 K/UL — SIGNIFICANT CHANGE UP (ref 3.8–10.5)
WBC # FLD AUTO: 5.71 K/UL — SIGNIFICANT CHANGE UP (ref 3.8–10.5)
WBC # FLD AUTO: 5.71 K/UL — SIGNIFICANT CHANGE UP (ref 3.8–10.5)
WBC UR QL: 5 /HPF — SIGNIFICANT CHANGE UP (ref 0–5)
WBC UR QL: 5 /HPF — SIGNIFICANT CHANGE UP (ref 0–5)

## 2024-01-02 PROCEDURE — 36415 COLL VENOUS BLD VENIPUNCTURE: CPT

## 2024-01-02 PROCEDURE — 81001 URINALYSIS AUTO W/SCOPE: CPT

## 2024-01-02 PROCEDURE — 93010 ELECTROCARDIOGRAM REPORT: CPT

## 2024-01-02 PROCEDURE — 90792 PSYCH DIAG EVAL W/MED SRVCS: CPT | Mod: 95

## 2024-01-02 PROCEDURE — 99284 EMERGENCY DEPT VISIT MOD MDM: CPT

## 2024-01-02 PROCEDURE — 99285 EMERGENCY DEPT VISIT HI MDM: CPT

## 2024-01-02 PROCEDURE — 87635 SARS-COV-2 COVID-19 AMP PRB: CPT

## 2024-01-02 PROCEDURE — 80053 COMPREHEN METABOLIC PANEL: CPT

## 2024-01-02 PROCEDURE — 80307 DRUG TEST PRSMV CHEM ANLYZR: CPT

## 2024-01-02 PROCEDURE — 93005 ELECTROCARDIOGRAM TRACING: CPT

## 2024-01-02 PROCEDURE — 85025 COMPLETE CBC W/AUTO DIFF WBC: CPT

## 2024-01-02 RX ORDER — LAMOTRIGINE 25 MG/1
100 TABLET, ORALLY DISINTEGRATING ORAL ONCE
Refills: 0 | Status: COMPLETED | OUTPATIENT
Start: 2024-01-02 | End: 2024-01-02

## 2024-01-02 RX ORDER — LURASIDONE HYDROCHLORIDE 40 MG/1
1 TABLET ORAL
Refills: 0 | DISCHARGE

## 2024-01-02 RX ORDER — LAMOTRIGINE 25 MG/1
1 TABLET, ORALLY DISINTEGRATING ORAL
Qty: 0 | Refills: 0 | DISCHARGE

## 2024-01-02 RX ORDER — TRAZODONE HCL 50 MG
50 TABLET ORAL AT BEDTIME
Refills: 0 | Status: DISCONTINUED | OUTPATIENT
Start: 2024-01-02 | End: 2024-01-03

## 2024-01-02 RX ORDER — LIDOCAINE 4 G/100G
1 CREAM TOPICAL ONCE
Refills: 0 | Status: COMPLETED | OUTPATIENT
Start: 2024-01-02 | End: 2024-01-02

## 2024-01-02 RX ORDER — LANOLIN ALCOHOL/MO/W.PET/CERES
1 CREAM (GRAM) TOPICAL
Qty: 0 | Refills: 1 | DISCHARGE

## 2024-01-02 RX ORDER — ZIPRASIDONE HYDROCHLORIDE 20 MG/1
1 CAPSULE ORAL
Qty: 0 | Refills: 0 | DISCHARGE

## 2024-01-02 RX ORDER — ACETAMINOPHEN 500 MG
650 TABLET ORAL ONCE
Refills: 0 | Status: COMPLETED | OUTPATIENT
Start: 2024-01-02 | End: 2024-01-02

## 2024-01-02 RX ORDER — LURASIDONE HYDROCHLORIDE 40 MG/1
40 TABLET ORAL ONCE
Refills: 0 | Status: COMPLETED | OUTPATIENT
Start: 2024-01-02 | End: 2024-01-02

## 2024-01-02 RX ADMIN — Medication 650 MILLIGRAM(S): at 18:33

## 2024-01-02 RX ADMIN — LAMOTRIGINE 100 MILLIGRAM(S): 25 TABLET, ORALLY DISINTEGRATING ORAL at 23:48

## 2024-01-02 RX ADMIN — LIDOCAINE 1 PATCH: 4 CREAM TOPICAL at 18:33

## 2024-01-02 RX ADMIN — Medication 50 MILLIGRAM(S): at 23:49

## 2024-01-02 RX ADMIN — LURASIDONE HYDROCHLORIDE 40 MILLIGRAM(S): 40 TABLET ORAL at 23:48

## 2024-01-02 SDOH — ECONOMIC STABILITY - INCOME SECURITY: UNEMPLOYMENT, UNSPECIFIED: Z56.0
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
## 2024-01-02 NOTE — ED PROVIDER NOTE - NSFOLLOWUPCLINICS_GEN_ALL_ED_FT
[facility]  Family Medicine  [address] [place] [address]  Phone: [phone]  Fax:      [facility]  Family Medicine  [address] [place] [address]  Phone: [phone]  Fax:

## 2024-01-02 NOTE — ED BEHAVIORAL HEALTH ASSESSMENT NOTE - REFERRAL / APPOINTMENT DETAILS
Please follow up with your outpt therapist and psychiatrist and attend your intake appointment with the [facility] at [facility] Please follow up with your outpt therapist and psychiatrist and attend your intake appointment with the [facility] at [facility]

## 2024-01-02 NOTE — ED BEHAVIORAL HEALTH ASSESSMENT NOTE - NSBHATTESTBILLING_PSY_A_CORE
[id number] diagnostic evaluation with medical services [id number] diagnostic evaluation with medical services

## 2024-01-02 NOTE — ED ADULT NURSE NOTE - NSFALLRISKINTERV_ED_ALL_ED
Assistance OOB with selected safe patient handling equipment if applicable/Assistance with ambulation/Communicate fall risk and risk factors to all staff, patient, and family/Encourage patient to sit up slowly, dangle for a short time, stand at bedside before walking/Monitor gait and stability/Orthostatic vital signs/Provide visual cue: yellow wristband, yellow gown, etc/Reinforce activity limits and safety measures with patient and family/Call bell, personal items and telephone in reach/Instruct patient to call for assistance before getting out of bed/chair/stretcher/Non-slip footwear applied when patient is off stretcher/[place] to call system/Physically safe environment - no spills, clutter or unnecessary equipment/Purposeful Proactive Rounding/Room/bathroom lighting operational, light cord in reach Assistance OOB with selected safe patient handling equipment if applicable/Assistance with ambulation/Communicate fall risk and risk factors to all staff, patient, and family/Encourage patient to sit up slowly, dangle for a short time, stand at bedside before walking/Monitor gait and stability/Orthostatic vital signs/Provide visual cue: yellow wristband, yellow gown, etc/Reinforce activity limits and safety measures with patient and family/Call bell, personal items and telephone in reach/Instruct patient to call for assistance before getting out of bed/chair/stretcher/Non-slip footwear applied when patient is off stretcher/[place] to call system/Physically safe environment - no spills, clutter or unnecessary equipment/Purposeful Proactive Rounding/Room/bathroom lighting operational, light cord in reach

## 2024-01-02 NOTE — ED BEHAVIORAL HEALTH ASSESSMENT NOTE - NS ED BHA TELEPSYCH PROVIDER LOCATION
[address] [facility], [place], [place] [address] [facility], [place]
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
## 2024-01-02 NOTE — ED BEHAVIORAL HEALTH ASSESSMENT NOTE - SUMMARY
Pt is a 52 yo F, , unemployed, resides in private residence with 20 yo daughter, daughter's fiance, and 2 tenants, has 2 children, PMH significant for SLE, Hashimoto's, with psych h/o unspecified mood and psychotic disorders per psyckes(Schizoaffective Disorder, Bipolar Disorder, MDD, persistent depressive disorder, brief psychotic disorder, Schizophrenia), Borderline Personality Disorder, PTSD, prior psych admissions(states she was last discharged from [facility] on 12/20/23), 2 prior suicide attempts by OD per chart(2001 & 2007), but today states these were not true attempts, remote h/o NSSIB(cut herself in her 30s), denies substance misuse, denies h/o legal issues or violence, in outpt tx with a therapist, Dr. Mick Elkins, through [facility]([phone]), and a psychiatrist, Dr. Clay Griggs, through [facility], on lamictal 100 mg and latuda ?40 mg, who presents to the ED BIB daughter after reportedly telling her daughter she's had recent thoughts to end her life, but would not act on these thoughts because of her daughter.     On assessment, the pt endorses recent hopelessness, passive SI, depressed mood, anhedonia, and lethargy in the setting of her recent hospitalization, medication changes, and discussing her past trauma with her therapist during their session. However, she consistently denies current or recent SI with plan or intent, cites her children and pets as reasons to continue living, and reports she has an intake appointment with a [facility] program tomorrow afternoon, which was corroborated by her daughter. The pt declined voluntary admission when offered, but was held overnight in the ED for safety, was administered her nighttime meds, and was reassessed in the morning.    On reassessment, the pt continued to    Given her numerous static risk factors(prior psych hx/admissions, prior suicide attempts vs gestures, h/o NSSIB, h/o trauma) and modifiable risk factors(depressive and anxiety symptoms, including passive SI), she is at a chronically elevated risk of harm. However, her risk is mitigated by the fact she consistently denies current or recent SI with plan or intent, cites her children and pets as protective factors, is connected with outpt care, is on meds, has support from family, has remained in good behavioral control in the ED, is able to safety plan, presents as future-oriented, and currently does not report or manifest symptoms of markus, hypomania, or psychosis. In light of these mitigating factors, this writer does not consider the pt to be an imminent risk of harm at this time, and currently does not meet criteria for involuntary psychiatric hospitalization. To mitigate the pt's risk of harm in the ED, she was held overnight, was instructed to attend her [facility] intake appointment scheduled later today, was instructed to Pt is a 54 yo F, , unemployed, resides in private residence with 20 yo daughter, daughter's fiance, and 2 tenants, has 2 children, PMH significant for SLE, Hashimoto's, with psych h/o unspecified mood and psychotic disorders per psyckes(Schizoaffective Disorder, Bipolar Disorder, MDD, persistent depressive disorder, brief psychotic disorder, Schizophrenia), Borderline Personality Disorder, PTSD, prior psych admissions(states she was last discharged from [facility] on 12/20/23), 2 prior suicide attempts by OD per chart(2001 & 2007), but today states these were not true attempts, remote h/o NSSIB(cut herself in her 30s), denies substance misuse, denies h/o legal issues or violence, in outpt tx with a therapist, Dr. Mick Elkins, through [facility]([phone]), and a psychiatrist, Dr. Clay Griggs, through [facility], on lamictal 100 mg and latuda ?40 mg, who presents to the ED BIB daughter after reportedly telling her daughter she's had recent thoughts to end her life, but would not act on these thoughts because of her daughter.     On assessment, the pt endorses recent hopelessness, passive SI, depressed mood, anhedonia, and lethargy in the setting of her recent hospitalization, medication changes, and discussing her past trauma with her therapist during their session. However, she consistently denies current or recent SI with plan or intent, cites her children and pets as reasons to continue living, and reports she has an intake appointment with a [facility] program tomorrow afternoon, which was corroborated by her daughter. The pt declined voluntary admission when offered, but was held overnight in the ED for safety, was administered her nighttime meds, and was reassessed in the morning.    On reassessment, the pt continued to    Given her numerous static risk factors(prior psych hx/admissions, prior suicide attempts vs gestures, h/o NSSIB, h/o trauma) and modifiable risk factors(depressive and anxiety symptoms, including passive SI), she is at a chronically elevated risk of harm. However, her risk is mitigated by the fact she consistently denies current or recent SI with plan or intent, cites her children and pets as protective factors, is connected with outpt care, is on meds, has support from family, has remained in good behavioral control in the ED, is able to safety plan, presents as future-oriented, and currently does not report or manifest symptoms of markus, hypomania, or psychosis. In light of these mitigating factors, this writer does not consider the pt to be an imminent risk of harm at this time, and currently does not meet criteria for involuntary psychiatric hospitalization. To mitigate the pt's risk of harm in the ED, she was held overnight, was instructed to attend her [facility] intake appointment scheduled later today, was instructed to Pt is a 52 yo F, , unemployed, resides in private residence with 20 yo daughter, daughter's fiance, and 2 tenants, has 2 children, PMH significant for SLE, Hashimoto's, with psych h/o unspecified mood and psychotic disorders per psyckes(Schizoaffective Disorder, Bipolar Disorder, MDD, persistent depressive disorder, brief psychotic disorder, Schizophrenia), Borderline Personality Disorder, PTSD, prior psych admissions(states she was last discharged from [facility] on 12/20/23), 2 prior suicide attempts by OD per chart(2001 & 2007), but today states these were not true attempts, remote h/o NSSIB(cut herself in her 30s), denies substance misuse, denies h/o legal issues or violence, in outpt tx with a therapist, Dr. Mick Elkins, through [facility]([phone]), and a psychiatrist, Dr. Clay Griggs, through [facility], on lamictal 100 mg and latuda ?40 mg, who presents to the ED BIB daughter after reportedly telling her daughter she's had recent thoughts to end her life, but would not act on these thoughts because of her daughter.     On assessment, the pt endorses recent hopelessness, passive SI, depressed mood, anhedonia, and lethargy in the setting of her recent hospitalization, medication changes, and discussing her past trauma with her therapist during their session. However, she consistently denies current or recent SI with plan or intent, cites her children and pets as reasons to continue living, and reports she has an intake appointment with a [facility] program tomorrow afternoon, which was corroborated by her daughter. The pt declined voluntary admission when offered, but was held overnight in the ED for safety, was administered her nighttime meds, and was reassessed in the morning. On reassessment, the pt continues to deny current SI with plan or intent or urges to harm herself, states she feels safe to return home, plans to attend the [facility] later today, and will reach out to her therapist and psychiatrist later today.     Given her numerous static risk factors(prior psych hx/admissions, prior suicide attempts vs gestures, h/o NSSIB, h/o trauma) and modifiable risk factors(depressive and anxiety symptoms, including passive SI), she is at a chronically elevated risk of harm. However, her risk is mitigated by the fact she consistently denies current or recent SI with plan or intent, cites her children and pets as protective factors, is connected with outpt care, is on meds, has support from family, has remained in good behavioral control in the ED, is able to safety plan, presents as future-oriented, and currently does not report or manifest symptoms of markus, hypomania, or psychosis. In light of these mitigating factors, this writer does not consider the pt to be an imminent risk of harm at this time, and currently does not meet criteria for involuntary psychiatric hospitalization. To mitigate the pt's risk of harm in the ED, she was held overnight for observation, was instructed to attend her [facility] intake appointment scheduled later today, was instructed to follow up with her therapist and psychiatrist Pt is a 54 yo F, , unemployed, resides in private residence with 18 yo daughter, daughter's fiance, and 2 tenants, has 2 children, PMH significant for SLE, Hashimoto's, with psych h/o unspecified mood and psychotic disorders per psyckes(Schizoaffective Disorder, Bipolar Disorder, MDD, persistent depressive disorder, brief psychotic disorder, Schizophrenia), Borderline Personality Disorder, PTSD, prior psych admissions(states she was last discharged from [facility] on 12/20/23), 2 prior suicide attempts by OD per chart(2001 & 2007), but today states these were not true attempts, remote h/o NSSIB(cut herself in her 30s), denies substance misuse, denies h/o legal issues or violence, in outpt tx with a therapist, Dr. Mick Elkins, through [facility]([phone]), and a psychiatrist, Dr. Clay Griggs, through [facility], on lamictal 100 mg and latuda ?40 mg, who presents to the ED BIB daughter after reportedly telling her daughter she's had recent thoughts to end her life, but would not act on these thoughts because of her daughter.     On assessment, the pt endorses recent hopelessness, passive SI, depressed mood, anhedonia, and lethargy in the setting of her recent hospitalization, medication changes, and discussing her past trauma with her therapist during their session. However, she consistently denies current or recent SI with plan or intent, cites her children and pets as reasons to continue living, and reports she has an intake appointment with a [facility] program tomorrow afternoon, which was corroborated by her daughter. The pt declined voluntary admission when offered, but was held overnight in the ED for safety, was administered her nighttime meds, and was reassessed in the morning. On reassessment, the pt continues to deny current SI with plan or intent or urges to harm herself, states she feels safe to return home, plans to attend the [facility] later today, and will reach out to her therapist and psychiatrist later today.     Given her numerous static risk factors(prior psych hx/admissions, prior suicide attempts vs gestures, h/o NSSIB, h/o trauma) and modifiable risk factors(depressive and anxiety symptoms, including passive SI), she is at a chronically elevated risk of harm. However, her risk is mitigated by the fact she consistently denies current or recent SI with plan or intent, cites her children and pets as protective factors, is connected with outpt care, is on meds, has support from family, has remained in good behavioral control in the ED, is able to safety plan, presents as future-oriented, and currently does not report or manifest symptoms of markus, hypomania, or psychosis. In light of these mitigating factors, this writer does not consider the pt to be an imminent risk of harm at this time, and currently does not meet criteria for involuntary psychiatric hospitalization. To mitigate the pt's risk of harm in the ED, she was held overnight for observation, was instructed to attend her [facility] intake appointment scheduled later today, was instructed to follow up with her therapist and psychiatrist Pt is a 52 yo F, , unemployed, resides in private residence with 20 yo daughter, daughter's fiance, and 2 tenants, has 2 children, PMH significant for SLE, Hashimoto's, with psych h/o unspecified mood and psychotic disorders per psyckes(Schizoaffective Disorder, Bipolar Disorder, MDD, persistent depressive disorder, brief psychotic disorder, Schizophrenia), Borderline Personality Disorder, PTSD, prior psych admissions(states she was last discharged from [facility] on 12/20/23), 2 prior suicide attempts by OD per chart(2001 & 2007), but today states these were not true attempts, remote h/o NSSIB(cut herself in her 30s), denies substance misuse, denies h/o legal issues or violence, in outpt tx with a therapist, Dr. Mick Elkins, through [facility]([phone]), and a psychiatrist, Dr. Clay Griggs, through [facility], on lamictal 100 mg and latuda ?40 mg, who presents to the ED BIB daughter after reportedly telling her daughter she's had recent thoughts to end her life, but would not act on these thoughts because of her daughter.     On assessment, the pt endorses recent hopelessness, passive SI, depressed mood, anhedonia, and lethargy in the setting of her recent hospitalization, medication changes, and discussing her past trauma with her therapist during their last session. However, she consistently denies current or recent SI with plan or intent, cites her children and pets as reasons to continue living, and states she has an intake appointment with a [facility] program on 1/3/23, which was corroborated by her daughter. The pt declined voluntary admission when offered, but was held overnight in the ED for safety, was administered her nighttime meds, and was reassessed in the morning. On reassessment, the pt continues to deny current SI with plan or intent, denies current urges to harm herself or others, states she feels safe to return home, agrees to attend the [facility] later today, and will reach out to her therapist and psychiatrist later today as well.     Given her numerous static risk factors(prior psych hx/admissions, prior suicide attempts vs gestures, h/o NSSIB, h/o trauma) and modifiable risk factors(depressive and anxiety symptoms, including passive SI), the pt is at a chronically elevated risk of harm. However, her risk is mitigated by the fact she consistently denies current or recent SI with plan or intent, cites her children and pets as protective factors, is connected with outpt care and on meds, has support from family, has remained in good behavioral control in the ED, is able to safety plan, presents as future-oriented, and currently does not report or manifest symptoms of markus, hypomania, or psychosis. In light of these mitigating factors, this writer does not consider the pt to be an imminent risk of harm to self or other at this time, she seems able to safely care for herself in the community, and currently does not meet criteria for involuntary psychiatric hospitalization. To mitigate the pt's risk of harm in the ED, she was administered her nighttime meds, was held overnight for observation, was instructed to attend her [facility] intake appointment scheduled later today, was instructed to follow up with her therapist and her psychiatrist to discuss potential med changes, and was instructed to return to the ED for acute safety concerns in the future. In addition, the pt's daughter agreed to continue to monitor the pt at home and to bring pt back to the ED in the future for acute safety concerns. This writer also left a vm for the pt's therapist updating him that the pt presented to the ED, that she was psych cleared, and requesting he reach out to the pt as soon as possible. No acute psychiatric contraindication to discharge. Pt is a 52 yo F, , unemployed, resides in private residence with 18 yo daughter, daughter's fiance, and 2 tenants, has 2 children, PMH significant for SLE, Hashimoto's, with psych h/o unspecified mood and psychotic disorders per psyckes(Schizoaffective Disorder, Bipolar Disorder, MDD, persistent depressive disorder, brief psychotic disorder, Schizophrenia), Borderline Personality Disorder, PTSD, prior psych admissions(states she was last discharged from [facility] on 12/20/23), 2 prior suicide attempts by OD per chart(2001 & 2007), but today states these were not true attempts, remote h/o NSSIB(cut herself in her 30s), denies substance misuse, denies h/o legal issues or violence, in outpt tx with a therapist, Dr. Mick Elkins, through [facility]([phone]), and a psychiatrist, Dr. Clay Griggs, through [facility], on lamictal 100 mg and latuda ?40 mg, who presents to the ED BIB daughter after reportedly telling her daughter she's had recent thoughts to end her life, but would not act on these thoughts because of her daughter.     On assessment, the pt endorses recent hopelessness, passive SI, depressed mood, anhedonia, and lethargy in the setting of her recent hospitalization, medication changes, and discussing her past trauma with her therapist during their last session. However, she consistently denies current or recent SI with plan or intent, cites her children and pets as reasons to continue living, and states she has an intake appointment with a [facility] program on 1/3/23, which was corroborated by her daughter. The pt declined voluntary admission when offered, but was held overnight in the ED for safety, was administered her nighttime meds, and was reassessed in the morning. On reassessment, the pt continues to deny current SI with plan or intent, denies current urges to harm herself or others, states she feels safe to return home, agrees to attend the [facility] later today, and will reach out to her therapist and psychiatrist later today as well.     Given her numerous static risk factors(prior psych hx/admissions, prior suicide attempts vs gestures, h/o NSSIB, h/o trauma) and modifiable risk factors(depressive and anxiety symptoms, including passive SI), the pt is at a chronically elevated risk of harm. However, her risk is mitigated by the fact she consistently denies current or recent SI with plan or intent, cites her children and pets as protective factors, is connected with outpt care and on meds, has support from family, has remained in good behavioral control in the ED, is able to safety plan, presents as future-oriented, and currently does not report or manifest symptoms of markus, hypomania, or psychosis. In light of these mitigating factors, this writer does not consider the pt to be an imminent risk of harm to self or other at this time, she seems able to safely care for herself in the community, and currently does not meet criteria for involuntary psychiatric hospitalization. To mitigate the pt's risk of harm in the ED, she was administered her nighttime meds, was held overnight for observation, was instructed to attend her [facility] intake appointment scheduled later today, was instructed to follow up with her therapist and her psychiatrist to discuss potential med changes, and was instructed to return to the ED for acute safety concerns in the future. In addition, the pt's daughter agreed to continue to monitor the pt at home and to bring pt back to the ED in the future for acute safety concerns. This writer also left a vm for the pt's therapist updating him that the pt presented to the ED, that she was psych cleared, and requesting he reach out to the pt as soon as possible. No acute psychiatric contraindication to discharge.

## 2024-01-02 NOTE — ED PROVIDER NOTE - PHYSICAL EXAMINATION
Vital signs reviewed  GENERAL: Patient nontoxic appearing, NAD  HEAD: NCAT  EYES: Anicteric  ENT: MMM  NECK: Supple, no midline ttp. L sided paraspinal ttp.   RESPIRATORY: Normal respiratory effort. CTA B/L. No wheezing, rales, rhonchi  CARDIOVASCULAR: Regular rate and rhythm  ABDOMEN: Soft. Nondistended. Nontender. No guarding or rebound. No CVA tenderness.  MUSCULOSKELETAL/EXTREMITIES: Brisk cap refill. 2+ radial pulses. No leg edema.  SKIN:  Warm and dry  NEURO: AAOx3. No gross FND.  PSYCHIATRIC: Cooperative. Affect appropriate.

## 2024-01-02 NOTE — ED ADULT TRIAGE NOTE - CHIEF COMPLAINT QUOTE
Pt presents to the ED c/o suicidal ideations. Pt discharged from [facility] on 12/20 after having her medications adjusted. Pt was recently started on Latuda, increased her dose of Lamictal and discontinued Geodon. Pt states "I just feel numb." Pt reports having homicidal thoughts last night. Denies hallucinations or drug use. PMH of schizophrenia, schizoaffective disorder, bipolar disorder, and borderline personality. Psychiatrist Dr. Griggs. Pt presents to the ED c/o suicidal ideations. Pt discharged from [facility] on 12/20 after having her medications adjusted. Pt was recently started on Latuda, increased her dose of Lamictal and discontinued Geodon. Pt states "I just feel numb." Pt reports having homicidal thoughts last night. Denies hallucinations or drug use. PMH of schizophrenia, schizoaffective disorder, bipolar disorder, and borderline personality. Psychiatrist Dr. Griggs. Pt presents to the ED c/o suicidal ideations. Pt discharged from [facility] on 12/20 after having her medications adjusted. Pt was recently started on Latuda, increased her dose of Lamictal and discontinued Geodon. Pt states "I just feel numb." Pt reports having homicidal thoughts last night. Denies hallucinations or drug use. PMH of schizophrenia, schizoaffective disorder, bipolar disorder, and borderline personality. Psychiatrist Dr. Griggs. Pt placed of 1:1 for safety. Pt presents to the ED c/o suicidal ideations. Pt discharged from [facility] on 12/20 after having her medications adjusted. Pt was recently started on Latuda, increased her dose of Lamictal and discontinued Geodon. Pt states "I just feel numb." Pt reports having homicidal thoughts last night. Denies hallucinations or drug use. PMH of schizophrenia, schizoaffective disorder, bipolar disorder, and borderline personality. Psychiatrist Dr. Griggs. Pt placed of 1:1 for safety.
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
## 2024-01-02 NOTE — ED BEHAVIORAL HEALTH ASSESSMENT NOTE - SAFETY PLAN ADDT'L DETAILS
Safety plan discussed with.../Education provided regarding environmental safety / lethal means restriction/Provision of National Suicide Prevention Lifeline 1-273-462-GACL (7625) Safety plan discussed with.../Education provided regarding environmental safety / lethal means restriction/Provision of National Suicide Prevention Lifeline 9-954-821-PTLN (8930)

## 2024-01-02 NOTE — ED BEHAVIORAL HEALTH NOTE - BEHAVIORAL HEALTH NOTE
===================    PRE-HOSPITAL COURSE    ==================    SOURCE:  ED provider and ED documentation     DETAILS:  Pt bibEMS for worsening SI.      ============    ED COURSE    ============    SOURCE: ED provider and secondhand ED documentation.    ARRIVAL: Per ED documentation patient bibEMS to ED. Patient cooperative with triage process.     BELONGINGS: Unknown   BEHAVIOR: ED provider described patient to be calm but tearful at times, remains in behavioral and impulse control, and is not in restraints. Pt currently has 1:1 sitter.  Pt is not displaying aggression towards staff or others and was described as cooperative. Per provider, pt presenting with depressed affect and mood is congruent with affect. Pt has a linear thought process  and able to answer questions appropriately. Provider stated that the patient is endorsing current SI. No HI per provider report but triage note did indicate that the pt is experiencing HI. Per provider pt not endorsing A/VH.  There are no visible marks, bruises, or lacerations on the body. Provider reports that the patient appears to have fair hygiene.   TREATMENT: No medication administered. No restraints.     VISITORS: None     -----------------------------------------------   COVID Exposure Screen- collateral (i.e. third-party, chart review, belongings, etc; include EMS and ED staff)   ---------------------------------------------------   1. Has the patient had a COVID-19 test in the last 90 days? Unknown.   2. Has the patient tested positive for COVID-19 antibodies? Unknown.   3.Has the patient received 2 doses of the COVID-19 vaccine?  Unknown.   4. In the past 10 days, has the patient been around anyone with a positive COVID-19 test?* Unknown.   5.Has the patient been out of [place] within the past 10 days? Unknown.

## 2024-01-02 NOTE — ED ADULT NURSE NOTE - CHIEF COMPLAINT QUOTE
Pt presents to the ED c/o suicidal ideations. Pt discharged from [facility] on 12/20 after having her medications adjusted. Pt was recently started on Latuda, increased her dose of Lamictal and discontinued Geodon. Pt states "I just feel numb." Pt reports having homicidal thoughts last night. Denies hallucinations or drug use. PMH of schizophrenia, schizoaffective disorder, bipolar disorder, and borderline personality. Psychiatrist Dr. Griggs. Pt presents to the ED c/o suicidal ideations. Pt discharged from [facility] on 12/20 after having her medications adjusted. Pt was recently started on Latuda, increased her dose of Lamictal and discontinued Geodon. Pt states "I just feel numb." Pt reports having homicidal thoughts last night. Denies hallucinations or drug use. PMH of schizophrenia, schizoaffective disorder, bipolar disorder, and borderline personality. Psychiatrist Dr. Griggs.

## 2024-01-02 NOTE — ED PROVIDER NOTE - PROGRESS NOTE DETAILS
[facility] Attending medically cleared, spoke with psych, patient pending psych eval  colleratal daughter Mari [phone] [facility] Attending medically cleared, spoke with psych, patient pending psych eval  colleratal daughter Mari [phone] pt signed out to me, pt cleared by telepsych, pt denying si or hi.  Psych spoke with pts daughter and left a message for pts therapist, pt contracted for safety. will dc    ED evaluation and management discussed with the patient and family (if available) in detail.  Close PMD follow up encouraged.  Strict ED return instructions discussed in detail and patient given the opportunity to ask any questions about their discharge diagnosis and instructions. Patient verbalized understanding.

## 2024-01-02 NOTE — ED PROVIDER NOTE - PATIENT PORTAL LINK FT
You can access the FollowMyHealth Patient Portal offered by [facility] by registering at the following website: [email or web address]. By joining [facility]s FollowMyHealth portal, you will also be able to view your health information using other applications (apps) compatible with our system. You can access the FollowMyHealth Patient Portal offered by [facility] by registering at the following website: [email or web address]. By joining [facility]’s FollowMyHealth portal, you will also be able to view your health information using other applications (apps) compatible with our system.
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
## 2024-01-02 NOTE — ED BEHAVIORAL HEALTH ASSESSMENT NOTE - ABORTED (SELF-INTERRUPTED) ATTEMPT:
Overnight oximetry on room air, begin oxygen with sleep if indicated. Discussed this process and advised to call us if there are issues with hearing from oxygen company to perform the study or hearing results from us within 1 week of study. Discussed 30 day window from today from medicare standpoint to accomplish oxygen order, if needed. Albuterol inhaler, 2 puffs 4 times daily if needed for shortness of breath or wheezing. Use with spacer. None known

## 2024-01-02 NOTE — ED ADULT NURSE NOTE - OBJECTIVE STATEMENT
Patient states "I feel numb"  "I just don't care".  Patient had some suicidal ideation stating it is futile I have been in and out of hospital since I was 21 and they can't stabilize me.   c/o nightmares

## 2024-01-02 NOTE — ED BEHAVIORAL HEALTH NOTE - BEHAVIORAL HEALTH NOTE
Writer left message requesting callback for patient's daughter Mari per EM attending note [phone]. Writer left message requesting callback for patient's daughter Mari per EM attending note [phone].

## 2024-01-02 NOTE — ED PROVIDER NOTE - NSFOLLOWUPINSTRUCTIONS_ED_ALL_ED_FT
Follow up with a psychiatrist or the [facility], call [phone] for an appointment Follow up with a psychiatrist or the [facility], call [phone] for an appointment

## 2024-01-03 VITALS
SYSTOLIC BLOOD PRESSURE: 129 MMHG | HEART RATE: 65 BPM | RESPIRATION RATE: 18 BRPM | TEMPERATURE: 98 F | OXYGEN SATURATION: 100 % | DIASTOLIC BLOOD PRESSURE: 79 MMHG

## 2024-01-03 NOTE — ED ADULT NURSE REASSESSMENT NOTE - NS ED NURSE REASSESS COMMENT FT1
pt medicated as per orders. offering no complaints or discomforts at this time. pt resting comfortably in bed. 1:1 remains in place. pt room remains free of clutter. constant obs sheet up to date

## 2024-01-03 NOTE — ED ADULT NURSE REASSESSMENT NOTE - NS ED NURSE REASSESS COMMENT FT1
Assumed care of pt from overnight RN. Patient cleared from psychiatric standpoint. Discharge order placed, medicaid cab set up, pending transportation arrival. Patient currently eating breakfast.

## 2024-01-03 NOTE — BH SAFETY PLAN - SUICIDE PREVENTION LIFELINE PHONES
Suicide Prevention Lifeline Phone: [phone] ([phone] Suicide Prevention Lifeline Phone: [phone] [phone] ([phone])

## 2024-01-10 NOTE — ED PROVIDER NOTE - CROS ED PSYCH ALL NEG
Patient's father calls stating patient continues to have a cough. Patient's cough has been ongoing for months, but it is worsening again. Patient also has green nasal discharge. Patient was referred to [facility] for this last week, however, patient's parents have not yet set up an appointment. Patient's father would like a second opinion from a Pediatrician prior to having patient evaluated at [facility]. Patient's father states patient was seen at Urgent Care on 12-29-23 and diagnosed with a UTI. While on an antibiotic, patient's cough improved. However, as soon as the antibiotic was complete, patient's cough returned. Patient has been evaluated for a cough/URI six times in the recent past, including three times in the past three months. Patient's father was provided the phone number to Pediatrics to schedule an appointment for a second opinion. Referral placed.   
- - -

## 2024-01-16 NOTE — BEHAVIORAL HEALTH ASSESSMENT NOTE - NS ED BHA MSE SPEECH VOLUME
Patient is a 62y old  Female who presents with a chief complaint of ALTERED MENTAL STATUS     (16 Jan 2024 11:41)      INTERVAL HPI/OVERNIGHT EVENTS: Overnight no acute events. Sugars remain eelevated. Could not fit in MRI machine.     MEDICATIONS  (STANDING):  atorvastatin 80 milliGRAM(s) Oral at bedtime  dextrose 5%. 1000 milliLiter(s) (50 mL/Hr) IV Continuous <Continuous>  dextrose 5%. 1000 milliLiter(s) (100 mL/Hr) IV Continuous <Continuous>  dextrose 50% Injectable 25 Gram(s) IV Push once  dextrose 50% Injectable 25 Gram(s) IV Push once  dextrose 50% Injectable 12.5 Gram(s) IV Push once  gabapentin 100 milliGRAM(s) Oral daily  glucagon  Injectable 1 milliGRAM(s) IntraMuscular once  influenza   Vaccine 0.5 milliLiter(s) IntraMuscular once  insulin glargine Injectable (LANTUS) 56 Unit(s) SubCutaneous at bedtime  insulin lispro (ADMELOG) corrective regimen sliding scale   SubCutaneous three times a day before meals  insulin lispro (ADMELOG) corrective regimen sliding scale   SubCutaneous at bedtime  insulin lispro Injectable (ADMELOG) 16 Unit(s) SubCutaneous three times a day before meals  labetalol 200 milliGRAM(s) Oral two times a day  levETIRAcetam 500 milliGRAM(s) Oral two times a day    MEDICATIONS  (PRN):  acetaminophen     Tablet .. 650 milliGRAM(s) Oral every 6 hours PRN Temp greater or equal to 38C (100.4F), Mild Pain (1 - 3)  aluminum hydroxide/magnesium hydroxide/simethicone Suspension 30 milliLiter(s) Oral every 4 hours PRN Dyspepsia  dextrose Oral Gel 15 Gram(s) Oral once PRN Blood Glucose LESS THAN 70 milliGRAM(s)/deciliter  melatonin 3 milliGRAM(s) Oral at bedtime PRN Insomnia  ondansetron Injectable 4 milliGRAM(s) IV Push every 8 hours PRN Nausea and/or Vomiting      Allergies    No Known Allergies    Intolerances        REVIEW OF SYSTEMS:  ROS negative unless stated otherwise.    Vital Signs Last 24 Hrs  T(C): 36.8 (16 Jan 2024 10:16), Max: 36.8 (16 Jan 2024 00:00)  T(F): 98.3 (16 Jan 2024 10:16), Max: 98.3 (16 Jan 2024 10:16)  HR: 92 (16 Jan 2024 10:16) (71 - 98)  BP: 169/96 (16 Jan 2024 10:16) (169/96 - 190/92)  BP(mean): --  RR: 15 (16 Jan 2024 10:16) (15 - 18)  SpO2: 95% (16 Jan 2024 10:16) (93% - 97%)    Parameters below as of 16 Jan 2024 10:16  Patient On (Oxygen Delivery Method): room air        PHYSICAL EXAM:  GENERAL: NAD, obese female   HEAD:  Atraumatic   EYES: EOMI   ENMT: Moist mucous membranes  NECK: Supple   NERVOUS SYSTEM:  Awake  CHEST/LUNG: CTAB   HEART: RRR   ABDOMEN: Soft, Nontender, Nondistended  EXTREMITIES:   No clubbing, cyanosis, or edema  PSYCH: Mood appropriate     LABS:                        13.2   5.31  )-----------( 198      ( 16 Jan 2024 07:17 )             39.3     01-16    139  |  107  |  21  ----------------------------<  267<H>  4.0   |  29  |  0.75    Ca    8.7      16 Jan 2024 07:17        Urinalysis Basic - ( 16 Jan 2024 07:17 )    Color: x / Appearance: x / SG: x / pH: x  Gluc: 267 mg/dL / Ketone: x  / Bili: x / Urobili: x   Blood: x / Protein: x / Nitrite: x   Leuk Esterase: x / RBC: x / WBC x   Sq Epi: x / Non Sq Epi: x / Bacteria: x      CAPILLARY BLOOD GLUCOSE      POCT Blood Glucose.: 331 mg/dL (16 Jan 2024 10:43)  POCT Blood Glucose.: 225 mg/dL (16 Jan 2024 07:27)  POCT Blood Glucose.: 226 mg/dL (16 Jan 2024 07:22)  POCT Blood Glucose.: 372 mg/dL (15 Dexter 2024 21:00)  POCT Blood Glucose.: 195 mg/dL (15 Dexter 2024 17:00)      RADIOLOGY & ADDITIONAL TESTS:    Imaging Personally Reviewed:  [ X] YES  [ ] NO    Consultant(s) Notes Reviewed:  [ X] YES  [ ] NO    Care Discussed with Consultants/Other Providers [X ] YES  [ ] NO Normal

## 2024-02-15 NOTE — PROGRESS NOTE BEHAVIORAL HEALTH - AXIS III
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

## 2024-03-26 NOTE — PROGRESS NOTE BEHAVIORAL HEALTH - FUND OF KNOWLEDGE
PT to ED with son.   PT advised to come by in PCP, R eye irritation since Sunday, no relief from polymyxcin eyedrops and aomox/k clav 875-125 mg antibiotics, R eye pain 7/10 tender.   
Normal

## 2024-04-25 NOTE — PATIENT PROFILE BEHAVIORAL HEALTH - LIVES WITH, PROFILE
Dapsone Counseling: I discussed with the patient the risks of dapsone including but not limited to hemolytic anemia, agranulocytosis, rashes, methemoglobinemia, kidney failure, peripheral neuropathy, headaches, GI upset, and liver toxicity.  Patients who start dapsone require monitoring including baseline LFTs and weekly CBCs for the first month, then every month thereafter.  The patient verbalized understanding of the proper use and possible adverse effects of dapsone.  All of the patient's questions and concerns were addressed. High Dose Vitamin A Pregnancy And Lactation Text: High dose vitamin A therapy is contraindicated during pregnancy and breast feeding. Use Enhanced Medication Counseling?: No Azelaic Acid Pregnancy And Lactation Text: This medication is considered safe during pregnancy and breast feeding. Spironolactone Counseling: Patient advised regarding risks of diarrhea, abdominal pain, hyperkalemia, birth defects (for female patients), liver toxicity and renal toxicity. The patient may need blood work to monitor liver and kidney function and potassium levels while on therapy. The patient verbalized understanding of the proper use and possible adverse effects of spironolactone.  All of the patient's questions and concerns were addressed. Topical Clindamycin Counseling: Patient counseled that this medication may cause skin irritation or allergic reactions.  In the event of skin irritation, the patient was advised to reduce the amount of the drug applied or use it less frequently.   The patient verbalized understanding of the proper use and possible adverse effects of clindamycin.  All of the patient's questions and concerns were addressed. Isotretinoin Pregnancy And Lactation Text: This medication is Pregnancy Category X and is considered extremely dangerous during pregnancy. It is unknown if it is excreted in breast milk. Winlevi Pregnancy And Lactation Text: This medication is considered safe during pregnancy and breastfeeding. Birth Control Pills Counseling: Birth Control Pill Counseling: I discussed with the patient the potential side effects of OCPs including but not limited to increased risk of stroke, heart attack, thrombophlebitis, deep venous thrombosis, hepatic adenomas, breast changes, GI upset, headaches, and depression.  The patient verbalized understanding of the proper use and possible adverse effects of OCPs. All of the patient's questions and concerns were addressed. Erythromycin Pregnancy And Lactation Text: This medication is Pregnancy Category B and is considered safe during pregnancy. It is also excreted in breast milk. Aklief Pregnancy And Lactation Text: It is unknown if this medication is safe to use during pregnancy.  It is unknown if this medication is excreted in breast milk.  Breastfeeding women should use the topical cream on the smallest area of the skin for the shortest time needed while breastfeeding.  Do not apply to nipple and areola. Tazorac Counseling:  Patient advised that medication is irritating and drying.  Patient may need to apply sparingly and wash off after an hour before eventually leaving it on overnight.  The patient verbalized understanding of the proper use and possible adverse effects of tazorac.  All of the patient's questions and concerns were addressed. Sarecycline Counseling: Patient advised regarding possible photosensitivity and discoloration of the teeth, skin, lips, tongue and gums.  Patient instructed to avoid sunlight, if possible.  When exposed to sunlight, patients should wear protective clothing, sunglasses, and sunscreen.  The patient was instructed to call the office immediately if the following severe adverse effects occur:  hearing changes, easy bruising/bleeding, severe headache, or vision changes.  The patient verbalized understanding of the proper use and possible adverse effects of sarecycline.  All of the patient's questions and concerns were addressed. Topical Sulfur Applications Pregnancy And Lactation Text: This medication is Pregnancy Category C and has an unknown safety profile during pregnancy. It is unknown if this topical medication is excreted in breast milk. Topical Retinoid counseling:  Patient advised to apply a pea-sized amount only at bedtime and wait 30 minutes after washing their face before applying.  If too drying, patient may add a non-comedogenic moisturizer. The patient verbalized understanding of the proper use and possible adverse effects of retinoids.  All of the patient's questions and concerns were addressed. Tetracycline Pregnancy And Lactation Text: This medication is Pregnancy Category D and not consider safe during pregnancy. It is also excreted in breast milk. Minocycline Counseling: Patient advised regarding possible photosensitivity and discoloration of the teeth, skin, lips, tongue and gums.  Patient instructed to avoid sunlight, if possible.  When exposed to sunlight, patients should wear protective clothing, sunglasses, and sunscreen.  The patient was instructed to call the office immediately if the following severe adverse effects occur:  hearing changes, easy bruising/bleeding, severe headache, or vision changes.  The patient verbalized understanding of the proper use and possible adverse effects of minocycline.  All of the patient's questions and concerns were addressed. Bactrim Counseling:  I discussed with the patient the risks of sulfa antibiotics including but not limited to GI upset, allergic reaction, drug rash, diarrhea, dizziness, photosensitivity, and yeast infections.  Rarely, more serious reactions can occur including but not limited to aplastic anemia, agranulocytosis, methemoglobinemia, blood dyscrasias, liver or kidney failure, lung infiltrates or desquamative/blistering drug rashes. Doxycycline Pregnancy And Lactation Text: This medication is Pregnancy Category D and not consider safe during pregnancy. It is also excreted in breast milk but is considered safe for shorter treatment courses. Topical Clindamycin Pregnancy And Lactation Text: This medication is Pregnancy Category B and is considered safe during pregnancy. It is unknown if it is excreted in breast milk. Benzoyl Peroxide Counseling: Patient counseled that medicine may cause skin irritation and bleach clothing.  In the event of skin irritation, the patient was advised to reduce the amount of the drug applied or use it less frequently.   The patient verbalized understanding of the proper use and possible adverse effects of benzoyl peroxide.  All of the patient's questions and concerns were addressed. Detail Level: Zone Spironolactone Pregnancy And Lactation Text: This medication can cause feminization of the male fetus and should be avoided during pregnancy. The active metabolite is also found in breast milk. High Dose Vitamin A Counseling: Side effects reviewed, pt to contact office should one occur. Azithromycin Counseling:  I discussed with the patient the risks of azithromycin including but not limited to GI upset, allergic reaction, drug rash, diarrhea, and yeast infections. Dapsone Pregnancy And Lactation Text: This medication is Pregnancy Category C and is not considered safe during pregnancy or breast feeding. Isotretinoin Counseling: Patient should get monthly blood tests, not donate blood, not drive at night if vision affected, not share medication, and not undergo elective surgery for 6 months after tx completed. Side effects reviewed, pt to contact office should one occur. Tazorac Pregnancy And Lactation Text: This medication is not safe during pregnancy. It is unknown if this medication is excreted in breast milk. Azelaic Acid Counseling: Patient counseled that medicine may cause skin irritation and to avoid applying near the eyes.  In the event of skin irritation, the patient was advised to reduce the amount of the drug applied or use it less frequently.   The patient verbalized understanding of the proper use and possible adverse effects of azelaic acid.  All of the patient's questions and concerns were addressed. Birth Control Pills Pregnancy And Lactation Text: This medication should be avoided if pregnant and for the first 30 days post-partum. Winlevi Counseling:  I discussed with the patient the risks of topical clascoterone including but not limited to erythema, scaling, itching, and stinging. Patient voiced their understanding. Erythromycin Counseling:  I discussed with the patient the risks of erythromycin including but not limited to GI upset, allergic reaction, drug rash, diarrhea, increase in liver enzymes, and yeast infections. Aklief counseling:  Patient advised to apply a pea-sized amount only at bedtime and wait 30 minutes after washing their face before applying.  If too drying, patient may add a non-comedogenic moisturizer.  The most commonly reported side effects including irritation, redness, scaling, dryness, stinging, burning, itching, and increased risk of sunburn.  The patient verbalized understanding of the proper use and possible adverse effects of retinoids.  All of the patient's questions and concerns were addressed. Bactrim Pregnancy And Lactation Text: This medication is Pregnancy Category D and is known to cause fetal risk.  It is also excreted in breast milk. Topical Retinoid Pregnancy And Lactation Text: This medication is Pregnancy Category C. It is unknown if this medication is excreted in breast milk. Benzoyl Peroxide Pregnancy And Lactation Text: This medication is Pregnancy Category C. It is unknown if benzoyl peroxide is excreted in breast milk. Tetracycline Counseling: Patient counseled regarding possible photosensitivity and increased risk for sunburn.  Patient instructed to avoid sunlight, if possible.  When exposed to sunlight, patients should wear protective clothing, sunglasses, and sunscreen.  The patient was instructed to call the office immediately if the following severe adverse effects occur:  hearing changes, easy bruising/bleeding, severe headache, or vision changes.  The patient verbalized understanding of the proper use and possible adverse effects of tetracycline.  All of the patient's questions and concerns were addressed. Patient understands to avoid pregnancy while on therapy due to potential birth defects. Topical Sulfur Applications Counseling: Topical Sulfur Counseling: Patient counseled that this medication may cause skin irritation or allergic reactions.  In the event of skin irritation, the patient was advised to reduce the amount of the drug applied or use it less frequently.   The patient verbalized understanding of the proper use and possible adverse effects of topical sulfur application.  All of the patient's questions and concerns were addressed. Azithromycin Pregnancy And Lactation Text: This medication is considered safe during pregnancy and is also secreted in breast milk. Doxycycline Counseling:  Patient counseled regarding possible photosensitivity and increased risk for sunburn.  Patient instructed to avoid sunlight, if possible.  When exposed to sunlight, patients should wear protective clothing, sunglasses, and sunscreen.  The patient was instructed to call the office immediately if the following severe adverse effects occur:  hearing changes, easy bruising/bleeding, severe headache, or vision changes.  The patient verbalized understanding of the proper use and possible adverse effects of doxycycline.  All of the patient's questions and concerns were addressed. No significant other

## 2024-05-10 ENCOUNTER — APPOINTMENT (OUTPATIENT)
Dept: RHEUMATOLOGY | Facility: CLINIC | Age: 54
End: 2024-05-10
Payer: MEDICAID

## 2024-05-10 VITALS
DIASTOLIC BLOOD PRESSURE: 80 MMHG | WEIGHT: 180 LBS | TEMPERATURE: 97.4 F | HEART RATE: 79 BPM | BODY MASS INDEX: 31.89 KG/M2 | HEIGHT: 63 IN | SYSTOLIC BLOOD PRESSURE: 118 MMHG | OXYGEN SATURATION: 97 %

## 2024-05-10 DIAGNOSIS — H04.123 DRY EYE SYNDROME OF BILATERAL LACRIMAL GLANDS: ICD-10-CM

## 2024-05-10 DIAGNOSIS — M25.541 PAIN IN JOINTS OF RIGHT HAND: ICD-10-CM

## 2024-05-10 DIAGNOSIS — R76.8 OTHER SPECIFIED ABNORMAL IMMUNOLOGICAL FINDINGS IN SERUM: ICD-10-CM

## 2024-05-10 DIAGNOSIS — M25.542 PAIN IN JOINTS OF RIGHT HAND: ICD-10-CM

## 2024-05-10 DIAGNOSIS — M15.4 EROSIVE (OSTEO)ARTHRITIS: ICD-10-CM

## 2024-05-10 PROCEDURE — 99214 OFFICE O/P EST MOD 30 MIN: CPT

## 2024-05-10 PROCEDURE — G2211 COMPLEX E/M VISIT ADD ON: CPT | Mod: NC,1L

## 2024-05-10 RX ORDER — ARIPIPRAZOLE 30 MG/1
30 TABLET ORAL DAILY
Qty: 30 | Refills: 2 | Status: DISCONTINUED | COMMUNITY
Start: 2018-02-01 | End: 2024-05-10

## 2024-05-10 RX ORDER — TRAZODONE HYDROCHLORIDE 100 MG/1
100 TABLET ORAL
Qty: 30 | Refills: 2 | Status: DISCONTINUED | COMMUNITY
Start: 2018-08-02 | End: 2024-05-10

## 2024-05-10 RX ORDER — LAMOTRIGINE 200 MG/1
200 TABLET ORAL
Qty: 60 | Refills: 0 | Status: DISCONTINUED | COMMUNITY
Start: 2022-09-08 | End: 2024-05-10

## 2024-05-10 RX ORDER — MESALAMINE 375 MG/1
0.38 CAPSULE, EXTENDED RELEASE ORAL
Refills: 0 | Status: ACTIVE | COMMUNITY

## 2024-05-10 RX ORDER — PANCRELIPASE 36000; 180000; 114000 [USP'U]/1; [USP'U]/1; [USP'U]/1
CAPSULE, DELAYED RELEASE PELLETS ORAL
Refills: 0 | Status: ACTIVE | COMMUNITY

## 2024-05-10 RX ORDER — ZIPRASIDONE HYDROCHLORIDE 20 MG/1
20 CAPSULE ORAL
Qty: 60 | Refills: 0 | Status: DISCONTINUED | COMMUNITY
Start: 2022-09-08 | End: 2024-05-10

## 2024-05-10 RX ORDER — LURASIDONE HYDROCHLORIDE 40 MG/1
40 TABLET, FILM COATED ORAL
Refills: 0 | Status: ACTIVE | COMMUNITY

## 2024-05-10 RX ORDER — CYCLOBENZAPRINE HYDROCHLORIDE 5 MG/1
5 TABLET, FILM COATED ORAL
Qty: 90 | Refills: 1 | Status: ACTIVE | COMMUNITY
Start: 2022-05-13 | End: 1900-01-01

## 2024-05-10 RX ORDER — HYDROXYCHLOROQUINE SULFATE 200 MG/1
200 TABLET ORAL
Refills: 0 | Status: DISCONTINUED | COMMUNITY
End: 2024-05-10

## 2024-05-10 NOTE — HISTORY OF PRESENT ILLNESS
[___ Month(s) Ago] : [unfilled] month(s) ago [FreeTextEntry1] : 5/2024 followup: Patient with ongoing pains in b/l hands, b/l hips. states recently had Xrays of spine with PCP at [facility] (not in our records). Takes naproxen OTC PRN for pain, twice daily always with food- not always, only in past couple of weeks PRN. Otherwise also takes tylenol PRN.   Denies rashes apart from slgiht eczema on side of foot; no oral or nasal ulcers. no hair loss. no hematuria. +dry eyes, no significant dry mouth. No weight loss.

## 2024-05-10 NOTE — REVIEW OF SYSTEMS
[Feeling Tired] : feeling tired [Dry Eyes] : dryness of the eyes [As Noted in HPI] : as noted in HPI [Arthralgias] : arthralgias [Joint Pain] : joint pain [Joint Stiffness] : joint stiffness [Negative] : Heme/Lymph

## 2024-05-10 NOTE — PHYSICAL EXAM
[General Appearance - Alert] : alert [General Appearance - In No Acute Distress] : in no acute distress [General Appearance - Well Developed] : well developed [General Appearance - Well-Appearing] : healthy appearing [Sclera] : the sclera and conjunctiva were normal [Extraocular Movements] : extraocular movements were intact [Neck Appearance] : the appearance of the neck was normal [Exaggerated Use Of Accessory Muscles For Inspiration] : no accessory muscle use [Edema] : there was no peripheral edema [FreeTextEntry1] : notable Heberdens nodes in b/l hands, also some bouchards nodes, OA changes to b/l hands, no synovitis in any joint.  [Skin Color & Pigmentation] : normal skin color and pigmentation [] : no rash [Skin Lesions] : no skin lesions [No Focal Deficits] : no focal deficits [Oriented To Time, Place, And Person] : oriented to person, place, and time

## 2024-05-10 NOTE — DATA REVIEWED
[FreeTextEntry1] : CALEB 1:640 DFS70 pattern in 1/2023 dsDNA, CHRISTAL, Sjogrens, RF, CCP negative in 1/2023

## 2024-05-10 NOTE — ASSESSMENT
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   #Erosive OA can continue NSAID PRN sparingly (currently taking OTC naproxen BID PRN in the past 1-2 weeks), advised to take tylenol instead when possible, and topical voltaren gel PRN to hand joints when possible.  #Positive CALEB At last year's visit, autoimmune workup was negative apart from +CALEB but given positive family history of autoimmune disease, as well as her reported history of ulcerative colitis and retinal vasculitis, will repeat tests for SLE, RA, and Sjogrens.

## 2024-05-13 DIAGNOSIS — M35.05 SJOGREN SYNDROME WITH INFLAMMATORY ARTHRITIS: ICD-10-CM

## 2024-05-13 LAB
ALBUMIN SERPL ELPH-MCNC: 4.7 G/DL
ALP BLD-CCNC: 96 U/L
ALT SERPL-CCNC: 17 U/L
ANION GAP SERPL CALC-SCNC: 12 MMOL/L
APPEARANCE: CLEAR
AST SERPL-CCNC: 22 U/L
BILIRUB SERPL-MCNC: 0.5 MG/DL
BILIRUBIN URINE: NEGATIVE
BLOOD URINE: NEGATIVE
BUN SERPL-MCNC: 15 MG/DL
C3 SERPL-MCNC: 142 MG/DL
C4 SERPL-MCNC: 27 MG/DL
CALCIUM SERPL-MCNC: 9.6 MG/DL
CCP AB SER IA-ACNC: <8 UNITS
CHLORIDE SERPL-SCNC: 100 MMOL/L
CO2 SERPL-SCNC: 26 MMOL/L
COLOR: YELLOW
CREAT SERPL-MCNC: 0.92 MG/DL
CREAT SPEC-SCNC: 165 MG/DL
CREAT/PROT UR: 0.1 RATIO
DSDNA AB SER-ACNC: <1 IU/ML
EGFR: 75 ML/MIN/1.73M2
ENA RNP AB SER IA-ACNC: <0.2 AL
ENA SM AB SER IA-ACNC: <0.2 AL
ENA SS-A AB SER IA-ACNC: <0.2 AL
ENA SS-B AB SER IA-ACNC: 1 AL
ERYTHROCYTE [SEDIMENTATION RATE] IN BLOOD BY WESTERGREN METHOD: 25 MM/HR
GLUCOSE QUALITATIVE U: NEGATIVE MG/DL
GLUCOSE SERPL-MCNC: 94 MG/DL
KETONES URINE: NEGATIVE MG/DL
LEUKOCYTE ESTERASE URINE: NEGATIVE
NITRITE URINE: NEGATIVE
PH URINE: 7
POTASSIUM SERPL-SCNC: 4.4 MMOL/L
PROT SERPL-MCNC: 6.8 G/DL
PROT UR-MCNC: 15 MG/DL
PROTEIN URINE: NORMAL MG/DL
RF+CCP IGG SER-IMP: NEGATIVE
RHEUMATOID FACT SER QL: 11 IU/ML
SODIUM SERPL-SCNC: 138 MMOL/L
SPECIFIC GRAVITY URINE: 1.02
TSH SERPL-ACNC: 1.31 UIU/ML
UROBILINOGEN URINE: 0.2 MG/DL

## 2024-05-13 RX ORDER — HYDROXYCHLOROQUINE SULFATE 200 MG/1
200 TABLET, FILM COATED ORAL
Qty: 180 | Refills: 1 | Status: ACTIVE | COMMUNITY
Start: 2024-05-13 | End: 1900-01-01

## 2024-05-14 ENCOUNTER — NON-APPOINTMENT (OUTPATIENT)
Age: 54
End: 2024-05-14

## 2024-05-15 LAB
ANA PAT FLD IF-IMP: NORMAL
ANA SER IF-ACNC: ABNORMAL

## 2024-05-22 NOTE — PATIENT PROFILE BEHAVIORAL HEALTH - SUBSTANCE USE COMMENT, PROFILE
Medication:   Requested Prescriptions     Pending Prescriptions Disp Refills    fluconazole (DIFLUCAN) 150 MG tablet 4 tablet 0     Sig: Take 2 tablets by mouth once a week for 2 doses       Last Filled:  2/10/2023    Patient Phone Number: [phone] (home)     Last appt: 5/8/2024   Next appt: 11/8/2024     clean for 9 years

## 2024-05-23 NOTE — PROGRESS NOTE BEHAVIORAL HEALTH - NS ED [facility] MED ROS RESPIRATORY
Merle Archuleta (:  No complaints
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
## 2024-08-15 ENCOUNTER — APPOINTMENT (OUTPATIENT)
Dept: RHEUMATOLOGY | Facility: CLINIC | Age: 54
End: 2024-08-15

## 2024-08-20 NOTE — ED PROVIDER NOTE - SCRIBE NAME
MELANIE GUZMAN (Key: [id number])  PA Case ID #: [id number]    Drug  Celecoxib 100MG capsules    [employer] PA Form (2017   Dayanara Barnett

## 2024-08-30 ENCOUNTER — INPATIENT (INPATIENT)
Facility: HOSPITAL | Age: 54
LOS: 1 days | Discharge: HOME | DRG: 342 | End: 2024-09-01
Attending: ORTHOPAEDIC SURGERY | Admitting: ORTHOPAEDIC SURGERY
Payer: MEDICAID

## 2024-08-30 VITALS
TEMPERATURE: 98 F | HEART RATE: 79 BPM | RESPIRATION RATE: 18 BRPM | DIASTOLIC BLOOD PRESSURE: 89 MMHG | SYSTOLIC BLOOD PRESSURE: 126 MMHG | HEIGHT: 62 IN | WEIGHT: 175.05 LBS

## 2024-08-30 DIAGNOSIS — S82.832A OTHER FRACTURE OF UPPER AND LOWER END OF LEFT FIBULA, INITIAL ENCOUNTER FOR CLOSED FRACTURE: ICD-10-CM

## 2024-08-30 LAB
ANION GAP SERPL CALC-SCNC: 2 MMOL/L — LOW (ref 5–17)
APTT BLD: 32.2 SEC — SIGNIFICANT CHANGE UP (ref 24.5–35.6)
BUN SERPL-MCNC: 13 MG/DL — SIGNIFICANT CHANGE UP (ref 7–23)
CALCIUM SERPL-MCNC: 9.3 MG/DL — SIGNIFICANT CHANGE UP (ref 8.5–10.1)
CHLORIDE SERPL-SCNC: 109 MMOL/L — HIGH (ref 96–108)
CO2 SERPL-SCNC: 30 MMOL/L — SIGNIFICANT CHANGE UP (ref 22–31)
CREAT SERPL-MCNC: 1.05 MG/DL — SIGNIFICANT CHANGE UP (ref 0.5–1.3)
EGFR: 64 ML/MIN/1.73M2 — SIGNIFICANT CHANGE UP
GLUCOSE SERPL-MCNC: 110 MG/DL — HIGH (ref 70–99)
HCG SERPL-ACNC: <1 MIU/ML — SIGNIFICANT CHANGE UP
HCT VFR BLD CALC: 39 % — SIGNIFICANT CHANGE UP (ref 34.5–45)
HGB BLD-MCNC: 13 G/DL — SIGNIFICANT CHANGE UP (ref 11.5–15.5)
INR BLD: 0.95 RATIO — SIGNIFICANT CHANGE UP (ref 0.85–1.18)
MCHC RBC-ENTMCNC: 30 PG — SIGNIFICANT CHANGE UP (ref 27–34)
MCHC RBC-ENTMCNC: 33.3 GM/DL — SIGNIFICANT CHANGE UP (ref 32–36)
MCV RBC AUTO: 89.9 FL — SIGNIFICANT CHANGE UP (ref 80–100)
PLATELET # BLD AUTO: 258 K/UL — SIGNIFICANT CHANGE UP (ref 150–400)
POTASSIUM SERPL-MCNC: 4.3 MMOL/L — SIGNIFICANT CHANGE UP (ref 3.5–5.3)
POTASSIUM SERPL-SCNC: 4.3 MMOL/L — SIGNIFICANT CHANGE UP (ref 3.5–5.3)
PROTHROM AB SERPL-ACNC: 10.8 SEC — SIGNIFICANT CHANGE UP (ref 9.5–13)
RBC # BLD: 4.34 M/UL — SIGNIFICANT CHANGE UP (ref 3.8–5.2)
RBC # FLD: 13.1 % — SIGNIFICANT CHANGE UP (ref 10.3–14.5)
SODIUM SERPL-SCNC: 141 MMOL/L — SIGNIFICANT CHANGE UP (ref 135–145)
WBC # BLD: 8.08 K/UL — SIGNIFICANT CHANGE UP (ref 3.8–10.5)
WBC # FLD AUTO: 8.08 K/UL — SIGNIFICANT CHANGE UP (ref 3.8–10.5)

## 2024-08-30 PROCEDURE — 84702 CHORIONIC GONADOTROPIN TEST: CPT

## 2024-08-30 PROCEDURE — C1713: CPT

## 2024-08-30 PROCEDURE — 76376 3D RENDER W/INTRP POSTPROCES: CPT

## 2024-08-30 PROCEDURE — 73700 CT LOWER EXTREMITY W/O DYE: CPT | Mod: MC,LT

## 2024-08-30 PROCEDURE — 86900 BLOOD TYPING SEROLOGIC ABO: CPT

## 2024-08-30 PROCEDURE — 99285 EMERGENCY DEPT VISIT HI MDM: CPT

## 2024-08-30 PROCEDURE — 86901 BLOOD TYPING SEROLOGIC RH(D): CPT

## 2024-08-30 PROCEDURE — 73700 CT LOWER EXTREMITY W/O DYE: CPT | Mod: 26,LT

## 2024-08-30 PROCEDURE — 97530 THERAPEUTIC ACTIVITIES: CPT | Mod: GP

## 2024-08-30 PROCEDURE — 73590 X-RAY EXAM OF LOWER LEG: CPT | Mod: 26,LT

## 2024-08-30 PROCEDURE — 71045 X-RAY EXAM CHEST 1 VIEW: CPT

## 2024-08-30 PROCEDURE — 86850 RBC ANTIBODY SCREEN: CPT

## 2024-08-30 PROCEDURE — 71045 X-RAY EXAM CHEST 1 VIEW: CPT | Mod: 26

## 2024-08-30 PROCEDURE — 82962 GLUCOSE BLOOD TEST: CPT

## 2024-08-30 PROCEDURE — 83036 HEMOGLOBIN GLYCOSYLATED A1C: CPT

## 2024-08-30 PROCEDURE — 97116 GAIT TRAINING THERAPY: CPT | Mod: GP

## 2024-08-30 PROCEDURE — 76376 3D RENDER W/INTRP POSTPROCES: CPT | Mod: 26

## 2024-08-30 PROCEDURE — 76000 FLUOROSCOPY <1 HR PHYS/QHP: CPT

## 2024-08-30 PROCEDURE — 73600 X-RAY EXAM OF ANKLE: CPT | Mod: 26,59,LT

## 2024-08-30 PROCEDURE — 80048 BASIC METABOLIC PNL TOTAL CA: CPT

## 2024-08-30 PROCEDURE — 85610 PROTHROMBIN TIME: CPT

## 2024-08-30 PROCEDURE — 97162 PT EVAL MOD COMPLEX 30 MIN: CPT | Mod: GP

## 2024-08-30 PROCEDURE — 85730 THROMBOPLASTIN TIME PARTIAL: CPT

## 2024-08-30 PROCEDURE — 93005 ELECTROCARDIOGRAM TRACING: CPT

## 2024-08-30 PROCEDURE — 36415 COLL VENOUS BLD VENIPUNCTURE: CPT

## 2024-08-30 PROCEDURE — 85027 COMPLETE CBC AUTOMATED: CPT

## 2024-08-30 PROCEDURE — 99222 1ST HOSP IP/OBS MODERATE 55: CPT

## 2024-08-30 PROCEDURE — 73610 X-RAY EXAM OF ANKLE: CPT | Mod: 26,LT

## 2024-08-30 PROCEDURE — 93010 ELECTROCARDIOGRAM REPORT: CPT

## 2024-08-30 RX ORDER — FOLIC ACID/MULTIVIT,IRON,MINER 0.4MG-18MG
1 TABLET,CHEWABLE ORAL
Refills: 0 | DISCHARGE

## 2024-08-30 RX ORDER — FLUVOXAMINE MALEATE 100 MG/1
1 TABLET ORAL
Refills: 0 | DISCHARGE

## 2024-08-30 RX ORDER — SENNA 187 MG
2 TABLET ORAL AT BEDTIME
Refills: 0 | Status: DISCONTINUED | OUTPATIENT
Start: 2024-08-30 | End: 2024-08-31

## 2024-08-30 RX ORDER — ONDANSETRON 2 MG/ML
4 INJECTION, SOLUTION INTRAMUSCULAR; INTRAVENOUS ONCE
Refills: 0 | Status: COMPLETED | OUTPATIENT
Start: 2024-08-30 | End: 2024-08-30

## 2024-08-30 RX ORDER — MESALAMINE 1.2 G/1
1 TABLET, DELAYED RELEASE ORAL
Refills: 0 | DISCHARGE

## 2024-08-30 RX ORDER — POLYETHYLENE GLYCOL 3350 17 G/17G
17 POWDER, FOR SOLUTION ORAL ONCE
Refills: 0 | Status: DISCONTINUED | OUTPATIENT
Start: 2024-08-30 | End: 2024-09-01

## 2024-08-30 RX ORDER — OXYCODONE HYDROCHLORIDE 5 MG/1
5 TABLET ORAL ONCE
Refills: 0 | Status: DISCONTINUED | OUTPATIENT
Start: 2024-08-30 | End: 2024-08-30

## 2024-08-30 RX ORDER — SODIUM CHLORIDE 9 MG/ML
1000 INJECTION INTRAMUSCULAR; INTRAVENOUS; SUBCUTANEOUS
Refills: 0 | Status: DISCONTINUED | OUTPATIENT
Start: 2024-08-30 | End: 2024-09-01

## 2024-08-30 RX ORDER — TRAMADOL HYDROCHLORIDE 200 MG/1
50 TABLET, EXTENDED RELEASE ORAL ONCE
Refills: 0 | Status: DISCONTINUED | OUTPATIENT
Start: 2024-08-30 | End: 2024-09-01

## 2024-08-30 RX ORDER — OXYCODONE HYDROCHLORIDE 5 MG/1
5 TABLET ORAL EVERY 4 HOURS
Refills: 0 | Status: DISCONTINUED | OUTPATIENT
Start: 2024-08-30 | End: 2024-08-31

## 2024-08-30 RX ORDER — ACETAMINOPHEN 325 MG/1
1000 TABLET ORAL ONCE
Refills: 0 | Status: COMPLETED | OUTPATIENT
Start: 2024-08-30 | End: 2024-08-30

## 2024-08-30 RX ORDER — HEPARIN SODIUM,BOVINE 1000/ML
5000 VIAL (ML) INJECTION ONCE
Refills: 0 | Status: COMPLETED | OUTPATIENT
Start: 2024-08-30 | End: 2024-08-30

## 2024-08-30 RX ORDER — LAMOTRIGINE 100 MG/1
1.5 TABLET, EXTENDED RELEASE ORAL
Refills: 0 | DISCHARGE

## 2024-08-30 RX ORDER — OXYCODONE HYDROCHLORIDE 5 MG/1
10 TABLET ORAL EVERY 4 HOURS
Refills: 0 | Status: DISCONTINUED | OUTPATIENT
Start: 2024-08-30 | End: 2024-08-31

## 2024-08-30 RX ORDER — HYDROXYCHLOROQUINE SULFATE 200 MG/1
2 TABLET, FILM COATED ORAL
Refills: 0 | DISCHARGE

## 2024-08-30 RX ADMIN — OXYCODONE HYDROCHLORIDE 5 MILLIGRAM(S): 5 TABLET ORAL at 20:45

## 2024-08-30 RX ADMIN — ACETAMINOPHEN 1000 MILLIGRAM(S): 325 TABLET ORAL at 20:45

## 2024-08-30 RX ADMIN — Medication 5000 UNIT(S): at 23:14

## 2024-08-30 NOTE — CONSULT NOTE ADULT - SUBJECTIVE AND OBJECTIVE BOX
CC: Fall with ankle pain  HPI: Patient is a 54yFemale with a pmh/o Sjogrens, UC, Hashimotos thyroiditis, DMII diet controlled previously on metformin, Fibromyalgia, Bipolar disease, OCD, Brain developmental venous anomaly, who presents to [place] ED after fall. Pt states she was walking down the stairs when she missed the last step and tripped and fell with subsequent pain and inability to walk/get self up. Denies HS/LOC.   ROS: Left ankle pain. Denies cp, palpitations, LOC, Head strike, n/v/d, abd pain, HA, fever, chills, dysuria, cough, sob, WORLEY. No dizziness/lightheadedness prior to or after fall.  Surgical HX:  section x 2  PMHx:  Sjogren syndrome  Type 2 diabetes mellitus without complication, diet controlled  Ulcerative colitis   Hashimoto's disease/Hashimoto's thyroiditis  Fibromyalgia  Bipolar disorder   OCD  ALLERGIES: penicillin (Unknown)  Family Hx: Mother- CHF, MI. Father- EtOH cirrhosis  Social Hx: Denies smoking, etoh, drug use. ADL independent.   MEDICATIONS  (STANDING):  cholecalciferol 1000 Unit(s) Oral daily  dextrose 5%. 1000 milliLiter(s) (100 mL/Hr) IV Continuous <Continuous>  dextrose 5%. 1000 milliLiter(s) (50 mL/Hr) IV Continuous <Continuous>  dextrose 50% Injectable 25 Gram(s) IV Push once  dextrose 50% Injectable 25 Gram(s) IV Push once  dextrose 50% Injectable 12.5 Gram(s) IV Push once  fluvoxaMINE 100 milliGRAM(s) Oral at bedtime  glucagon  Injectable 1 milliGRAM(s) IntraMuscular once  hydroxychloroquine 400 milliGRAM(s) Oral daily  insulin lispro (ADMELOG) corrective regimen sliding scale   SubCutaneous three times a day before meals  lamoTRIgine 150 milliGRAM(s) Oral at bedtime  levothyroxine 75 MICROGram(s) Oral daily  lurasidone 80 milliGRAM(s) Oral daily  melatonin 5 milliGRAM(s) Oral at bedtime  mesalamine DR Capsule 800 milliGRAM(s) Oral two times a day  sodium chloride 0.9%. 1000 milliLiter(s) (100 mL/Hr) IV Continuous <Continuous>  vitamin B complex with vitamin C 1 Tablet(s) Oral daily  MEDICATIONS  (PRN):  dextrose Oral Gel 15 Gram(s) Oral once PRN Blood Glucose LESS THAN 70 milliGRAM(s)/deciliter  oxyCODONE    IR 10 milliGRAM(s) Oral every 4 hours PRN Severe Pain (7 - 10)  oxyCODONE    IR 5 milliGRAM(s) Oral every 4 hours PRN Moderate Pain (4 - 6)  polyethylene glycol 3350 17 Gram(s) Oral Once PRN Constipation  senna 2 Tablet(s) Oral at bedtime PRN Constipation  traMADol 50 milliGRAM(s) Oral Once PRN Mild Pain (1 - 3)  PHYSICAL EXAM:  T(C): 36.9 (24 @ 19:51), Max: 36.9 (-- @ 19:51)  HR: 79 (24 @ 19:51) (79 - 79)  BP: 126/89 (-- @ 19:51) (126/89 - 126/89)  RR: 18 (24 @ 19:51) (18 - 18)  Gen: NAD, Resting comfortably  HEENT: NCAT, EOMI, PERRLA  Cardiac: RRR, +s1/s2  RESP: no w/r/r, CTABL  GI: soft, NT, ND, +BS  EXT: LLE in dressing placed by ortho team  LABS:                      13.0   8.08  )-----------( 258      ( 30 Aug 2024 22:34 )             39.0   Glucose 110  K 4.3  Na 141 Chloride 109  CO2 30  Anion gap 2 BUN/Cr 13/ 1.05  Ca 9.3  Hcg<1  IMAGIN.   Comminuted fracture distal fibula with lateral angulation. Associated  syndesmotic injury with widening at the distal tibiotalar joint.  2.   Mildly displaced fracture corner of the posterior malleolus. Avulsion  fracture tip medial malleolus.    A/P: 55yo F who presents s/p mechanical fall, found to have :  #Left ankle fracture  Medicine to follow on consult  EKG NSR, no STT abnormality  CXR w/o consolidation or effusion on prelim read  Plan for OR tomorrow with Dr. Art for L ankle ORIF  Keep patient NPO after midnight except meds for OR tomorrow  Chemical DVT ppx per ortho  Analgesia per ortho  NWB LLE in splint by ortho in ED  Ice and elevate as tolerated  Pt medically optimized for OR with RCRI class 1    #BPD/OCD  c/w lamotrigine  c/w latuda   c/w Luvox    #Sjogrens  c/w plaquenil    #h/o Hashimotos  c/w levothyroxine    #Ulcerative colitis  c/w mesalamine

## 2024-08-30 NOTE — ED STATDOCS - CLINICAL SUMMARY MEDICAL DECISION MAKING FREE TEXT BOX
Patient with a closed Black C fracture of the left fibula, neurovascular intact, compartments soft no concern for compartment syndrome, discussed with orthopedics and medicine service who admitted to hospital for surgical repair.

## 2024-08-30 NOTE — ED STATDOCS - WR ORDER DATE AND TIME 1
Your Child's Health  7-8 Year-Old Visit      Katherine Grady  January 22, 2024    Visit Vitals  /63 (BP Location: RUE - Right upper extremity, Patient Position: Sitting)   Pulse 80   Temp 97.3 °F (36.3 °C) (Temporal)   Ht 4' 2.59\" (1.285 m)   Wt 27.3 kg (60 lb 3.2 oz)   BMI 16.54 kg/m²     Weight: 60.2 lbs      YOUR CHILD'S 7 and 8 YEAR-OLD VISITS      School / Development / Behavior   Children should be well-adjusted in their school setting this at age. Success in school depends on several skills--communication skills, cooperation, attention, cognitive ability. Problems in one area may affect a child’s overall school experience. It is very important to stay in touch with teachers in order to identify and address any problems as soon as they are recognized. To help your child learn well, be sure they are well rested and have a healthy breakfast every morning.    Children need to be in school if they are going to learn and advance. Missing school frequently will lead to a lot of problems for a child; this needs to be addressed if it is happening. If your child receives any extra services through an IEP, make sure the IEP is reviewed regularly and updated if needed.     With increasing age comes increasing opportunities for activities outside of school (sports, arts, scouting).  Being part of a peer group becomes more important to children as they are growing older. They may encounter friends with different values and beliefs. Discuss differences openly with your children to help them start to understand the diversity of our society. Always listen without interrupting. Your children may still need reminding of the rules and expectations which you have for them, but they are developing more of a conscience and awareness of right and wrong which will affect how they view rules and social expectations. Discuss what consequences are for not following family rules--make sure they are reasonable and be consistent  in enforcing them.     Children should have some definite responsibilities at this age. Simple household tasks like making their bed, setting the table, helping with meals or simple household chores should be an expectation. Tell your child that you notice and appreciate what they are doing so that they become more confident and ready to take on more responsibility as time goes by. Children are motivated to do well when their parents provide a lot of encouragement. Make sure to find time every day for just talking with your children (no TV, no phone, no music!). Be a good role model for them by always acting responsibly, keep promises, and being on time.     Ask your child if they feel safe at school. Bullying is common--it is difficult to know exactly how common it is, but most children probably experience some bullying during their school years. Bullying hurts everyone--the child who is bullied, children who witness it, and the person doing the bullying (those children are likely to develop long term behavior and self-esteem issues). Children should know to report to you and/or teachers if they are being teased or bullied or if they witness another child being bullied. Bullying in schools (or anywhere) should not be tolerated. Talk to teachers, administrators or guidance counselors at the school to help with this issue. Good online resources regarding bullying are Stop[place] Hillsllying.gov and the American Academy of Pediatrics \"HealthyChildren.org\" website (search for \"Bullying\"). These websites include guidelines that may be useful to both parents and children.      Health and Safety in (and out of!) the Home  Smoking: Continue to protect your child from cigarette smoke; secondhand smoke increases their risk of heart and lung disease. Vapors from e-cigarettes may also be harmful, so do not use those in your home or around children. If you smoke and are ready to consider quitting, talk to your doctor. Nicotine replacement  products can be very helpful in breaking this tough addiction. 1-800-QUIT-NOW is a national help line that can help you find resources; other resources can be found at cdc.gov.    Safety on the Internet: Just as you would not allow your child to go anywhere they want outside, they should not be allowed to “wander” the internet on their own. Keep the computer where you can observe your child’s use. Make sure you know how to check the internet history and do it regularly. If possible, set up a safety filter which will prevent your child from accessing inappropriate sites.      Dental Health: Your child should be brushing at least twice daily for 2 minutes at a time with a pea-sized amount of regular (fluoridated) toothpaste. Make flossing a regular part of their dental routine at this age. Most children need a parent’s help to make sure all of their back teeth are brushed well until they are ~8 years old. Hopefully they have seen a dentist by this age; look for a one now if you do not already have one. Let our office know if you use water from a private well; testing for fluoride content is recommended to determine if fluoride supplements are needed. Limiting candy, other sweets, juice and sticky/chewy foods remains important for their dental (and overall!) health.    Healthy Eating: Eat meals together as a family and talk during meals. (Leave the television off and do not allow phones or other electronics at the table.) For in between meals, keep nutritious choices around for snack times (fresh fruits and vegetables, string cheese, whole-grain crackers, yogurt, hard-boiled eggs, nuts).School-age children need 3 servings of good sources of calcium daily; this can include lowfat (or skim) milk, yogurt, low fat cheese or foods which have been fortified with calcium.  They should also get 600 IU of vitamin D daily which (along with appropriate calcium intake) ensures good bone health. Most people cannot meet their  vitamin D needs with their usual diet, so a multivitamin with iron supplement is a good way to get it. (A pure vitamin D supplement with 400 or 600 IU is also okay.)  Protect your child from the problems of overweight and obesity by teaching them that healthy choices are important, as are continuing to avoid unhealthy choices like greasy fast food, bagged snacks, sodas, sweetened drinks, juice, candy and sweets. Don’t keep these types of food in your home because your child will find them! If you give your child juice, give them no more than 6 to 8 ounces of 100% fruit juice daily. (Remember that eating fruit is a lot healthier than drinking it!) Also, don't allow snacking in front of the TV set--that is an unhealthy habit that is easier to prevent than change!    Healthy Activity: Set a goal of 60 minutes of physical activity every day--it can be all at once or broken up into shorter segments. Try to choose family activities as much as possible.  Do not overschedule your children. They may be tempted by lots of activities when their friends are participating in them, but they still need plenty of time for schoolwork, family time and some simple unstructured downtime.  Time sitting watching television, playing on the computer or using any form of electronic media is NOT physical activity. Set a time limit for media use each day (and enforce it!).  For suggestions on developing healthy media habits, go to the American Academy of Pediatrics \"HealthyChildren.org\" website and search for \"media use plan\".     Healthy Sleep: Children this age need 10 to 11 hours of sleep each night. Have a regular bedtime routine that does not involve electronic media (including TV) because screen time before bedtime is known to cause sleep problems. Develop a quiet routine that involves reading together or reading in bed for a short time before sleep.    Children this age should not have access to their electronic devices in their  bedroom at night. All electronic media use should be supervised.     Safety on the Road: Once your child weighs more than 40 pounds, they can start riding in a high-backed booster seat. They should ride properly secured in a booster seat in the back seat until they are 4 feet 9 inches tall. High-backed booster seats should be used if there are low seat backs or no head rests in your car; backless boosters can be used if your car has high seat backs and head rests.     Children (and their parents!) should wear properly fitted helmets when biking. Watch your children biking to determine how good their judgement is and set limits about where and when they can be biking based on what you see.     Safety in the Water: This is a good age for swimming lessons if your child is not yet a good swimmer. Even if they are comfortable swimming, they should always be supervised when swimming. They should always wear [employer] approved life jackets when on any sort of boat or watercraft. If you have access to a swimming pool where you live (in an apartment complex, neighborhood or your own yard), be sure it is fenced with a locked, self-closing, self-latching gate which prevents unsupervised access by children. Remember to use sunscreen with an SPF of 15 or higher when outside and reapply after time in the water.  Your child should avoid prolonged time in the sun between 11 AM and 3 PM and wear hats, sunglasses and sun protection clothing.    Personal Safety: A parent’s safety is just as important as a child’s safety. Violence is common in many people’s lives. If you do not feel safe in your home or if a partner has ever hit, kicked, shoved or physically hurt you or your child, it is important for you to get help. Talk to your doctors or a . In [place], resources include [facility] (088-636-3967) and the [facility] (24 hour hotline is 976- 125-8386); the National Domestic Violence  Hotline is 1-519-433-SKVS (7305).    Review with your child that certain body parts (the parts usually covered by a bathing suit) and behaviors are private. For safety purposes, make sure your child knows that they should never keep secrets from parents, and they should always report to you if any adult or older child shows any interest in their private parts (or if an older person discussed or shared their own private parts with a child). Tell them they should talk to you if any adult is doing or saying anything that makes them uncomfortable, especially if an adult is asking them to keep a secret.    Remind your child about he the importance of never opening the door to anyone they don’t know. Make sure your child always knows how to reach you and what to do in the case of a fire or other emergency. Teach your child about using “911”.     Guns and Firearms: Firearms in homes can pose a safety risk; if you need to keep a gun, be sure it is stored safely: locked, unloaded, with ammunition stored separately. Even the best-behaved children are curious--so make sure firearms are stored safely in your home and anywhere they visit. They are too young to be taught to safely handle a weapon. Teach them that if they ever see a gun, they should not touch it, they should leave the immediate area and they should tell an adult.    MEDICATION FOR FEVER OR PAIN:   Acetaminophen liquid (e.g., Tylenol or Tempra) may be given every four hours as needed for pain or fever.  Acetaminophen liquid is less concentrated than the infant dropper bottle type.  Be sure to check which product CONCENTRATION you are using.    CHILDREN’S Tylenol/Acetaminophen  (160 MG/5 mL)    Child’s Weight:  Dose:  36 - 47 pounds:    240 mg (7.5 mL (1 1/2 Teaspoons))  48 - 59 pounds:    320 mg (10.0 mL (2 Teaspoons))  60 - 71 pounds:    400 mg (12.5 mL (2 1/2 Teaspoons))  Greater than 72 pounds:   480 mg (15.0 mL (3 Teaspoons))    CHILDREN’S Tylenol/Acetaminophen  MELTAWAYS ( 80 MG tablets)    Child’s Weight:  Dose:  36 - 47 pounds:    240 mg (3 meltaway tablets)  48 - 59 pounds:    320 mg (4 meltaway tablets)  60 - 71 pounds:    400 mg (5 meltaway tablets)  Greater than 72 pounds:   480 mg (6 meltaway tablets)    Robin (Jr) Tylenol/Acetaminophen MELTAWAYS (160 MG tablets)    Child’s Weight:  Dose:  36 - 47 pounds:    240 mg (1 1/2 meltaway tablets)  48 - 59 pounds:    320 mg (2 meltaway tablets)  60 - 71 pounds:    400 mg (2 1/2 meltaway tablets)  Greater than 72 pounds:   480 mg (3 meltaway tablets)    CHILDREN'S Ibuprofen liquid (e.g., Advil or Motrin) may be given every six hours as needed for pain or fever.    Child’s Weight:  Dose:  36 - 47 pounds:    150 mg (1 1/2 Teaspoons)  48 - 59 pounds:    200 mg (2 Teaspoons)  60 - 71 pounds:    250 mg (2 1/2 Teaspoons)  Greater than 72 pounds:   300 mg (3 Teaspoons)    NEXT VISIT:  IN 1 YEAR      Thank you for entrusting your care to [facility].    Also, check out “Children’s Health” on the [facility] Blog for updates on timely topics regarding children’s health!  Optometry resources:  Children who can cooperative with an eye exam (4 yo or greater)    [facility]  [phone]  [phone]    [facility]  [phone]    [facility]  [place]  [phone]    [facility]  [employer]  [phone]    [facility]  Multiple Locations--[facility]  [phone] ([place])     30-Aug-2024 20:12

## 2024-08-30 NOTE — ED STATDOCS - PHYSICAL EXAMINATION
Patient awake, alert, orient x 3  Left ankle tenderness palpation over the bilateral malleoli, no obvious deformity, does have some swelling, neurovascularly intact, compartments are soft  Small abrasion overlying the left ankle, otherwise no other skin changes, no wounds

## 2024-08-30 NOTE — H&P ADULT - HISTORY OF PRESENT ILLNESS
Patient is a 54yFemale who presents to [place] ED w/ a c/o of left ankle pain. Patient states that she was walking down the stairs when she missed the last step and tripped and fell. Denies HS/LOC. States inability to walk immediately following the injury. Denies any numbness or tingling. Denies having any other pain elsewhere. Hx of right femoral shaft fx treated nonop. No other orthopedic concerns at this time.     delivery delivered    Schizo-affective schizophrenia    Other forms of systemic lupus erythematosus, unspecified organ involvement status    Type 2 diabetes mellitus without complication, unspecified long term insulin use status    Ulcerative colitis with rectal bleeding, unspecified location    Hashimoto's disease    Retinal vasculitis, unspecified laterality    Lupus    LE (lupus erythematosus)    Hashimoto's thyroiditis    Fibromyalgia    Retinal vasculitis            penicillin (Unknown)      PHYSICAL EXAM:  T(C): 36.9 (08-30-24 @ 19:51), Max: 36.9 (08-30-24 @ 19:51)  HR: 79 (08-30-24 @ 19:51) (79 - 79)  BP: 126/89 (08-30-24 @ 19:51) (126/89 - 126/89)  RR: 18 (08-30-24 @ 19:51) (18 - 18)  SpO2: --    Gen: NAD, Resting comfortably    LLE:  Skin intact, with mild swelling and ecchymosis of ankle  TTP around lateral malleolus  +EHL/FHL/TA/GSC  +SILT L3-S1  + DP  Compartments soft and compressible  No calf tenderness    Secondary Survey:   RLE/RUE/LUE: No TTP over bony prominences, SILT, palpable pulses, full/painless range of motion, compartments soft    Spine: No bony tenderness. No palpable stepoffs.    Imaging: L bimall equivalent fracture, personal read    A/P: 54F who presents with L bimalleolar equivalent ankle fracture    Plan for OR tomorrow with Dr. Art for L ankle ORIF  Keep patient NPO after midnight for OR tomorrow  Hold chemical DVT ppx after midnight for OR tomorrow  Analgesia  NWB LLE in splint  Ice and elevate as tolerated  Discussed with attending who is in agreement with above plan

## 2024-08-30 NOTE — ED STATDOCS - OBJECTIVE STATEMENT
54-year-old female with a past medical history of retinal vasculitis, fibromyalgia, Hashimoto's thyroiditis, lupus, ulcerative colitis, schizoaffective disorder, who presents with chief complaint of left ankle injury.  Patient states that she was coming down some stairs, rolled her left ankle.  Endorses pain and difficulty ambulating secondary to this.  She denies any other injuries at this time.  No medication taken prior to arrival.

## 2024-08-30 NOTE — ED ADULT NURSE NOTE - OBJECTIVE STATEMENT
Pt present to the ED c/o left ankle injury. Pt reports falling down two steps and fell forward, -head strike, -LOC, -blood thinners. Pt reports endorsing left ankle pain from the fall, swelling noted on left ankle and abrasion noted to left elbow. Pt denies numbing and tingling of ankle and denies any other complaints at this time.

## 2024-08-30 NOTE — ED STATDOCS - PROGRESS NOTE DETAILS
orthopedics and patient made aware of XR findings. Pt to be evaluated in ED. - Selvin Franco PA-C 55 y/o F with PMH of lupus, ulcerative colitis, schizoaffective d/o presents with left ankle injury. pt states she tripped down stairs, roller her ankle. Has been unable to ambulate since that time. No other reported injuries. PE: Well appearing. Cardiac: s1s2, RRR. lungs: CTAB. Abdomen:NBS x4 soft, nontender. MSK: +edema, ecchymosis left lateral ankle. +TTP lateral left leg above lateral malleolus. Sensation intact to light touch in lower extremities. 5/5 strength in lower extremities. A/P: r/o fracture, plan for analgesia, XR, reassess. - Selvin Franco PA-C

## 2024-08-30 NOTE — ED ADULT TRIAGE NOTE - CHIEF COMPLAINT QUOTE
Pt BIBEMS co L ankle pain with difficulty ambulating s/p trip and fall down 2 steps. Pt endorsing small abrasion and some swelling to L ankle and abrasion to Left elbow. Denies head strike, LOC, blood thinners, CP, SOB, N/V. -PMHx, +allergies, AOx4. GCS 15.

## 2024-08-31 LAB
A1C WITH ESTIMATED AVERAGE GLUCOSE RESULT: 5.4 % — SIGNIFICANT CHANGE UP (ref 4–5.6)
ANION GAP SERPL CALC-SCNC: 3 MMOL/L — LOW (ref 5–17)
ANION GAP SERPL CALC-SCNC: 4 MMOL/L — LOW (ref 5–17)
APTT BLD: 32.5 SEC — SIGNIFICANT CHANGE UP (ref 24.5–35.6)
BLD GP AB SCN SERPL QL: SIGNIFICANT CHANGE UP
BUN SERPL-MCNC: 11 MG/DL — SIGNIFICANT CHANGE UP (ref 7–23)
BUN SERPL-MCNC: 15 MG/DL — SIGNIFICANT CHANGE UP (ref 7–23)
CALCIUM SERPL-MCNC: 8.5 MG/DL — SIGNIFICANT CHANGE UP (ref 8.5–10.1)
CALCIUM SERPL-MCNC: 8.8 MG/DL — SIGNIFICANT CHANGE UP (ref 8.5–10.1)
CHLORIDE SERPL-SCNC: 111 MMOL/L — HIGH (ref 96–108)
CHLORIDE SERPL-SCNC: 112 MMOL/L — HIGH (ref 96–108)
CO2 SERPL-SCNC: 25 MMOL/L — SIGNIFICANT CHANGE UP (ref 22–31)
CO2 SERPL-SCNC: 26 MMOL/L — SIGNIFICANT CHANGE UP (ref 22–31)
CREAT SERPL-MCNC: 0.93 MG/DL — SIGNIFICANT CHANGE UP (ref 0.5–1.3)
CREAT SERPL-MCNC: 0.93 MG/DL — SIGNIFICANT CHANGE UP (ref 0.5–1.3)
EGFR: 74 ML/MIN/1.73M2 — SIGNIFICANT CHANGE UP
EGFR: 74 ML/MIN/1.73M2 — SIGNIFICANT CHANGE UP
ESTIMATED AVERAGE GLUCOSE: 108 MG/DL — SIGNIFICANT CHANGE UP (ref 68–114)
GLUCOSE BLDC GLUCOMTR-MCNC: 101 MG/DL — HIGH (ref 70–99)
GLUCOSE BLDC GLUCOMTR-MCNC: 129 MG/DL — HIGH (ref 70–99)
GLUCOSE BLDC GLUCOMTR-MCNC: 90 MG/DL — SIGNIFICANT CHANGE UP (ref 70–99)
GLUCOSE SERPL-MCNC: 113 MG/DL — HIGH (ref 70–99)
GLUCOSE SERPL-MCNC: 99 MG/DL — SIGNIFICANT CHANGE UP (ref 70–99)
HCG SERPL-ACNC: <1 MIU/ML — SIGNIFICANT CHANGE UP
HCT VFR BLD CALC: 35.1 % — SIGNIFICANT CHANGE UP (ref 34.5–45)
HCT VFR BLD CALC: 38.1 % — SIGNIFICANT CHANGE UP (ref 34.5–45)
HGB BLD-MCNC: 12 G/DL — SIGNIFICANT CHANGE UP (ref 11.5–15.5)
HGB BLD-MCNC: 12.4 G/DL — SIGNIFICANT CHANGE UP (ref 11.5–15.5)
INR BLD: 0.98 RATIO — SIGNIFICANT CHANGE UP (ref 0.85–1.18)
MCHC RBC-ENTMCNC: 29.8 PG — SIGNIFICANT CHANGE UP (ref 27–34)
MCHC RBC-ENTMCNC: 30.5 PG — SIGNIFICANT CHANGE UP (ref 27–34)
MCHC RBC-ENTMCNC: 32.5 GM/DL — SIGNIFICANT CHANGE UP (ref 32–36)
MCHC RBC-ENTMCNC: 34.2 GM/DL — SIGNIFICANT CHANGE UP (ref 32–36)
MCV RBC AUTO: 89.3 FL — SIGNIFICANT CHANGE UP (ref 80–100)
MCV RBC AUTO: 91.6 FL — SIGNIFICANT CHANGE UP (ref 80–100)
PLATELET # BLD AUTO: 236 K/UL — SIGNIFICANT CHANGE UP (ref 150–400)
PLATELET # BLD AUTO: 257 K/UL — SIGNIFICANT CHANGE UP (ref 150–400)
POTASSIUM SERPL-MCNC: 3.8 MMOL/L — SIGNIFICANT CHANGE UP (ref 3.5–5.3)
POTASSIUM SERPL-MCNC: 4.3 MMOL/L — SIGNIFICANT CHANGE UP (ref 3.5–5.3)
POTASSIUM SERPL-SCNC: 3.8 MMOL/L — SIGNIFICANT CHANGE UP (ref 3.5–5.3)
POTASSIUM SERPL-SCNC: 4.3 MMOL/L — SIGNIFICANT CHANGE UP (ref 3.5–5.3)
PROTHROM AB SERPL-ACNC: 11.1 SEC — SIGNIFICANT CHANGE UP (ref 9.5–13)
RBC # BLD: 3.93 M/UL — SIGNIFICANT CHANGE UP (ref 3.8–5.2)
RBC # BLD: 4.16 M/UL — SIGNIFICANT CHANGE UP (ref 3.8–5.2)
RBC # FLD: 13.1 % — SIGNIFICANT CHANGE UP (ref 10.3–14.5)
RBC # FLD: 13.2 % — SIGNIFICANT CHANGE UP (ref 10.3–14.5)
SODIUM SERPL-SCNC: 140 MMOL/L — SIGNIFICANT CHANGE UP (ref 135–145)
SODIUM SERPL-SCNC: 141 MMOL/L — SIGNIFICANT CHANGE UP (ref 135–145)
WBC # BLD: 6 K/UL — SIGNIFICANT CHANGE UP (ref 3.8–10.5)
WBC # BLD: 6.22 K/UL — SIGNIFICANT CHANGE UP (ref 3.8–10.5)
WBC # FLD AUTO: 6 K/UL — SIGNIFICANT CHANGE UP (ref 3.8–10.5)
WBC # FLD AUTO: 6.22 K/UL — SIGNIFICANT CHANGE UP (ref 3.8–10.5)

## 2024-08-31 PROCEDURE — 99232 SBSQ HOSP IP/OBS MODERATE 35: CPT

## 2024-08-31 RX ORDER — HYDROMORPHONE HYDROCHLORIDE 2 MG/1
0.5 TABLET ORAL
Refills: 0 | Status: DISCONTINUED | OUTPATIENT
Start: 2024-08-31 | End: 2024-08-31

## 2024-08-31 RX ORDER — SENNA 187 MG
2 TABLET ORAL AT BEDTIME
Refills: 0 | Status: DISCONTINUED | OUTPATIENT
Start: 2024-08-31 | End: 2024-08-31

## 2024-08-31 RX ORDER — SENNA 187 MG
2 TABLET ORAL AT BEDTIME
Refills: 0 | Status: DISCONTINUED | OUTPATIENT
Start: 2024-08-31 | End: 2024-09-01

## 2024-08-31 RX ORDER — ACETAMINOPHEN 325 MG/1
975 TABLET ORAL EVERY 8 HOURS
Refills: 0 | Status: DISCONTINUED | OUTPATIENT
Start: 2024-08-31 | End: 2024-09-01

## 2024-08-31 RX ORDER — CEFAZOLIN SODIUM 2 G/100ML
2000 INJECTION, SOLUTION INTRAVENOUS EVERY 8 HOURS
Refills: 0 | Status: COMPLETED | OUTPATIENT
Start: 2024-08-31 | End: 2024-09-01

## 2024-08-31 RX ORDER — OXYCODONE HYDROCHLORIDE 5 MG/1
10 TABLET ORAL EVERY 4 HOURS
Refills: 0 | Status: DISCONTINUED | OUTPATIENT
Start: 2024-08-31 | End: 2024-09-01

## 2024-08-31 RX ORDER — FOLIC ACID/MULTIVIT,IRON,MINER 0.4MG-18MG
1000 TABLET,CHEWABLE ORAL DAILY
Refills: 0 | Status: DISCONTINUED | OUTPATIENT
Start: 2024-08-31 | End: 2024-09-01

## 2024-08-31 RX ORDER — MAGNESIUM, ALUMINUM HYDROXIDE 200-225/5
30 SUSPENSION, ORAL (FINAL DOSE FORM) ORAL
Refills: 0 | Status: DISCONTINUED | OUTPATIENT
Start: 2024-08-31 | End: 2024-09-01

## 2024-08-31 RX ORDER — HYDROXYCHLOROQUINE SULFATE 200 MG/1
400 TABLET, FILM COATED ORAL DAILY
Refills: 0 | Status: DISCONTINUED | OUTPATIENT
Start: 2024-08-31 | End: 2024-09-01

## 2024-08-31 RX ORDER — DEXTROSE 15 G/33 G
25 GEL IN PACKET (GRAM) ORAL ONCE
Refills: 0 | Status: DISCONTINUED | OUTPATIENT
Start: 2024-08-31 | End: 2024-09-01

## 2024-08-31 RX ORDER — FLUVOXAMINE MALEATE 100 MG/1
100 TABLET ORAL AT BEDTIME
Refills: 0 | Status: DISCONTINUED | OUTPATIENT
Start: 2024-08-31 | End: 2024-09-01

## 2024-08-31 RX ORDER — LURASIDONE HYDROCHLORIDE 120 MG/1
80 TABLET ORAL DAILY
Refills: 0 | Status: DISCONTINUED | OUTPATIENT
Start: 2024-08-31 | End: 2024-09-01

## 2024-08-31 RX ORDER — POLYETHYLENE GLYCOL 3350 17 G/17G
17 POWDER, FOR SOLUTION ORAL AT BEDTIME
Refills: 0 | Status: DISCONTINUED | OUTPATIENT
Start: 2024-08-31 | End: 2024-09-01

## 2024-08-31 RX ORDER — ACETAMINOPHEN 325 MG/1
1000 TABLET ORAL ONCE
Refills: 0 | Status: DISCONTINUED | OUTPATIENT
Start: 2024-08-31 | End: 2024-09-01

## 2024-08-31 RX ORDER — TRAMADOL HYDROCHLORIDE 200 MG/1
50 TABLET, EXTENDED RELEASE ORAL EVERY 6 HOURS
Refills: 0 | Status: DISCONTINUED | OUTPATIENT
Start: 2024-08-31 | End: 2024-09-01

## 2024-08-31 RX ORDER — MESALAMINE 1.2 G/1
800 TABLET, DELAYED RELEASE ORAL
Refills: 0 | Status: DISCONTINUED | OUTPATIENT
Start: 2024-08-31 | End: 2024-08-31

## 2024-08-31 RX ORDER — LEVOTHYROXINE SODIUM 100 MCG
75 TABLET ORAL DAILY
Refills: 0 | Status: DISCONTINUED | OUTPATIENT
Start: 2024-08-31 | End: 2024-09-01

## 2024-08-31 RX ORDER — ONDANSETRON 2 MG/ML
4 INJECTION, SOLUTION INTRAMUSCULAR; INTRAVENOUS EVERY 6 HOURS
Refills: 0 | Status: DISCONTINUED | OUTPATIENT
Start: 2024-08-31 | End: 2024-09-01

## 2024-08-31 RX ORDER — GLUCAGON INJECTION, SOLUTION 1 MG/.2ML
1 INJECTION, SOLUTION SUBCUTANEOUS ONCE
Refills: 0 | Status: DISCONTINUED | OUTPATIENT
Start: 2024-08-31 | End: 2024-09-01

## 2024-08-31 RX ORDER — DEXTROSE 15 G/33 G
15 GEL IN PACKET (GRAM) ORAL ONCE
Refills: 0 | Status: DISCONTINUED | OUTPATIENT
Start: 2024-08-31 | End: 2024-09-01

## 2024-08-31 RX ORDER — ONDANSETRON 2 MG/ML
4 INJECTION, SOLUTION INTRAMUSCULAR; INTRAVENOUS ONCE
Refills: 0 | Status: DISCONTINUED | OUTPATIENT
Start: 2024-08-31 | End: 2024-08-31

## 2024-08-31 RX ORDER — ACETAMINOPHEN 325 MG/1
1000 TABLET ORAL ONCE
Refills: 0 | Status: COMPLETED | OUTPATIENT
Start: 2024-08-31 | End: 2024-08-31

## 2024-08-31 RX ORDER — MESALAMINE 1.2 G/1
800 TABLET, DELAYED RELEASE ORAL
Refills: 0 | Status: DISCONTINUED | OUTPATIENT
Start: 2024-08-31 | End: 2024-09-01

## 2024-08-31 RX ORDER — OXYCODONE HYDROCHLORIDE 5 MG/1
5 TABLET ORAL EVERY 4 HOURS
Refills: 0 | Status: DISCONTINUED | OUTPATIENT
Start: 2024-08-31 | End: 2024-09-01

## 2024-08-31 RX ORDER — LAMOTRIGINE 100 MG/1
150 TABLET, EXTENDED RELEASE ORAL AT BEDTIME
Refills: 0 | Status: DISCONTINUED | OUTPATIENT
Start: 2024-08-31 | End: 2024-09-01

## 2024-08-31 RX ORDER — CEFAZOLIN SODIUM 2 G/100ML
2000 INJECTION, SOLUTION INTRAVENOUS EVERY 8 HOURS
Refills: 0 | Status: DISCONTINUED | OUTPATIENT
Start: 2024-08-31 | End: 2024-08-31

## 2024-08-31 RX ORDER — ASPIRIN 81 MG
81 TABLET, DELAYED RELEASE (ENTERIC COATED) ORAL
Refills: 0 | Status: DISCONTINUED | OUTPATIENT
Start: 2024-09-01 | End: 2024-09-01

## 2024-08-31 RX ORDER — SODIUM CHLORIDE 9 MG/ML
1000 INJECTION INTRAMUSCULAR; INTRAVENOUS; SUBCUTANEOUS
Refills: 0 | Status: DISCONTINUED | OUTPATIENT
Start: 2024-08-31 | End: 2024-09-01

## 2024-08-31 RX ORDER — DEXTROSE 15 G/33 G
12.5 GEL IN PACKET (GRAM) ORAL ONCE
Refills: 0 | Status: DISCONTINUED | OUTPATIENT
Start: 2024-08-31 | End: 2024-09-01

## 2024-08-31 RX ADMIN — ACETAMINOPHEN 975 MILLIGRAM(S): 325 TABLET ORAL at 21:23

## 2024-08-31 RX ADMIN — ACETAMINOPHEN 400 MILLIGRAM(S): 325 TABLET ORAL at 05:08

## 2024-08-31 RX ADMIN — Medication 2 TABLET(S): at 21:28

## 2024-08-31 RX ADMIN — SODIUM CHLORIDE 100 MILLILITER(S): 9 INJECTION INTRAMUSCULAR; INTRAVENOUS; SUBCUTANEOUS at 12:00

## 2024-08-31 RX ADMIN — LURASIDONE HYDROCHLORIDE 80 MILLIGRAM(S): 120 TABLET ORAL at 01:48

## 2024-08-31 RX ADMIN — HYDROMORPHONE HYDROCHLORIDE 0.5 MILLIGRAM(S): 2 TABLET ORAL at 15:32

## 2024-08-31 RX ADMIN — LAMOTRIGINE 150 MILLIGRAM(S): 100 TABLET, EXTENDED RELEASE ORAL at 01:49

## 2024-08-31 RX ADMIN — FLUVOXAMINE MALEATE 100 MILLIGRAM(S): 100 TABLET ORAL at 21:24

## 2024-08-31 RX ADMIN — CEFAZOLIN SODIUM 2000 MILLIGRAM(S): 2 INJECTION, SOLUTION INTRAVENOUS at 21:23

## 2024-08-31 RX ADMIN — Medication 75 MICROGRAM(S): at 06:22

## 2024-08-31 RX ADMIN — Medication 5 MILLIGRAM(S): at 21:24

## 2024-08-31 RX ADMIN — SODIUM CHLORIDE 75 MILLILITER(S): 9 INJECTION INTRAMUSCULAR; INTRAVENOUS; SUBCUTANEOUS at 17:23

## 2024-08-31 RX ADMIN — HYDROMORPHONE HYDROCHLORIDE 0.5 MILLIGRAM(S): 2 TABLET ORAL at 15:42

## 2024-08-31 RX ADMIN — LAMOTRIGINE 150 MILLIGRAM(S): 100 TABLET, EXTENDED RELEASE ORAL at 21:25

## 2024-08-31 RX ADMIN — ACETAMINOPHEN 1000 MILLIGRAM(S): 325 TABLET ORAL at 05:38

## 2024-08-31 RX ADMIN — OXYCODONE HYDROCHLORIDE 5 MILLIGRAM(S): 5 TABLET ORAL at 10:14

## 2024-08-31 RX ADMIN — HYDROMORPHONE HYDROCHLORIDE 0.5 MILLIGRAM(S): 2 TABLET ORAL at 15:18

## 2024-08-31 RX ADMIN — OXYCODONE HYDROCHLORIDE 5 MILLIGRAM(S): 5 TABLET ORAL at 11:14

## 2024-08-31 RX ADMIN — Medication 1 TABLET(S): at 12:22

## 2024-08-31 RX ADMIN — HYDROXYCHLOROQUINE SULFATE 400 MILLIGRAM(S): 200 TABLET, FILM COATED ORAL at 12:22

## 2024-08-31 RX ADMIN — FLUVOXAMINE MALEATE 100 MILLIGRAM(S): 100 TABLET ORAL at 01:50

## 2024-08-31 RX ADMIN — OXYCODONE HYDROCHLORIDE 10 MILLIGRAM(S): 5 TABLET ORAL at 15:28

## 2024-08-31 NOTE — BRIEF OPERATIVE NOTE - ANTIBIOTIC PROTOCOL
Followed protocol
Detail Level: Zone
Quality 130: Documentation Of Current Medications In The Medical Record: Current Medications Documented
Quality 431: Preventive Care And Screening: Unhealthy Alcohol Use - Screening: Patient screened for unhealthy alcohol use using a single question and scores less than 2 times per year
Quality 226: Preventive Care And Screening: Tobacco Use: Screening And Cessation Intervention: Patient screened for tobacco use and is an ex/non-smoker

## 2024-08-31 NOTE — BRIEF OPERATIVE NOTE - NSICDXBRIEFPROCEDURE_GEN_ALL_CORE_FT
PROCEDURES:  Open reduction and internal fixation (ORIF) of trimalleolar fracture of ankle without fixation of posterior lip 31-Aug-2024 15:15:18 Left Augustin Hunter

## 2024-08-31 NOTE — PROGRESS NOTE ADULT - SUBJECTIVE AND OBJECTIVE BOX
· Subjective and Objective:   CC: Fall with ankle pain  HPI: Patient is a 54yFemale with a pmh/o Sjogrens, UC, Hashimotos thyroiditis, DMII diet controlled previously on metformin, Fibromyalgia, Bipolar disease, OCD, Brain developmental venous anomaly, who presents to [place] ED after fall. Pt states she was walking down the stairs when she missed the last step and tripped and fell with subsequent pain and inability to walk/get self up. Denies HS/LOC.    - plan for OR today. no cp, sob, dyspnea.  pain controlled      ROS: Left ankle pain. Denies cp, palpitations, LOC, Head strike, n/v/d, abd pain, HA, fever, chills, dysuria, cough, sob, [place]. No dizziness/lightheadedness prior to or after fall.    Vital Signs Last 24 Hrs  T(C): 36.5 (31 Aug 2024 08:00), Max: 36.9 (30 Aug 2024 19:51)  T(F): 97.7 (31 Aug 2024 08:00), Max: 98.4 (30 Aug 2024 19:51)  HR: 63 (31 Aug 2024 08:00) (63 - 79)  BP: 101/62 (31 Aug 2024 08:00) (101/62 - 126/89)  BP(mean): 78 (31 Aug 2024 04:00) (77 - 78)  RR: 18 (31 Aug 2024 08:00) (18 - 18)  SpO2: 96% (31 Aug 2024 08:00) (95% - 100%)    Parameters below as of 31 Aug 2024 08:00  Patient On (Oxygen Delivery Method): room air        Gen: NAD, Resting comfortably  HEENT: NCAT, EOMI, PERRLA  Cardiac: RRR, +s1/s2  RESP: no w/r/r, CTABL  GI: soft, NT, ND, +BS  EXT: LLE in dressing placed by ortho team    LABS:                                         12.0   6.00  )-----------( 257      ( 31 Aug 2024 04:40 )             35.1     08-    140  |  111<H>  |  15  ----------------------------<  99  3.8   |  25  |  0.93    Ca    8.8      31 Aug 2024 04:40            PT/INR - ( 31 Aug 2024 04:40 )   PT: 11.1 sec;   INR: 0.98 ratio         PTT - ( 31 Aug 2024 04:40 )  PTT:32.5 sec  Urinalysis Basic - ( 31 Aug 2024 04:40 )    Color: x / Appearance: x / SG: x / pH: x  Gluc: 99 mg/dL / Ketone: x  / Bili: x / Urobili: x   Blood: x / Protein: x / Nitrite: x   Leuk Esterase: x / RBC: x / WBC x   Sq Epi: x / Non Sq Epi: x / Bacteria: x        IMAGIN.   Comminuted fracture distal fibula with lateral angulation. Associated  syndesmotic injury with widening at the distal tibiotalar joint.  2.   Mildly displaced fracture corner of the posterior malleolus. Avulsion  fracture tip medial malleolus.    A/P: 55yo F who presents s/p mechanical fall, found to have :      #Left ankle fracture  Medicine to follow on consult  EKG NSR, no STT abnormality  CXR w/o consolidation or effusion on prelim read  Plan for OR todaywith Dr. Art for L ankle ORIF  Keep patient NPO after midnight except meds for OR today  Chemical DVT ppx per ortho  Analgesia per ortho  NWB LLE in splint by ortho in ED  Ice and elevate as tolerated  Pt medically optimized for OR with RCRI class 1    #BPD/OCD  c/w lamotrigine  c/w latuda   c/w Luvox    #Sjogrens  c/w plaquenil    #h/o Hashimotos  c/w levothyroxine    #Ulcerative colitis  c/w mesalamine

## 2024-08-31 NOTE — PROGRESS NOTE ADULT - SUBJECTIVE AND OBJECTIVE BOX
Orthopaedic Surgery: Post-Operative Note    Patient interviewed and examined at bedside, resting comfortably. Pain well-controlled. Denies any numbness or tingling in the Left lower extremity. Denies fevers, chills, headaches, chest pain, or shortness of breath. Patient tolerated procedure well.     VITAL SIGNS  T(C): 36.8 (08-31-24 @ 17:01), Max: 36.9 (08-30-24 @ 19:51)  HR: 70 (08-31-24 @ 17:01) (63 - 79)  BP: 128/72 (08-31-24 @ 17:01) (101/62 - 138/72)  RR: 18 (08-31-24 @ 17:01) (11 - 20)  SpO2: 94% (08-31-24 @ 17:01) (94% - 100%)      LABS:                        12.4   6.22  )-----------( 236      ( 31 Aug 2024 15:35 )             38.1     141  |  112<H>  |  11  ----------------------------<  113<H>  4.3   |  26  |  0.93    Ca    8.5      31 Aug 2024 15:35      PT/INR - ( 31 Aug 2024 04:40 )   PT: 11.1 sec;   INR: 0.98 ratio      PTT - ( 31 Aug 2024 04:40 )  PTT:32.5 sec        PHYSICAL EXAM  GEN: NAD  LLE:  Splint clean/dry/intact.   Motor function grossly intact in all five digits of the extremity; Unable to assess dorsiflexion or plantarflexion secondary to splint.   Sensory function grossly intact in all five digits of the extremity.   Capillary refill less than 2 seconds in all five digits of the foot.    Accessible compartments soft and compressible.       ASSESSMENT:  54y Female s/p Left Ankle ORIF on 8/31/24.     - Pain control.   - DVT ppx: Lovenox - Aspirin 81 mg BID. .   - NWB LLE in Splint.   - Keep Left ankle iced and elevated.   - PT/OT.   - Encourage incentive spirometry.   - DC planning: Home vs [facility] per PT recommendations.   - Will discuss with Dr. Art and will advise if plan changes.

## 2024-08-31 NOTE — PATIENT PROFILE ADULT - FALL HARM RISK - HARM RISK INTERVENTIONS
Assistance with ambulation/Assistance OOB with selected safe patient handling equipment/Communicate Risk of Fall with Harm to all staff/Discuss with provider need for PT consult/Monitor gait and stability/Reinforce activity limits and safety measures with patient and family/Tailored Fall Risk Interventions/Visual Cue: Yellow wristband and red socks/Bed in lowest position, wheels locked, appropriate side rails in place/Call bell, personal items and telephone in reach/Instruct patient to call for assistance before getting out of bed or chair/Non-slip footwear when patient is out of bed/[place] to call system/Physically safe environment - no spills, clutter or unnecessary equipment/Purposeful Proactive Rounding/Room/bathroom lighting operational, light cord in reach

## 2024-08-31 NOTE — PROGRESS NOTE ADULT - SUBJECTIVE AND OBJECTIVE BOX
Patient seen and examined at bedside. Patient reports pain well controlled on medications. No acute events overnight. Pt denies fevers, chills, new onset numbness, weakness or tingling in the extremities.    Vital Signs (24 Hrs):  T(C): 36.6 (08-31-24 @ 04:00), Max: 36.9 (08-30-24 @ 19:51)  HR: 65 (08-31-24 @ 04:00) (65 - 79)  BP: 109/63 (08-31-24 @ 04:00) (109/63 - 126/89)  RR: 18 (08-31-24 @ 04:00) (18 - 18)  SpO2: 95% (08-31-24 @ 04:00) (95% - 100%)  Wt(kg): --    LABS:                          12.0   6.00  )-----------( 257      ( 31 Aug 2024 04:40 )             35.1     08-31    140  |  111<H>  |  15  ----------------------------<  99  3.8   |  25  |  0.93    Ca    8.8      31 Aug 2024 04:40        PT/INR - ( 31 Aug 2024 04:40 )   PT: 11.1 sec;   INR: 0.98 ratio         PTT - ( 31 Aug 2024 04:40 )  PTT:32.5 sec    Physical Exam:  Gen: NAD, Resting comfortably    LLE:  Skin intact, with mild swelling and ecchymosis of ankle  TTP around lateral malleolus  +EHL/FHL/TA/GSC  +SILT L3-S1  + DP  Compartments soft and compressible  No calf tenderness      A/P: 54F who presents with L trimal ankle fracture    Plan for OR today with Dr. Art for L ankle ORIF  Keep patient NPO for OR today  Hold chemical DVT ppx for OR today  Analgesia  NWB LLE in splint  Ice and elevate as tolerated  Discussed with attending who is in agreement with above plan

## 2024-09-01 VITALS
DIASTOLIC BLOOD PRESSURE: 73 MMHG | SYSTOLIC BLOOD PRESSURE: 152 MMHG | OXYGEN SATURATION: 97 % | TEMPERATURE: 98 F | RESPIRATION RATE: 18 BRPM | HEART RATE: 90 BPM

## 2024-09-01 LAB
ANION GAP SERPL CALC-SCNC: 5 MMOL/L — SIGNIFICANT CHANGE UP (ref 5–17)
BUN SERPL-MCNC: 11 MG/DL — SIGNIFICANT CHANGE UP (ref 7–23)
CALCIUM SERPL-MCNC: 9 MG/DL — SIGNIFICANT CHANGE UP (ref 8.5–10.1)
CHLORIDE SERPL-SCNC: 109 MMOL/L — HIGH (ref 96–108)
CO2 SERPL-SCNC: 26 MMOL/L — SIGNIFICANT CHANGE UP (ref 22–31)
CREAT SERPL-MCNC: 0.76 MG/DL — SIGNIFICANT CHANGE UP (ref 0.5–1.3)
EGFR: 94 ML/MIN/1.73M2 — SIGNIFICANT CHANGE UP
GLUCOSE BLDC GLUCOMTR-MCNC: 92 MG/DL — SIGNIFICANT CHANGE UP (ref 70–99)
GLUCOSE SERPL-MCNC: 122 MG/DL — HIGH (ref 70–99)
HCT VFR BLD CALC: 37 % — SIGNIFICANT CHANGE UP (ref 34.5–45)
HGB BLD-MCNC: 12.2 G/DL — SIGNIFICANT CHANGE UP (ref 11.5–15.5)
MCHC RBC-ENTMCNC: 29.8 PG — SIGNIFICANT CHANGE UP (ref 27–34)
MCHC RBC-ENTMCNC: 33 GM/DL — SIGNIFICANT CHANGE UP (ref 32–36)
MCV RBC AUTO: 90.5 FL — SIGNIFICANT CHANGE UP (ref 80–100)
PLATELET # BLD AUTO: 237 K/UL — SIGNIFICANT CHANGE UP (ref 150–400)
POTASSIUM SERPL-MCNC: 4.5 MMOL/L — SIGNIFICANT CHANGE UP (ref 3.5–5.3)
POTASSIUM SERPL-SCNC: 4.5 MMOL/L — SIGNIFICANT CHANGE UP (ref 3.5–5.3)
RBC # BLD: 4.09 M/UL — SIGNIFICANT CHANGE UP (ref 3.8–5.2)
RBC # FLD: 12.9 % — SIGNIFICANT CHANGE UP (ref 10.3–14.5)
SODIUM SERPL-SCNC: 140 MMOL/L — SIGNIFICANT CHANGE UP (ref 135–145)
WBC # BLD: 7.75 K/UL — SIGNIFICANT CHANGE UP (ref 3.8–10.5)
WBC # FLD AUTO: 7.75 K/UL — SIGNIFICANT CHANGE UP (ref 3.8–10.5)

## 2024-09-01 PROCEDURE — 99232 SBSQ HOSP IP/OBS MODERATE 35: CPT

## 2024-09-01 RX ORDER — DOCUSATE CALCIUM 240 MG/1
1 CAPSULE ORAL
Qty: 14 | Refills: 0
Start: 2024-09-01 | End: 2024-09-07

## 2024-09-01 RX ORDER — OXYCODONE HYDROCHLORIDE 5 MG/1
1 TABLET ORAL
Qty: 28 | Refills: 0
Start: 2024-09-01 | End: 2024-09-07

## 2024-09-01 RX ORDER — ONDANSETRON 2 MG/ML
1 INJECTION, SOLUTION INTRAMUSCULAR; INTRAVENOUS
Qty: 21 | Refills: 0
Start: 2024-09-01 | End: 2024-09-07

## 2024-09-01 RX ORDER — ASPIRIN 81 MG
1 TABLET, DELAYED RELEASE (ENTERIC COATED) ORAL
Qty: 60 | Refills: 0
Start: 2024-09-01 | End: 2024-09-30

## 2024-09-01 RX ADMIN — ACETAMINOPHEN 975 MILLIGRAM(S): 325 TABLET ORAL at 05:51

## 2024-09-01 RX ADMIN — TRAMADOL HYDROCHLORIDE 50 MILLIGRAM(S): 200 TABLET, EXTENDED RELEASE ORAL at 03:37

## 2024-09-01 RX ADMIN — HYDROXYCHLOROQUINE SULFATE 400 MILLIGRAM(S): 200 TABLET, FILM COATED ORAL at 10:25

## 2024-09-01 RX ADMIN — Medication 81 MILLIGRAM(S): at 10:24

## 2024-09-01 RX ADMIN — Medication 1 TABLET(S): at 10:24

## 2024-09-01 RX ADMIN — Medication 1000 UNIT(S): at 10:24

## 2024-09-01 RX ADMIN — Medication 75 MICROGRAM(S): at 05:52

## 2024-09-01 RX ADMIN — TRAMADOL HYDROCHLORIDE 50 MILLIGRAM(S): 200 TABLET, EXTENDED RELEASE ORAL at 10:24

## 2024-09-01 RX ADMIN — TRAMADOL HYDROCHLORIDE 50 MILLIGRAM(S): 200 TABLET, EXTENDED RELEASE ORAL at 10:54

## 2024-09-01 RX ADMIN — TRAMADOL HYDROCHLORIDE 50 MILLIGRAM(S): 200 TABLET, EXTENDED RELEASE ORAL at 04:07

## 2024-09-01 RX ADMIN — CEFAZOLIN SODIUM 2000 MILLIGRAM(S): 2 INJECTION, SOLUTION INTRAVENOUS at 05:52

## 2024-09-01 NOTE — DISCHARGE NOTE NURSING/CASE MANAGEMENT/SOCIAL WORK - PATIENT PORTAL LINK FT
You can access the FollowMyHealth Patient Portal offered by [facility] by registering at the following website: [email or web address]. By joining [employer]’s FollowMyHealth portal, you will also be able to view your health information using other applications (apps) compatible with our system.

## 2024-09-01 NOTE — DISCHARGE NOTE NURSING/CASE MANAGEMENT/SOCIAL WORK - NSDCVIVACCINE_GEN_ALL_CORE_FT
influenza, injectable, quadrivalent, preservative free; 22-Oct-2018 12:26; Sarai Naylor (RN); [employer]; if146ui (Exp. Date: 30-Jun-2019); IntraMuscular; Deltoid Left.; 0.5 milliLiter(s); VIS (VIS Published: 07-Aug-2015, VIS Presented: 22-Oct-2018);

## 2024-09-01 NOTE — PROGRESS NOTE ADULT - SUBJECTIVE AND OBJECTIVE BOX
SUBJECTIVE:       Pt seen and examined at bedside. No acute events overnight. Patient doing well with no complaints overall. Reports pain well-controlled with medication. No CP, SOB, N/V, F/C, new N/T.    Vital Signs (24 Hrs):  T(C): 36.7 (09-01-24 @ 07:55), Max: 36.8 (08-31-24 @ 17:01)  HR: 65 (09-01-24 @ 07:55) (65 - 79)  BP: 131/80 (09-01-24 @ 07:55) (113/65 - 138/72)  RR: 18 (09-01-24 @ 07:55) (11 - 20)  SpO2: 97% (09-01-24 @ 07:55) (94% - 100%)  Wt(kg): --    LABS:                          12.2   7.75  )-----------( 237      ( 01 Sep 2024 07:46 )             37.0     09-01    140  |  109<H>  |  11  ----------------------------<  122<H>  4.5   |  26  |  0.76    Ca    9.0      01 Sep 2024 07:46        PT/INR - ( 31 Aug 2024 04:40 )   PT: 11.1 sec;   INR: 0.98 ratio         PTT - ( 31 Aug 2024 04:40 )  PTT:32.5 sec    PHYSICAL EXAM  GEN: NAD  LLE:  Splint clean/dry/intact.   Motor function grossly intact in all five digits of the extremity; Unable to assess dorsiflexion or plantarflexion secondary to splint.   Sensory function grossly intact in all five digits of the extremity.   Capillary refill less than 2 seconds in all five digits of the foot.    Accessible compartments soft and compressible.       ASSESSMENT:  54y Female s/p Left Ankle ORIF on 8/31/24.     - Pain control.   - DVT ppx: Lovenox - Aspirin 81 mg BID. .   - NWB LLE in Splint.   - Keep Left ankle iced and elevated.   - PT/OT.   - Encourage incentive spirometry.   - DC planning: Home vs [facility] per PT recommendations.   - Will discuss with Dr. Art and will advise if plan changes.

## 2024-09-01 NOTE — DISCHARGE NOTE PROVIDER - NSDCMRMEDTOKEN_GEN_ALL_CORE_FT
cholecalciferol 25 mcg (1000 intl units) oral tablet: 1 tab(s) orally once a day  fluvoxaMINE 100 mg oral tablet: 1 tab(s) orally once a day (at bedtime)  hydroxychloroquine 200 mg oral tablet: 2 tab(s) orally once a day  lamoTRIgine 100 mg oral tablet: 1.5 tab(s) orally once a day (at bedtime)  Latuda 40 mg oral tablet: 2 tab(s) orally once a day after dinner  levothyroxine 75 mcg (0.075 mg) oral tablet: 1 tab(s) orally once a day  mesalamine 800 mg oral delayed release tablet: 1 tab(s) orally 2 times a day  Vitamin B Complex oral tablet: 1 tab(s) orally once a day   aspirin 81 mg oral delayed release tablet: 1 tab(s) orally 2 times a day  cholecalciferol 25 mcg (1000 intl units) oral tablet: 1 tab(s) orally once a day  Colace 100 mg oral capsule: 1 cap(s) orally 2 times a day as needed for  constipation  fluvoxaMINE 100 mg oral tablet: 1 tab(s) orally once a day (at bedtime)  hydroxychloroquine 200 mg oral tablet: 2 tab(s) orally once a day  lamoTRIgine 100 mg oral tablet: 1.5 tab(s) orally once a day (at bedtime)  Latuda 40 mg oral tablet: 2 tab(s) orally once a day after dinner  levothyroxine 75 mcg (0.075 mg) oral tablet: 1 tab(s) orally once a day  mesalamine 800 mg oral delayed release tablet: 1 tab(s) orally 2 times a day  ondansetron 4 mg oral tablet: 1 tab(s) orally every 8 hours as needed for  nausea  oxyCODONE 5 mg oral tablet: 1 tab(s) orally every 6 hours as needed for  severe pain MDD: 4  Vitamin B Complex oral tablet: 1 tab(s) orally once a day

## 2024-09-01 NOTE — DISCHARGE NOTE PROVIDER - HOSPITAL COURSE
The patient is a 54y Female status post left ankle ORIF after sustaining trimalleolar ankle fracture. Patient presented to [facility] and, after being medically cleared for surgical procedure, was taken to the operating room on 8/31/2024. Prophylactic antibiotics were started before the procedure and continued for 24 hours. There were no complications during the procedure and patient tolerated the procedure well. The patient was transferred to the recovery room in stable condition and subsequently to the surgical floor. The patient was placed on Aspirin 81 for anticoagulation while in house. All home medications were continued. The patient received physical therapy daily and daily labs were followed. The dressing was kept clean, dry, intact. The rest of the hospital stay was unremarkable.

## 2024-09-01 NOTE — DISCHARGE NOTE PROVIDER - NSDCCPCAREPLAN_GEN_ALL_CORE_FT
PRINCIPAL DISCHARGE DIAGNOSIS  Diagnosis: Fracture of fibula, distal, left, closed  Assessment and Plan of Treatment:

## 2024-09-01 NOTE — DISCHARGE NOTE PROVIDER - NSDCCPTREATMENT_GEN_ALL_CORE_FT
PRINCIPAL PROCEDURE  Procedure: Open reduction and internal fixation (ORIF) of trimalleolar fracture of ankle without fixation of posterior lip  Findings and Treatment: Left

## 2024-09-01 NOTE — DISCHARGE NOTE PROVIDER - NSDCFUADDINST_GEN_ALL_CORE_FT
Discharge Instructions for Ankle ORIF:    1. ACTIVITY: Do not put weight on your left foot/ankle. Use crutches to mobilize. Daily PT.  2. CALL FOR: fever over 101, wound redness, drainage or open area, calf pain/calf swelling.  3. BANDAGE: Change dressing to a new bandage POD7. May change sooner if dressing saturated or falling off. DO NOT REMOVE BANDAGE TO CHECK WOUND ON INTAKE.  4. STAPLES: RN Remove Staples POD14   5. SHOWER: Okay to shower. Do not scrub dressing or submerge under water. No baths or hot tubs.   6. DVT PE Prophylaxis: See med rec.  7.  FOLLOW UP: Dr. Art in 2 weeks. Call to schedule.  8. MEDICATION: eRX sent to your pharmacy for  if you go home.   9.**Call office if medications not covered under your insurance, especially BLOOD CLOT PREVENTION/anticoagulant medication.

## 2024-09-01 NOTE — DISCHARGE NOTE PROVIDER - CARE PROVIDER_API CALL
HODAN HARDEN  [address] [place] [id number]  Phone: [phone]  Follow Up Time:

## 2024-09-08 DIAGNOSIS — E11.9 TYPE 2 DIABETES MELLITUS WITHOUT COMPLICATIONS: ICD-10-CM
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
## 2024-09-08 DIAGNOSIS — Z79.890 HORMONE REPLACEMENT THERAPY: ICD-10-CM

## 2024-09-08 DIAGNOSIS — K51.90 ULCERATIVE COLITIS, UNSPECIFIED, WITHOUT COMPLICATIONS: ICD-10-CM

## 2024-09-08 DIAGNOSIS — Z88.0 ALLERGY STATUS TO PENICILLIN: ICD-10-CM

## 2024-09-08 DIAGNOSIS — F42.9 OBSESSIVE-COMPULSIVE DISORDER, UNSPECIFIED: ICD-10-CM

## 2024-09-08 DIAGNOSIS — M32.8 OTHER FORMS OF SYSTEMIC LUPUS ERYTHEMATOSUS: ICD-10-CM

## 2024-09-08 DIAGNOSIS — M35.00 SJOGREN SYNDROME, UNSPECIFIED: ICD-10-CM

## 2024-09-08 DIAGNOSIS — F25.9 SCHIZOAFFECTIVE DISORDER, UNSPECIFIED: ICD-10-CM

## 2024-09-08 DIAGNOSIS — E06.3 AUTOIMMUNE THYROIDITIS: ICD-10-CM

## 2024-09-08 DIAGNOSIS — H35.069 RETINAL VASCULITIS, UNSPECIFIED EYE: ICD-10-CM
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
## 2024-09-08 DIAGNOSIS — M79.7 FIBROMYALGIA: ICD-10-CM

## 2024-09-08 DIAGNOSIS — S82.862A DISPLACED MAISONNEUVE'S FRACTURE OF LEFT LEG, INITIAL ENCOUNTER FOR CLOSED FRACTURE: ICD-10-CM

## 2024-09-11 ENCOUNTER — APPOINTMENT (OUTPATIENT)
Dept: ORTHOPEDIC SURGERY | Facility: CLINIC | Age: 54
End: 2024-09-11
Payer: MEDICAID

## 2024-09-11 VITALS — BODY MASS INDEX: 31.89 KG/M2 | HEIGHT: 63 IN | WEIGHT: 180 LBS

## 2024-09-11 DIAGNOSIS — Z98.890 OTHER SPECIFIED POSTPROCEDURAL STATES: ICD-10-CM

## 2024-09-11 DIAGNOSIS — S82.62XD DISPLACED FRACTURE OF LATERAL MALLEOLUS OF LEFT FIBULA, SUBSEQUENT ENCOUNTER FOR CLOSED FRACTURE WITH ROUTINE HEALING: ICD-10-CM

## 2024-09-11 DIAGNOSIS — S93.432A SPRAIN OF TIBIOFIBULAR LIGAMENT OF LEFT ANKLE, INITIAL ENCOUNTER: ICD-10-CM

## 2024-09-11 DIAGNOSIS — S93.432D SPRAIN OF TIBIOFIBULAR LIGAMENT OF LEFT ANKLE, SUBSEQUENT ENCOUNTER: ICD-10-CM

## 2024-09-11 DIAGNOSIS — Z87.81 OTHER SPECIFIED POSTPROCEDURAL STATES: ICD-10-CM

## 2024-09-11 PROCEDURE — 73610 X-RAY EXAM OF ANKLE: CPT | Mod: LT

## 2024-09-11 PROCEDURE — 29405 APPL SHORT LEG CAST: CPT | Mod: LT,58

## 2024-09-11 PROCEDURE — 99024 POSTOP FOLLOW-UP VISIT: CPT

## 2024-09-12 PROBLEM — S93.432D SYNDESMOTIC DISRUPTION OF LEFT ANKLE, SUBSEQUENT ENCOUNTER: Status: ACTIVE | Noted: 2024-09-12

## 2024-09-12 PROBLEM — S82.62XD: Status: ACTIVE | Noted: 2024-09-12

## 2024-09-12 PROBLEM — S93.432A SYNDESMOTIC DISRUPTION OF LEFT ANKLE, INITIAL ENCOUNTER: Status: ACTIVE | Noted: 2024-09-12

## 2024-09-12 NOTE — HISTORY OF PRESENT ILLNESS
[2] : 2 [] : yes [FreeTextEntry5] : 54 y.o patient is here for PO#1 of her left ankle today. DOS noted above. States recovery is going well with minimal pain. She is NWB with cutches and in a splint. [de-identified] : 8/31/24 [de-identified] : ORIF with placement of syndesmotic tight rope   left ankle

## 2024-09-12 NOTE — ASSESSMENT
[FreeTextEntry1] : The patient is 2 weeks s/p a left ankle ORIF and syndesmotic tight rope on 8/31/24. The patient denies fever, chills, CP, SOB. The patient denies drainage or discharge from their incision. The patient is doing well postoperatively. The patient has not needed pain medications. They deny new trauma or paresthesias. The patient has remained NWB and is using crutches. She presents in postoperative splint.   Left ankle exam: The patient is in no apparent distress. Neurovascularly intact. Sensation to left lower extremity. 2+ pulses to posterior tibialis. Operative incision is clean, dry, and intact. Staples removed and steris placed. No active drainage or discharge. No signs of infection or DVT. Tender to palpation to lateral joint and lateral lower extremity. Patient is able to wiggle toes and slightly plantarflex and dorsiflex.  X-rays done in the office today left ankle 3 views show the fracture to be healing in anatomic position with appropriate position of the hardware.  The syndesmosis is well reduced and there is no opening of the medial clear space.  There are no obvious tumors, mass or calcifications seen.  A short leg fiberglass cast was applied with the left ankle in the neutral position.  Patient tolerated the procedure well.  The plan at this time is ice and activity modification we will see her back in the office in 2 weeks for cast removal, x-rays 3 views of the left ankle, in a cam walker boot.

## 2024-09-12 NOTE — HISTORY OF PRESENT ILLNESS
[2] : 2 [] : yes [FreeTextEntry5] : 54 y.o patient is here for PO#1 of her left ankle today. DOS noted above. States recovery is going well with minimal pain. She is NWB with cutches and in a splint. [de-identified] : 8/31/24 [de-identified] : ORIF with placement of syndesmotic tight rope   left ankle

## 2024-09-18 NOTE — ED ADULT NURSE NOTE - HOW OFTEN DO YOU HAVE A DRINK CONTAINING ALCOHOL?
September 18, 2024    To Whom It May Concern:         This is confirmation that Kristie Luciano attended her scheduled appointment with Georgia Morin C.N.M. on 9/18/24. She is approximately 37 weeks and 1 day pregnant. Please allow her to take leave from work starting on Monday, 9/23/2024.          If you have any questions please do not hesitate to call me at the phone number listed below.    Sincerely,          Georgia Morin C.N.M.  [phone]                   Never

## 2024-09-25 ENCOUNTER — APPOINTMENT (OUTPATIENT)
Dept: ORTHOPEDIC SURGERY | Facility: CLINIC | Age: 54
End: 2024-09-25
Payer: MEDICAID

## 2024-09-25 DIAGNOSIS — Z87.81 OTHER SPECIFIED POSTPROCEDURAL STATES: ICD-10-CM

## 2024-09-25 DIAGNOSIS — S93.432D SPRAIN OF TIBIOFIBULAR LIGAMENT OF LEFT ANKLE, SUBSEQUENT ENCOUNTER: ICD-10-CM

## 2024-09-25 DIAGNOSIS — Z98.890 OTHER SPECIFIED POSTPROCEDURAL STATES: ICD-10-CM

## 2024-09-25 DIAGNOSIS — S82.62XD DISPLACED FRACTURE OF LATERAL MALLEOLUS OF LEFT FIBULA, SUBSEQUENT ENCOUNTER FOR CLOSED FRACTURE WITH ROUTINE HEALING: ICD-10-CM

## 2024-09-25 PROCEDURE — 73610 X-RAY EXAM OF ANKLE: CPT | Mod: LT

## 2024-09-25 PROCEDURE — 99024 POSTOP FOLLOW-UP VISIT: CPT

## 2024-09-27 NOTE — H&P ADULT - NS_MD_PANP_GEN_ALL_CORE
[Appropriately responsive] : appropriately responsive [Alert] : alert [No Acute Distress] : no acute distress [Soft] : soft [Non-tender] : non-tender [Non-distended] : non-distended [No HSM] : No HSM [No Lesions] : no lesions [No Mass] : no mass [Oriented x3] : oriented x3 [Examination Of The Breasts] : a normal appearance [No Masses] : no breast masses were palpable [Labia Majora] : normal [Labia Minora] : normal [Normal] : normal [Uterine Adnexae] : normal Attending and PA/NP shared services statement (NON-critical care):

## 2024-09-28 NOTE — ASSESSMENT
[FreeTextEntry1] : The patient comes in today for follow-up on her left ankle fracture.  She is now 1 month out from her injury involving a lateral malleolus fracture and syndesmosis disruption with placement of a plate and tight rope done on August 31, 2024.  Her cast was removed today.  She complains of minimal pain and swelling.  Examination of the left lower extremity reveals normal neurovascular exam.  Examination left ankle reveals slight limitation of range of motion in all directions.  The scar is well-healed.  There is no signs of infection or DVT.  There is no instability anterior drawer or talar tilt.  There is no pain with stress of the syndesmosis.  X-rays done in the office today of the left ankle 3 views show the fracture to be healing in anatomic position with appropriate position of the hardware.  There is no widening of the mortise or the syndesmosis.  There are no obvious tumors, mass or calcifications seen.  The plan at this time is a cam walker boot.  She can be weightbearing as tolerated.  She will start physical therapy for range of motion, modalities and strengthening.  I will see her back in the office in 1 month with x-rays 3 views of the left ankle.

## 2024-09-28 NOTE — HISTORY OF PRESENT ILLNESS
[2] : 2 [] : Post Surgical Visit: yes [FreeTextEntry5] : 54 y.o patient is here for PO#2 of her left ankle today. DOS noted above. States recovery is going well with minimal pain.  [de-identified] : 8/31/24 [de-identified] : ORIF with placement of syndesmotic tight rope   left ankle

## 2024-09-28 NOTE — HISTORY OF PRESENT ILLNESS
[2] : 2 [] : Post Surgical Visit: yes [FreeTextEntry5] : 54 y.o patient is here for PO#2 of her left ankle today. DOS noted above. States recovery is going well with minimal pain.  [de-identified] : 8/31/24 [de-identified] : ORIF with placement of syndesmotic tight rope   left ankle

## 2024-10-04 NOTE — ED BEHAVIORAL HEALTH ASSESSMENT NOTE - ABUSE / TRAUMA HISTORY
DASI score: 5.7 METS   DASI activity: walking, light to moderate activity  Loose teeth or denture: Denies  MP: Class 4
No

## 2024-10-28 ENCOUNTER — APPOINTMENT (OUTPATIENT)
Dept: ORTHOPEDIC SURGERY | Facility: CLINIC | Age: 54
End: 2024-10-28
Payer: MEDICAID

## 2024-10-28 VITALS — HEIGHT: 63 IN | BODY MASS INDEX: 31.89 KG/M2 | WEIGHT: 180 LBS

## 2024-10-28 DIAGNOSIS — S82.62XD DISPLACED FRACTURE OF LATERAL MALLEOLUS OF LEFT FIBULA, SUBSEQUENT ENCOUNTER FOR CLOSED FRACTURE WITH ROUTINE HEALING: ICD-10-CM

## 2024-10-28 PROCEDURE — 73610 X-RAY EXAM OF ANKLE: CPT | Mod: LT

## 2024-10-28 PROCEDURE — 99024 POSTOP FOLLOW-UP VISIT: CPT

## 2024-11-11 ENCOUNTER — NON-APPOINTMENT (OUTPATIENT)
Age: 54
End: 2024-11-11

## 2024-11-15 NOTE — ED ADULT NURSE REASSESSMENT NOTE - NS ED NURSE REASSESS COMMENT FT1
Lets see if we can get her in in January.    Pt resting comfortably at this time. Tolerated lunch well. Pt awaiting bed availability. Updated pt on POC and anticipated bed availability. Pt verbalizes understanding. Pt states she took her AM meds. Pt cont to be maintained on constant observation for safety.

## 2024-12-16 NOTE — BH TREATMENT PLAN - NSTXPSYCHOGOAL_PSY_ALL_CORE
Him/He
Will identify 2 coping skills that assist with focus on reality
Will identify 2 coping skills that assist with focus on reality

## 2025-01-06 NOTE — BH SAFETY PLAN - ENVIRONMENT SAFETY 1:
Patient noted on Candidacy List for Ambulatory Care Specialty Care Management program to optimize self management of  Heart Failure. Phoned patient for Second enrollment outreach attempt. Left voice mail requesting return call.    Taking my medications

## 2025-01-07 NOTE — ED ADULT TRIAGE NOTE - INTERNATIONAL TRAVEL
Return Office Visit     CHIEF COMPLAINT:    DM  Dyslipidemia   Vitamin D deficiency    HISTORY OF PRESENT ILLNESS:  Stanley Mosher is a 74 year old male who presents for follow up for DM.      DM HISTORY  He states he had \" borderline DM\" as a teenager.  He was on diabinese for a few years after he stopped.    The pills were resumed a few years later.    HISTORY OF DIABETES COMPLICATIONS: :  History of Retinopathy: No,  Eye exam: Oct 2024   History of Neuropathy: No  History of Nephropathy: Yes    ASSOCIATED COMPLICATIONS:    HTN: Yes  Hyperlipidemia: Yes  Coronary Artery Disease:  Yes  Cerebrovascular Disease: No  Has PVD      HOME GLUCOSE READINGS:   Has been not been using the gage   States it is not available  Gage 3 plus prescribed  Contour next test strips also prescribed      CURRENT DIABETIC MEDICATIONS INCLUDE:    Insulin 70/30 60 am and 60 pm with meals    T/f trulicity, bydureon and Victoza due to GI SE    Does not want to try ozempic      MEALS:  Moderate compliance      CURRENT MEDICATION:    Current Outpatient Medications   Medication Sig Dispense Refill    Continuous Glucose Sensor (FREESTYLE GAGE 3 PLUS SENSOR) Does not apply Misc 1 each every 15 (fifteen) days. 6 each 0    Glucose Blood (CONTOUR NEXT TEST) In Vitro Strip Check sugars twice a day E 11.65 with insulin use 200 each 0    Tadalafil 20 MG Oral Tab Take 1 tablet (20 mg total) by mouth daily as needed for Erectile Dysfunction. 30 tablet 4    rosuvastatin 40 MG Oral Tab Take 1 tablet (40 mg total) by mouth daily.      losartan 50 MG Oral Tab TAKE 1 AND 1/2 TABLET BY MOUTH DAILY 135 tablet 1    Insulin NPH & Regular (HUMULIN 70/30 KWIKPEN) (70-30) 100 UNIT/ML Subcutaneous Suspension Pen-injector INJECT 60 UNITS INTO THE SKIN DAILY WITH BREAKFAST AND 60 UNITS DAILY WITH DINNER 111 mL 1    Insulin Pen Needle (BD PEN NEEDLE BETHEL U/F) 32G X 4 MM Does not apply Misc INJECT TWICE A  each 1    metoprolol succinate  MG Oral Tablet 24 Hr  Take 1.5 tablets (150 mg total) by mouth daily. 135 tablet 1    ergocalciferol 1.25 MG (61335 UT) Oral Cap Take 1 capsule (50,000 Units total) by mouth once a week. 12 capsule 11    amLODIPine 10 MG Oral Tab Take 1 tablet (10 mg total) by mouth daily. 90 tablet 4    clopidogrel 75 MG Oral Tab Take 1 tablet (75 mg total) by mouth daily. 90 tablet 3    Ascorbic Acid (VITAMIN C) 1000 MG Oral Tab Take 0.5 tablets (500 mg total) by mouth daily.      aspirin 81 MG Oral Tab EC Take 1 tablet (81 mg total) by mouth daily.      calcium carbonate antacid 500 MG Oral Chew Tab Chew 2 tablets (1,000 mg total) by mouth 3 (three) times daily. (Patient taking differently: Chew 1 tablet (500 mg total) by mouth daily.) 90 tablet 0    multivitamin Oral Tab Take 1 tablet by mouth daily. 30 tablet 0         ALLERGY:  Allergies   Allergen Reactions    Victoza NAUSEA AND VOMITING    Trulicity [Dulaglutide] DIARRHEA and NAUSEA ONLY       PAST MEDICAL, SOCIAL AND FAMILY HISTORY:  See past medical history marked as reviewed.  See past surgical history marked as reviewed.  See past family history marked as reviewed.  See past social history marked as reviewed.    ASSESSMENTS:     REVIEW OF SYSTEMS:  Constitutional: Negative for:  fever, fatigue, cold/heat intolerance, weight change  Eyes: Negative for:  Visual changes, proptosis, blurring  ENT: Negative for:  dysphagia, neck swelling, dysphonia  Respiratory: Negative for:  dyspnea, cough  Cardiovascular: Negative for:  chest pain, palpitations, orthopnea  GI: Negative for:  abdominal pain, nausea, vomiting, diarrhea, constipation, bleeding  Neurology: Negative for: headache, numbness, weakness  Genito-Urinary: Negative for: dysuria, frequency  Psychiatric: Negative for:  depression, anxiety  Hematology/Lymphatics: Negative for: bruising, lower extremity edema  Endocrine: Negative for: polyuria, polydypsia  Skin: Negative for: rash, blister, cellulitis,       PHYSICAL EXAM:   Vitals:     01/07/25 1630   BP: (!) 169/72   Pulse: 58   Weight: 211 lb (95.7 kg)   Height: 5' 9\" (1.753 m)     BMI: Body mass index is 31.16 kg/m².         General Appearance:  alert, well developed, in no acute distress  Head: Atraumatic  Eyes:  normal conjunctivae, sclera., normal sclera and normal pupils  Throat/Neck: normal sound to voice. Normal hearing, normal speech  Respiratory:  Speaking in full sentences, non-labored. no increased work of breathing, no audible wheezing    Neuro: motor grossly intact, moving all extremities without difficulty  Psychiatric:  oriented to time, self, and place  Extremities: 9/2024  Bilateral barefoot  skin diabetic exam is normal, visualized feet and the appearance is normal.  Bilateral monofilament/sensation of both feet is normal.  Pulsation pedal pulse exam of both lower legs/feet is normal as well.        DATA:       A1c is 8.2 % ( 1/2025)    ASSESSMENT AND PLAN:    1. Type 2 DM: CKD, with hyperglycemia    Plan:  Discussed the pathogenesis, natural course of diabetes. Patient understands the importance of glycemic control and the implications of uncontrolled diabetes including Diabetic ketoacidosis and various micro vascular and macrovascular complications.      a). Medications:     A1c is elevated     Work on lifestyle changes    Insulin 70/30   60 --> 64 units with breakfast and   60--> 64  units with dinner      Continue to monitor BG  Call as discussed   Always call if Bg is under 80      b). Has Nephropathy. Discussed importance of good BG control.   c). Discussed importance of annual eye exams  d). Discussed daily foot exam   e). BG log maintainence explained in great detail, to get log and glucometer on next visit.  f). Life style changes discussed  g). Hypoglycemia recognition and management discussed    2. Patient’s BP is high today.  Is elevated on repeat testing.  Patient states that he is compliant with his medications.  States that blood pressure is likely high due to  being in the doctor's office.  States that he has a blood pressure machine at home and checks his blood pressure on a regular basis and it is normal under 140/90.  Discussed that he can check his blood pressure and send readings on MyChart and 1 to 2 weeks patient is agreeable.  3. Dyslipidemia  A) Discussed lifestyle modifications including reductions in dietary total and saturated fat, weight loss, aerobic exercise, and eating a diet rich in fruits and vegetables.  B) on rosuvastatin to  40 mg daily.  C) Also his TG cholesterol was elevated over 500  He declined, fenofibrate   Repeat TG is in the 200s  Good BG control  Fasting lipid panel  ordered  4. H/o hyperparathyroidism s/p sx in 12/2021  He was on calcium 500 mg daily now, will recheck    He is also on vit D 50,000 q week , vitamin D normal  DXA shows normal BMD  Recheck labs  RTC in three months  Call with BG as discussed                          Orders Placed This Encounter   Procedures    POC HemoCue Glucose 201 (Finger stick glucose)    Lipid Panel [E]    Vitamin D [E]    Calcium                              No

## 2025-01-07 NOTE — ED PROVIDER NOTE - IV ALTEPLASE DOOR HIDDEN
[Time Spent: ___ minutes] : I have spent [unfilled] minutes of time on the encounter which excludes teaching and separately reported services.
show

## 2025-01-09 ENCOUNTER — NON-APPOINTMENT (OUTPATIENT)
Age: 55
End: 2025-01-09

## 2025-01-24 NOTE — PROGRESS NOTE BEHAVIORAL HEALTH - NSBHFUPINTERVALHXFT_PSY_A_CORE
Refill requested for albuterol 108 (90 Base) MCG/ACT inhaler.    Last refilled 6/25/24.    Last office visit 6/25/24 with Dr. Espinoza. Saw Dr. Manuel on 9/10/24.     Plan from last visit note:  \"Mild persistent asthma  - Continue Rx's per allergy/immunology; refilled today  - Call if worsening\"    Routed to Dr. Espinoza.     Met with and evaluated patient.  Chart reviewed and case discussed in tx team meeting and with Dr. Lucia.  No significant interval events are reported, except patient c/o lower right quadrant pain, and Dr. Rosas, hospitalist reports will come and see patient. . Reports improved, stable mood, and that her coping skills are better now. She continues to report that groups have been very helpful to her.   She continues to report her current boyfriend is supportive, and she is in sobriety. and goes to AA regularly.   Reports went to AA on unit. Denies any SI or HI.  No Rx SE or sx TD/EPS are noted or reported.

## 2025-02-03 NOTE — PATIENT PROFILE BEHAVIORAL HEALTH - TEACHING/LEARNING LEARNING PREFERENCES
Chronic, ongoing.  Left submandibular lymph node larger than right side.  Started a couple years ago.  Nontender to palpation.  Denies unexplained weight loss.   audio written material/verbal instruction/audio

## 2025-02-04 ENCOUNTER — NON-APPOINTMENT (OUTPATIENT)
Age: 55
End: 2025-02-04

## 2025-03-02 ENCOUNTER — NON-APPOINTMENT (OUTPATIENT)
Age: 55
End: 2025-03-02

## 2025-03-07 ENCOUNTER — NON-APPOINTMENT (OUTPATIENT)
Age: 55
End: 2025-03-07

## 2025-03-07 ENCOUNTER — APPOINTMENT (OUTPATIENT)
Dept: ORTHOPEDIC SURGERY | Facility: CLINIC | Age: 55
End: 2025-03-07
Payer: COMMERCIAL

## 2025-03-07 DIAGNOSIS — M23.91 UNSPECIFIED INTERNAL DERANGEMENT OF RIGHT KNEE: ICD-10-CM

## 2025-03-07 DIAGNOSIS — M25.561 PAIN IN RIGHT KNEE: ICD-10-CM

## 2025-03-07 PROCEDURE — 20610 DRAIN/INJ JOINT/BURSA W/O US: CPT | Mod: RT

## 2025-03-07 PROCEDURE — 73564 X-RAY EXAM KNEE 4 OR MORE: CPT | Mod: RT

## 2025-03-07 PROCEDURE — 99204 OFFICE O/P NEW MOD 45 MIN: CPT | Mod: 25

## 2025-03-07 RX ORDER — CELECOXIB 200 MG/1
200 CAPSULE ORAL
Qty: 60 | Refills: 0 | Status: ACTIVE | COMMUNITY
Start: 2025-03-07 | End: 2025-04-06

## 2025-04-09 NOTE — ED ADULT NURSE NOTE - NS ED NOTE ABUSE SUSPICION NEGLECT YN
Ambulated patient to doorway of room and then back to bed. Sats dropped to 79% on RA, reported SOB and worsening pain. Encouraged deep breathing but sats did not improve. When back in bed, sats improved to low 90s on 3LPM NC. Patient positioned to best comfort, declines further needs. Provider aware.    No

## 2025-04-11 ENCOUNTER — APPOINTMENT (OUTPATIENT)
Dept: ORTHOPEDIC SURGERY | Facility: CLINIC | Age: 55
End: 2025-04-11
Payer: COMMERCIAL

## 2025-04-11 DIAGNOSIS — M23.91 UNSPECIFIED INTERNAL DERANGEMENT OF RIGHT KNEE: ICD-10-CM

## 2025-04-11 PROCEDURE — 99213 OFFICE O/P EST LOW 20 MIN: CPT

## 2025-05-09 ENCOUNTER — APPOINTMENT (OUTPATIENT)
Dept: ORTHOPEDIC SURGERY | Facility: CLINIC | Age: 55
End: 2025-05-09
Payer: COMMERCIAL

## 2025-05-09 DIAGNOSIS — M23.91 UNSPECIFIED INTERNAL DERANGEMENT OF RIGHT KNEE: ICD-10-CM

## 2025-05-09 PROCEDURE — 20610 DRAIN/INJ JOINT/BURSA W/O US: CPT | Mod: RT

## 2025-05-09 PROCEDURE — 99214 OFFICE O/P EST MOD 30 MIN: CPT | Mod: 25

## 2025-05-09 RX ORDER — DICLOFENAC SODIUM 10 MG/G
1 GEL TOPICAL DAILY
Qty: 1 | Refills: 0 | Status: ACTIVE | COMMUNITY
Start: 2025-05-09 | End: 1900-01-01

## 2025-05-19 NOTE — ED BEHAVIORAL HEALTH ASSESSMENT NOTE - IMPULSE CONTROL
Nutrition  Change feeds to 150 mL Real Food Blends or Home Blend + 550 mL water and give this 3 times per day via GT  Email: [email or web address] with your home blend recipes and nutrition info you like to use and we will make a plan for home blends, kcal goals  Continue same vitamins and minerals at this time    -Lab order given to you   Normal

## 2025-05-30 ENCOUNTER — APPOINTMENT (OUTPATIENT)
Dept: MRI IMAGING | Facility: CLINIC | Age: 55
End: 2025-05-30

## 2025-05-30 ENCOUNTER — OUTPATIENT (OUTPATIENT)
Dept: OUTPATIENT SERVICES | Facility: HOSPITAL | Age: 55
LOS: 1 days | End: 2025-05-30
Payer: MEDICAID

## 2025-05-30 DIAGNOSIS — M23.91 UNSPECIFIED INTERNAL DERANGEMENT OF RIGHT KNEE: ICD-10-CM

## 2025-05-30 DIAGNOSIS — Z00.8 ENCOUNTER FOR OTHER GENERAL EXAMINATION: ICD-10-CM

## 2025-05-30 PROCEDURE — 73721 MRI JNT OF LWR EXTRE W/O DYE: CPT

## 2025-05-30 PROCEDURE — 73721 MRI JNT OF LWR EXTRE W/O DYE: CPT | Mod: 26,RT

## 2025-06-12 ENCOUNTER — APPOINTMENT (OUTPATIENT)
Dept: ORTHOPEDIC SURGERY | Facility: CLINIC | Age: 55
End: 2025-06-12
Payer: COMMERCIAL

## 2025-06-12 PROCEDURE — 99214 OFFICE O/P EST MOD 30 MIN: CPT

## 2025-06-18 ENCOUNTER — OUTPATIENT (OUTPATIENT)
Dept: OUTPATIENT SERVICES | Facility: HOSPITAL | Age: 55
LOS: 1 days | End: 2025-06-18
Payer: COMMERCIAL

## 2025-06-18 ENCOUNTER — APPOINTMENT (OUTPATIENT)
Dept: MRI IMAGING | Facility: CLINIC | Age: 55
End: 2025-06-18

## 2025-06-18 DIAGNOSIS — G44.59 OTHER COMPLICATED HEADACHE SYNDROME: ICD-10-CM

## 2025-06-18 PROCEDURE — 70544 MR ANGIOGRAPHY HEAD W/O DYE: CPT | Mod: 26

## 2025-06-18 PROCEDURE — 70544 MR ANGIOGRAPHY HEAD W/O DYE: CPT

## 2025-06-19 ENCOUNTER — APPOINTMENT (OUTPATIENT)
Dept: RHEUMATOLOGY | Facility: CLINIC | Age: 55
End: 2025-06-19
Payer: COMMERCIAL

## 2025-06-19 ENCOUNTER — NON-APPOINTMENT (OUTPATIENT)
Age: 55
End: 2025-06-19

## 2025-06-19 VITALS
TEMPERATURE: 97.6 F | OXYGEN SATURATION: 96 % | DIASTOLIC BLOOD PRESSURE: 70 MMHG | SYSTOLIC BLOOD PRESSURE: 116 MMHG | WEIGHT: 180 LBS | HEART RATE: 76 BPM | BODY MASS INDEX: 31.89 KG/M2 | HEIGHT: 63 IN

## 2025-06-19 PROCEDURE — 99214 OFFICE O/P EST MOD 30 MIN: CPT

## 2025-06-19 PROCEDURE — G2211 COMPLEX E/M VISIT ADD ON: CPT | Mod: NC

## 2025-06-19 RX ORDER — TOPIRAMATE 25 MG
25 CAPSULE, SPRINKLE ORAL
Refills: 0 | Status: ACTIVE | COMMUNITY

## 2025-06-19 RX ORDER — GABAPENTIN 100 MG
100 TABLET ORAL
Refills: 0 | Status: ACTIVE | COMMUNITY

## 2025-06-19 RX ORDER — FEZOLINETANT 45 MG/1
TABLET, FILM COATED ORAL
Refills: 0 | Status: ACTIVE | COMMUNITY

## 2025-06-20 ENCOUNTER — LABORATORY RESULT (OUTPATIENT)
Age: 55
End: 2025-06-20

## 2025-06-24 LAB
24R-OH-CALCIDIOL SERPL-MCNC: 26.4 PG/ML
25(OH)D3 SERPL-MCNC: 24.8 NG/ML
ALBUMIN MFR SERPL ELPH: 65.8 %
ALBUMIN SERPL ELPH-MCNC: 4.6 G/DL
ALBUMIN SERPL-MCNC: 4.4 G/DL
ALBUMIN/GLOB SERPL: 1.9 RATIO
ALP BLD-CCNC: 129 U/L
ALPHA1 GLOB MFR SERPL ELPH: 3.9 %
ALPHA1 GLOB SERPL ELPH-MCNC: 0.3 G/DL
ALPHA2 GLOB MFR SERPL ELPH: 10.1 %
ALPHA2 GLOB SERPL ELPH-MCNC: 0.7 G/DL
ALT SERPL-CCNC: 25 U/L
ANA TITR SER: ABNORMAL
ANA TITR SER: ABNORMAL
ANCA AB SER-IMP: ABNORMAL
ANION GAP SERPL CALC-SCNC: 16 MMOL/L
APPEARANCE: CLEAR
AST SERPL-CCNC: 25 U/L
B BURGDOR AB SER-IMP: NEGATIVE
B BURGDOR IGG+IGM SER QL: 0.06 INDEX
B-GLOBULIN MFR SERPL ELPH: 11.2 %
B-GLOBULIN SERPL ELPH-MCNC: 0.8 G/DL
BASOPHILS # BLD AUTO: 0.04 K/UL
BASOPHILS NFR BLD AUTO: 0.6 %
BILIRUB SERPL-MCNC: 0.3 MG/DL
BILIRUBIN URINE: NEGATIVE
BLOOD URINE: NEGATIVE
BUN SERPL-MCNC: 16 MG/DL
C-ANCA SER-ACNC: NEGATIVE
C3 SERPL-MCNC: 149 MG/DL
C4 SERPL-MCNC: 28 MG/DL
CALCIUM SERPL-MCNC: 9.7 MG/DL
CCP AB SER IA-ACNC: <8 U/ML
CCP AB SER QL: NEGATIVE
CENTROMERE B AB SER-ACNC: <0.2 AL
CHLORIDE SERPL-SCNC: 107 MMOL/L
CO2 SERPL-SCNC: 18 MMOL/L
COLOR: YELLOW
CREAT SERPL-MCNC: 1.03 MG/DL
CREAT SPEC-SCNC: 18 MG/DL
CREAT/PROT UR: NORMAL RATIO
CRP SERPL-MCNC: 3 MG/L
DSDNA AB SER-ACNC: <1 IU/ML
EGFRCR SERPLBLD CKD-EPI 2021: 65 ML/MIN/1.73M2
ENA RNP AB SER-ACNC: 0.2 AL
ENA SCL70 AB SER-ACNC: 0.5 AL
ENA SM AB SER-ACNC: <0.2 AL
ENA SS-A AB SER-ACNC: <0.2 AL
ENA SS-B AB SER-ACNC: 0.4 AL
EOSINOPHIL # BLD AUTO: 0.09 K/UL
EOSINOPHIL NFR BLD AUTO: 1.3 %
ERYTHROCYTE [SEDIMENTATION RATE] IN BLOOD BY WESTERGREN METHOD: 24 MM/HR
GAMMA GLOB FLD ELPH-MCNC: 0.6 G/DL
GAMMA GLOB MFR SERPL ELPH: 9 %
GLUCOSE QUALITATIVE U: NEGATIVE MG/DL
GLUCOSE SERPL-MCNC: 94 MG/DL
HCT VFR BLD CALC: 40.6 %
HGB BLD-MCNC: 13.1 G/DL
IMM GRANULOCYTES NFR BLD AUTO: 0.1 %
INTERPRETATION SERPL IEP-IMP: NORMAL
KETONES URINE: NEGATIVE MG/DL
LEUKOCYTE ESTERASE URINE: ABNORMAL
LUPUS ANTICOAGULANT CASCADE REFLEX: NORMAL
LYMPHOCYTES # BLD AUTO: 2.14 K/UL
LYMPHOCYTES NFR BLD AUTO: 30.5 %
M PROTEIN MFR SERPL ELPH: NORMAL
MAN DIFF?: NORMAL
MCHC RBC-ENTMCNC: 30 PG
MCHC RBC-ENTMCNC: 32.3 G/DL
MCV RBC AUTO: 92.9 FL
MONOCLON BAND OBS SERPL: NORMAL
MONOCYTES # BLD AUTO: 0.42 K/UL
MONOCYTES NFR BLD AUTO: 6 %
NEUTROPHILS # BLD AUTO: 4.31 K/UL
NEUTROPHILS NFR BLD AUTO: 61.5 %
NITRITE URINE: NEGATIVE
P-ANCA TITR SER IF: NEGATIVE
PH URINE: 7
PLATELET # BLD AUTO: 317 K/UL
POTASSIUM SERPL-SCNC: 4.2 MMOL/L
PROT SERPL-MCNC: 6.7 G/DL
PROT SERPL-MCNC: 6.7 G/DL
PROT SERPL-MCNC: 6.8 G/DL
PROT UR-MCNC: <4 MG/DL
PROTEIN URINE: NEGATIVE MG/DL
RBC # BLD: 4.37 M/UL
RBC # FLD: 13.2 %
RHEUMATOID FACT SERPL-ACNC: <10 IU/ML
SODIUM SERPL-SCNC: 142 MMOL/L
SPECIFIC GRAVITY URINE: 1.01
TSH SERPL-ACNC: 1.91 UIU/ML
UROBILINOGEN URINE: 0.2 MG/DL
WBC # FLD AUTO: 7.01 K/UL

## 2025-06-26 LAB — ACE BLD-CCNC: 65 U/L

## 2025-07-15 ENCOUNTER — OUTPATIENT (OUTPATIENT)
Dept: OUTPATIENT SERVICES | Facility: HOSPITAL | Age: 55
LOS: 1 days | Discharge: HOME | End: 2025-07-15

## 2025-07-15 DIAGNOSIS — D64.9 ANEMIA, UNSPECIFIED: ICD-10-CM

## 2025-07-15 PROBLEM — M25.561 ACUTE PAIN OF RIGHT KNEE: Status: RESOLVED | Noted: 2025-03-07 | Resolved: 2025-07-15

## 2025-07-15 PROBLEM — E03.9 HYPOTHYROIDISM, UNSPECIFIED TYPE: Status: ACTIVE | Noted: 2018-02-01

## 2025-07-15 PROBLEM — Z87.898 HISTORY OF INSOMNIA: Status: RESOLVED | Noted: 2018-08-02 | Resolved: 2025-07-15

## 2025-07-15 PROBLEM — S82.62XD: Status: RESOLVED | Noted: 2024-09-12 | Resolved: 2025-07-15

## 2025-07-15 PROBLEM — S93.432A SYNDESMOTIC DISRUPTION OF LEFT ANKLE, INITIAL ENCOUNTER: Status: RESOLVED | Noted: 2024-09-12 | Resolved: 2025-07-15

## 2025-07-15 PROBLEM — M54.9 UPPER BACK PAIN: Status: RESOLVED | Noted: 2023-01-09 | Resolved: 2025-07-15

## 2025-07-15 PROBLEM — Z87.39 HISTORY OF NECK PAIN: Status: RESOLVED | Noted: 2023-03-02 | Resolved: 2025-07-15

## 2025-07-15 PROBLEM — S93.432D SYNDESMOTIC DISRUPTION OF LEFT ANKLE, SUBSEQUENT ENCOUNTER: Status: RESOLVED | Noted: 2024-09-12 | Resolved: 2025-07-15

## 2025-07-15 PROBLEM — Z84.0 FAMILY HISTORY OF PSORIATIC ARTHRITIS: Status: ACTIVE | Noted: 2025-07-15

## 2025-07-16 ENCOUNTER — RESULT REVIEW (OUTPATIENT)
Age: 55
End: 2025-07-16

## 2025-07-16 ENCOUNTER — APPOINTMENT (OUTPATIENT)
Dept: HEMATOLOGY ONCOLOGY | Facility: CLINIC | Age: 55
End: 2025-07-16
Payer: COMMERCIAL

## 2025-07-16 VITALS
HEART RATE: 52 BPM | DIASTOLIC BLOOD PRESSURE: 70 MMHG | TEMPERATURE: 98.1 F | WEIGHT: 177 LBS | OXYGEN SATURATION: 97 % | SYSTOLIC BLOOD PRESSURE: 102 MMHG | BODY MASS INDEX: 31.35 KG/M2

## 2025-07-16 LAB
BASOPHILS # BLD AUTO: 0.05 K/UL — SIGNIFICANT CHANGE UP (ref 0–0.2)
BASOPHILS NFR BLD AUTO: 0.9 % — SIGNIFICANT CHANGE UP (ref 0–2)
EOSINOPHIL # BLD AUTO: 0.16 K/UL — SIGNIFICANT CHANGE UP (ref 0–0.5)
EOSINOPHIL NFR BLD AUTO: 2.7 % — SIGNIFICANT CHANGE UP (ref 0–6)
HCT VFR BLD CALC: 40.8 % — SIGNIFICANT CHANGE UP (ref 34.5–45)
HGB BLD-MCNC: 13.5 G/DL — SIGNIFICANT CHANGE UP (ref 11.5–15.5)
IMM GRANULOCYTES # BLD AUTO: 0.01 K/UL — SIGNIFICANT CHANGE UP (ref 0–0.07)
IMM GRANULOCYTES NFR BLD AUTO: 0.2 % — SIGNIFICANT CHANGE UP (ref 0–0.9)
LYMPHOCYTES # BLD AUTO: 1.73 K/UL — SIGNIFICANT CHANGE UP (ref 1–3.3)
LYMPHOCYTES NFR BLD AUTO: 29.4 % — SIGNIFICANT CHANGE UP (ref 13–44)
MCHC RBC-ENTMCNC: 30.3 PG — SIGNIFICANT CHANGE UP (ref 27–34)
MCHC RBC-ENTMCNC: 33.1 G/DL — SIGNIFICANT CHANGE UP (ref 32–36)
MCV RBC AUTO: 91.7 FL — SIGNIFICANT CHANGE UP (ref 80–100)
MONOCYTES # BLD AUTO: 0.44 K/UL — SIGNIFICANT CHANGE UP (ref 0–0.9)
MONOCYTES NFR BLD AUTO: 7.5 % — SIGNIFICANT CHANGE UP (ref 2–14)
NEUTROPHILS # BLD AUTO: 3.49 K/UL — SIGNIFICANT CHANGE UP (ref 1.8–7.4)
NEUTROPHILS NFR BLD AUTO: 59.3 % — SIGNIFICANT CHANGE UP (ref 43–77)
NRBC # BLD AUTO: 0 K/UL — SIGNIFICANT CHANGE UP (ref 0–0)
NRBC # FLD: 0 K/UL — SIGNIFICANT CHANGE UP (ref 0–0)
NRBC BLD AUTO-RTO: 0 /100 WBCS — SIGNIFICANT CHANGE UP (ref 0–0)
PLATELET # BLD AUTO: 315 K/UL — SIGNIFICANT CHANGE UP (ref 150–400)
PMV BLD: 10.3 FL — SIGNIFICANT CHANGE UP (ref 7–13)
RBC # BLD: 4.45 M/UL — SIGNIFICANT CHANGE UP (ref 3.8–5.2)
RBC # FLD: 13.3 % — SIGNIFICANT CHANGE UP (ref 10.3–14.5)
WBC # BLD: 5.88 K/UL — SIGNIFICANT CHANGE UP (ref 3.8–10.5)
WBC # FLD AUTO: 5.88 K/UL — SIGNIFICANT CHANGE UP (ref 3.8–10.5)

## 2025-07-16 PROCEDURE — 99204 OFFICE O/P NEW MOD 45 MIN: CPT

## 2025-07-17 LAB
FREE KAPPA URINE: 0.43 MG/L
FREE KAPPA/LAMDA RATIO: NORMAL
FREE LAMDA URINE: <0.74 MG/L

## 2025-07-18 DIAGNOSIS — D47.2 MONOCLONAL GAMMOPATHY: ICD-10-CM

## 2025-07-18 PROBLEM — M79.7 FIBROMYALGIA: Status: ACTIVE | Noted: 2025-07-18

## 2025-07-18 PROBLEM — M32.9 HISTORY OF SYSTEMIC LUPUS ERYTHEMATOSUS (SLE): Status: RESOLVED | Noted: 2018-02-01 | Resolved: 2025-07-18

## 2025-07-18 PROBLEM — Z80.0 FAMILY HISTORY OF LIVER CANCER: Status: ACTIVE | Noted: 2025-07-18

## 2025-07-18 PROBLEM — Z80.0 FHX: COLON CANCER: Status: ACTIVE | Noted: 2025-07-15

## 2025-07-18 PROBLEM — F10.99 ALCOHOL-RELATED DISORDER: Status: ACTIVE | Noted: 2025-07-18

## 2025-07-18 PROBLEM — I73.00 RAYNAUD DISEASE: Status: ACTIVE | Noted: 2025-07-18

## 2025-07-18 PROBLEM — R56.9 SEIZURES: Status: RESOLVED | Noted: 2022-10-30 | Resolved: 2025-07-18

## 2025-07-18 PROBLEM — K52.9 COLITIS: Status: ACTIVE | Noted: 2025-07-18

## 2025-07-18 PROBLEM — F25.9 SCHIZOAFFECTIVE DISORDER: Status: ACTIVE | Noted: 2025-07-18

## 2025-07-18 PROBLEM — Z87.891 FORMER SMOKER: Status: ACTIVE | Noted: 2025-07-18

## 2025-07-21 LAB
ALBUMIN MFR SERPL ELPH: 67.4 %
ALBUMIN SERPL-MCNC: 4.5 G/DL
ALBUMIN/GLOB SERPL: 2 RATIO
ALBUPE: 13 %
ALPHA1 GLOB MFR SERPL ELPH: 3.7 %
ALPHA1 GLOB SERPL ELPH-MCNC: 0.2 G/DL
ALPHA1UPE: 31 %
ALPHA2 GLOB MFR SERPL ELPH: 9.6 %
ALPHA2 GLOB SERPL ELPH-MCNC: 0.6 G/DL
ALPHA2UPE: 15.5 %
B-GLOBULIN MFR SERPL ELPH: 10.7 %
B-GLOBULIN SERPL ELPH-MCNC: 0.7 G/DL
BETAUPE: 6.7 %
DEPRECATED KAPPA LC FREE/LAMBDA SER: 0.86 RATIO
GAMMA GLOB FLD ELPH-MCNC: 0.6 G/DL
GAMMA GLOB MFR SERPL ELPH: 8.6 %
GAMMAUPE: 33.8 %
IGA 24H UR QL IFE: NORMAL
IGA SERPL-MCNC: 51 MG/DL
IGG SERPL-MCNC: 533 MG/DL
IGM SERPL-MCNC: 72 MG/DL
INTERPRETATION SERPL IEP-IMP: NORMAL
KAPPA LC 24H UR QL: NORMAL
KAPPA LC CSF-MCNC: 1.17 MG/DL
KAPPA LC SERPL-MCNC: 1.01 MG/DL
M PROTEIN MFR SERPL ELPH: NORMAL
M PROTEIN SPEC IFE-MCNC: NORMAL
MONOCLON BAND OBS SERPL: NORMAL
PROT PATTERN 24H UR ELPH-IMP: NORMAL
PROT SERPL-MCNC: 6.7 G/DL
PROT SERPL-MCNC: 6.7 G/DL
PROT UR-MCNC: <4 MG/DL
PROT UR-MCNC: <4 MG/DL

## 2025-07-25 ENCOUNTER — APPOINTMENT (OUTPATIENT)
Dept: ORTHOPEDIC SURGERY | Facility: CLINIC | Age: 55
End: 2025-07-25

## 2025-07-28 ENCOUNTER — APPOINTMENT (OUTPATIENT)
Dept: NEUROLOGY | Facility: CLINIC | Age: 55
End: 2025-07-28

## 2025-09-08 ENCOUNTER — APPOINTMENT (OUTPATIENT)
Dept: ORTHOPEDIC SURGERY | Facility: CLINIC | Age: 55
End: 2025-09-08
Payer: COMMERCIAL

## 2025-09-08 VITALS — WEIGHT: 173 LBS | BODY MASS INDEX: 30.65 KG/M2 | HEIGHT: 63 IN

## 2025-09-08 DIAGNOSIS — M24.812 OTHER SPECIFIC JOINT DERANGEMENTS OF LEFT SHOULDER, NOT ELSEWHERE CLASSIFIED: ICD-10-CM

## 2025-09-08 DIAGNOSIS — M25.512 PAIN IN LEFT SHOULDER: ICD-10-CM

## 2025-09-08 PROCEDURE — 99213 OFFICE O/P EST LOW 20 MIN: CPT | Mod: 25

## 2025-09-08 PROCEDURE — 73030 X-RAY EXAM OF SHOULDER: CPT | Mod: LT

## 2025-09-10 ENCOUNTER — APPOINTMENT (OUTPATIENT)
Dept: MRI IMAGING | Facility: CLINIC | Age: 55
End: 2025-09-10
Payer: COMMERCIAL

## 2025-09-10 PROCEDURE — 73221 MRI JOINT UPR EXTREM W/O DYE: CPT | Mod: 26,LT

## 2025-09-18 ENCOUNTER — APPOINTMENT (OUTPATIENT)
Dept: RHEUMATOLOGY | Facility: CLINIC | Age: 55
End: 2025-09-18